# Patient Record
Sex: FEMALE | Race: WHITE | NOT HISPANIC OR LATINO | URBAN - METROPOLITAN AREA
[De-identification: names, ages, dates, MRNs, and addresses within clinical notes are randomized per-mention and may not be internally consistent; named-entity substitution may affect disease eponyms.]

---

## 2017-06-14 ENCOUNTER — OUTPATIENT (OUTPATIENT)
Dept: OUTPATIENT SERVICES | Facility: HOSPITAL | Age: 62
LOS: 1 days | Discharge: HOME | End: 2017-06-14

## 2017-06-14 DIAGNOSIS — D64.9 ANEMIA, UNSPECIFIED: ICD-10-CM

## 2017-06-14 DIAGNOSIS — R19.5 OTHER FECAL ABNORMALITIES: ICD-10-CM

## 2017-06-14 DIAGNOSIS — N39.0 URINARY TRACT INFECTION, SITE NOT SPECIFIED: ICD-10-CM

## 2017-06-28 DIAGNOSIS — D64.9 ANEMIA, UNSPECIFIED: ICD-10-CM

## 2017-09-11 ENCOUNTER — OUTPATIENT (OUTPATIENT)
Dept: OUTPATIENT SERVICES | Facility: HOSPITAL | Age: 62
LOS: 1 days | Discharge: HOME | End: 2017-09-11

## 2017-09-11 DIAGNOSIS — R50.9 FEVER, UNSPECIFIED: ICD-10-CM

## 2017-09-11 DIAGNOSIS — R19.5 OTHER FECAL ABNORMALITIES: ICD-10-CM

## 2017-09-11 DIAGNOSIS — D64.9 ANEMIA, UNSPECIFIED: ICD-10-CM

## 2017-09-11 DIAGNOSIS — N39.0 URINARY TRACT INFECTION, SITE NOT SPECIFIED: ICD-10-CM

## 2017-09-12 ENCOUNTER — OUTPATIENT (OUTPATIENT)
Dept: OUTPATIENT SERVICES | Facility: HOSPITAL | Age: 62
LOS: 1 days | Discharge: HOME | End: 2017-09-12

## 2017-09-12 DIAGNOSIS — D64.9 ANEMIA, UNSPECIFIED: ICD-10-CM

## 2017-09-12 DIAGNOSIS — Z79.899 OTHER LONG TERM (CURRENT) DRUG THERAPY: ICD-10-CM

## 2017-09-12 DIAGNOSIS — N39.0 URINARY TRACT INFECTION, SITE NOT SPECIFIED: ICD-10-CM

## 2017-09-12 DIAGNOSIS — R19.5 OTHER FECAL ABNORMALITIES: ICD-10-CM

## 2017-09-12 DIAGNOSIS — R94.6 ABNORMAL RESULTS OF THYROID FUNCTION STUDIES: ICD-10-CM

## 2017-09-14 ENCOUNTER — OUTPATIENT (OUTPATIENT)
Dept: OUTPATIENT SERVICES | Facility: HOSPITAL | Age: 62
LOS: 1 days | Discharge: HOME | End: 2017-09-14

## 2017-09-14 DIAGNOSIS — R19.5 OTHER FECAL ABNORMALITIES: ICD-10-CM

## 2017-09-14 DIAGNOSIS — D64.9 ANEMIA, UNSPECIFIED: ICD-10-CM

## 2017-09-14 DIAGNOSIS — N39.0 URINARY TRACT INFECTION, SITE NOT SPECIFIED: ICD-10-CM

## 2017-09-14 DIAGNOSIS — R79.9 ABNORMAL FINDING OF BLOOD CHEMISTRY, UNSPECIFIED: ICD-10-CM

## 2017-09-18 ENCOUNTER — OUTPATIENT (OUTPATIENT)
Dept: OUTPATIENT SERVICES | Facility: HOSPITAL | Age: 62
LOS: 1 days | Discharge: HOME | End: 2017-09-18

## 2017-09-18 DIAGNOSIS — R19.5 OTHER FECAL ABNORMALITIES: ICD-10-CM

## 2017-09-18 DIAGNOSIS — D64.9 ANEMIA, UNSPECIFIED: ICD-10-CM

## 2017-09-18 DIAGNOSIS — N39.0 URINARY TRACT INFECTION, SITE NOT SPECIFIED: ICD-10-CM

## 2017-09-18 DIAGNOSIS — R79.9 ABNORMAL FINDING OF BLOOD CHEMISTRY, UNSPECIFIED: ICD-10-CM

## 2017-09-24 ENCOUNTER — INPATIENT (INPATIENT)
Facility: HOSPITAL | Age: 62
LOS: 11 days | Discharge: NURSING, REHAB OR LONG-TERM CARE | End: 2017-10-06
Attending: HOSPITALIST

## 2017-09-24 DIAGNOSIS — R19.5 OTHER FECAL ABNORMALITIES: ICD-10-CM

## 2017-09-24 DIAGNOSIS — D64.9 ANEMIA, UNSPECIFIED: ICD-10-CM

## 2017-09-24 DIAGNOSIS — N39.0 URINARY TRACT INFECTION, SITE NOT SPECIFIED: ICD-10-CM

## 2017-10-06 PROBLEM — Z00.00 ENCOUNTER FOR PREVENTIVE HEALTH EXAMINATION: Status: ACTIVE | Noted: 2017-10-06

## 2017-10-07 ENCOUNTER — OUTPATIENT (OUTPATIENT)
Dept: OUTPATIENT SERVICES | Facility: HOSPITAL | Age: 62
LOS: 1 days | Discharge: HOME | End: 2017-10-07

## 2017-10-07 DIAGNOSIS — D64.9 ANEMIA, UNSPECIFIED: ICD-10-CM

## 2017-10-07 DIAGNOSIS — R19.5 OTHER FECAL ABNORMALITIES: ICD-10-CM

## 2017-10-07 DIAGNOSIS — N39.0 URINARY TRACT INFECTION, SITE NOT SPECIFIED: ICD-10-CM

## 2017-10-09 ENCOUNTER — OUTPATIENT (OUTPATIENT)
Dept: OUTPATIENT SERVICES | Facility: HOSPITAL | Age: 62
LOS: 1 days | Discharge: HOME | End: 2017-10-09

## 2017-10-09 DIAGNOSIS — E03.9 HYPOTHYROIDISM, UNSPECIFIED: ICD-10-CM

## 2017-10-09 DIAGNOSIS — D64.9 ANEMIA, UNSPECIFIED: ICD-10-CM

## 2017-10-09 DIAGNOSIS — N39.0 URINARY TRACT INFECTION, SITE NOT SPECIFIED: ICD-10-CM

## 2017-10-09 DIAGNOSIS — R19.5 OTHER FECAL ABNORMALITIES: ICD-10-CM

## 2017-10-09 DIAGNOSIS — R79.9 ABNORMAL FINDING OF BLOOD CHEMISTRY, UNSPECIFIED: ICD-10-CM

## 2017-10-12 ENCOUNTER — OUTPATIENT (OUTPATIENT)
Dept: OUTPATIENT SERVICES | Facility: HOSPITAL | Age: 62
LOS: 1 days | Discharge: HOME | End: 2017-10-12

## 2017-10-12 ENCOUNTER — INPATIENT (INPATIENT)
Facility: HOSPITAL | Age: 62
LOS: 3 days | Discharge: NURSING, REHAB OR LONG-TERM CARE | End: 2017-10-16
Attending: INTERNAL MEDICINE

## 2017-10-12 DIAGNOSIS — N39.0 URINARY TRACT INFECTION, SITE NOT SPECIFIED: ICD-10-CM

## 2017-10-12 DIAGNOSIS — R19.5 OTHER FECAL ABNORMALITIES: ICD-10-CM

## 2017-10-12 DIAGNOSIS — N17.9 ACUTE KIDNEY FAILURE, UNSPECIFIED: ICD-10-CM

## 2017-10-12 DIAGNOSIS — D64.9 ANEMIA, UNSPECIFIED: ICD-10-CM

## 2017-10-12 DIAGNOSIS — G40.909 EPILEPSY, UNSPECIFIED, NOT INTRACTABLE, WITHOUT STATUS EPILEPTICUS: ICD-10-CM

## 2017-10-12 DIAGNOSIS — F29 UNSPECIFIED PSYCHOSIS NOT DUE TO A SUBSTANCE OR KNOWN PHYSIOLOGICAL CONDITION: ICD-10-CM

## 2017-10-12 DIAGNOSIS — G35 MULTIPLE SCLEROSIS: ICD-10-CM

## 2017-10-12 DIAGNOSIS — G93.41 METABOLIC ENCEPHALOPATHY: ICD-10-CM

## 2017-10-12 DIAGNOSIS — E86.1 HYPOVOLEMIA: ICD-10-CM

## 2017-10-12 DIAGNOSIS — F31.9 BIPOLAR DISORDER, UNSPECIFIED: ICD-10-CM

## 2017-10-12 DIAGNOSIS — K59.00 CONSTIPATION, UNSPECIFIED: ICD-10-CM

## 2017-10-12 DIAGNOSIS — R33.8 OTHER RETENTION OF URINE: ICD-10-CM

## 2017-10-12 DIAGNOSIS — E87.2 ACIDOSIS: ICD-10-CM

## 2017-10-12 DIAGNOSIS — E44.1 MILD PROTEIN-CALORIE MALNUTRITION: ICD-10-CM

## 2017-10-12 DIAGNOSIS — E87.5 HYPERKALEMIA: ICD-10-CM

## 2017-10-12 DIAGNOSIS — E03.9 HYPOTHYROIDISM, UNSPECIFIED: ICD-10-CM

## 2017-10-12 DIAGNOSIS — E87.1 HYPO-OSMOLALITY AND HYPONATREMIA: ICD-10-CM

## 2017-10-12 DIAGNOSIS — E86.0 DEHYDRATION: ICD-10-CM

## 2017-10-12 DIAGNOSIS — B96.20 UNSPECIFIED ESCHERICHIA COLI [E. COLI] AS THE CAUSE OF DISEASES CLASSIFIED ELSEWHERE: ICD-10-CM

## 2017-10-12 DIAGNOSIS — R94.5 ABNORMAL RESULTS OF LIVER FUNCTION STUDIES: ICD-10-CM

## 2017-10-12 DIAGNOSIS — E83.52 HYPERCALCEMIA: ICD-10-CM

## 2017-10-12 DIAGNOSIS — M81.0 AGE-RELATED OSTEOPOROSIS WITHOUT CURRENT PATHOLOGICAL FRACTURE: ICD-10-CM

## 2017-10-12 DIAGNOSIS — R79.9 ABNORMAL FINDING OF BLOOD CHEMISTRY, UNSPECIFIED: ICD-10-CM

## 2017-10-12 DIAGNOSIS — D72.829 ELEVATED WHITE BLOOD CELL COUNT, UNSPECIFIED: ICD-10-CM

## 2017-10-12 DIAGNOSIS — E72.20 DISORDER OF UREA CYCLE METABOLISM, UNSPECIFIED: ICD-10-CM

## 2017-10-12 DIAGNOSIS — E53.8 DEFICIENCY OF OTHER SPECIFIED B GROUP VITAMINS: ICD-10-CM

## 2017-10-12 DIAGNOSIS — N26.1 ATROPHY OF KIDNEY (TERMINAL): ICD-10-CM

## 2017-10-12 DIAGNOSIS — N18.3 CHRONIC KIDNEY DISEASE, STAGE 3 (MODERATE): ICD-10-CM

## 2017-10-12 DIAGNOSIS — K72.90 HEPATIC FAILURE, UNSPECIFIED WITHOUT COMA: ICD-10-CM

## 2017-10-12 DIAGNOSIS — R74.0 NONSPECIFIC ELEVATION OF LEVELS OF TRANSAMINASE AND LACTIC ACID DEHYDROGENASE [LDH]: ICD-10-CM

## 2017-10-13 DIAGNOSIS — Z02.9 ENCOUNTER FOR ADMINISTRATIVE EXAMINATIONS, UNSPECIFIED: ICD-10-CM

## 2017-10-19 DIAGNOSIS — E03.9 HYPOTHYROIDISM, UNSPECIFIED: ICD-10-CM

## 2017-10-19 DIAGNOSIS — D64.9 ANEMIA, UNSPECIFIED: ICD-10-CM

## 2017-10-19 DIAGNOSIS — R56.9 UNSPECIFIED CONVULSIONS: ICD-10-CM

## 2017-10-19 DIAGNOSIS — N18.9 CHRONIC KIDNEY DISEASE, UNSPECIFIED: ICD-10-CM

## 2017-10-19 DIAGNOSIS — Z96.0 PRESENCE OF UROGENITAL IMPLANTS: ICD-10-CM

## 2017-10-19 DIAGNOSIS — K76.89 OTHER SPECIFIED DISEASES OF LIVER: ICD-10-CM

## 2017-10-19 DIAGNOSIS — M81.0 AGE-RELATED OSTEOPOROSIS WITHOUT CURRENT PATHOLOGICAL FRACTURE: ICD-10-CM

## 2017-10-19 DIAGNOSIS — F31.9 BIPOLAR DISORDER, UNSPECIFIED: ICD-10-CM

## 2017-10-19 DIAGNOSIS — G35 MULTIPLE SCLEROSIS: ICD-10-CM

## 2017-10-19 DIAGNOSIS — E87.1 HYPO-OSMOLALITY AND HYPONATREMIA: ICD-10-CM

## 2017-10-19 DIAGNOSIS — N39.0 URINARY TRACT INFECTION, SITE NOT SPECIFIED: ICD-10-CM

## 2017-10-19 DIAGNOSIS — E11.22 TYPE 2 DIABETES MELLITUS WITH DIABETIC CHRONIC KIDNEY DISEASE: ICD-10-CM

## 2017-10-24 DIAGNOSIS — L89.151 PRESSURE ULCER OF SACRAL REGION, STAGE 1: ICD-10-CM

## 2017-11-06 ENCOUNTER — OUTPATIENT (OUTPATIENT)
Dept: OUTPATIENT SERVICES | Facility: HOSPITAL | Age: 62
LOS: 1 days | Discharge: HOME | End: 2017-11-06

## 2017-11-06 DIAGNOSIS — D64.9 ANEMIA, UNSPECIFIED: ICD-10-CM

## 2017-11-06 DIAGNOSIS — N39.0 URINARY TRACT INFECTION, SITE NOT SPECIFIED: ICD-10-CM

## 2017-11-06 DIAGNOSIS — R19.5 OTHER FECAL ABNORMALITIES: ICD-10-CM

## 2017-11-06 DIAGNOSIS — R79.9 ABNORMAL FINDING OF BLOOD CHEMISTRY, UNSPECIFIED: ICD-10-CM

## 2017-11-14 ENCOUNTER — OUTPATIENT (OUTPATIENT)
Dept: OUTPATIENT SERVICES | Facility: HOSPITAL | Age: 62
LOS: 1 days | Discharge: HOME | End: 2017-11-14

## 2017-11-14 DIAGNOSIS — N39.0 URINARY TRACT INFECTION, SITE NOT SPECIFIED: ICD-10-CM

## 2017-11-14 DIAGNOSIS — R19.5 OTHER FECAL ABNORMALITIES: ICD-10-CM

## 2017-11-14 DIAGNOSIS — D64.9 ANEMIA, UNSPECIFIED: ICD-10-CM

## 2017-11-14 DIAGNOSIS — R79.9 ABNORMAL FINDING OF BLOOD CHEMISTRY, UNSPECIFIED: ICD-10-CM

## 2017-11-17 ENCOUNTER — OUTPATIENT (OUTPATIENT)
Dept: OUTPATIENT SERVICES | Facility: HOSPITAL | Age: 62
LOS: 1 days | Discharge: HOME | End: 2017-11-17

## 2017-11-17 DIAGNOSIS — D64.9 ANEMIA, UNSPECIFIED: ICD-10-CM

## 2017-11-17 DIAGNOSIS — N39.0 URINARY TRACT INFECTION, SITE NOT SPECIFIED: ICD-10-CM

## 2017-11-17 DIAGNOSIS — R19.5 OTHER FECAL ABNORMALITIES: ICD-10-CM

## 2017-11-22 ENCOUNTER — OUTPATIENT (OUTPATIENT)
Dept: OUTPATIENT SERVICES | Facility: HOSPITAL | Age: 62
LOS: 1 days | Discharge: HOME | End: 2017-11-22

## 2017-11-22 DIAGNOSIS — N39.0 URINARY TRACT INFECTION, SITE NOT SPECIFIED: ICD-10-CM

## 2017-11-22 DIAGNOSIS — D64.9 ANEMIA, UNSPECIFIED: ICD-10-CM

## 2017-11-22 DIAGNOSIS — R19.5 OTHER FECAL ABNORMALITIES: ICD-10-CM

## 2017-11-27 ENCOUNTER — OUTPATIENT (OUTPATIENT)
Dept: OUTPATIENT SERVICES | Facility: HOSPITAL | Age: 62
LOS: 1 days | Discharge: HOME | End: 2017-11-27

## 2017-11-27 DIAGNOSIS — N39.0 URINARY TRACT INFECTION, SITE NOT SPECIFIED: ICD-10-CM

## 2017-11-27 DIAGNOSIS — D64.9 ANEMIA, UNSPECIFIED: ICD-10-CM

## 2017-11-27 DIAGNOSIS — R19.5 OTHER FECAL ABNORMALITIES: ICD-10-CM

## 2017-11-27 DIAGNOSIS — R71.8 OTHER ABNORMALITY OF RED BLOOD CELLS: ICD-10-CM

## 2017-12-14 ENCOUNTER — OUTPATIENT (OUTPATIENT)
Dept: OUTPATIENT SERVICES | Facility: HOSPITAL | Age: 62
LOS: 1 days | Discharge: HOME | End: 2017-12-14

## 2017-12-14 DIAGNOSIS — N39.0 URINARY TRACT INFECTION, SITE NOT SPECIFIED: ICD-10-CM

## 2017-12-14 DIAGNOSIS — R79.9 ABNORMAL FINDING OF BLOOD CHEMISTRY, UNSPECIFIED: ICD-10-CM

## 2017-12-14 DIAGNOSIS — E87.4 MIXED DISORDER OF ACID-BASE BALANCE: ICD-10-CM

## 2017-12-14 DIAGNOSIS — D64.9 ANEMIA, UNSPECIFIED: ICD-10-CM

## 2017-12-14 DIAGNOSIS — R94.6 ABNORMAL RESULTS OF THYROID FUNCTION STUDIES: ICD-10-CM

## 2017-12-14 DIAGNOSIS — R19.5 OTHER FECAL ABNORMALITIES: ICD-10-CM

## 2017-12-14 DIAGNOSIS — Z51.81 ENCOUNTER FOR THERAPEUTIC DRUG LEVEL MONITORING: ICD-10-CM

## 2018-01-11 ENCOUNTER — INPATIENT (INPATIENT)
Facility: HOSPITAL | Age: 63
LOS: 5 days | Discharge: NURSING, REHAB OR LONG-TERM CARE | End: 2018-01-17
Attending: HOSPITALIST

## 2018-01-11 ENCOUNTER — OUTPATIENT (OUTPATIENT)
Dept: OUTPATIENT SERVICES | Facility: HOSPITAL | Age: 63
LOS: 1 days | Discharge: HOME | End: 2018-01-11

## 2018-01-11 DIAGNOSIS — N39.0 URINARY TRACT INFECTION, SITE NOT SPECIFIED: ICD-10-CM

## 2018-01-11 DIAGNOSIS — R19.5 OTHER FECAL ABNORMALITIES: ICD-10-CM

## 2018-01-11 DIAGNOSIS — D64.9 ANEMIA, UNSPECIFIED: ICD-10-CM

## 2018-01-11 DIAGNOSIS — R79.9 ABNORMAL FINDING OF BLOOD CHEMISTRY, UNSPECIFIED: ICD-10-CM

## 2018-01-12 DIAGNOSIS — Z02.9 ENCOUNTER FOR ADMINISTRATIVE EXAMINATIONS, UNSPECIFIED: ICD-10-CM

## 2018-01-18 ENCOUNTER — OUTPATIENT (OUTPATIENT)
Dept: OUTPATIENT SERVICES | Facility: HOSPITAL | Age: 63
LOS: 1 days | Discharge: HOME | End: 2018-01-18

## 2018-01-18 DIAGNOSIS — D64.9 ANEMIA, UNSPECIFIED: ICD-10-CM

## 2018-01-18 DIAGNOSIS — R79.9 ABNORMAL FINDING OF BLOOD CHEMISTRY, UNSPECIFIED: ICD-10-CM

## 2018-01-18 DIAGNOSIS — R19.5 OTHER FECAL ABNORMALITIES: ICD-10-CM

## 2018-01-18 DIAGNOSIS — N39.0 URINARY TRACT INFECTION, SITE NOT SPECIFIED: ICD-10-CM

## 2018-01-22 DIAGNOSIS — G93.41 METABOLIC ENCEPHALOPATHY: ICD-10-CM

## 2018-01-22 DIAGNOSIS — N13.6 PYONEPHROSIS: ICD-10-CM

## 2018-01-22 DIAGNOSIS — F31.9 BIPOLAR DISORDER, UNSPECIFIED: ICD-10-CM

## 2018-01-22 DIAGNOSIS — N18.4 CHRONIC KIDNEY DISEASE, STAGE 4 (SEVERE): ICD-10-CM

## 2018-01-22 DIAGNOSIS — G40.909 EPILEPSY, UNSPECIFIED, NOT INTRACTABLE, WITHOUT STATUS EPILEPTICUS: ICD-10-CM

## 2018-01-22 DIAGNOSIS — R41.82 ALTERED MENTAL STATUS, UNSPECIFIED: ICD-10-CM

## 2018-01-22 DIAGNOSIS — N17.9 ACUTE KIDNEY FAILURE, UNSPECIFIED: ICD-10-CM

## 2018-01-22 DIAGNOSIS — E11.22 TYPE 2 DIABETES MELLITUS WITH DIABETIC CHRONIC KIDNEY DISEASE: ICD-10-CM

## 2018-01-22 DIAGNOSIS — R33.9 RETENTION OF URINE, UNSPECIFIED: ICD-10-CM

## 2018-01-22 DIAGNOSIS — G35 MULTIPLE SCLEROSIS: ICD-10-CM

## 2018-01-22 DIAGNOSIS — E87.1 HYPO-OSMOLALITY AND HYPONATREMIA: ICD-10-CM

## 2018-01-22 DIAGNOSIS — M81.0 AGE-RELATED OSTEOPOROSIS WITHOUT CURRENT PATHOLOGICAL FRACTURE: ICD-10-CM

## 2018-01-22 DIAGNOSIS — L89.153 PRESSURE ULCER OF SACRAL REGION, STAGE 3: ICD-10-CM

## 2018-01-22 DIAGNOSIS — R65.20 SEVERE SEPSIS WITHOUT SEPTIC SHOCK: ICD-10-CM

## 2018-01-22 DIAGNOSIS — A41.50 GRAM-NEGATIVE SEPSIS, UNSPECIFIED: ICD-10-CM

## 2018-01-22 DIAGNOSIS — B96.20 UNSPECIFIED ESCHERICHIA COLI [E. COLI] AS THE CAUSE OF DISEASES CLASSIFIED ELSEWHERE: ICD-10-CM

## 2018-01-22 DIAGNOSIS — E87.2 ACIDOSIS: ICD-10-CM

## 2018-01-23 ENCOUNTER — OUTPATIENT (OUTPATIENT)
Dept: OUTPATIENT SERVICES | Facility: HOSPITAL | Age: 63
LOS: 1 days | Discharge: HOME | End: 2018-01-23

## 2018-01-23 DIAGNOSIS — R79.9 ABNORMAL FINDING OF BLOOD CHEMISTRY, UNSPECIFIED: ICD-10-CM

## 2018-01-23 DIAGNOSIS — D64.9 ANEMIA, UNSPECIFIED: ICD-10-CM

## 2018-01-29 ENCOUNTER — OUTPATIENT (OUTPATIENT)
Dept: OUTPATIENT SERVICES | Facility: HOSPITAL | Age: 63
LOS: 1 days | Discharge: HOME | End: 2018-01-29

## 2018-01-29 DIAGNOSIS — R79.9 ABNORMAL FINDING OF BLOOD CHEMISTRY, UNSPECIFIED: ICD-10-CM

## 2018-02-01 ENCOUNTER — APPOINTMENT (OUTPATIENT)
Dept: SURGERY | Facility: CLINIC | Age: 63
End: 2018-02-01

## 2018-02-01 ENCOUNTER — OUTPATIENT (OUTPATIENT)
Dept: OUTPATIENT SERVICES | Facility: HOSPITAL | Age: 63
LOS: 1 days | Discharge: HOME | End: 2018-02-01

## 2018-02-01 ENCOUNTER — INPATIENT (INPATIENT)
Facility: HOSPITAL | Age: 63
LOS: 7 days | Discharge: HOME HEALTH | End: 2018-02-09
Attending: HOSPITALIST

## 2018-02-01 DIAGNOSIS — R94.6 ABNORMAL RESULTS OF THYROID FUNCTION STUDIES: ICD-10-CM

## 2018-02-01 DIAGNOSIS — R79.9 ABNORMAL FINDING OF BLOOD CHEMISTRY, UNSPECIFIED: ICD-10-CM

## 2018-02-01 DIAGNOSIS — D64.9 ANEMIA, UNSPECIFIED: ICD-10-CM

## 2018-02-02 DIAGNOSIS — Z02.9 ENCOUNTER FOR ADMINISTRATIVE EXAMINATIONS, UNSPECIFIED: ICD-10-CM

## 2018-02-03 VITALS
HEART RATE: 102 BPM | DIASTOLIC BLOOD PRESSURE: 53 MMHG | SYSTOLIC BLOOD PRESSURE: 115 MMHG | TEMPERATURE: 98 F | RESPIRATION RATE: 16 BRPM

## 2018-02-03 LAB
CK SERPL-CCNC: 34 U/L — SIGNIFICANT CHANGE UP (ref 0–225)
VALPROATE SERPL-MCNC: <10 UG/ML — LOW (ref 50–100)

## 2018-02-03 RX ORDER — LACTOBACILLUS ACIDOPHILUS 100MM CELL
0 CAPSULE ORAL
Qty: 0 | Refills: 0 | COMMUNITY

## 2018-02-03 RX ORDER — HEPARIN SODIUM 5000 [USP'U]/ML
5000 INJECTION INTRAVENOUS; SUBCUTANEOUS EVERY 8 HOURS
Qty: 0 | Refills: 0 | Status: DISCONTINUED | OUTPATIENT
Start: 2018-02-03 | End: 2018-02-09

## 2018-02-03 RX ORDER — LEVOTHYROXINE SODIUM 125 MCG
1 TABLET ORAL
Qty: 0 | Refills: 0 | COMMUNITY

## 2018-02-03 RX ORDER — LEVOTHYROXINE SODIUM 125 MCG
50 TABLET ORAL DAILY
Qty: 0 | Refills: 0 | Status: DISCONTINUED | OUTPATIENT
Start: 2018-02-03 | End: 2018-02-09

## 2018-02-03 RX ORDER — CARBAMAZEPINE 200 MG
400 TABLET ORAL DAILY
Qty: 0 | Refills: 0 | Status: DISCONTINUED | OUTPATIENT
Start: 2018-02-03 | End: 2018-02-06

## 2018-02-03 RX ORDER — ZINC SULFATE TAB 220 MG (50 MG ZINC EQUIVALENT) 220 (50 ZN) MG
220 TAB ORAL DAILY
Qty: 0 | Refills: 0 | Status: DISCONTINUED | OUTPATIENT
Start: 2018-02-03 | End: 2018-02-09

## 2018-02-03 RX ORDER — DIVALPROEX SODIUM 500 MG/1
500 TABLET, DELAYED RELEASE ORAL AT BEDTIME
Qty: 0 | Refills: 0 | Status: DISCONTINUED | OUTPATIENT
Start: 2018-02-03 | End: 2018-02-06

## 2018-02-03 RX ORDER — THIAMINE MONONITRATE (VIT B1) 100 MG
1 TABLET ORAL
Qty: 0 | Refills: 0 | COMMUNITY

## 2018-02-03 RX ORDER — DIVALPROEX SODIUM 500 MG/1
250 TABLET, DELAYED RELEASE ORAL EVERY 12 HOURS
Qty: 0 | Refills: 0 | Status: DISCONTINUED | OUTPATIENT
Start: 2018-02-03 | End: 2018-02-03

## 2018-02-03 RX ORDER — FOLIC ACID 0.8 MG
1 TABLET ORAL DAILY
Qty: 0 | Refills: 0 | Status: DISCONTINUED | OUTPATIENT
Start: 2018-02-03 | End: 2018-02-09

## 2018-02-03 RX ORDER — DIVALPROEX SODIUM 500 MG/1
1 TABLET, DELAYED RELEASE ORAL
Qty: 0 | Refills: 0 | COMMUNITY

## 2018-02-03 RX ORDER — LACTULOSE 10 G/15ML
30 SOLUTION ORAL
Qty: 0 | Refills: 0 | COMMUNITY

## 2018-02-03 RX ORDER — SIMETHICONE 80 MG/1
1 TABLET, CHEWABLE ORAL
Qty: 0 | Refills: 0 | COMMUNITY

## 2018-02-03 RX ORDER — CHOLECALCIFEROL (VITAMIN D3) 125 MCG
1 CAPSULE ORAL
Qty: 0 | Refills: 0 | COMMUNITY

## 2018-02-03 RX ORDER — HEPARIN SODIUM 5000 [USP'U]/ML
5000 INJECTION INTRAVENOUS; SUBCUTANEOUS EVERY 8 HOURS
Qty: 0 | Refills: 0 | Status: DISCONTINUED | OUTPATIENT
Start: 2018-02-03 | End: 2018-02-03

## 2018-02-03 RX ORDER — THIAMINE MONONITRATE (VIT B1) 100 MG
100 TABLET ORAL DAILY
Qty: 0 | Refills: 0 | Status: DISCONTINUED | OUTPATIENT
Start: 2018-02-03 | End: 2018-02-09

## 2018-02-03 RX ORDER — CARBAMAZEPINE 200 MG
2 TABLET ORAL
Qty: 0 | Refills: 0 | COMMUNITY

## 2018-02-03 RX ORDER — DIVALPROEX SODIUM 500 MG/1
2 TABLET, DELAYED RELEASE ORAL
Qty: 0 | Refills: 0 | COMMUNITY

## 2018-02-03 RX ORDER — ZINC SULFATE TAB 220 MG (50 MG ZINC EQUIVALENT) 220 (50 ZN) MG
1 TAB ORAL
Qty: 0 | Refills: 0 | COMMUNITY

## 2018-02-03 RX ORDER — CARBAMAZEPINE 200 MG
1 TABLET ORAL
Qty: 0 | Refills: 0 | COMMUNITY

## 2018-02-03 RX ORDER — MEROPENEM 1 G/30ML
500 INJECTION INTRAVENOUS EVERY 8 HOURS
Qty: 0 | Refills: 0 | Status: DISCONTINUED | OUTPATIENT
Start: 2018-02-03 | End: 2018-02-03

## 2018-02-03 RX ORDER — DIVALPROEX SODIUM 500 MG/1
250 TABLET, DELAYED RELEASE ORAL
Qty: 0 | Refills: 0 | Status: DISCONTINUED | OUTPATIENT
Start: 2018-02-03 | End: 2018-02-06

## 2018-02-03 RX ORDER — LACTULOSE 10 G/15ML
10 SOLUTION ORAL EVERY 8 HOURS
Qty: 0 | Refills: 0 | Status: DISCONTINUED | OUTPATIENT
Start: 2018-02-03 | End: 2018-02-09

## 2018-02-03 RX ORDER — FOLIC ACID 0.8 MG
1 TABLET ORAL
Qty: 0 | Refills: 0 | COMMUNITY

## 2018-02-03 RX ORDER — CARBAMAZEPINE 200 MG
450 TABLET ORAL AT BEDTIME
Qty: 0 | Refills: 0 | Status: DISCONTINUED | OUTPATIENT
Start: 2018-02-03 | End: 2018-02-09

## 2018-02-03 RX ORDER — LACTOBACILLUS ACIDOPHILUS 100MM CELL
1 CAPSULE ORAL EVERY 8 HOURS
Qty: 0 | Refills: 0 | Status: DISCONTINUED | OUTPATIENT
Start: 2018-02-03 | End: 2018-02-03

## 2018-02-03 RX ORDER — MEROPENEM 1 G/30ML
500 INJECTION INTRAVENOUS EVERY 12 HOURS
Qty: 0 | Refills: 0 | Status: DISCONTINUED | OUTPATIENT
Start: 2018-02-03 | End: 2018-02-08

## 2018-02-03 RX ORDER — LACTOBACILLUS ACIDOPHILUS 100MM CELL
1 CAPSULE ORAL EVERY 8 HOURS
Qty: 0 | Refills: 0 | Status: DISCONTINUED | OUTPATIENT
Start: 2018-02-03 | End: 2018-02-09

## 2018-02-03 RX ADMIN — HEPARIN SODIUM 5000 UNIT(S): 5000 INJECTION INTRAVENOUS; SUBCUTANEOUS at 22:11

## 2018-02-03 RX ADMIN — Medication 1 TABLET(S): at 22:05

## 2018-02-03 RX ADMIN — Medication 450 MILLIGRAM(S): at 22:07

## 2018-02-03 RX ADMIN — DIVALPROEX SODIUM 500 MILLIGRAM(S): 500 TABLET, DELAYED RELEASE ORAL at 22:09

## 2018-02-03 RX ADMIN — LACTULOSE 10 GRAM(S): 10 SOLUTION ORAL at 22:05

## 2018-02-04 DIAGNOSIS — N39.0 URINARY TRACT INFECTION, SITE NOT SPECIFIED: ICD-10-CM

## 2018-02-04 DIAGNOSIS — R19.5 OTHER FECAL ABNORMALITIES: ICD-10-CM

## 2018-02-04 DIAGNOSIS — E87.2 ACIDOSIS: ICD-10-CM

## 2018-02-04 DIAGNOSIS — R56.9 UNSPECIFIED CONVULSIONS: ICD-10-CM

## 2018-02-04 DIAGNOSIS — E11.22 TYPE 2 DIABETES MELLITUS WITH DIABETIC CHRONIC KIDNEY DISEASE: ICD-10-CM

## 2018-02-04 DIAGNOSIS — N17.9 ACUTE KIDNEY FAILURE, UNSPECIFIED: ICD-10-CM

## 2018-02-04 DIAGNOSIS — D64.9 ANEMIA, UNSPECIFIED: ICD-10-CM

## 2018-02-04 DIAGNOSIS — L89.303 PRESSURE ULCER OF UNSPECIFIED BUTTOCK, STAGE 3: ICD-10-CM

## 2018-02-04 DIAGNOSIS — E03.9 HYPOTHYROIDISM, UNSPECIFIED: ICD-10-CM

## 2018-02-04 DIAGNOSIS — N18.4 CHRONIC KIDNEY DISEASE, STAGE 4 (SEVERE): ICD-10-CM

## 2018-02-04 DIAGNOSIS — R33.9 RETENTION OF URINE, UNSPECIFIED: ICD-10-CM

## 2018-02-04 DIAGNOSIS — D63.8 ANEMIA IN OTHER CHRONIC DISEASES CLASSIFIED ELSEWHERE: ICD-10-CM

## 2018-02-04 DIAGNOSIS — K74.60 UNSPECIFIED CIRRHOSIS OF LIVER: ICD-10-CM

## 2018-02-04 LAB
ALBUMIN SERPL ELPH-MCNC: 1.8 G/DL — LOW (ref 3–5.5)
ALP SERPL-CCNC: 98 U/L — SIGNIFICANT CHANGE UP (ref 30–115)
ALT FLD-CCNC: 8 U/L — SIGNIFICANT CHANGE UP (ref 0–41)
ANION GAP SERPL CALC-SCNC: 16 MMOL/L — HIGH (ref 7–14)
ANION GAP SERPL CALC-SCNC: 18 MMOL/L — HIGH (ref 7–14)
AST SERPL-CCNC: 16 U/L — SIGNIFICANT CHANGE UP (ref 0–41)
BILIRUB SERPL-MCNC: 0.5 MG/DL — SIGNIFICANT CHANGE UP (ref 0.2–1.2)
BUN SERPL-MCNC: 45 MG/DL — HIGH (ref 10–20)
BUN SERPL-MCNC: 46 MG/DL — HIGH (ref 10–20)
CALCIUM SERPL-MCNC: 8.7 MG/DL — SIGNIFICANT CHANGE UP (ref 8.5–10.1)
CALCIUM SERPL-MCNC: 8.8 MG/DL — SIGNIFICANT CHANGE UP (ref 8.5–10.1)
CHLORIDE SERPL-SCNC: 107 MMOL/L — SIGNIFICANT CHANGE UP (ref 98–110)
CHLORIDE SERPL-SCNC: 108 MMOL/L — SIGNIFICANT CHANGE UP (ref 98–110)
CO2 SERPL-SCNC: 15 MMOL/L — LOW (ref 17–32)
CO2 SERPL-SCNC: 17 MMOL/L — SIGNIFICANT CHANGE UP (ref 17–32)
CREAT SERPL-MCNC: 2.3 MG/DL — HIGH (ref 0.7–1.5)
CREAT SERPL-MCNC: 2.3 MG/DL — HIGH (ref 0.7–1.5)
GLUCOSE SERPL-MCNC: 81 MG/DL — SIGNIFICANT CHANGE UP (ref 70–110)
GLUCOSE SERPL-MCNC: 98 MG/DL — SIGNIFICANT CHANGE UP (ref 70–110)
HCT VFR BLD CALC: 24.6 % — LOW (ref 37–47)
HGB BLD-MCNC: 8.2 G/DL — LOW (ref 14–18)
MCHC RBC-ENTMCNC: 29 PG — SIGNIFICANT CHANGE UP (ref 27–31)
MCHC RBC-ENTMCNC: 33.3 G/DL — SIGNIFICANT CHANGE UP (ref 32–37)
MCV RBC AUTO: 86.9 FL — SIGNIFICANT CHANGE UP (ref 81–91)
NRBC # BLD: 0 /100 WBCS — SIGNIFICANT CHANGE UP (ref 0–0)
OSMOLALITY UR: 262 MOS/KG — SIGNIFICANT CHANGE UP (ref 50–1400)
PLATELET # BLD AUTO: 416 K/UL — HIGH (ref 130–400)
POTASSIUM SERPL-MCNC: 3.3 MMOL/L — LOW (ref 3.5–5)
POTASSIUM SERPL-MCNC: 3.9 MMOL/L — SIGNIFICANT CHANGE UP (ref 3.5–5)
POTASSIUM SERPL-SCNC: 3.3 MMOL/L — LOW (ref 3.5–5)
POTASSIUM SERPL-SCNC: 3.9 MMOL/L — SIGNIFICANT CHANGE UP (ref 3.5–5)
PROT SERPL-MCNC: 6.1 G/DL — SIGNIFICANT CHANGE UP (ref 6–8)
RBC # BLD: 2.83 M/UL — LOW (ref 4.2–5.4)
RBC # FLD: 14.9 % — HIGH (ref 11.5–14.5)
SODIUM SERPL-SCNC: 140 MMOL/L — SIGNIFICANT CHANGE UP (ref 135–146)
SODIUM SERPL-SCNC: 141 MMOL/L — SIGNIFICANT CHANGE UP (ref 135–146)
VALPROATE SERPL-MCNC: 15.8 UG/ML — LOW (ref 50–100)
WBC # BLD: 8.11 K/UL — SIGNIFICANT CHANGE UP (ref 4.8–10.8)
WBC # FLD AUTO: 8.11 K/UL — SIGNIFICANT CHANGE UP (ref 4.8–10.8)

## 2018-02-04 RX ORDER — VALPROIC ACID (AS SODIUM SALT) 250 MG/5ML
1200 SOLUTION, ORAL ORAL ONCE
Qty: 0 | Refills: 0 | Status: COMPLETED | OUTPATIENT
Start: 2018-02-04 | End: 2018-02-04

## 2018-02-04 RX ORDER — POTASSIUM CHLORIDE 20 MEQ
40 PACKET (EA) ORAL ONCE
Qty: 0 | Refills: 0 | Status: COMPLETED | OUTPATIENT
Start: 2018-02-04 | End: 2018-02-04

## 2018-02-04 RX ORDER — SODIUM CHLORIDE 9 MG/ML
1000 INJECTION, SOLUTION INTRAVENOUS
Qty: 0 | Refills: 0 | Status: DISCONTINUED | OUTPATIENT
Start: 2018-02-04 | End: 2018-02-06

## 2018-02-04 RX ORDER — SODIUM CHLORIDE 9 MG/ML
1000 INJECTION, SOLUTION INTRAVENOUS
Qty: 0 | Refills: 0 | Status: DISCONTINUED | OUTPATIENT
Start: 2018-02-04 | End: 2018-02-04

## 2018-02-04 RX ADMIN — Medication 450 MILLIGRAM(S): at 21:25

## 2018-02-04 RX ADMIN — Medication 1 APPLICATION(S): at 06:12

## 2018-02-04 RX ADMIN — LACTULOSE 10 GRAM(S): 10 SOLUTION ORAL at 14:58

## 2018-02-04 RX ADMIN — Medication 1 TABLET(S): at 21:31

## 2018-02-04 RX ADMIN — DIVALPROEX SODIUM 500 MILLIGRAM(S): 500 TABLET, DELAYED RELEASE ORAL at 21:29

## 2018-02-04 RX ADMIN — Medication 1 APPLICATION(S): at 17:32

## 2018-02-04 RX ADMIN — DIVALPROEX SODIUM 250 MILLIGRAM(S): 500 TABLET, DELAYED RELEASE ORAL at 18:05

## 2018-02-04 RX ADMIN — HEPARIN SODIUM 5000 UNIT(S): 5000 INJECTION INTRAVENOUS; SUBCUTANEOUS at 21:30

## 2018-02-04 RX ADMIN — MEROPENEM 100 MILLIGRAM(S): 1 INJECTION INTRAVENOUS at 06:39

## 2018-02-04 RX ADMIN — SODIUM CHLORIDE 100 MILLILITER(S): 9 INJECTION, SOLUTION INTRAVENOUS at 11:47

## 2018-02-04 RX ADMIN — Medication 1 TABLET(S): at 14:58

## 2018-02-04 RX ADMIN — LACTULOSE 10 GRAM(S): 10 SOLUTION ORAL at 21:31

## 2018-02-04 RX ADMIN — MEROPENEM 100 MILLIGRAM(S): 1 INJECTION INTRAVENOUS at 17:31

## 2018-02-04 RX ADMIN — HEPARIN SODIUM 5000 UNIT(S): 5000 INJECTION INTRAVENOUS; SUBCUTANEOUS at 14:57

## 2018-02-04 RX ADMIN — Medication 1 APPLICATION(S): at 09:46

## 2018-02-04 RX ADMIN — Medication 56 MILLIGRAM(S): at 18:30

## 2018-02-04 RX ADMIN — Medication 40 MILLIEQUIVALENT(S): at 15:50

## 2018-02-04 RX ADMIN — HEPARIN SODIUM 5000 UNIT(S): 5000 INJECTION INTRAVENOUS; SUBCUTANEOUS at 06:09

## 2018-02-04 NOTE — PROGRESS NOTE ADULT - SUBJECTIVE AND OBJECTIVE BOX
Nephrology progress note    Patient is a 62y Female with DM, Bipolar d/o, CKD stage III/IV, indwelling Tyson, recurrent UTIs, seizures, admitted with change in MS.  Pt also with history of liver cirrhosis on Lactulose at NH.  Pt recently diagnosed with Colon Mass/Cancer (1/25/18) as per surgery - Dr. Centeno note.  Pt seen for SALOME on CKD - baseline creat 2.2 (1/17/18) and metabolic acidosis    Allergies:  No Known Allergies    Hospital Medications:   MEDICATIONS  (STANDING):  carBAMazepine 450 milliGRAM(s) Oral at bedtime  carBAMazepine 400 milliGRAM(s) Oral daily  diVALproex Sprinkle 500 milliGRAM(s) Oral at bedtime  diVALproex Sprinkle 250 milliGRAM(s) Oral two times a day  folic acid 1 milliGRAM(s) Oral daily  heparin  Injectable 5000 Unit(s) SubCutaneous every 8 hours  lactobacillus acidophilus 1 Tablet(s) Oral every 8 hours  lactulose Syrup 10 Gram(s) Oral every 8 hours  levothyroxine 50 MICROGram(s) Oral daily  meropenem  IVPB 500 milliGRAM(s) IV Intermittent every 12 hours  multivitamin 1 Tablet(s) Oral daily  silver sulfADIAZINE 1% Cream 1 Application(s) Topical every 12 hours  thiamine 100 milliGRAM(s) Oral daily  zinc sulfate 220 milliGRAM(s) Oral daily    REVIEW OF SYSTEMS: Poor po intake, refuses meds most of the time. IVF given off and on in the hospital. As per nursing, no diarrhea, but pt is on Lactulose for high ammonia.  CONSTITUTIONAL: No fevers or chills  NECK: No pain or stiffness  RESPIRATORY: No cough, wheezing, hemoptysis; No shortness of breath  CARDIOVASCULAR: No chest pain or palpitations.  GASTROINTESTINAL: No abdominal or epigastric pain. No diarrhea  GENITOURINARY: Tyson in place  NEUROLOGICAL: Awake, alert, not answering questions  SKIN: No itching, burning, rashes, or lesions   VASCULAR: No bilateral lower extremity edema.   All other review of systems is negative unless indicated above.    VITALS:  T(F): 97.4 (02-04-18 @ 00:49), Max: 97.4 (02-04-18 @ 00:49)  HR: 99 (02-04-18 @ 00:49)  BP: 132/60 (02-04-18 @ 00:49)  RR: 18 (02-04-18 @ 00:49)  SpO2: --  Wt(kg): --    02-03 @ 07:01  -  02-04 @ 07:00  --------------------------------------------------------  IN: 0 mL / OUT: 400 mL / NET: -400 mL      Height (cm): 157.48 (02-03 @ 12:20)  Weight (kg): 76.9 (02-03 @ 12:20)  BMI (kg/m2): 31 (02-03 @ 12:20)  BSA (m2): 1.78 (02-03 @ 12:20)  PHYSICAL EXAM:  Constitutional: NAD  Neck: No JVD  Respiratory: CTAB, no wheezes, rales or rhonchi  Cardiovascular: S1, S2, RRR  Gastrointestinal: BS+, soft, NT/ND  Extremities: No cyanosis or clubbing. No peripheral edema  Neurological: Awake, alert, not answering questions.  Psychiatric: Flat affect  : No CVA tenderness. Tyson with yellow cloudy urine.   Skin: No rashes  Vascular Access:    LABS:  2/3/18 Creat 2.5, bicarb 12, K 3.6, AG 15+ albumin 1.8  CEA 8  Hb 7.9  WBC 6.9          Urine Studies:  UA - LE pos, WBC many    RADIOLOGY & ADDITIONAL STUDIES: CT ABDOMEN (2/1/18)  KIDNEYS: Interval near resolution of right hydroureteronephrosis, without radiopaque obstructing renal calculus. There is diffuse wall thickening of the right proximal collecting system. There is new foci of air in the lower pole of the right kidney anteriorly there is cortical atrophy of the right kidney, which is new, likely infectious in nature. No left hydronephrosis. There is a left extrarenal pelvis.   IMPRESSION:     1. Since January 11, 2018: Interval near resolution of right hydroureteronephrosis, without radiopaque obstructing renal calculus.     2. Wall thickening of the right proximal collecting system, which may be related to infection. Neoplasm is not excluded.     3. New air in the lower pole of the right kidney, which is likely infectious in nature.     4. Stable 2.2 cm indeterminate right adrenal nodule.     5. Stable bladder wall diffuse thickening and trabeculations.

## 2018-02-04 NOTE — PROGRESS NOTE ADULT - SUBJECTIVE AND OBJECTIVE BOX
Neurology Follow up note    Name  JUAN JOSE RAMACHANDRAN      Interval History -  Patient more alert today and saying "doctory".  She is following simple commands. No obvious seizure like events overnight.          Vital Signs Last 24 Hrs  T(C): 36.1 (04 Feb 2018 07:14), Max: 36.3 (04 Feb 2018 00:49)  T(F): 97 (04 Feb 2018 07:14), Max: 97.4 (04 Feb 2018 00:49)  HR: 103 (04 Feb 2018 07:14) (99 - 103)  BP: 127/58 (04 Feb 2018 07:14) (127/58 - 132/60)  BP(mean): --  RR: 16 (04 Feb 2018 07:14) (16 - 18)  SpO2: --          Neurological Exam:   Awake, follows simple commands in [place]. Moving extremities better then yesterday but still has increased tone     Medications  carBAMazepine 450 milliGRAM(s) Oral at bedtime  carBAMazepine 400 milliGRAM(s) Oral daily  dextrose 5% 1000 milliLiter(s) IV Continuous <Continuous>  diVALproex Sprinkle 250 milliGRAM(s) Oral two times a day  diVALproex Sprinkle 500 milliGRAM(s) Oral at bedtime  folic acid 1 milliGRAM(s) Oral daily  heparin  Injectable 5000 Unit(s) SubCutaneous every 8 hours  lactobacillus acidophilus 1 Tablet(s) Oral every 8 hours  lactulose Syrup 10 Gram(s) Oral every 8 hours  levothyroxine 50 MICROGram(s) Oral daily  LORazepam     Tablet 1 milliGRAM(s) Oral every 12 hours PRN  meropenem  IVPB 500 milliGRAM(s) IV Intermittent every 12 hours  multivitamin 1 Tablet(s) Oral daily  potassium chloride    Tablet ER 40 milliEquivalent(s) Oral once  silver sulfADIAZINE 1% Cream 1 Application(s) Topical every 12 hours  thiamine 100 milliGRAM(s) Oral daily  valproate sodium IVPB 1200 milliGRAM(s) IV Intermittent once  zinc sulfate 220 milliGRAM(s) Oral daily      Lab                        8.2    8.11  )-----------( 416      ( 04 Feb 2018 07:20 )             24.6       02-04    140  |  107  |  45<H>  ----------------------------<  81  3.3<L>   |  15<L>  |  2.3<H>    Ca    8.7      04 Feb 2018 07:20  Mg     2.6     02-04    TPro  6.1  /  Alb  1.8<L>  /  TBili  0.5  /  DBili  x   /  AST  16  /  ALT  8   /  AlkPhos  98  02-04

## 2018-02-04 NOTE — PROGRESS NOTE ADULT - ASSESSMENT
Patient with sepsis (improving) and h/o seizures.  Will adjust depakote level as level <10  1. VEEG (currently running)  2. Depakote 1200mg IVPB x1  3. Continue depakote and carbamazepine  4.check routine blood work daily,ammonia and magnesium  5. Call with questions and for changes

## 2018-02-04 NOTE — PROGRESS NOTE ADULT - ASSESSMENT
62/F with CKD stage IV, DM, bipolar, liver cirrhosis, new Colon mass, p/w with change in MS .  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

## 2018-02-04 NOTE — PROGRESS NOTE ADULT - SUBJECTIVE AND OBJECTIVE BOX
JUAN JOSE RAMACHANDRAN  62y  Female      Patient is a 62y old  Female who presents with a chief complaint of AMS and lethargy.    INTERVAL HPI/OVERNIGHT EVENTS:        No Known Allergies        REVIEW OF SYSTEMS:  CONSTITUTIONAL: No fever, weight loss, or fatigue  EYES: No eye pain, visual disturbances, or discharge  ENMT:  No difficulty hearing, tinnitus, vertigo; No sinus or throat pain  NECK: No pain or stiffness  BREASTS: No pain, masses, or nipple discharge  RESPIRATORY: No cough, wheezing, chills or hemoptysis; No shortness of breath  CARDIOVASCULAR: No chest pain, palpitations, dizziness, or leg swelling  GASTROINTESTINAL: No abdominal or epigastric pain. No nausea, vomiting, or hematemesis; No diarrhea or constipation. No melena or hematochezia.  GENITOURINARY: No dysuria, frequency, hematuria, or incontinence  NEUROLOGICAL: No headaches, memory loss, loss of strength, numbness, or tremors  SKIN: No itching, burning, rashes, or lesions   LYMPH NODES: No enlarged glands  ENDOCRINE: No heat or cold intolerance; No hair loss  MUSCULOSKELETAL: No joint pain or swelling; No muscle, back, or extremity pain  PSYCHIATRIC: No depression, anxiety, mood swings, or difficulty sleeping  HEME/LYMPH: No easy bruising, or bleeding gums  ALLERY AND IMMUNOLOGIC: No hives or eczema  FAMILY HISTORY:    T(C): 36.1 (02-04-18 @ 07:14), Max: 36.3 (02-04-18 @ 00:49)  HR: 103 (02-04-18 @ 07:14) (99 - 103)  BP: 127/58 (02-04-18 @ 07:14) (98/55 - 132/60)  RR: 16 (02-04-18 @ 07:14) (16 - 18)  SpO2: --  Wt(kg): --Vital Signs Last 24 Hrs  T(C): 36.1 (04 Feb 2018 07:14), Max: 36.3 (04 Feb 2018 00:49)  T(F): 97 (04 Feb 2018 07:14), Max: 97.4 (04 Feb 2018 00:49)  HR: 103 (04 Feb 2018 07:14) (99 - 103)  BP: 127/58 (04 Feb 2018 07:14) (98/55 - 132/60)  BP(mean): --  RR: 16 (04 Feb 2018 07:14) (16 - 18)  SpO2: --    PHYSICAL EXAM:  GENERAL: NAD, well-groomed, well-developed  HEAD:  Atraumatic, Normocephalic  EYES: EOMI, PERRLA, conjunctiva and sclera clear  ENMT: No tonsillar erythema, exudates, or enlargement; Moist mucous membranes, Good dentition, No lesions  NECK: Supple, No JVD, Normal thyroid  NERVOUS SYSTEM:  Alert & Oriented X3, Good concentration; Motor Strength 5/5 B/L upper and lower extremities; DTRs 2+ intact and symmetric  CHEST/LUNG: Clear to percussion bilaterally; No rales, rhonchi, wheezing, or rubs  HEART: Regular rate and rhythm; No murmurs, rubs, or gallops  ABDOMEN: Soft, Nontender, Nondistended; Bowel sounds present, Tyson present  EXTREMITIES:  2+ Peripheral Pulses, No clubbing, cyanosis, or edema  LYMPH: No lymphadenopathy noted  SKIN: No rashes or lesions    Consultant(s) Notes Reviewed:  [x ] YES  [ ] NO  Care Discussed with Consultants/Other Providers [ x] YES  [ ] NO    LABS:  CBC Full  -  ( 04 Feb 2018 07:20 )  WBC Count : 8.11 K/uL  Hemoglobin : 8.2 g/dL  Hematocrit : 24.6 %  Platelet Count - Automated : 416 K/uL  Mean Cell Volume : 86.9 fL  Mean Cell Hemoglobin : 29.0 pg  Mean Cell Hemoglobin Concentration : 33.3 g/dL  Auto Neutrophil # : x  Auto Lymphocyte # : x  Auto Monocyte # : x  Auto Eosinophil # : x  Auto Basophil # : x  Auto Neutrophil % : x  Auto Lymphocyte % : x  Auto Monocyte % : x  Auto Eosinophil % : x  Auto Basophil % : x             RADIOLOGY & ADDITIONAL TESTS:    Imaging Personally Reviewed:  [ ] YES  [ ] NO    HEALTH ISSUES - PROBLEM Dx:  Acute renal failure, unspecified acute renal failure type: Acute renal failure, unspecified acute renal failure type  Anemia, chronic disease: Anemia, chronic disease  Acidosis, metabolic: Acidosis, metabolic  Complicated UTI (urinary tract infection): Complicated UTI (urinary tract infection)  Type 2 diabetes mellitus with diabetic chronic kidney disease, unspecified CKD stage, unspecified long term insulin use status: Type 2 diabetes mellitus with diabetic chronic kidney disease, unspecified CKD stage, unspecified long term insulin use status  Chronic kidney disease, stage 4 (severe): Chronic kidney disease, stage 4 (severe)

## 2018-02-05 LAB
ALBUMIN SERPL ELPH-MCNC: 1.8 G/DL — LOW (ref 3–5.5)
ALBUMIN SERPL ELPH-MCNC: 1.9 G/DL — LOW (ref 3–5.5)
ALP SERPL-CCNC: 103 U/L — SIGNIFICANT CHANGE UP (ref 30–115)
ALP SERPL-CCNC: 104 U/L — SIGNIFICANT CHANGE UP (ref 30–115)
ALT FLD-CCNC: 11 U/L — SIGNIFICANT CHANGE UP (ref 0–41)
ALT FLD-CCNC: 9 U/L — SIGNIFICANT CHANGE UP (ref 0–41)
AMMONIA BLD-MCNC: 58 MMOL/L — CRITICAL HIGH (ref 11–35)
ANION GAP SERPL CALC-SCNC: 16 MMOL/L — HIGH (ref 7–14)
ANION GAP SERPL CALC-SCNC: 19 MMOL/L — HIGH (ref 7–14)
AST SERPL-CCNC: 17 U/L — SIGNIFICANT CHANGE UP (ref 0–41)
AST SERPL-CCNC: 23 U/L — SIGNIFICANT CHANGE UP (ref 0–41)
BILIRUB SERPL-MCNC: 0.5 MG/DL — SIGNIFICANT CHANGE UP (ref 0.2–1.2)
BILIRUB SERPL-MCNC: 1 MG/DL — SIGNIFICANT CHANGE UP (ref 0.2–1.2)
BUN SERPL-MCNC: 38 MG/DL — HIGH (ref 10–20)
BUN SERPL-MCNC: 42 MG/DL — HIGH (ref 10–20)
CALCIUM SERPL-MCNC: 8.5 MG/DL — SIGNIFICANT CHANGE UP (ref 8.5–10.1)
CALCIUM SERPL-MCNC: 8.6 MG/DL — SIGNIFICANT CHANGE UP (ref 8.5–10.1)
CARBAMAZEPINE SERPL-MCNC: 4.1 UG/ML — SIGNIFICANT CHANGE UP (ref 4–12)
CHLORIDE SERPL-SCNC: 104 MMOL/L — SIGNIFICANT CHANGE UP (ref 98–110)
CHLORIDE SERPL-SCNC: 106 MMOL/L — SIGNIFICANT CHANGE UP (ref 98–110)
CO2 SERPL-SCNC: 19 MMOL/L — SIGNIFICANT CHANGE UP (ref 17–32)
CO2 SERPL-SCNC: 19 MMOL/L — SIGNIFICANT CHANGE UP (ref 17–32)
CREAT SERPL-MCNC: 2.2 MG/DL — HIGH (ref 0.7–1.5)
CREAT SERPL-MCNC: 2.3 MG/DL — HIGH (ref 0.7–1.5)
GLUCOSE SERPL-MCNC: 105 MG/DL — SIGNIFICANT CHANGE UP (ref 70–110)
GLUCOSE SERPL-MCNC: 77 MG/DL — SIGNIFICANT CHANGE UP (ref 70–110)
HCT VFR BLD CALC: 24.8 % — LOW (ref 37–47)
HCT VFR BLD CALC: 26.4 % — LOW (ref 37–47)
HGB BLD-MCNC: 7.9 G/DL — LOW (ref 14–18)
HGB BLD-MCNC: 8.5 G/DL — LOW (ref 14–18)
MAGNESIUM SERPL-MCNC: 2 MG/DL — SIGNIFICANT CHANGE UP (ref 1.8–2.4)
MAGNESIUM SERPL-MCNC: 2.2 MG/DL — SIGNIFICANT CHANGE UP (ref 1.8–2.4)
MCHC RBC-ENTMCNC: 27.9 PG — SIGNIFICANT CHANGE UP (ref 27–31)
MCHC RBC-ENTMCNC: 28.1 PG — SIGNIFICANT CHANGE UP (ref 27–31)
MCHC RBC-ENTMCNC: 31.9 G/DL — LOW (ref 32–37)
MCHC RBC-ENTMCNC: 32.2 G/DL — SIGNIFICANT CHANGE UP (ref 32–37)
MCV RBC AUTO: 87.1 FL — SIGNIFICANT CHANGE UP (ref 81–91)
MCV RBC AUTO: 87.6 FL — SIGNIFICANT CHANGE UP (ref 81–91)
NRBC # BLD: 0 /100 WBCS — SIGNIFICANT CHANGE UP (ref 0–0)
NRBC # BLD: 0 /100 WBCS — SIGNIFICANT CHANGE UP (ref 0–0)
PLATELET # BLD AUTO: 409 K/UL — HIGH (ref 130–400)
PLATELET # BLD AUTO: 412 K/UL — HIGH (ref 130–400)
POTASSIUM SERPL-MCNC: 3.5 MMOL/L — SIGNIFICANT CHANGE UP (ref 3.5–5)
POTASSIUM SERPL-MCNC: 4 MMOL/L — SIGNIFICANT CHANGE UP (ref 3.5–5)
POTASSIUM SERPL-SCNC: 3.5 MMOL/L — SIGNIFICANT CHANGE UP (ref 3.5–5)
POTASSIUM SERPL-SCNC: 4 MMOL/L — SIGNIFICANT CHANGE UP (ref 3.5–5)
PROT SERPL-MCNC: 6 G/DL — SIGNIFICANT CHANGE UP (ref 6–8)
PROT SERPL-MCNC: 6.2 G/DL — SIGNIFICANT CHANGE UP (ref 6–8)
RBC # BLD: 2.83 M/UL — LOW (ref 4.2–5.4)
RBC # BLD: 3.03 M/UL — LOW (ref 4.2–5.4)
RBC # FLD: 15 % — HIGH (ref 11.5–14.5)
RBC # FLD: 15.1 % — HIGH (ref 11.5–14.5)
SODIUM SERPL-SCNC: 141 MMOL/L — SIGNIFICANT CHANGE UP (ref 135–146)
SODIUM SERPL-SCNC: 142 MMOL/L — SIGNIFICANT CHANGE UP (ref 135–146)
VALPROATE SERPL-MCNC: <10 UG/ML — LOW (ref 50–100)
WBC # BLD: 10.79 K/UL — SIGNIFICANT CHANGE UP (ref 4.8–10.8)
WBC # BLD: 8.5 K/UL — SIGNIFICANT CHANGE UP (ref 4.8–10.8)
WBC # FLD AUTO: 10.79 K/UL — SIGNIFICANT CHANGE UP (ref 4.8–10.8)
WBC # FLD AUTO: 8.5 K/UL — SIGNIFICANT CHANGE UP (ref 4.8–10.8)

## 2018-02-05 RX ADMIN — HEPARIN SODIUM 5000 UNIT(S): 5000 INJECTION INTRAVENOUS; SUBCUTANEOUS at 07:15

## 2018-02-05 RX ADMIN — Medication 1 APPLICATION(S): at 20:29

## 2018-02-05 RX ADMIN — MEROPENEM 100 MILLIGRAM(S): 1 INJECTION INTRAVENOUS at 20:28

## 2018-02-05 RX ADMIN — SODIUM CHLORIDE 100 MILLILITER(S): 9 INJECTION, SOLUTION INTRAVENOUS at 12:42

## 2018-02-05 RX ADMIN — LACTULOSE 10 GRAM(S): 10 SOLUTION ORAL at 22:32

## 2018-02-05 RX ADMIN — HEPARIN SODIUM 5000 UNIT(S): 5000 INJECTION INTRAVENOUS; SUBCUTANEOUS at 20:28

## 2018-02-05 RX ADMIN — Medication 450 MILLIGRAM(S): at 22:33

## 2018-02-05 RX ADMIN — MEROPENEM 100 MILLIGRAM(S): 1 INJECTION INTRAVENOUS at 07:16

## 2018-02-05 RX ADMIN — DIVALPROEX SODIUM 500 MILLIGRAM(S): 500 TABLET, DELAYED RELEASE ORAL at 22:34

## 2018-02-05 RX ADMIN — SODIUM CHLORIDE 100 MILLILITER(S): 9 INJECTION, SOLUTION INTRAVENOUS at 07:13

## 2018-02-05 RX ADMIN — Medication 1 APPLICATION(S): at 07:14

## 2018-02-05 RX ADMIN — Medication 1 TABLET(S): at 22:34

## 2018-02-05 NOTE — PROGRESS NOTE ADULT - SUBJECTIVE AND OBJECTIVE BOX
Nephrology progress note    Patient is a 62y Female with SALOME on CKD, recurrent UTI, Rt kidney with air on CT, possible PN.  Metab. acidosis - on IV bicarb since yesterday.    PMH: liver cirrhosis, bipolar, seizure    Allergies:  No Known Allergies    Hospital Medications:   MEDICATIONS  (STANDING):  carBAMazepine 450 milliGRAM(s) Oral at bedtime  carBAMazepine 400 milliGRAM(s) Oral daily  dextrose 5% 1000 milliLiter(s) (100 mL/Hr) IV Continuous <Continuous>  diVALproex Sprinkle 250 milliGRAM(s) Oral two times a day  diVALproex Sprinkle 500 milliGRAM(s) Oral at bedtime  folic acid 1 milliGRAM(s) Oral daily  heparin  Injectable 5000 Unit(s) SubCutaneous every 8 hours  lactobacillus acidophilus 1 Tablet(s) Oral every 8 hours  lactulose Syrup 10 Gram(s) Oral every 8 hours  levothyroxine 50 MICROGram(s) Oral daily  meropenem  IVPB 500 milliGRAM(s) IV Intermittent every 12 hours  multivitamin 1 Tablet(s) Oral daily  silver sulfADIAZINE 1% Cream 1 Application(s) Topical every 12 hours  thiamine 100 milliGRAM(s) Oral daily  zinc sulfate 220 milliGRAM(s) Oral daily    REVIEW OF SYSTEMS:  CONSTITUTIONAL: No weakness, fevers or chills  EYES/ENT: No visual changes;  No vertigo or throat pain   NECK: No pain or stiffness  RESPIRATORY: No cough, wheezing, hemoptysis; No shortness of breath  CARDIOVASCULAR: No chest pain or palpitations.  GASTROINTESTINAL: No abdominal or epigastric pain. No nausea, vomiting, or hematemesis; No diarrhea or constipation. No melena or hematochezia.  GENITOURINARY: Tyson in place  SKIN: No lesions   VASCULAR: No bilateral lower extremity edema.   All other review of systems is negative unless indicated above.    VITALS:  T(F): 98.2 (02-05-18 @ 07:41), Max: 98.7 (02-04-18 @ 23:10)  HR: 114 (02-05-18 @ 07:41)  BP: 114/49 (02-05-18 @ 07:41)  RR: 18 (02-05-18 @ 07:41)  SpO2: --  Wt(kg): --    02-03 @ 07:01  -  02-04 @ 07:00  --------------------------------------------------------  IN: 0 mL / OUT: 400 mL / NET: -400 mL    02-04 @ 07:01  -  02-05 @ 07:00  --------------------------------------------------------  IN: 1150 mL / OUT: 1650 mL / NET: -500 mL        PHYSICAL EXAM:  Constitutional: NAD  HEENT: anicteric sclera, oropharynx clear, MMM  Neck: No JVD  Respiratory: CTAB, no wheezes, rales or rhonchi  Cardiovascular: S1, S2, RRR  Gastrointestinal: BS+, soft, NT/ND  Extremities: No cyanosis or clubbing. No peripheral edema  Neurological: Awake, alert  : No CVA tenderness. Tyson in place  Skin: No rashes  Vascular Access:    LABS:  02-04    141  |  108  |  46<H>  ----------------------------<  98  3.9   |  17  |  2.3<H>    Ca    8.8      04 Feb 2018 19:40  Mg     2.2     02-05    TPro  6.1  /  Alb  1.8<L>  /  TBili  0.5  /  DBili      /  AST  16  /  ALT  8   /  AlkPhos  98  02-04                          7.9    8.50  )-----------( 409      ( 05 Feb 2018 06:52 )             24.8       Urine Studies:    Osmolality, Random Urine: 262 mos/kg (02-03 @ 18:37)    RADIOLOGY & ADDITIONAL STUDIES:

## 2018-02-05 NOTE — PROGRESS NOTE ADULT - SUBJECTIVE AND OBJECTIVE BOX
Patient is a 62y old  Female who presents with a chief complaint of     INTERVAL HPI/OVERNIGHT EVENTS:    MEDICATIONS  (STANDING):  carBAMazepine 450 milliGRAM(s) Oral at bedtime  carBAMazepine 400 milliGRAM(s) Oral daily  dextrose 5% 1000 milliLiter(s) (100 mL/Hr) IV Continuous <Continuous>  diVALproex Sprinkle 250 milliGRAM(s) Oral two times a day  diVALproex Sprinkle 500 milliGRAM(s) Oral at bedtime  folic acid 1 milliGRAM(s) Oral daily  heparin  Injectable 5000 Unit(s) SubCutaneous every 8 hours  lactobacillus acidophilus 1 Tablet(s) Oral every 8 hours  lactulose Syrup 10 Gram(s) Oral every 8 hours  levothyroxine 50 MICROGram(s) Oral daily  meropenem  IVPB 500 milliGRAM(s) IV Intermittent every 12 hours  multivitamin 1 Tablet(s) Oral daily  silver sulfADIAZINE 1% Cream 1 Application(s) Topical every 12 hours  thiamine 100 milliGRAM(s) Oral daily  zinc sulfate 220 milliGRAM(s) Oral daily    MEDICATIONS  (PRN):  LORazepam     Tablet 1 milliGRAM(s) Oral every 12 hours PRN Anxiety      Allergies    No Known Allergies    Intolerances        REVIEW OF SYSTEMS:  CONSTITUTIONAL: No fever, weight loss, or fatigue  EYES: No eye pain, visual disturbances, or discharge  ENMT:  No difficulty hearing, tinnitus, vertigo; No sinus or throat pain  NECK: No pain or stiffness  BREASTS: No pain, masses, or nipple discharge  RESPIRATORY: No cough, wheezing, chills or hemoptysis; No shortness of breath  CARDIOVASCULAR: No chest pain, palpitations, dizziness, or leg swelling  GASTROINTESTINAL: No abdominal or epigastric pain. No nausea, vomiting, or hematemesis; No diarrhea or constipation. No melena or hematochezia.  GENITOURINARY: No dysuria, frequency, hematuria, or incontinence  NEUROLOGICAL: No headaches, memory loss, loss of strength, numbness, or tremors  SKIN: No itching, burning, rashes, or lesions   LYMPH NODES: No enlarged glands  ENDOCRINE: No heat or cold intolerance; No hair loss  MUSCULOSKELETAL: No joint pain or swelling; No muscle, back, or extremity pain  PSYCHIATRIC: No depression, anxiety, mood swings, or difficulty sleeping  HEME/LYMPH: No easy bruising, or bleeding gums  ALLERGY AND IMMUNOLOGIC: No hives or eczema    Vital Signs Last 24 Hrs  T(C): 36.6 (05 Feb 2018 15:00), Max: 36.8 (05 Feb 2018 07:41)  T(F): 97.9 (05 Feb 2018 15:00), Max: 98.2 (05 Feb 2018 07:41)  HR: 105 (05 Feb 2018 15:00) (105 - 114)  BP: 93/68 (05 Feb 2018 15:00) (93/68 - 114/49)  BP(mean): --  RR: 18 (05 Feb 2018 15:00) (18 - 18)  SpO2: --    PHYSICAL EXAM:  GENERAL: NAD, well-groomed, well-developed  HEAD:  Atraumatic, Normocephalic  EYES: EOMI, PERRLA, conjunctiva and sclera clear  ENMT: No tonsillar erythema, exudates, or enlargement; Moist mucous membranes, Good dentition, No lesions  NECK: Supple, No JVD, Normal thyroid  NERVOUS SYSTEM:  Alert & Oriented X3, Good concentration; Motor Strength 5/5 B/L upper and lower extremities; DTRs 2+ intact and symmetric  CHEST/LUNG: Clear to percussion bilaterally; No rales, rhonchi, wheezing, or rubs  HEART: Regular rate and rhythm; No murmurs, rubs, or gallops  ABDOMEN: Soft, Nontender, Nondistended; Bowel sounds present  EXTREMITIES:  2+ Peripheral Pulses, No clubbing, cyanosis, or edema  LYMPH: No lymphadenopathy noted  SKIN: No rashes or lesions    LABS:                        8.5    10.79 )-----------( 412      ( 05 Feb 2018 19:40 )             26.4     02-05    142  |  104  |  38<H>  ----------------------------<  77  4.0   |  19  |  2.2<H>    Ca    8.5      05 Feb 2018 19:40  Mg     2.0     02-05    TPro  6.2  /  Alb  1.9<L>  /  TBili  1.0  /  DBili  x   /  AST  23  /  ALT  11  /  AlkPhos  104  02-05        CAPILLARY BLOOD GLUCOSE          RADIOLOGY & ADDITIONAL TESTS:    Imaging Personally Reviewed:  [ ] YES  [ ] NO    Consultant(s) Notes Reviewed:  [ ] YES  [ ] NO    Care Discussed with Consultants/Other Providers [ ] YES  [ ] NO Patient is seen and examined at the bed side, is afebrile. She is moaning but said  has no pain. The vEEG in progress.         REVIEW OF SYSTEMS: All other review systems are negative        Vital Signs Last 24 Hrs  T(C): 36.6 (05 Feb 2018 15:00), Max: 36.8 (05 Feb 2018 07:41)  T(F): 97.9 (05 Feb 2018 15:00), Max: 98.2 (05 Feb 2018 07:41)  HR: 105 (05 Feb 2018 15:00) (105 - 114)  BP: 93/68 (05 Feb 2018 15:00) (93/68 - 114/49)  BP(mean): --  RR: 18 (05 Feb 2018 15:00) (18 - 18)  SpO2: --        PHYSICAL EXAM:  GENERAL: Moaning but not sure why, vEEG in progress  CVS: s1 and s2 present   RESP: Air entry B/L  GI: Abdomen soft and nontender  : Tyson catheter in placed  EXT: No pedal edema  CNS: Awake and alert        Allergies    No Known Allergies            LABS:                        8.5    10.79 )-----------( 412      ( 05 Feb 2018 19:40 )             26.4     02-05    142  |  104  |  38<H>  ----------------------------<  77  4.0   |  19  |  2.2<H>    Ca    8.5      05 Feb 2018 19:40  Mg     2.0     02-05    TPro  6.2  /  Alb  1.9<L>  /  TBili  1.0  /  DBili  x   /  AST  23  /  ALT  11  /  AlkPhos  104  02-05        MEDICATIONS  (STANDING):  carBAMazepine 450 milliGRAM(s) Oral at bedtime  carBAMazepine 400 milliGRAM(s) Oral daily  dextrose 5% 1000 milliLiter(s) (100 mL/Hr) IV Continuous <Continuous>  diVALproex Sprinkle 250 milliGRAM(s) Oral two times a day  diVALproex Sprinkle 500 milliGRAM(s) Oral at bedtime  folic acid 1 milliGRAM(s) Oral daily  heparin  Injectable 5000 Unit(s) SubCutaneous every 8 hours  lactobacillus acidophilus 1 Tablet(s) Oral every 8 hours  lactulose Syrup 10 Gram(s) Oral every 8 hours  levothyroxine 50 MICROGram(s) Oral daily  meropenem  IVPB 500 milliGRAM(s) IV Intermittent every 12 hours  multivitamin 1 Tablet(s) Oral daily  silver sulfADIAZINE 1% Cream 1 Application(s) Topical every 12 hours  thiamine 100 milliGRAM(s) Oral daily  zinc sulfate 220 milliGRAM(s) Oral daily    MEDICATIONS  (PRN):  LORazepam     Tablet 1 milliGRAM(s) Oral every 12 hours PRN Anxiety            RADIOLOGY & ADDITIONAL TESTS:    none      MICROBIOLOGY DATA:    Culture - Urine (02.03.18 @ 18:35)    Specimen Source: .Urine Clean Catch (Midstream)    Culture Results:   50,000 - 99,000 CFU/mL Enterobacter cloacae/asburiae  <10,000 CFU/ml Normal Urogenital clark present

## 2018-02-05 NOTE — PROGRESS NOTE ADULT - ASSESSMENT
62/F with CKD stage IV, DM, bipolar, liver cirrhosis, new Colon mass, p/w with change in MS .  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

## 2018-02-05 NOTE — PROGRESS NOTE ADULT - SUBJECTIVE AND OBJECTIVE BOX
Neurology Follow up note    Name  JUAN JOSE RAMACHANDRAN    HPI: 62-year-old female with history of MS, epilepsy,, mood disorder admitted last week for change in mental status. Pt is treated with Depakote and Tegretol. On admission VPA level <10, Tegretol level <2.5. Diagnosed with UTI and ARF.      Interval History:  No clinical seizures reported since admission.    VEEG findings: EEG is abnormal due to presence of :  1. moderate generalized slowing  2. moderate left more than right focal slowing  3. independent left more than right posterior quadrant, at times diffusely expressed sharps. In the early portions of the EEG occasionally appeared as a periodic pattern    Vital Signs Last 24 Hrs  T(C): 36.8 (05 Feb 2018 07:41), Max: 37.1 (04 Feb 2018 23:10)  T(F): 98.2 (05 Feb 2018 07:41), Max: 98.7 (04 Feb 2018 23:10)  HR: 114 (05 Feb 2018 07:41) (109 - 117)  BP: 114/49 (05 Feb 2018 07:41) (96/66 - 114/49)  RR: 18 (05 Feb 2018 07:41) (18 - 20)      Mental status: Awake, alert, answers some simple questions  Moves UE symmetrically    Medications  carBAMazepine 450 milliGRAM(s) Oral at bedtime  carBAMazepine 400 milliGRAM(s) Oral daily  dextrose 5% 1000 milliLiter(s) IV Continuous <Continuous>  diVALproex Sprinkle 250 milliGRAM(s) Oral two times a day  diVALproex Sprinkle 500 milliGRAM(s) Oral at bedtime  folic acid 1 milliGRAM(s) Oral daily  heparin  Injectable 5000 Unit(s) SubCutaneous every 8 hours  lactobacillus acidophilus 1 Tablet(s) Oral every 8 hours  lactulose Syrup 10 Gram(s) Oral every 8 hours  levothyroxine 50 MICROGram(s) Oral daily  LORazepam     Tablet 1 milliGRAM(s) Oral every 12 hours PRN  meropenem  IVPB 500 milliGRAM(s) IV Intermittent every 12 hours  multivitamin 1 Tablet(s) Oral daily  silver sulfADIAZINE 1% Cream 1 Application(s) Topical every 12 hours  thiamine 100 milliGRAM(s) Oral daily  zinc sulfate 220 milliGRAM(s) Oral daily      Lab  02-05    141  |  106  |  42<H>  ----------------------------<  105  3.5   |  19  |  2.3<H>    Ca    8.6      05 Feb 2018 06:52  Mg     2.2     02-05    TPro  6.0  /  Alb  1.8<L>  /  TBili  0.5  /  DBili  x   /  AST  17  /  ALT  9   /  AlkPhos  103  02-05                          7.9    8.50  )-----------( 409      ( 05 Feb 2018 06:52 )             24.8     LIVER FUNCTIONS - ( 05 Feb 2018 06:52 )  Alb: 1.8 g/dL / Pro: 6.0 g/dL / ALK PHOS: 103 U/L / ALT: 9 U/L / AST: 17 U/L / GGT: x             Mg 2.2    Ammonia 58  --  VPA 15.8      Assessment: 62-year-old female with history of MS, seizure disorder, mood disorder,  ARF, acute UTI, and change in mental status.    Plan:  Continue VEEG monitoring.  Check VPA and Tegretol levels trough, Mg level  Keep Mg above 2.0  Will try to obtained additional history from family and SNF

## 2018-02-05 NOTE — PROGRESS NOTE ADULT - ASSESSMENT
A 62 year old female presented with metabolic encephalopathy Most likely secondary to UTI.    # Encephalopathy  # UTI- enterobacter cloacae  # R/O seizure    would recommend:  1. Follow up Final urine culture, and adjust antibiotic accordingly,  growing Enterobacter   2. Please change Meropenem to Cefepime, since it can lower the seizure threshold in the setting of suspected seizure  3. Follow up the vEEG result  4. Tyson catheter need to be changed  5. Blood cultures x2 for completeness    d/w patient and Nursing staff    -will continue to follow the patient with you

## 2018-02-05 NOTE — PROGRESS NOTE ADULT - SUBJECTIVE AND OBJECTIVE BOX
JUAN JOSE RAMACHANDRAN  62y  Female      Patient is a 62y old  Female who presents with a chief complaint of AMS and lethargy.    INTERVAL HPI/OVERNIGHT EVENTS:  none    No Known Allergies      REVIEW OF SYSTEMS:  CONSTITUTIONAL: No fever, weight loss, or fatigue  EYES: No eye pain, visual disturbances, or discharge  ENMT:  No difficulty hearing, tinnitus, vertigo; No sinus or throat pain  NECK: No pain or stiffness  BREASTS: No pain, masses, or nipple discharge  RESPIRATORY: No cough, wheezing, chills or hemoptysis; No shortness of breath  CARDIOVASCULAR: No chest pain, palpitations, dizziness, or leg swelling  GASTROINTESTINAL: No abdominal or epigastric pain. No nausea, vomiting, or hematemesis; No diarrhea or constipation. No melena or hematochezia.  GENITOURINARY: No dysuria, frequency, hematuria, or incontinence  NEUROLOGICAL: No headaches, memory loss, loss of strength, numbness, or tremors  SKIN: No itching, burning, rashes, or lesions   LYMPH NODES: No enlarged glands  ENDOCRINE: No heat or cold intolerance; No hair loss  MUSCULOSKELETAL: No joint pain or swelling; No muscle, back, or extremity pain  PSYCHIATRIC: No depression, anxiety, mood swings, or difficulty sleeping  HEME/LYMPH: No easy bruising, or bleeding gums  ALLERY AND IMMUNOLOGIC: No hives or eczema  FAMILY HISTORY: n/a    ICU Vital Signs Last 24 Hrs  T(C): 36.8 (05 Feb 2018 07:41), Max: 37.1 (04 Feb 2018 23:10)  T(F): 98.2 (05 Feb 2018 07:41), Max: 98.7 (04 Feb 2018 23:10)  HR: 114 (05 Feb 2018 07:41) (113 - 117)  BP: 114/49 (05 Feb 2018 07:41) (110/65 - 114/49)  BP(mean): --  ABP: --  ABP(mean): --  RR: 18 (05 Feb 2018 07:41) (18 - 18)  SpO2: --    PHYSICAL EXAM:  GENERAL: NAD, well-groomed, well-developed  HEAD:  Atraumatic, Normocephalic  EYES: EOMI, PERRLA, conjunctiva and sclera clear  ENMT: No tonsillar erythema, exudates, or enlargement; Moist mucous membranes, Good dentition, No lesions. On VEEG  NECK: Supple, No JVD, Normal thyroid  NERVOUS SYSTEM:  Lethargic. Responds to noxious stimuli  CHEST/LUNG: Clear to percussion bilaterally; No rales, rhonchi, wheezing, or rubs  HEART: Regular rate and rhythm; No murmurs, rubs, or gallops  ABDOMEN: Soft, Nontender, Nondistended; Bowel sounds present, Tyson present  EXTREMITIES:  2+ Peripheral Pulses, No clubbing, cyanosis, or edema  LYMPH: No lymphadenopathy noted  SKIN: No rashes or lesions    Consultant(s) Notes Reviewed:  [x ] YES  [ ] NO  Care Discussed with Consultants/Other Providers [ x] YES  [ ] NO    LABS:                        7.9    8.50  )-----------( 409      ( 05 Feb 2018 06:52 )             24.8   02-05    141  |  106  |  42<H>  ----------------------------<  105  3.5   |  19  |  2.3<H>    Ca    8.6      05 Feb 2018 06:52  Mg     2.2     02-05    TPro  6.0  /  Alb  1.8<L>  /  TBili  0.5  /  DBili  x   /  AST  17  /  ALT  9   /  AlkPhos  103  02-05      RADIOLOGY & ADDITIONAL TESTS:    Imaging Personally Reviewed:  [ ] YES  [ ] NO    HEALTH ISSUES - PROBLEM Dx:  Acute renal failure, unspecified acute renal failure type: Acute renal failure, unspecified acute renal failure type  Anemia, chronic disease: Anemia, chronic disease  Acidosis, metabolic: Acidosis, metabolic  Complicated UTI (urinary tract infection): Complicated UTI (urinary tract infection)  Type 2 diabetes mellitus with diabetic chronic kidney disease, unspecified CKD stage, unspecified long term insulin use status: Type 2 diabetes mellitus with diabetic chronic kidney disease, unspecified CKD stage, unspecified long term insulin use status  Chronic kidney disease, stage 4 (severe): Chronic kidney disease, stage 4 (severe)

## 2018-02-06 DIAGNOSIS — M46.28 OSTEOMYELITIS OF VERTEBRA, SACRAL AND SACROCOCCYGEAL REGION: ICD-10-CM

## 2018-02-06 DIAGNOSIS — D49.0 NEOPLASM OF UNSPECIFIED BEHAVIOR OF DIGESTIVE SYSTEM: ICD-10-CM

## 2018-02-06 LAB
ALBUMIN SERPL ELPH-MCNC: 1.8 G/DL — LOW (ref 3–5.5)
ALP SERPL-CCNC: 100 U/L — SIGNIFICANT CHANGE UP (ref 30–115)
ALT FLD-CCNC: 9 U/L — SIGNIFICANT CHANGE UP (ref 0–41)
ANION GAP SERPL CALC-SCNC: 16 MMOL/L — HIGH (ref 7–14)
AST SERPL-CCNC: 20 U/L — SIGNIFICANT CHANGE UP (ref 0–41)
BILIRUB SERPL-MCNC: 0.5 MG/DL — SIGNIFICANT CHANGE UP (ref 0.2–1.2)
BUN SERPL-MCNC: 37 MG/DL — HIGH (ref 10–20)
CALCIUM SERPL-MCNC: 8.2 MG/DL — LOW (ref 8.5–10.1)
CHLORIDE SERPL-SCNC: 102 MMOL/L — SIGNIFICANT CHANGE UP (ref 98–110)
CO2 SERPL-SCNC: 23 MMOL/L — SIGNIFICANT CHANGE UP (ref 17–32)
CREAT SERPL-MCNC: 2 MG/DL — HIGH (ref 0.7–1.5)
ERYTHROCYTE [SEDIMENTATION RATE] IN BLOOD: 130 MM/HR — HIGH (ref 0–20)
GLUCOSE SERPL-MCNC: 98 MG/DL — SIGNIFICANT CHANGE UP (ref 70–110)
HBA1C BLD-MCNC: 5.4 % — SIGNIFICANT CHANGE UP (ref 4–5.6)
HCT VFR BLD CALC: 25.6 % — LOW (ref 37–47)
HGB BLD-MCNC: 8.3 G/DL — LOW (ref 14–18)
MCHC RBC-ENTMCNC: 27.6 PG — SIGNIFICANT CHANGE UP (ref 27–31)
MCHC RBC-ENTMCNC: 32.4 G/DL — SIGNIFICANT CHANGE UP (ref 32–37)
MCV RBC AUTO: 85 FL — SIGNIFICANT CHANGE UP (ref 81–91)
NRBC # BLD: 0 /100 WBCS — SIGNIFICANT CHANGE UP (ref 0–0)
PLATELET # BLD AUTO: 416 K/UL — HIGH (ref 130–400)
POTASSIUM SERPL-MCNC: 3.5 MMOL/L — SIGNIFICANT CHANGE UP (ref 3.5–5)
POTASSIUM SERPL-SCNC: 3.5 MMOL/L — SIGNIFICANT CHANGE UP (ref 3.5–5)
PROT SERPL-MCNC: 6 G/DL — SIGNIFICANT CHANGE UP (ref 6–8)
RBC # BLD: 3.01 M/UL — LOW (ref 4.2–5.4)
RBC # FLD: 15.2 % — HIGH (ref 11.5–14.5)
SODIUM SERPL-SCNC: 141 MMOL/L — SIGNIFICANT CHANGE UP (ref 135–146)
WBC # BLD: 8.3 K/UL — SIGNIFICANT CHANGE UP (ref 4.8–10.8)
WBC # FLD AUTO: 8.3 K/UL — SIGNIFICANT CHANGE UP (ref 4.8–10.8)

## 2018-02-06 RX ORDER — ACETAMINOPHEN 500 MG
650 TABLET ORAL ONCE
Qty: 0 | Refills: 0 | Status: COMPLETED | OUTPATIENT
Start: 2018-02-06 | End: 2018-02-06

## 2018-02-06 RX ORDER — CARBAMAZEPINE 200 MG
400 TABLET ORAL EVERY 24 HOURS
Qty: 0 | Refills: 0 | Status: DISCONTINUED | OUTPATIENT
Start: 2018-02-07 | End: 2018-02-09

## 2018-02-06 RX ORDER — SODIUM CHLORIDE 9 MG/ML
1000 INJECTION, SOLUTION INTRAVENOUS
Qty: 0 | Refills: 0 | Status: DISCONTINUED | OUTPATIENT
Start: 2018-02-06 | End: 2018-02-09

## 2018-02-06 RX ORDER — VALPROIC ACID (AS SODIUM SALT) 250 MG/5ML
500 SOLUTION, ORAL ORAL EVERY 12 HOURS
Qty: 0 | Refills: 0 | Status: DISCONTINUED | OUTPATIENT
Start: 2018-02-06 | End: 2018-02-09

## 2018-02-06 RX ORDER — VALPROIC ACID (AS SODIUM SALT) 250 MG/5ML
1000 SOLUTION, ORAL ORAL ONCE
Qty: 0 | Refills: 0 | Status: COMPLETED | OUTPATIENT
Start: 2018-02-06 | End: 2018-02-06

## 2018-02-06 RX ADMIN — Medication 400 MILLIGRAM(S): at 12:13

## 2018-02-06 RX ADMIN — Medication 1 APPLICATION(S): at 06:30

## 2018-02-06 RX ADMIN — Medication 110 MILLIGRAM(S): at 17:13

## 2018-02-06 RX ADMIN — Medication 50 MICROGRAM(S): at 06:29

## 2018-02-06 RX ADMIN — MEROPENEM 100 MILLIGRAM(S): 1 INJECTION INTRAVENOUS at 17:12

## 2018-02-06 RX ADMIN — Medication 60 MILLIGRAM(S): at 14:22

## 2018-02-06 RX ADMIN — Medication 1 TABLET(S): at 14:22

## 2018-02-06 RX ADMIN — Medication 1 APPLICATION(S): at 17:12

## 2018-02-06 RX ADMIN — Medication 1 MILLIGRAM(S): at 12:14

## 2018-02-06 RX ADMIN — Medication 1 TABLET(S): at 06:28

## 2018-02-06 RX ADMIN — HEPARIN SODIUM 5000 UNIT(S): 5000 INJECTION INTRAVENOUS; SUBCUTANEOUS at 06:28

## 2018-02-06 RX ADMIN — HEPARIN SODIUM 5000 UNIT(S): 5000 INJECTION INTRAVENOUS; SUBCUTANEOUS at 23:06

## 2018-02-06 RX ADMIN — Medication 1 TABLET(S): at 23:07

## 2018-02-06 RX ADMIN — LACTULOSE 10 GRAM(S): 10 SOLUTION ORAL at 23:06

## 2018-02-06 RX ADMIN — DIVALPROEX SODIUM 250 MILLIGRAM(S): 500 TABLET, DELAYED RELEASE ORAL at 06:26

## 2018-02-06 RX ADMIN — ZINC SULFATE TAB 220 MG (50 MG ZINC EQUIVALENT) 220 MILLIGRAM(S): 220 (50 ZN) TAB at 12:14

## 2018-02-06 RX ADMIN — Medication 450 MILLIGRAM(S): at 23:06

## 2018-02-06 RX ADMIN — LACTULOSE 10 GRAM(S): 10 SOLUTION ORAL at 14:21

## 2018-02-06 RX ADMIN — SODIUM CHLORIDE 100 MILLILITER(S): 9 INJECTION, SOLUTION INTRAVENOUS at 01:12

## 2018-02-06 RX ADMIN — Medication 1 TABLET(S): at 12:14

## 2018-02-06 RX ADMIN — LACTULOSE 10 GRAM(S): 10 SOLUTION ORAL at 06:27

## 2018-02-06 RX ADMIN — SODIUM CHLORIDE 75 MILLILITER(S): 9 INJECTION, SOLUTION INTRAVENOUS at 14:54

## 2018-02-06 RX ADMIN — Medication 100 MILLIGRAM(S): at 12:14

## 2018-02-06 RX ADMIN — MEROPENEM 100 MILLIGRAM(S): 1 INJECTION INTRAVENOUS at 06:34

## 2018-02-06 RX ADMIN — HEPARIN SODIUM 5000 UNIT(S): 5000 INJECTION INTRAVENOUS; SUBCUTANEOUS at 14:18

## 2018-02-06 NOTE — PROGRESS NOTE ADULT - ASSESSMENT
62 year old female presented with metabolic encephalopathy secondary to UTI and sacral decubitus ulcer

## 2018-02-06 NOTE — PROGRESS NOTE ADULT - ASSESSMENT
1-Chronic kidney disease, stage 4 (severe).  likely due to DM and recurrent UTIs/possible PN  CT abdomen revealed near complete resolution of Rt hydro, although Rt collecting system neoplasm is not excluded. No right hydronephrosis.  Myeloma w/u was neg in Oct 2017  ELAINA, ANCA were neg as well.      2-Type 2 diabetes mellitus with diabetic chronic kidney disease, unspecified CKD stage, unspecified long term insulin use status.   Urine protein/creat ratio    Proteinuria was 2.1 g/g creat in Oct 2017.   3- Complicated UTI (urinary tract infection).  Plan: Continue Merrem (renally adjusted)  Air noted on the Right kidney  ?Emphysematous PN?  Follow ID Rx.       4-Acidosis, metabolic.  Continue IVF with bicarb  1/2 NS with 75 mE of Na bicarb at 100 cc/hr  Obtain urine lytes for Urine AG.      5-Anemia, chronic disease.  Plan: obtain iron studies, stool guiac  SPEP/UPEP/SIF/UIF were neg in OCtober 2017  Monitor H/H - transfuse if Hb<7 g/dl. 1-Chronic kidney disease, stage 4 (severe).  likely due to DM and recurrent UTIs/possible PN, creatinine at baseline , last creat on chart was 2.2 from 1/2017  CT abdomen revealed near complete resolution of Rt hydro, although Rt collecting system neoplasm is not excluded. No right hydronephrosis.  Myeloma w/u was neg in Oct 2017. Will hold on ordering CT scan with contrast for now . Can switch IVF to D51/2 NS at 75 cc per hour for now   ELAINA, ANCA were neg as well.      2-Type 2 diabetes mellitus with diabetic chronic kidney disease, unspecified CKD stage, unspecified long term insulin use status.   repeat Urine protein/creat ratioProteinuria was 2.1 g/g creat in Oct 2017.     3- Complicated UTI (urinary tract infection). Continue Merrem (renally adjusted)  Air noted on the Right kidney  ?Emphysematous PN?  Follow ID Rx.       4-Acidosis, metabolic.  Continue IVF as above   Obtain urine lytes for Urine AG.      5-Anemia, chronic disease.   obtain iron studies, stool guiac  SPEP/UPEP/SIF/UIF were neg in OCtober 2017  Monitor H/H - transfuse if Hb<7 g/dl.     6- Confusion related to UTI / Neuro on case patient has history of seizure

## 2018-02-06 NOTE — PROGRESS NOTE ADULT - SUBJECTIVE AND OBJECTIVE BOX
JUAN JOSE RAMACHANDRAN  62y  Female      Patient is a 62y old  Female who presents with a chief complaint of AMS and lethargy.    INTERVAL HPI/OVERNIGHT EVENTS:  NGT placed    No Known Allergies      REVIEW OF SYSTEMS:  CONSTITUTIONAL: No fever, weight loss, or fatigue  EYES: No eye pain, visual disturbances, or discharge  ENMT:  No difficulty hearing, tinnitus, vertigo; No sinus or throat pain  NECK: No pain or stiffness  BREASTS: No pain, masses, or nipple discharge  RESPIRATORY: No cough, wheezing, chills or hemoptysis; No shortness of breath  CARDIOVASCULAR: No chest pain, palpitations, dizziness, or leg swelling  GASTROINTESTINAL: No abdominal or epigastric pain. No nausea, vomiting, or hematemesis; No diarrhea or constipation. No melena or hematochezia.  GENITOURINARY: No dysuria, frequency, hematuria, or incontinence  NEUROLOGICAL: No headaches, memory loss, loss of strength, numbness, or tremors  SKIN: No itching, burning, rashes, or lesions   LYMPH NODES: No enlarged glands  ENDOCRINE: No heat or cold intolerance; No hair loss  MUSCULOSKELETAL: No joint pain or swelling; No muscle, back, or extremity pain  PSYCHIATRIC: No depression, anxiety, mood swings, or difficulty sleeping  HEME/LYMPH: No easy bruising, or bleeding gums  ALLERY AND IMMUNOLOGIC: No hives or eczema  FAMILY HISTORY: n/a    ICU Vital Signs Last 24 Hrs  T(C): 37 (05 Feb 2018 23:13), Max: 37 (05 Feb 2018 23:13)  T(F): 98.6 (05 Feb 2018 23:13), Max: 98.6 (05 Feb 2018 23:13)  HR: 99 (06 Feb 2018 07:58) (99 - 121)  BP: 116/57 (06 Feb 2018 07:58) (93/68 - 124/64)  BP(mean): --  ABP: --  ABP(mean): --  RR: 16 (06 Feb 2018 07:58) (16 - 18)  SpO2: --      PHYSICAL EXAM:  GENERAL: NAD, well-groomed, well-developed  HEAD:  Atraumatic, Normocephalic  EYES: EOMI, PERRLA, conjunctiva and sclera clear  ENMT: No tonsillar erythema, exudates, or enlargement; Moist mucous membranes, Good dentition, No lesions. On VEEG  NECK: Supple, No JVD, Normal thyroid  NERVOUS SYSTEM:  more awake today. Responds to questions  CHEST/LUNG: Clear to percussion bilaterally; No rales, rhonchi, wheezing, or rubs  HEART: Regular rate and rhythm; No murmurs, rubs, or gallops  ABDOMEN: Soft, Nontender, Nondistended; Bowel sounds present, Tyson present  EXTREMITIES:  2+ Peripheral Pulses, No clubbing, cyanosis, or edema.  Sacral stage III decubitus ulcer  LYMPH: No lymphadenopathy noted  SKIN: Sacral stage III decubitus ulder    Consultant(s) Notes Reviewed:  [x ] YES  [ ] NO  Care Discussed with Consultants/Other Providers [ x] YES  [ ] NO    LABS:                        7.9    8.50  )-----------( 409      ( 05 Feb 2018 06:52 )             24.8   02-05    141  |  106  |  42<H>  ----------------------------<  105  3.5   |  19  |  2.3<H>    Ca    8.6      05 Feb 2018 06:52  Mg     2.2     02-05    TPro  6.0  /  Alb  1.8<L>  /  TBili  0.5  /  DBili  x   /  AST  17  /  ALT  9   /  AlkPhos  103  02-05      RADIOLOGY & ADDITIONAL TESTS:    Imaging Personally Reviewed:  [ ] YES  [ ] NO    HEALTH ISSUES - PROBLEM Dx:  Acute renal failure, unspecified acute renal failure type: Acute renal failure, unspecified acute renal failure type  Anemia, chronic disease: Anemia, chronic disease  Acidosis, metabolic: Acidosis, metabolic  Complicated UTI (urinary tract infection): Complicated UTI (urinary tract infection)  Chronic kidney disease, stage 4 (severe): Chronic kidney disease, stage 4 (severe)

## 2018-02-06 NOTE — PROGRESS NOTE ADULT - SUBJECTIVE AND OBJECTIVE BOX
Surgery recalled for wound reevaluation.   Patient poor historian secondary to psychiatric hx    Vital Signs Last 24 Hrs  T(C): 37 (05 Feb 2018 23:13), Max: 37 (05 Feb 2018 23:13)  T(F): 98.6 (05 Feb 2018 23:13), Max: 98.6 (05 Feb 2018 23:13)  HR: 99 (06 Feb 2018 07:58) (99 - 121)  BP: 116/57 (06 Feb 2018 07:58) (116/57 - 124/64)  BP(mean): --  RR: 16 (06 Feb 2018 07:58) (16 - 18)  SpO2: --    PHYSICAL EXAM:      Constitutional: ALERT    Eyes: PERRLA, EOM intact    Neck: no tenderness    Back: no spinal tenderness    Respiratory: cta b/l    Cardiovascular: s1 s2 rrr    Gastrointestinal: soft nt  nd + bs no rebound or guarding    Genitourinary: no cva tenderenss    Extremities: no edema, calf tederness    Vascular: no cyanosis     Neurological: no focal deficits    Skin: STAGE 3 SACRAL ULCER HAS MINIMAL ERYTHEMA SURROUNDING WOUND, WOUND BASE IS CLEAN NO DISCHARGE    Lymph Nodes: no lymphadenopathy                          8.3    8.30  )-----------( 416      ( 06 Feb 2018 06:31 )             25.6       02-06    141  |  102  |  37<H>  ----------------------------<  98  3.5   |  23  |  2.0<H>    Ca    8.2<L>      06 Feb 2018 06:31  Mg     2.0     02-05    TPro  6.0  /  Alb  1.8<L>  /  TBili  0.5  /  DBili  x   /  AST  20  /  ALT  9   /  AlkPhos  100  02-06  [id number]

## 2018-02-06 NOTE — PROGRESS NOTE ADULT - ASSESSMENT
61yo F with resolving sepsis suspected secondary to UTI. Stage 3 sacral ulcer is clean and does not require debridement presently. Patient was korin and examined with surgical resident. Will discuss case with attending.

## 2018-02-06 NOTE — PROGRESS NOTE ADULT - SUBJECTIVE AND OBJECTIVE BOX
Nephrology progress note    Patient is seen and examined, events over the last 24 h noted .    Allergies:  No Known Allergies    Hospital Medications:   MEDICATIONS  (STANDING):  carBAMazepine 450 milliGRAM(s) Oral at bedtime  carBAMazepine 400 milliGRAM(s) Oral daily  dextrose 5% 1000 milliLiter(s) (100 mL/Hr) IV Continuous <Continuous>  diVALproex Sprinkle 250 milliGRAM(s) Oral two times a day  diVALproex Sprinkle 500 milliGRAM(s) Oral at bedtime  folic acid 1 milliGRAM(s) Oral daily  heparin  Injectable 5000 Unit(s) SubCutaneous every 8 hours  lactobacillus acidophilus 1 Tablet(s) Oral every 8 hours  lactulose Syrup 10 Gram(s) Oral every 8 hours  levothyroxine 50 MICROGram(s) Oral daily  meropenem  IVPB 500 milliGRAM(s) IV Intermittent every 12 hours  multivitamin 1 Tablet(s) Oral daily  silver sulfADIAZINE 1% Cream 1 Application(s) Topical every 12 hours  thiamine 100 milliGRAM(s) Oral daily  zinc sulfate 220 milliGRAM(s) Oral daily        VITALS:  T(F): 98.6 (02-05-18 @ 23:13), Max: 98.6 (02-05-18 @ 23:13)  HR: 99 (02-06-18 @ 07:58)  BP: 116/57 (02-06-18 @ 07:58)  RR: 16 (02-06-18 @ 07:58)  SpO2: --  Wt(kg): --    02-04 @ 07:01  -  02-05 @ 07:00  --------------------------------------------------------  IN: 1150 mL / OUT: 1650 mL / NET: -500 mL    02-05 @ 07:01  -  02-06 @ 07:00  --------------------------------------------------------  IN: 2090 mL / OUT: 1175 mL / NET: 915 mL          PHYSICAL EXAM:  Constitutional: NAD  HEENT: anicteric sclera, oropharynx clear, MMM  Neck: No JVD  Respiratory: CTAB, no wheezes, rales or rhonchi  Cardiovascular: S1, S2, RRR  Gastrointestinal: BS+, soft, NT/ND  Extremities: No cyanosis or clubbing. No peripheral edema  Neurological: A/O x 3, no focal deficits  : No CVA tenderness. No antony.   Skin: No rashes  Vascular Access:    LABS:  02-05    142  |  104  |  38<H>  ----------------------------<  77  4.0   |  19  |  2.2<H>    Ca    8.5      05 Feb 2018 19:40  Mg     2.0     02-05    Creatinine Trend: 2.2<--, 2.3<--, 2.3<--, 2.3<--    TPro  6.2  /  Alb  1.9<L>  /  TBili  1.0  /  DBili      /  AST  23  /  ALT  11  /  AlkPhos  104  02-05                          8.5    10.79 )-----------( 412      ( 05 Feb 2018 19:40 )             26.4       Urine Studies:    Osmolality, Random Urine: 262 mos/kg (02-03 @ 18:37) Nephrology progress note    Patient is seen and examined, events over the last 24 h noted .  Confused asking for water , no major events     Allergies:  No Known Allergies    Hospital Medications:   MEDICATIONS  (STANDING):  carBAMazepine 450 milliGRAM(s) Oral at bedtime  carBAMazepine 400 milliGRAM(s) Oral daily  dextrose 5% 1000 milliLiter(s) (100 mL/Hr) IV Continuous <Continuous>  diVALproex Sprinkle 250 milliGRAM(s) Oral two times a day  diVALproex Sprinkle 500 milliGRAM(s) Oral at bedtime  folic acid 1 milliGRAM(s) Oral daily  heparin  Injectable 5000 Unit(s) SubCutaneous every 8 hours  lactobacillus acidophilus 1 Tablet(s) Oral every 8 hours  lactulose Syrup 10 Gram(s) Oral every 8 hours  levothyroxine 50 MICROGram(s) Oral daily  meropenem  IVPB 500 milliGRAM(s) IV Intermittent every 12 hours  multivitamin 1 Tablet(s) Oral daily  silver sulfADIAZINE 1% Cream 1 Application(s) Topical every 12 hours  thiamine 100 milliGRAM(s) Oral daily  zinc sulfate 220 milliGRAM(s) Oral daily        VITALS:  T(F): 98.6 (02-05-18 @ 23:13), Max: 98.6 (02-05-18 @ 23:13)  HR: 99 (02-06-18 @ 07:58)  BP: 116/57 (02-06-18 @ 07:58)  RR: 16 (02-06-18 @ 07:58)      02-04 @ 07:01  -  02-05 @ 07:00  --------------------------------------------------------  IN: 1150 mL / OUT: 1650 mL / NET: -500 mL    02-05 @ 07:01  -  02-06 @ 07:00  --------------------------------------------------------  IN: 2090 mL / OUT: 1175 mL / NET: 915 mL          PHYSICAL EXAM:  Constitutional: NAD  HEENT: anicteric sclera, oropharynx clear, MMM  Neck: No JVD  Respiratory: CTAB, no wheezes, rales or rhonchi  Cardiovascular: S1, S2, RRR  Gastrointestinal: BS+, soft, NT/ND  Extremities: No cyanosis or clubbing. No peripheral edema  Neurological: confused asking for water , no focal motor sensory deficit   : No CVA tenderness. No antony.   Skin: No rashes      LABS:  02-05    142  |  104  |  38<H>  ----------------------------<  77  4.0   |  19  |  2.2<H>    Ca    8.5      05 Feb 2018 19:40  Mg     2.0     02-05    Creatinine Trend: 2.2<--, 2.3<--, 2.3<--, 2.3<--    TPro  6.2  /  Alb  1.9<L>  /  TBili  1.0  /  DBili      /  AST  23  /  ALT  11  /  AlkPhos  104  02-05                          8.5    10.79 )-----------( 412      ( 05 Feb 2018 19:40 )             26.4       Urine Studies:    Osmolality, Random Urine: 262 mos/kg (02-03 @ 18:37)

## 2018-02-06 NOTE — CHART NOTE - NSCHARTNOTEFT_GEN_A_CORE
Registered Dietitian Follow-Up     Patient Profile Reviewed                           Yes [X]   No []     Nutrition History Previously Obtained        Yes [X]  No []       Pertinent Subjective Information: At time of RD visit, pt's daughter was feeding pt pudding. Pt had NGT in place for medications. Pt's daughter gave preferences to RD and noted pt's last BM was today, 2/6. (Of note, pt speaks Malay as primary language.)     Pertinent Medical Interventions: Per RD's phone call with MD, pt is to begin NGT feedings today. 2/6- Abnormal V-EEG noted.      Diet order: NPO with Jevity 1.2 240mL q8= total volume 720mL. This provides 855 kcals, 39.6g protein, and 583mL free water.      Anthropometrics:  - Ht. 157.48cm  - Wt. 76.9kg  - %wt change none noted  - BMI 31.0  - IBW 155lbs     Pertinent Lab Data: 2/6 RBC 3.01L, H/H 8.3/25.6 L, BUN 37H, Cr 2.0H, Alb 1.8L     Pertinent Meds: Heparin, Folic Acid, Thiamine, MVI, Zinc Sulfate, Lactulose, IVF     Physical Findings:  - Appearance: Pt alert and oriented at time of visit; spoke very little  - GI function: BM+ 2/6  - Tubes: NGT  - Oral/Mouth cavity: N/A  - Skin: decubitus ulcer of buttocks- stage 3     Nutrition Requirements  Weight Used: 76.9kg     Estimated Energy Needs    Continue []  Adjust [X]  Adjusted Energy Recommendations:  5652-7224 kcal/day (MSJ x1.2-1.3)        Estimated Protein Needs    Continue [X]  Adjust []  Adjusted Protein Recommendations:    gm/day (1.2-1.4g/kg)      Estimated Fluid Needs        Continue []  Adjust [X]  Adjusted Fluid Recommendations:   5981-7143 mL/day (1mL: 1 kcal)     Nutrient Intake: When on a PO diet, intakes varied between 0-25%        [X] Previous Nutrition Diagnosis: increased protein needs (continues as pt has stage 3 ulcer); inadequate protein-energy intake (continues as pt's TF has not yet started; current order will meet 56% of kcal needs and 43% of protein needs at goal)            [] Ongoing          [] Resolved    [] No active nutrition diagnosis identified at this time     Nutrition Diagnostic #1  Problem:  Etiology:  Statement:     Nutrition Diagnostic #2  Problem:  Etiology:  Statement:     Nutrition Intervention: Recommend slowly increasing pt's TF to Jevity 1.2 q4 as tolerated over the next 2-3 days (hold 2 AM feeds) to provide 1200mL total volume, 1425 kcals, 66g prot, and 972 mL free water. Water flushes- per physician. Also, recommend 1 packet Beneprotein with each feed to provide an additional 125 kcals and 30 g prot. At goal, TF + Beneprotein would provide 1550 kcals (101%) and 96g protein (104%).      Goal/Expected Outcome: Pt able to meet >75% of estimated needs through EN within 3 days.     Indicator/Monitoring: RD to monitor tube feeding initiation and tolerance, wt trends, wound healing, and nutrition-focused physical findings.

## 2018-02-06 NOTE — PROGRESS NOTE ADULT - SUBJECTIVE AND OBJECTIVE BOX
infectious diseases progress note:  JUAN JOSE RAMACHANDRAN is a 62yFemale patient    ALTERED MENTAL STATUS    Chronic retention of urine  Cirrhosis of liver without ascites, unspecified hepatic cirrhosis type  Hypothyroidism, unspecified type  Decubitus ulcer of buttock, stage 3, unspecified laterality  Seizures  Acute renal failure, unspecified acute renal failure type  Anemia, chronic disease  Acidosis, metabolic  Complicated UTI (urinary tract infection)  Type 2 diabetes mellitus with diabetic chronic kidney disease, unspecified CKD stage, unspecified long term insulin use status  Chronic kidney disease, stage 4 (severe)      ROS:  unable to obtain    Allergies    No Known Allergies    Intolerances        ANTIBIOTICS/RELEVANT:  antimicrobials  meropenem  IVPB 500 milliGRAM(s) IV Intermittent every 12 hours    immunologic:    OTHER:  carBAMazepine 450 milliGRAM(s) Oral at bedtime  carBAMazepine 400 milliGRAM(s) Oral daily  dextrose 5% 1000 milliLiter(s) IV Continuous <Continuous>  diVALproex Sprinkle 250 milliGRAM(s) Oral two times a day  diVALproex Sprinkle 500 milliGRAM(s) Oral at bedtime  folic acid 1 milliGRAM(s) Oral daily  heparin  Injectable 5000 Unit(s) SubCutaneous every 8 hours  lactobacillus acidophilus 1 Tablet(s) Oral every 8 hours  lactulose Syrup 10 Gram(s) Oral every 8 hours  levothyroxine 50 MICROGram(s) Oral daily  LORazepam     Tablet 1 milliGRAM(s) Oral every 12 hours PRN  multivitamin 1 Tablet(s) Oral daily  silver sulfADIAZINE 1% Cream 1 Application(s) Topical every 12 hours  thiamine 100 milliGRAM(s) Oral daily  zinc sulfate 220 milliGRAM(s) Oral daily      Objective:  T(F): 98.6 (02-05-18 @ 23:13), Max: 98.6 (02-05-18 @ 23:13)  HR: 99 (02-06-18 @ 07:58) (99 - 121)  BP: 116/57 (02-06-18 @ 07:58) (93/68 - 124/64)  RR: 16 (02-06-18 @ 07:58) (16 - 18)  SpO2: --    PHYSICAL EXAM:  Constitutional:Well-developed, well nourished  Eyes:NGUYỄN, EOMI  Ear/Nose/Throat: no oral lesion, no sinus tenderness on percussion	  Neck:no JVD, no lymphadenopathy, supple  Respiratory: CTA radha  Cardiovascular: S1S2 RRR, no murmurs  Gastrointestinal:soft, (+) BS, no HSM  Extremities:no phlebitis.   sacral ulcer - stage 4         LABS:                        8.5    10.79 )-----------( 412      ( 05 Feb 2018 19:40 )             26.4     02-05    142  |  104  |  38<H>  ----------------------------<  77  4.0   |  19  |  2.2<H>    Ca    8.5      05 Feb 2018 19:40  Mg     2.0     02-05    TPro  6.2  /  Alb  1.9<L>  /  TBili  1.0  /  DBili  x   /  AST  23  /  ALT  11  /  AlkPhos  104  02-05      ESR elevated (prior labs )      MICROBIOLOGY:        RADIOLOGY & ADDITIONAL STUDIES:

## 2018-02-06 NOTE — PROGRESS NOTE ADULT - SUBJECTIVE AND OBJECTIVE BOX
Epilepsy attending   V-EEG is continuing to be abnormal showing :    1-moderate generalized slowing    2- moderate left  more than right focal slowing    3- independent Left more than right sharps at times in psuedoperiodic pattern.    Patient has not been taking depakote regulary the levels are  : VPA <10 and CBZ  4.1              8.3    8.30  )-----------( 416      ( 06 Feb 2018 06:31 )             25.6   02-05    142  |  104  |  38<H>  ----------------------------<  77  4.0   |  19  |  2.2<H>    Ca    8.5      05 Feb 2018 19:40  Mg     2.0     02-05    TPro  6.2  /  Alb  1.9<L>  /  TBili  1.0  /  DBili  x   /  AST  23  /  ALT  11  /  AlkPhos  104  02-05    Suggest:   continue the monitoring   switch the depakote to IV  Do trouph levels

## 2018-02-07 LAB
ALBUMIN SERPL ELPH-MCNC: 1.7 G/DL — LOW (ref 3–5.5)
ALP SERPL-CCNC: 96 U/L — SIGNIFICANT CHANGE UP (ref 30–115)
ALT FLD-CCNC: 9 U/L — SIGNIFICANT CHANGE UP (ref 0–41)
AMMONIA BLD-MCNC: 31 MMOL/L — SIGNIFICANT CHANGE UP (ref 11–35)
AMMONIA BLD-MCNC: 44 MMOL/L — CRITICAL HIGH (ref 11–35)
ANION GAP SERPL CALC-SCNC: 15 MMOL/L — HIGH (ref 7–14)
AST SERPL-CCNC: 24 U/L — SIGNIFICANT CHANGE UP (ref 0–41)
BILIRUB SERPL-MCNC: 0.5 MG/DL — SIGNIFICANT CHANGE UP (ref 0.2–1.2)
BUN SERPL-MCNC: 33 MG/DL — HIGH (ref 10–20)
CALCIUM SERPL-MCNC: 8.1 MG/DL — LOW (ref 8.5–10.1)
CARBAMAZEPINE SERPL-MCNC: 7.7 UG/ML — SIGNIFICANT CHANGE UP (ref 4–12)
CHLORIDE SERPL-SCNC: 98 MMOL/L — SIGNIFICANT CHANGE UP (ref 98–110)
CO2 SERPL-SCNC: 25 MMOL/L — SIGNIFICANT CHANGE UP (ref 17–32)
CREAT SERPL-MCNC: 2 MG/DL — HIGH (ref 0.7–1.5)
GLUCOSE SERPL-MCNC: 107 MG/DL — SIGNIFICANT CHANGE UP (ref 70–110)
HCT VFR BLD CALC: 25.2 % — LOW (ref 37–47)
HGB BLD-MCNC: 8 G/DL — LOW (ref 14–18)
MAGNESIUM SERPL-MCNC: 1.8 MG/DL — SIGNIFICANT CHANGE UP (ref 1.8–2.4)
MCHC RBC-ENTMCNC: 28 PG — SIGNIFICANT CHANGE UP (ref 27–31)
MCHC RBC-ENTMCNC: 31.7 G/DL — LOW (ref 32–37)
MCV RBC AUTO: 88.1 FL — SIGNIFICANT CHANGE UP (ref 81–91)
NRBC # BLD: 0 /100 WBCS — SIGNIFICANT CHANGE UP (ref 0–0)
PLATELET # BLD AUTO: 373 K/UL — SIGNIFICANT CHANGE UP (ref 130–400)
POTASSIUM SERPL-MCNC: 3.4 MMOL/L — LOW (ref 3.5–5)
POTASSIUM SERPL-SCNC: 3.4 MMOL/L — LOW (ref 3.5–5)
PROT SERPL-MCNC: 5.8 G/DL — LOW (ref 6–8)
RBC # BLD: 2.86 M/UL — LOW (ref 4.2–5.4)
RBC # FLD: 15.1 % — HIGH (ref 11.5–14.5)
SODIUM SERPL-SCNC: 138 MMOL/L — SIGNIFICANT CHANGE UP (ref 135–146)
VALPROATE SERPL-MCNC: 22 UG/ML — LOW (ref 50–100)
WBC # BLD: 8.22 K/UL — SIGNIFICANT CHANGE UP (ref 4.8–10.8)
WBC # FLD AUTO: 8.22 K/UL — SIGNIFICANT CHANGE UP (ref 4.8–10.8)

## 2018-02-07 RX ORDER — VALPROIC ACID (AS SODIUM SALT) 250 MG/5ML
500 SOLUTION, ORAL ORAL ONCE
Qty: 0 | Refills: 0 | Status: COMPLETED | OUTPATIENT
Start: 2018-02-07 | End: 2018-02-07

## 2018-02-07 RX ADMIN — Medication 1 TABLET(S): at 05:52

## 2018-02-07 RX ADMIN — SODIUM CHLORIDE 75 MILLILITER(S): 9 INJECTION, SOLUTION INTRAVENOUS at 05:54

## 2018-02-07 RX ADMIN — Medication 100 MILLIGRAM(S): at 12:20

## 2018-02-07 RX ADMIN — Medication 55 MILLIGRAM(S): at 13:52

## 2018-02-07 RX ADMIN — Medication 50 MICROGRAM(S): at 05:52

## 2018-02-07 RX ADMIN — ZINC SULFATE TAB 220 MG (50 MG ZINC EQUIVALENT) 220 MILLIGRAM(S): 220 (50 ZN) TAB at 12:20

## 2018-02-07 RX ADMIN — HEPARIN SODIUM 5000 UNIT(S): 5000 INJECTION INTRAVENOUS; SUBCUTANEOUS at 21:34

## 2018-02-07 RX ADMIN — Medication 110 MILLIGRAM(S): at 05:51

## 2018-02-07 RX ADMIN — Medication 650 MILLIGRAM(S): at 00:21

## 2018-02-07 RX ADMIN — Medication 400 MILLIGRAM(S): at 09:32

## 2018-02-07 RX ADMIN — HEPARIN SODIUM 5000 UNIT(S): 5000 INJECTION INTRAVENOUS; SUBCUTANEOUS at 05:51

## 2018-02-07 RX ADMIN — Medication 1 APPLICATION(S): at 17:16

## 2018-02-07 RX ADMIN — MEROPENEM 100 MILLIGRAM(S): 1 INJECTION INTRAVENOUS at 05:51

## 2018-02-07 RX ADMIN — Medication 450 MILLIGRAM(S): at 21:33

## 2018-02-07 RX ADMIN — LACTULOSE 10 GRAM(S): 10 SOLUTION ORAL at 13:57

## 2018-02-07 RX ADMIN — Medication 1 TABLET(S): at 21:33

## 2018-02-07 RX ADMIN — SODIUM CHLORIDE 75 MILLILITER(S): 9 INJECTION, SOLUTION INTRAVENOUS at 21:34

## 2018-02-07 RX ADMIN — Medication 1 APPLICATION(S): at 05:53

## 2018-02-07 RX ADMIN — LACTULOSE 10 GRAM(S): 10 SOLUTION ORAL at 05:53

## 2018-02-07 RX ADMIN — MEROPENEM 100 MILLIGRAM(S): 1 INJECTION INTRAVENOUS at 17:15

## 2018-02-07 RX ADMIN — LACTULOSE 10 GRAM(S): 10 SOLUTION ORAL at 21:33

## 2018-02-07 RX ADMIN — Medication 1 MILLIGRAM(S): at 12:20

## 2018-02-07 RX ADMIN — HEPARIN SODIUM 5000 UNIT(S): 5000 INJECTION INTRAVENOUS; SUBCUTANEOUS at 13:56

## 2018-02-07 RX ADMIN — Medication 1 TABLET(S): at 12:20

## 2018-02-07 RX ADMIN — Medication 1 TABLET(S): at 13:56

## 2018-02-07 RX ADMIN — Medication 110 MILLIGRAM(S): at 17:19

## 2018-02-07 RX ADMIN — Medication 650 MILLIGRAM(S): at 00:00

## 2018-02-07 NOTE — CHART NOTE - NSCHARTNOTEFT_GEN_A_CORE
Called to bedside by RN.  Patient NGT ordered discontinued by attending.  Informed patient of procedure and NGT removed without incident.  Patient tolerated procedure well.

## 2018-02-07 NOTE — PROGRESS NOTE ADULT - SUBJECTIVE AND OBJECTIVE BOX
JUAN JOSE RAMACHANDRAN  62y  Female      Patient is a 62y old Female who presents with a chief complaint of AMS and lethargy.    INTERVAL HPI/OVERNIGHT EVENTS:  Patient started NGT feeding.  More awake.  Await speech and swallow follow up.      No Known Allergies      REVIEW OF SYSTEMS:  CONSTITUTIONAL: No fever, weight loss, or fatigue  EYES: No eye pain, visual disturbances, or discharge  ENMT:  No difficulty hearing, tinnitus, vertigo; No sinus or throat pain  NECK: No pain or stiffness  BREASTS: No pain, masses, or nipple discharge  RESPIRATORY: No cough, wheezing, chills or hemoptysis; No shortness of breath  CARDIOVASCULAR: No chest pain, palpitations, dizziness, or leg swelling  GASTROINTESTINAL: No abdominal or epigastric pain. No nausea, vomiting, or hematemesis; No diarrhea or constipation. No melena or hematochezia.  GENITOURINARY: No dysuria, frequency, hematuria, or incontinence  NEUROLOGICAL: No headaches, memory loss, loss of strength, numbness, or tremors  SKIN: No itching, burning, rashes, or lesions   LYMPH NODES: No enlarged glands  ENDOCRINE: No heat or cold intolerance; No hair loss  MUSCULOSKELETAL: No joint pain or swelling; No muscle, back, or extremity pain  PSYCHIATRIC: No depression, anxiety, mood swings, or difficulty sleeping  HEME/LYMPH: No easy bruising, or bleeding gums  ALLERY AND IMMUNOLOGIC: No hives or eczema  FAMILY HISTORY: n/a    ICU Vital Signs Last 24 Hrs  T(C): 31.7 (07 Feb 2018 08:02), Max: 36 (06 Feb 2018 16:45)  T(F): 89.1 (07 Feb 2018 08:02), Max: 96.8 (06 Feb 2018 16:45)  HR: 100 (07 Feb 2018 08:02) (97 - 100)  BP: 130/69 (07 Feb 2018 08:02) (114/52 - 130/69)  BP(mean): --  ABP: --  ABP(mean): --  RR: 18 (07 Feb 2018 08:02) (16 - 18)  SpO2: --      PHYSICAL EXAM:  GENERAL: NAD, well-groomed, well-developed  HEAD:  Atraumatic, Normocephalic  EYES: EOMI, PERRLA, conjunctiva and sclera clear  ENMT: No tonsillar erythema, exudates, or enlargement; Moist mucous membranes, Good dentition, No lesions.   NECK: Supple, No JVD, Normal thyroid  NERVOUS SYSTEM:  more awake today. Responds to questions  CHEST/LUNG: Clear to percussion bilaterally; No rales, rhonchi, wheezing, or rubs  HEART: Regular rate and rhythm; No murmurs, rubs, or gallops  ABDOMEN: Soft, Nontender, Nondistended; Bowel sounds present, Tyson present, NGT present  EXTREMITIES:  2+ Peripheral Pulses, No clubbing, cyanosis, or edema.  Sacral stage III decubitus ulcer  LYMPH: No lymphadenopathy noted  SKIN: Sacral stage III decubitus ulcer    Consultant(s) Notes Reviewed:  [x ] YES  [ ] NO  Care Discussed with Consultants/Other Providers [ x] YES  [ ] NO    LABS:                                   8.0    8.22  )-----------( 373      ( 07 Feb 2018 08:32 )             25.2   02-07    138  |  98  |  33<H>  ----------------------------<  107  3.4<L>   |  25  |  2.0<H>    Ca    8.1<L>      07 Feb 2018 08:32  Mg     1.8     02-07    TPro  5.8<L>  /  Alb  1.7<L>  /  TBili  0.5  /  DBili  x   /  AST  24  /  ALT  9   /  AlkPhos  96  02-07         RADIOLOGY & ADDITIONAL TESTS:    Imaging Personally Reviewed:  [ ] YES  [ ] NO    HEALTH ISSUES - PROBLEM Dx:  Acute renal failure, unspecified acute renal failure type: Acute renal failure, unspecified acute renal failure type  Anemia, chronic disease: Anemia, chronic disease  Acidosis, metabolic: Acidosis, metabolic  Complicated UTI (urinary tract infection): Complicated UTI (urinary tract infection)  Chronic kidney disease, stage 4 (severe): Chronic kidney disease, stage 4 (severe)

## 2018-02-07 NOTE — CONSULT NOTE ADULT - SUBJECTIVE AND OBJECTIVE BOX
PTN  KNOWN  TO  ME  FROM  [facility]  PTN  IS  WHEEL  CHAIR  BOUND  NON  AMBULATORY  FOR  YEARS AT  THIS  TIME  NO  NEED  FOR  ACUTE  PT  EVAL AND TX    CANCEL  REHAB  C/S

## 2018-02-07 NOTE — PROGRESS NOTE ADULT - ASSESSMENT
62/F with CKD stage IV, DM, bipolar, liver cirrhosis, new Colon mass, p/w with change in MS .  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

## 2018-02-07 NOTE — PROGRESS NOTE ADULT - SUBJECTIVE AND OBJECTIVE BOX
Patient is seen and examined at the bed side, is afebrile. She is moaning but said  has no pain. The vEEG in progress.         REVIEW OF SYSTEMS: All other review systems are negative        Vital Signs Last 24 Hrs  T(C): 36.5 (07 Feb 2018 16:17), Max: 36.5 (07 Feb 2018 16:17)  T(F): 97.7 (07 Feb 2018 16:17), Max: 97.7 (07 Feb 2018 16:17)  HR: 99 (07 Feb 2018 16:17) (99 - 100)  BP: 119/63 (07 Feb 2018 16:17) (119/63 - 130/69)  BP(mean): --  RR: 16 (07 Feb 2018 16:17) (16 - 18)  SpO2: --        PHYSICAL EXAM:  GENERAL: Moaning but not sure why, vEEG in progress  CVS: s1 and s2 present   RESP: Air entry B/L  GI: Abdomen soft and nontender  : Tyson catheter in placed  EXT: No pedal edema  CNS: Awake and alert        Allergies    No Known Allergies            LABS:                        8.0    8.22  )-----------( 373      ( 07 Feb 2018 08:32 )             25.2                           8.5    10.79 )-----------( 412      ( 05 Feb 2018 19:40 )             26.4         02-07    138  |  98  |  33<H>  ----------------------------<  107  3.4<L>   |  25  |  2.0<H>    Ca    8.1<L>      07 Feb 2018 08:32  Mg     1.8     02-07    TPro  5.8<L>  /  Alb  1.7<L>  /  TBili  0.5  /  DBili  x   /  AST  24  /  ALT  9   /  AlkPhos  96  02-07    02-05    142  |  104  |  38<H>  ----------------------------<  77  4.0   |  19  |  2.2<H>    Ca    8.5      05 Feb 2018 19:40  Mg     2.0     02-05    TPro  6.2  /  Alb  1.9<L>  /  TBili  1.0  /  DBili  x   /  AST  23  /  ALT  11  /  AlkPhos  104  02-05          MEDICATIONS  (STANDING):  carBAMazepine 400 milliGRAM(s) Oral every 24 hours  carBAMazepine 450 milliGRAM(s) Oral at bedtime  dextrose 5% + sodium chloride 0.45%. 1000 milliLiter(s) (75 mL/Hr) IV Continuous <Continuous>  folic acid 1 milliGRAM(s) Oral daily  heparin  Injectable 5000 Unit(s) SubCutaneous every 8 hours  lactobacillus acidophilus 1 Tablet(s) Oral every 8 hours  lactulose Syrup 10 Gram(s) Oral every 8 hours  levothyroxine 50 MICROGram(s) Oral daily  meropenem  IVPB 500 milliGRAM(s) IV Intermittent every 12 hours  multivitamin 1 Tablet(s) Oral daily  silver sulfADIAZINE 1% Cream 1 Application(s) Topical every 12 hours  thiamine 100 milliGRAM(s) Oral daily  valproate sodium IVPB 500 milliGRAM(s) IV Intermittent every 12 hours  zinc sulfate 220 milliGRAM(s) Oral daily    MEDICATIONS  (PRN):  LORazepam     Tablet 1 milliGRAM(s) Oral every 12 hours PRN Anxiety          RADIOLOGY & ADDITIONAL TESTS:    none      MICROBIOLOGY DATA:    Culture - Urine (02.03.18 @ 18:35)    Specimen Source: .Urine Clean Catch (Midstream)    Culture Results:   50,000 - 99,000 CFU/mL Enterobacter cloacae/asburiae  <10,000 CFU/ml Normal Urogenital clark present Patient is seen and examined at the bed side, is afebrile. She is doing better today and have no complaints. The blood cultures have no growth to date.        REVIEW OF SYSTEMS: All other review systems are negative        Vital Signs Last 24 Hrs  T(C): 36.5 (07 Feb 2018 16:17), Max: 36.5 (07 Feb 2018 16:17)  T(F): 97.7 (07 Feb 2018 16:17), Max: 97.7 (07 Feb 2018 16:17)  HR: 99 (07 Feb 2018 16:17) (99 - 100)  BP: 119/63 (07 Feb 2018 16:17) (119/63 - 130/69)  BP(mean): --  RR: 16 (07 Feb 2018 16:17) (16 - 18)  SpO2: --          PHYSICAL EXAM:  GENERAL: Not in distress  CVS: s1 and s2 present   RESP: Air entry B/L  GI: Abdomen soft and nontender  : Tyson catheter in placed  EXT: No pedal edema  CNS: Awake and alert        Allergies    No Known Allergies            LABS:                        8.0    8.22  )-----------( 373      ( 07 Feb 2018 08:32 )             25.2                           8.5    10.79 )-----------( 412      ( 05 Feb 2018 19:40 )             26.4         02-07    138  |  98  |  33<H>  ----------------------------<  107  3.4<L>   |  25  |  2.0<H>    Ca    8.1<L>      07 Feb 2018 08:32  Mg     1.8     02-07    TPro  5.8<L>  /  Alb  1.7<L>  /  TBili  0.5  /  DBili  x   /  AST  24  /  ALT  9   /  AlkPhos  96  02-07    02-05    142  |  104  |  38<H>  ----------------------------<  77  4.0   |  19  |  2.2<H>    Ca    8.5      05 Feb 2018 19:40  Mg     2.0     02-05    TPro  6.2  /  Alb  1.9<L>  /  TBili  1.0  /  DBili  x   /  AST  23  /  ALT  11  /  AlkPhos  104  02-05          MEDICATIONS  (STANDING):  carBAMazepine 400 milliGRAM(s) Oral every 24 hours  carBAMazepine 450 milliGRAM(s) Oral at bedtime  dextrose 5% + sodium chloride 0.45%. 1000 milliLiter(s) (75 mL/Hr) IV Continuous <Continuous>  folic acid 1 milliGRAM(s) Oral daily  heparin  Injectable 5000 Unit(s) SubCutaneous every 8 hours  lactobacillus acidophilus 1 Tablet(s) Oral every 8 hours  lactulose Syrup 10 Gram(s) Oral every 8 hours  levothyroxine 50 MICROGram(s) Oral daily  meropenem  IVPB 500 milliGRAM(s) IV Intermittent every 12 hours  multivitamin 1 Tablet(s) Oral daily  silver sulfADIAZINE 1% Cream 1 Application(s) Topical every 12 hours  thiamine 100 milliGRAM(s) Oral daily  valproate sodium IVPB 500 milliGRAM(s) IV Intermittent every 12 hours  zinc sulfate 220 milliGRAM(s) Oral daily    MEDICATIONS  (PRN):  LORazepam     Tablet 1 milliGRAM(s) Oral every 12 hours PRN Anxiety          RADIOLOGY & ADDITIONAL TESTS:    none      MICROBIOLOGY DATA:    Culture - Blood in AM (02.06.18 @ 06:31)    Specimen Source: .Blood None    Culture Results:   No growth to date.      Culture - Urine (02.03.18 @ 18:35)    Specimen Source: .Urine Clean Catch (Midstream)    Culture Results:   50,000 - 99,000 CFU/mL Enterobacter cloacae/asburiae  <10,000 CFU/ml Normal Urogenital clark present

## 2018-02-07 NOTE — PROGRESS NOTE ADULT - ASSESSMENT
A 62 year old female presented with metabolic encephalopathy Most likely secondary to UTI.    # Encephalopathy  # UTI- enterobacter cloacae  # R/O seizure    would recommend:  1. Follow up Final urine culture, and adjust antibiotic accordingly,  growing Enterobacter   2. Please change Meropenem to Cefepime, since it can lower the seizure threshold in the setting of suspected seizure  3. Follow up the vEEG result  4. Tyson catheter need to be changed  5. Blood cultures x2 for completeness    d/w patient and Nursing staff    -will continue to follow the patient with you A 62 year old female presented with metabolic encephalopathy Most likely secondary to UTI.    # Encephalopathy  # UTI- enterobacter cloacae  # R/O seizure  # Blood cx- NG to date    would recommend:  1. Please change Meropenem to Cefepime, since it can lower the seizure threshold in the setting of suspected seizure  2. Surgery follow up for DU  3. Frequent repositioning    d/w patient and Nursing staff    -will continue to follow the patient with you

## 2018-02-07 NOTE — PROGRESS NOTE ADULT - SUBJECTIVE AND OBJECTIVE BOX
Epilepsy attending,    V-EEG for the last 24 hours:    shows significant improvement. The background is more organized and the periodic discharges are less frequent.    The  blood  test results is pending.    will DC the monitoring.       Suggest continuing the Depakote as IV .    maintain a level around 60.    check levels and Mg++

## 2018-02-07 NOTE — PROGRESS NOTE ADULT - SUBJECTIVE AND OBJECTIVE BOX
Epilepsy NP note:    Morning Lab results reviewed  VPA level 22  CBZ level 7.7  Ammonia 44                   8.0    8.22  )-----------( 373      ( 07 Feb 2018 08:32 )             25.2   02-07    138  |  98  |  33<H>  ----------------------------<  107  3.4<L>   |  25  |  2.0<H>    Ca    8.1<L>      07 Feb 2018 08:32  Mg     1.8     02-07    TPro  5.8<L>  /  Alb  1.7<L>  /  TBili  0.5  /  DBili  x   /  AST  24  /  ALT  9   /  AlkPhos  96  02-07    Suggest: (ordered by me already)  give additional bolus  mg IV,  supplement Mg  repeat trough levels, CMP, Mg in AM

## 2018-02-08 LAB
AMMONIA BLD-MCNC: 43 MMOL/L — CRITICAL HIGH (ref 11–35)
HCT VFR BLD CALC: 24.6 % — LOW (ref 37–47)
HGB BLD-MCNC: 7.7 G/DL — LOW (ref 14–18)
MCHC RBC-ENTMCNC: 28 PG — SIGNIFICANT CHANGE UP (ref 27–31)
MCHC RBC-ENTMCNC: 31.3 G/DL — LOW (ref 32–37)
MCV RBC AUTO: 89.5 FL — SIGNIFICANT CHANGE UP (ref 81–91)
NRBC # BLD: 0 /100 WBCS — SIGNIFICANT CHANGE UP (ref 0–0)
PLATELET # BLD AUTO: 344 K/UL — SIGNIFICANT CHANGE UP (ref 130–400)
RBC # BLD: 2.75 M/UL — LOW (ref 4.2–5.4)
RBC # FLD: 15.4 % — HIGH (ref 11.5–14.5)
WBC # BLD: 7.91 K/UL — SIGNIFICANT CHANGE UP (ref 4.8–10.8)
WBC # FLD AUTO: 7.91 K/UL — SIGNIFICANT CHANGE UP (ref 4.8–10.8)

## 2018-02-08 RX ORDER — MEROPENEM 1 G/30ML
500 INJECTION INTRAVENOUS EVERY 12 HOURS
Qty: 0 | Refills: 0 | Status: DISCONTINUED | OUTPATIENT
Start: 2018-02-08 | End: 2018-02-09

## 2018-02-08 RX ORDER — VANCOMYCIN HCL 1 G
500 VIAL (EA) INTRAVENOUS EVERY 24 HOURS
Qty: 0 | Refills: 0 | Status: DISCONTINUED | OUTPATIENT
Start: 2018-02-09 | End: 2018-02-09

## 2018-02-08 RX ORDER — CEFEPIME 1 G/1
1000 INJECTION, POWDER, FOR SOLUTION INTRAMUSCULAR; INTRAVENOUS EVERY 24 HOURS
Qty: 0 | Refills: 0 | Status: DISCONTINUED | OUTPATIENT
Start: 2018-02-08 | End: 2018-02-08

## 2018-02-08 RX ORDER — VANCOMYCIN HCL 1 G
500 VIAL (EA) INTRAVENOUS ONCE
Qty: 0 | Refills: 0 | Status: COMPLETED | OUTPATIENT
Start: 2018-02-08 | End: 2018-02-08

## 2018-02-08 RX ORDER — VANCOMYCIN HCL 1 G
VIAL (EA) INTRAVENOUS
Qty: 0 | Refills: 0 | Status: DISCONTINUED | OUTPATIENT
Start: 2018-02-08 | End: 2018-02-09

## 2018-02-08 RX ADMIN — LACTULOSE 10 GRAM(S): 10 SOLUTION ORAL at 06:48

## 2018-02-08 RX ADMIN — Medication 1 APPLICATION(S): at 06:49

## 2018-02-08 RX ADMIN — HEPARIN SODIUM 5000 UNIT(S): 5000 INJECTION INTRAVENOUS; SUBCUTANEOUS at 14:37

## 2018-02-08 RX ADMIN — Medication 1 TABLET(S): at 14:37

## 2018-02-08 RX ADMIN — SODIUM CHLORIDE 75 MILLILITER(S): 9 INJECTION, SOLUTION INTRAVENOUS at 14:36

## 2018-02-08 RX ADMIN — ZINC SULFATE TAB 220 MG (50 MG ZINC EQUIVALENT) 220 MILLIGRAM(S): 220 (50 ZN) TAB at 11:29

## 2018-02-08 RX ADMIN — Medication 400 MILLIGRAM(S): at 09:27

## 2018-02-08 RX ADMIN — CEFEPIME 100 MILLIGRAM(S): 1 INJECTION, POWDER, FOR SOLUTION INTRAMUSCULAR; INTRAVENOUS at 07:35

## 2018-02-08 RX ADMIN — Medication 1 TABLET(S): at 06:46

## 2018-02-08 RX ADMIN — MEROPENEM 100 MILLIGRAM(S): 1 INJECTION INTRAVENOUS at 17:20

## 2018-02-08 RX ADMIN — Medication 50 MICROGRAM(S): at 06:47

## 2018-02-08 RX ADMIN — Medication 110 MILLIGRAM(S): at 06:48

## 2018-02-08 RX ADMIN — Medication 110 MILLIGRAM(S): at 17:21

## 2018-02-08 RX ADMIN — Medication 1 TABLET(S): at 22:08

## 2018-02-08 RX ADMIN — Medication 100 MILLIGRAM(S): at 09:27

## 2018-02-08 RX ADMIN — Medication 1 MILLIGRAM(S): at 11:29

## 2018-02-08 RX ADMIN — Medication 450 MILLIGRAM(S): at 22:09

## 2018-02-08 RX ADMIN — LACTULOSE 10 GRAM(S): 10 SOLUTION ORAL at 14:37

## 2018-02-08 RX ADMIN — HEPARIN SODIUM 5000 UNIT(S): 5000 INJECTION INTRAVENOUS; SUBCUTANEOUS at 06:48

## 2018-02-08 RX ADMIN — Medication 1 TABLET(S): at 11:29

## 2018-02-08 RX ADMIN — Medication 100 MILLIGRAM(S): at 11:29

## 2018-02-08 RX ADMIN — HEPARIN SODIUM 5000 UNIT(S): 5000 INJECTION INTRAVENOUS; SUBCUTANEOUS at 22:08

## 2018-02-08 RX ADMIN — LACTULOSE 10 GRAM(S): 10 SOLUTION ORAL at 22:10

## 2018-02-08 RX ADMIN — Medication 1 APPLICATION(S): at 17:21

## 2018-02-08 NOTE — PROGRESS NOTE ADULT - PROBLEM SELECTOR PLAN 10
Chronic antony renewed.

## 2018-02-08 NOTE — PROGRESS NOTE ADULT - PROBLEM SELECTOR PLAN 5
Patient is not a diabetic.  Hba1c is normal.
Not on meds
Not on meds
Patient is not a diabetic.  Hba1c is normal.
Patient is not a diabetic.  Hba1c is normal.
obtain iron studies, stool guiac  SPEP/UPEP/SIF/UIF were neg in OCtober 2017
obtain iron studies, stool guiac  SPEP/UPEP/SIF/UIF were neg in OCtober 2017  Monitor H/H - transfuse if Hb<7 g/dl

## 2018-02-08 NOTE — PROGRESS NOTE ADULT - PROBLEM SELECTOR PROBLEM 7
Decubitus ulcer of buttock, stage 3, unspecified laterality

## 2018-02-08 NOTE — PROGRESS NOTE ADULT - SUBJECTIVE AND OBJECTIVE BOX
infectious diseases progress note:  JUAN JOSE RAMACHANDRAN is a 62yFemale patient    ALTERED MENTAL STATUS    Colon neoplasm  Osteomyelitis of sacrum  Chronic retention of urine  Cirrhosis of liver without ascites, unspecified hepatic cirrhosis type  Hypothyroidism, unspecified type  Decubitus ulcer of buttock, stage 3, unspecified laterality  Seizures  Acute renal failure, unspecified acute renal failure type  Anemia, chronic disease  Acidosis, metabolic  Complicated UTI (urinary tract infection)  Type 2 diabetes mellitus with diabetic chronic kidney disease, unspecified CKD stage, unspecified long term insulin use status  Chronic kidney disease, stage 4 (severe)      ROS:  Unable to obtain      Allergies    No Known Allergies          ANTIBIOTICS/RELEVANT:  antimicrobials  cefepime  IVPB 1000 milliGRAM(s) IV Intermittent every 24 hours    immunologic:    OTHER:  carBAMazepine 400 milliGRAM(s) Oral every 24 hours  carBAMazepine 450 milliGRAM(s) Oral at bedtime  dextrose 5% + sodium chloride 0.45%. 1000 milliLiter(s) IV Continuous <Continuous>  folic acid 1 milliGRAM(s) Oral daily  heparin  Injectable 5000 Unit(s) SubCutaneous every 8 hours  lactobacillus acidophilus 1 Tablet(s) Oral every 8 hours  lactulose Syrup 10 Gram(s) Oral every 8 hours  levothyroxine 50 MICROGram(s) Oral daily  LORazepam     Tablet 1 milliGRAM(s) Oral every 12 hours PRN  multivitamin 1 Tablet(s) Oral daily  silver sulfADIAZINE 1% Cream 1 Application(s) Topical every 12 hours  thiamine 100 milliGRAM(s) Oral daily  valproate sodium IVPB 500 milliGRAM(s) IV Intermittent every 12 hours  zinc sulfate 220 milliGRAM(s) Oral daily      Objective:  T(F): 98.8 (02-08-18 @ 07:40), Max: 98.8 (02-08-18 @ 07:40)  HR: 107 (02-08-18 @ 07:40) (99 - 107)  BP: 146/69 (02-08-18 @ 07:40) (119/63 - 146/69)  RR: 16 (02-08-18 @ 07:40) (16 - 16)  SpO2: --    PHYSICAL EXAM:  Constitutional:Well-developed, well nourished  Eyes:NGUYỄN, EOMI  Ear/Nose/Throat: no oral lesion, no sinus tenderness on percussion	  Neck:no JVD, no lymphadenopathy, supple  Respiratory: CTA radha  Cardiovascular: S1S2 RRR, no murmurs  Gastrointestinal:soft, (+) BS, no HSM  Extremities:no e/e/c        LABS:                        7.7    7.91  )-----------( 344      ( 08 Feb 2018 06:27 )             24.6     02-07    138  |  98  |  33<H>  ----------------------------<  107  3.4<L>   |  25  |  2.0<H>    Ca    8.1<L>      07 Feb 2018 08:32  Mg     1.8     02-07    TPro  5.8<L>  /  Alb  1.7<L>  /  TBili  0.5  /  DBili  x   /  AST  24  /  ALT  9   /  AlkPhos  96  02-07            MICROBIOLOGY:        RADIOLOGY & ADDITIONAL STUDIES:

## 2018-02-08 NOTE — PROGRESS NOTE ADULT - PROBLEM SELECTOR PROBLEM 2
Complicated UTI (urinary tract infection)
Anemia, chronic disease
Osteomyelitis of sacrum
Type 2 diabetes mellitus with diabetic chronic kidney disease, unspecified CKD stage, unspecified long term insulin use status
Anemia, chronic disease

## 2018-02-08 NOTE — PROGRESS NOTE ADULT - PROBLEM SELECTOR PROBLEM 5
Anemia, chronic disease
Type 2 diabetes mellitus with diabetic chronic kidney disease, unspecified CKD stage, unspecified long term insulin use status

## 2018-02-08 NOTE — PROGRESS NOTE ADULT - PROBLEM SELECTOR PLAN 6
Neuro consult appreciated.  Completed VEEG. Continue current AED's monitor levels
2 to dehydtarion   Continue IVF with bicarb
2 to dehydtarion   Will start IVF with bicarb
Neuro consult appreciated.  Completed VEEG. Continue current AED's monitor levels
Neuro consult appreciated.  Pt needs Video EEG. Continue current AED's
Neuro consult appreciated.  Pt needs Video EEG. Continue current AED's
Neuro consult appreciated.  Pt on VEEG. Continue current AED's.

## 2018-02-08 NOTE — PROGRESS NOTE ADULT - PROBLEM SELECTOR PROBLEM 10
Chronic retention of urine

## 2018-02-08 NOTE — PROGRESS NOTE ADULT - PROBLEM SELECTOR PLAN 7
Surgery following.  Local wound care.  S/p bedside debridement. ID recommending PICC and 6 weeks of IV Vanco 500mg q24 and Meropenem 500mg q12
Surgery following.  Local wound care.  S/p bedside debridement
Surgery following.  Local wound care.  S/p bedside debridement
Surgery following.  Local wound care.  S/p bedside debridement. Follow up requested.
Surgery following.  Local wound care.  S/p bedside debridement. Follow up requested.

## 2018-02-08 NOTE — PROGRESS NOTE ADULT - PROBLEM SELECTOR PLAN 4
Continue IVF with bicarb  1/2 NS with 75 mE of Na bicarb at 100 cc/hr  Obtain urine lytes for Urine AG
Monitor BMP daily.
Monitor BMP daily.
Monitor BMP daily. Creatinine at baseline
Monitor BMP daily. Creatinine at baseline
Start IVF with isotonic bicarb  1/2 NS with 75 mE of Na bicarb at 100 cc/hr  Obtain urine lytes for Urine AG
Monitor BMP daily. Creatinine at baseline

## 2018-02-08 NOTE — PROGRESS NOTE ADULT - PROBLEM SELECTOR PLAN 2
-c/w iv abx  -ID following
Monitor. Transfuse and needed
Monitor. Transfuse and needed
Monitor. Transfuse as needed
Monitor. Transfuse as needed
Obtain Urine protein/creat ratio  Proteinuria was 2.1 g/g creat in Oct 2017
Obtain Urine protein/creat ratio  Proteinuria was 2.1 g/g creat in Oct 2017
Monitor. Transfuse as needed

## 2018-02-08 NOTE — PROGRESS NOTE ADULT - PROBLEM SELECTOR PROBLEM 1
Decubitus ulcer of buttock, stage 3, unspecified laterality
Osteomyelitis of sacrum
Acidosis, metabolic
Chronic kidney disease, stage 4 (severe)
Chronic kidney disease, stage 4 (severe)
Decubitus ulcer of buttock, stage 3, unspecified laterality
Chronic kidney disease, stage 4 (severe)
Acidosis, metabolic

## 2018-02-08 NOTE — PROGRESS NOTE ADULT - PROBLEM SELECTOR PROBLEM 9
Cirrhosis of liver without ascites, unspecified hepatic cirrhosis type

## 2018-02-08 NOTE — PROGRESS NOTE ADULT - PROBLEM SELECTOR PLAN 1
Repeat ESR  surgical eval - decub  may need PICC + 6 weeks IV abx
needs PICC + 6 weeks of abx - IV Meropenem + IV VAnco   No cx to guide rx - no I& D per surgery   ESR elevated    Recall if needed
-c/w LWC, nursing care, nutrition, recall prn
CKD stage 4, likely due to DM and recurrent UTIs/possible PN  CT abdomen revealed near complete resolution of Rt hydro, although Rt collecting system neoplasm is not excluded. No right hydronephrosis.  Myeloma w/u was neg in Oct 2017  ELAINA, ANCA were neg as well
CKD stage 4, likely due to DM and recurrent UTIs/possible PN  CT abdomen revealed near complete resolution of Rt hydro, although Rt collecting system neoplasm is not excluded. No right hydronephrosis.  Myeloma w/u was neg in Oct 2017  ELAINA, ANCA were neg as well
Changed fluids to d51/2ns as per renal.  Monitor BMP daily.  Renal following
On fluids with Bicarb. Monitor BMP daily.  Renal following
On fluids with Bicarb. Monitor BMP daily.  Renal following
Resolved. Changed fluids to d51/2ns as per renal.  Monitor BMP daily.  Renal following
Resolved. Changed fluids to d51/2ns as per renal.  Monitor BMP daily.  Renal following

## 2018-02-08 NOTE — PROGRESS NOTE ADULT - PROBLEM SELECTOR PROBLEM 6
Seizures
Acute renal failure, unspecified acute renal failure type
Seizures

## 2018-02-08 NOTE — PROGRESS NOTE ADULT - PROBLEM SELECTOR PROBLEM 3
Colon neoplasm
Complicated UTI (urinary tract infection)

## 2018-02-08 NOTE — PROGRESS NOTE ADULT - SUBJECTIVE AND OBJECTIVE BOX
JUAN JOSE RAMACHANDRAN  62y  Female      Patient is a 62y old Female who presents with a chief complaint of AMS and lethargy.    INTERVAL HPI/OVERNIGHT EVENTS:  More awake.  Tolerating mechanical soft ground diet      No Known Allergies      REVIEW OF SYSTEMS:  Unable to asses.  Patient is non verbal.    FAMILY HISTORY: n/a    Vital Signs Last 24 Hrs  T(C): 37.1 (08 Feb 2018 07:40), Max: 37.1 (08 Feb 2018 07:40)  T(F): 98.8 (08 Feb 2018 07:40), Max: 98.8 (08 Feb 2018 07:40)  HR: 107 (08 Feb 2018 07:40) (99 - 107)  BP: 146/69 (08 Feb 2018 07:40) (119/63 - 146/69)  BP(mean): --  RR: 16 (08 Feb 2018 07:40) (16 - 16)  SpO2: --      PHYSICAL EXAM:  GENERAL: NAD, well-groomed, well-developed  HEAD:  Atraumatic, Normocephalic  EYES: EOMI, PERRLA, conjunctiva and sclera clear  ENMT: No tonsillar erythema, exudates, or enlargement; Moist mucous membranes, Good dentition, No lesions.   NECK: Supple, No JVD, Normal thyroid  NERVOUS SYSTEM:  more awake today. Responds to questions appropriately  CHEST/LUNG: Clear to percussion bilaterally; No rales, rhonchi, wheezing, or rubs  HEART: Regular rate and rhythm; No murmurs, rubs, or gallops  ABDOMEN: Soft, Nontender, Nondistended; Bowel sounds present, Tyson present.  EXTREMITIES:  2+ Peripheral Pulses, No clubbing, cyanosis, or edema.  Sacral stage III decubitus ulcer  LYMPH: No lymphadenopathy noted  SKIN: Sacral stage III decubitus ulcer    Consultant(s) Notes Reviewed:  [x ] YES  [ ] NO  Care Discussed with Consultants/Other Providers [ x] YES  [ ] NO    LABS:                        7.7    7.91  )-----------( 344      ( 08 Feb 2018 06:27 )             24.6   02-07    138  |  98  |  33<H>  ----------------------------<  107  3.4<L>   |  25  |  2.0<H>    Ca    8.1<L>      07 Feb 2018 08:32  Mg     1.8     02-07    TPro  5.8<L>  /  Alb  1.7<L>  /  TBili  0.5  /  DBili  x   /  AST  24  /  ALT  9   /  AlkPhos  96  02-07    CMP pending for today.         RADIOLOGY & ADDITIONAL TESTS:    Imaging Personally Reviewed:  [ ] YES  [ ] NO    HEALTH ISSUES - PROBLEM Dx:  Acute renal failure, unspecified acute renal failure type: Acute renal failure, unspecified acute renal failure type  Anemia, chronic disease: Anemia, chronic disease  Acidosis, metabolic: Acidosis, metabolic  Complicated UTI (urinary tract infection): Complicated UTI (urinary tract infection)  Chronic kidney disease, stage 4 (severe): Chronic kidney disease, stage 4 (severe)

## 2018-02-08 NOTE — PROGRESS NOTE ADULT - PROVIDER SPECIALTY LIST ADULT
Infectious Disease
Internal Medicine
Nephrology
Neurology
Surgery
Internal Medicine

## 2018-02-08 NOTE — PROGRESS NOTE ADULT - PROBLEM SELECTOR PLAN 3
-will need colectomy when medically stable
Continue Merrem (renally adjusted)  Air noted on the Right kidney  ?Emphysematous PN?  Follow ID Rx
Continue Merrem (renally adjusted)  Air noted on the Right kidney  ?Emphysematous PN?  Follow ID Rx
Continue meropenem
Continue meropenem.  Altered mental status, but more awake today.  NGT placed and patient is tolerating feeds. Speech and swallow will follow up today.
Continue meropenem.  Altered mental status, but more awake today.  NGT placed.  Will try to feed by mouth, if unsuccessful, will start NGT feeds
Continue meropenem.  Altered mental status.  Patient not eating or taking meds. Needs NGT.  Will call PA to place
Continue meropenem.  More awake today.

## 2018-02-08 NOTE — PROGRESS NOTE ADULT - PROBLEM SELECTOR PROBLEM 4
Acidosis, metabolic
Chronic kidney disease, stage 4 (severe)

## 2018-02-08 NOTE — PROGRESS NOTE ADULT - NSHPATTENDINGPLANDISCUSS_GEN_ALL_CORE
medical staff and patient's daughter, Enma.  All questions answered
patients daughter and medical staff
medical staff
medical staff and patient's daughter at length.  All questions answered

## 2018-02-09 ENCOUNTER — TRANSCRIPTION ENCOUNTER (OUTPATIENT)
Age: 63
End: 2018-02-09

## 2018-02-09 VITALS
DIASTOLIC BLOOD PRESSURE: 53 MMHG | HEART RATE: 99 BPM | TEMPERATURE: 98 F | SYSTOLIC BLOOD PRESSURE: 96 MMHG | RESPIRATION RATE: 16 BRPM

## 2018-02-09 LAB
ALBUMIN SERPL ELPH-MCNC: 1.5 G/DL — LOW (ref 3–5.5)
ALLERGY+IMMUNOLOGY DIAG STUDY NOTE: SIGNIFICANT CHANGE UP
ALP SERPL-CCNC: 102 U/L — SIGNIFICANT CHANGE UP (ref 30–115)
ALT FLD-CCNC: 9 U/L — SIGNIFICANT CHANGE UP (ref 0–41)
ANION GAP SERPL CALC-SCNC: 12 MMOL/L — SIGNIFICANT CHANGE UP (ref 7–14)
AST SERPL-CCNC: 23 U/L — SIGNIFICANT CHANGE UP (ref 0–41)
BILIRUB SERPL-MCNC: 0.3 MG/DL — SIGNIFICANT CHANGE UP (ref 0.2–1.2)
BUN SERPL-MCNC: 25 MG/DL — HIGH (ref 10–20)
CALCIUM SERPL-MCNC: 8.2 MG/DL — LOW (ref 8.5–10.1)
CHLORIDE SERPL-SCNC: 102 MMOL/L — SIGNIFICANT CHANGE UP (ref 98–110)
CO2 SERPL-SCNC: 23 MMOL/L — SIGNIFICANT CHANGE UP (ref 17–32)
CREAT SERPL-MCNC: 2 MG/DL — HIGH (ref 0.7–1.5)
GLUCOSE SERPL-MCNC: 84 MG/DL — SIGNIFICANT CHANGE UP (ref 70–110)
HCT VFR BLD CALC: 23.2 % — LOW (ref 37–47)
HGB BLD-MCNC: 7.2 G/DL — CRITICAL LOW (ref 14–18)
MCHC RBC-ENTMCNC: 27.9 PG — SIGNIFICANT CHANGE UP (ref 27–31)
MCHC RBC-ENTMCNC: 31 G/DL — LOW (ref 32–37)
MCV RBC AUTO: 89.9 FL — SIGNIFICANT CHANGE UP (ref 81–91)
NRBC # BLD: 0 /100 WBCS — SIGNIFICANT CHANGE UP (ref 0–0)
PLATELET # BLD AUTO: 283 K/UL — SIGNIFICANT CHANGE UP (ref 130–400)
POTASSIUM SERPL-MCNC: 3.5 MMOL/L — SIGNIFICANT CHANGE UP (ref 3.5–5)
POTASSIUM SERPL-SCNC: 3.5 MMOL/L — SIGNIFICANT CHANGE UP (ref 3.5–5)
PROT SERPL-MCNC: 5.2 G/DL — LOW (ref 6–8)
RBC # BLD: 2.58 M/UL — LOW (ref 4.2–5.4)
RBC # FLD: 15 % — HIGH (ref 11.5–14.5)
SODIUM SERPL-SCNC: 137 MMOL/L — SIGNIFICANT CHANGE UP (ref 135–146)
TYPE + AB SCN PNL BLD: SIGNIFICANT CHANGE UP
WBC # BLD: 7.94 K/UL — SIGNIFICANT CHANGE UP (ref 4.8–10.8)
WBC # FLD AUTO: 7.94 K/UL — SIGNIFICANT CHANGE UP (ref 4.8–10.8)

## 2018-02-09 RX ORDER — DIVALPROEX SODIUM 500 MG/1
750 TABLET, DELAYED RELEASE ORAL
Qty: 0 | Refills: 0 | Status: DISCONTINUED | OUTPATIENT
Start: 2018-02-09 | End: 2018-02-09

## 2018-02-09 RX ORDER — FERROUS SULFATE 325(65) MG
325 TABLET ORAL
Qty: 0 | Refills: 0 | COMMUNITY
Start: 2018-02-09

## 2018-02-09 RX ORDER — CARBAMAZEPINE 200 MG
400 TABLET ORAL EVERY 24 HOURS
Qty: 0 | Refills: 0 | Status: DISCONTINUED | OUTPATIENT
Start: 2018-02-09 | End: 2018-02-09

## 2018-02-09 RX ORDER — ACETAMINOPHEN 500 MG
1 TABLET ORAL
Qty: 0 | Refills: 0 | COMMUNITY

## 2018-02-09 RX ORDER — DIVALPROEX SODIUM 500 MG/1
250 TABLET, DELAYED RELEASE ORAL
Qty: 0 | Refills: 0 | Status: DISCONTINUED | OUTPATIENT
Start: 2018-02-09 | End: 2018-02-09

## 2018-02-09 RX ORDER — FERROUS SULFATE 325(65) MG
325 TABLET ORAL EVERY 12 HOURS
Qty: 0 | Refills: 0 | Status: DISCONTINUED | OUTPATIENT
Start: 2018-02-09 | End: 2018-02-09

## 2018-02-09 RX ORDER — MEROPENEM 1 G/30ML
500 INJECTION INTRAVENOUS
Qty: 0 | Refills: 0 | COMMUNITY
Start: 2018-02-09

## 2018-02-09 RX ORDER — VANCOMYCIN HCL 1 G
500 VIAL (EA) INTRAVENOUS
Qty: 0 | Refills: 0 | COMMUNITY
Start: 2018-02-09

## 2018-02-09 RX ORDER — MULTIVIT-MIN/FERROUS GLUCONATE 9 MG/15 ML
0 LIQUID (ML) ORAL
Qty: 0 | Refills: 0 | COMMUNITY

## 2018-02-09 RX ORDER — CARBAMAZEPINE 200 MG
4.5 TABLET ORAL
Qty: 0 | Refills: 0 | COMMUNITY

## 2018-02-09 RX ADMIN — Medication 1 TABLET(S): at 12:31

## 2018-02-09 RX ADMIN — Medication 450 MILLIGRAM(S): at 21:44

## 2018-02-09 RX ADMIN — MEROPENEM 100 MILLIGRAM(S): 1 INJECTION INTRAVENOUS at 05:14

## 2018-02-09 RX ADMIN — SODIUM CHLORIDE 75 MILLILITER(S): 9 INJECTION, SOLUTION INTRAVENOUS at 05:51

## 2018-02-09 RX ADMIN — LACTULOSE 10 GRAM(S): 10 SOLUTION ORAL at 15:49

## 2018-02-09 RX ADMIN — Medication 1 TABLET(S): at 21:44

## 2018-02-09 RX ADMIN — LACTULOSE 10 GRAM(S): 10 SOLUTION ORAL at 21:44

## 2018-02-09 RX ADMIN — ZINC SULFATE TAB 220 MG (50 MG ZINC EQUIVALENT) 220 MILLIGRAM(S): 220 (50 ZN) TAB at 12:31

## 2018-02-09 RX ADMIN — Medication 400 MILLIGRAM(S): at 12:33

## 2018-02-09 RX ADMIN — Medication 1 APPLICATION(S): at 05:14

## 2018-02-09 RX ADMIN — Medication 1 TABLET(S): at 05:12

## 2018-02-09 RX ADMIN — HEPARIN SODIUM 5000 UNIT(S): 5000 INJECTION INTRAVENOUS; SUBCUTANEOUS at 05:12

## 2018-02-09 RX ADMIN — Medication 1 TABLET(S): at 15:50

## 2018-02-09 RX ADMIN — Medication 100 MILLIGRAM(S): at 12:33

## 2018-02-09 RX ADMIN — Medication 1 APPLICATION(S): at 18:07

## 2018-02-09 RX ADMIN — LACTULOSE 10 GRAM(S): 10 SOLUTION ORAL at 05:12

## 2018-02-09 RX ADMIN — DIVALPROEX SODIUM 250 MILLIGRAM(S): 500 TABLET, DELAYED RELEASE ORAL at 18:06

## 2018-02-09 RX ADMIN — Medication 100 MILLIGRAM(S): at 12:29

## 2018-02-09 RX ADMIN — Medication 50 MICROGRAM(S): at 05:12

## 2018-02-09 RX ADMIN — Medication 110 MILLIGRAM(S): at 05:14

## 2018-02-09 RX ADMIN — Medication 325 MILLIGRAM(S): at 18:07

## 2018-02-09 RX ADMIN — MEROPENEM 100 MILLIGRAM(S): 1 INJECTION INTRAVENOUS at 18:06

## 2018-02-09 RX ADMIN — Medication 1 MILLIGRAM(S): at 12:32

## 2018-02-09 NOTE — DISCHARGE NOTE ADULT - SECONDARY DIAGNOSIS.
Osteomyelitis of sacrum Seizures Acute renal failure, unspecified acute renal failure type Colon neoplasm Complicated UTI (urinary tract infection) Hypothyroidism, unspecified type

## 2018-02-09 NOTE — DISCHARGE NOTE ADULT - CARE PLAN
Principal Discharge DX:	Acidosis, metabolic  Goal:	resolved  Assessment and plan of treatment:	resolved. Check bmp in one week  Secondary Diagnosis:	Colon neoplasm  Goal:	follow up with surgery  Assessment and plan of treatment:	follow up with surgery and GI  Secondary Diagnosis:	Complicated UTI (urinary tract infection)  Goal:	resolved  Assessment and plan of treatment:	prevent recurrence  Secondary Diagnosis:	Hypothyroidism, unspecified type  Goal:	take meds as prescribed  Assessment and plan of treatment:	take meds as prescribed  Secondary Diagnosis:	Osteomyelitis of sacrum  Goal:	complete six weeks of abx  Assessment and plan of treatment:	s/p picc. complete six weeks of abx  Secondary Diagnosis:	Seizures  Goal:	continue current aed's  Assessment and plan of treatment:	continue current AED's  Secondary Diagnosis:	Acute renal failure, unspecified acute renal failure type  Goal:	resolved  Assessment and plan of treatment:	check bmp in one week. Baseline creatinine is 2

## 2018-02-09 NOTE — DISCHARGE NOTE ADULT - PATIENT PORTAL LINK FT
You can access the "[employer] Patient Portal, offered by [facility], by registering with the following website: [email or web address]

## 2018-02-09 NOTE — DISCHARGE NOTE ADULT - PLAN OF CARE
take meds as prescribed complete six weeks of abx s/p picc. complete six weeks of abx continue current aed's continue current AED's resolved check bmp in one week. Baseline creatinine is 2 resolved. Check bmp in one week follow up with surgery follow up with surgery and GI prevent recurrence

## 2018-02-09 NOTE — DISCHARGE NOTE ADULT - HOSPITAL COURSE
Patient seen an examined by me this morning.  She is in NAD, s/p PICC this am.  Presented for AMS and found to have sacral OM and metabolic acidosis.  Patient was seen by renal and s/p bicarb drip.  She was seen by surgery for a bedside debridement and recommended to have a picc placed and continue 6 weeks of IV Huey and Vanco.  Spent over 2.5 hours on discharge planning.

## 2018-02-09 NOTE — CHART NOTE - NSCHARTNOTEFT_GEN_A_CORE
Registered Dietitian Follow-Up     Patient Profile Reviewed                           Yes [x]   No []     Nutrition History Previously Obtained        Yes [x]  No []       Pertinent Subjective Information: pt was away for PICC line; no family present in pt's room at time of visit.     Pertinent Medical Interventions: stage 3 decubitus ulcer to sacral spine; s/p debridement; pt is gone for PICC line placement. colon neoplasm- will need colectomy.      Diet order: mechanical soft (unspecified consistency)     Anthropometrics:  - Ht.   - Wt. question accuracy of weight fluctuations.   - %wt change  - BMI  - IBW     Pertinent Lab Data: 2/8: H/H 7.7/24.6, 2/7: K+ 3.4, BUN 33, creatinine 2.0, albumin 1.7, GFR 26     Pertinent Meds: abx, acidophilus, lactulose, levothyroxine, MVI, thiamine, Zn sulfate (ordered as 220 mg OC to be d/c'd in 29 days)     Physical Findings:  - Appearance: noted to be confused and disoriented. not available at time of visit   - GI function: +BM 2/9  - Tubes: NGT removed   - Oral/Mouth cavity: communicated with SLP who recommended mechanical soft/chopped with thin liquids.   - Skin: stage 3 decubitus ulcer to sacral spine     Nutrition Requirements  Weight Used: 50 kg (IBW)     Estimated Energy Needs    Continue []  Adjust [x]  Adjusted Energy Recommendations:  6825-6653 kcal/day (30-35 kcal/kg ABW) for wound        Estimated Protein Needs    Continue [x]  Adjust []  Adjusted Protein Recommendations:   gm/day (1.2-1.4 g/kg ABW)        Estimated Fluid Needs        Continue [x]  Adjust []  Adjusted Fluid Recommendations:   1:1 ml/kcal     Nutrient Intake: needs are likely not being met. PCA reporting very poor intake ~ 0-25% of trays.        [] Previous Nutrition Diagnosis: increased protein needs             [x] Ongoing          [] Resolved    [] No active nutrition diagnosis identified at this time     Nutrition Diagnostic #1  Problem:  Etiology:  Statement:     Nutrition Diagnostic #2  Problem:  Etiology:  Statement:     Nutrition Intervention: meals and snacks, medical food supplements, vitamins and minerals     Goal/Expected Outcome: PO intake >25% of meals within 4 days      Indicator/Monitoring: diet order, energy intake, weight trends

## 2018-02-09 NOTE — DISCHARGE NOTE ADULT - MEDICATION SUMMARY - MEDICATIONS TO TAKE
I will START or STAY ON the medications listed below when I get home from the hospital:    carBAMazepine 200 mg oral tablet  -- 2 tab(s) by mouth once a day  -- Indication: For Seizures    Depakote 500 mg oral delayed release tablet  -- 1 tab(s) by mouth once a day (at bedtime)  -- Indication: For Seizures    divalproex sodium 125 mg oral delayed release capsule  -- 2 cap(s) by mouth 2 times a day  -- Indication: For Seizures    LORazepam 1 mg oral tablet  -- 1 tab(s) by mouth 2 times a day  -- Indication: For Seizures    meropenem 500 mg intravenous injection  -- 500 milligram(s) intravenous every 12 hours for 6 weeks  -- Indication: For Osteomyelitis of sacrum    silver sulfADIAZINE 1% topical cream  -- 1 application on skin every 12 hours  -- Indication: For Osteomyelitis of sacrum    vancomycin  -- 500 milligram(s) intravenously once a day  for 6 weeks  -- Indication: For Osteomyelitis of sacrum    Cranberry oral capsule  -- Indication: For Home med    FeroSul 325 mg (65 mg elemental iron) oral tablet  -- 325 milligram(s) by mouth 2 times a day  -- Indication: For Anemia, chronic disease    Bisco-Lax 10 mg rectal suppository  -- 1 suppository(ies) rectally once a day  -- Indication: For Constipation    lactulose  -- 30 gram(s) by mouth 3 times a day  -- Indication: For Constipation    zinc sulfate 220 mg oral capsule  -- 1 cap(s) by mouth once a day  -- Indication: For vitamin    simethicone 80 mg oral tablet  -- 1 tab(s) by mouth once a day  -- Indication: For gas    Acidophilus oral tablet  -- tab(s) by mouth 2 times a day  -- Indication: For for abx    Synthroid 50 mcg (0.05 mg) oral tablet  -- 1 tab(s) by mouth once a day (in the morning)  -- Indication: For Hypothyroidism, unspecified type    Multiple Vitamins oral tablet  -- 1 tab(s) by mouth once a day  -- Indication: For vitamin    thiamine 100 mg oral tablet  -- 1 tab(s) by mouth once a day  -- Indication: For vitamin    Vitamin D3 50,000 intl units oral capsule  -- 1 cap(s) by mouth once a month  -- Indication: For vitamin     folic acid 1 mg oral tablet  -- 1 tab(s) by mouth once a day  -- Indication: For vitamin

## 2018-02-10 ENCOUNTER — OUTPATIENT (OUTPATIENT)
Dept: OUTPATIENT SERVICES | Facility: HOSPITAL | Age: 63
LOS: 1 days | Discharge: HOME | End: 2018-02-10

## 2018-02-10 DIAGNOSIS — Z51.81 ENCOUNTER FOR THERAPEUTIC DRUG LEVEL MONITORING: ICD-10-CM

## 2018-02-11 LAB
CULTURE RESULTS: SIGNIFICANT CHANGE UP
SPECIMEN SOURCE: SIGNIFICANT CHANGE UP

## 2018-02-12 ENCOUNTER — OUTPATIENT (OUTPATIENT)
Dept: OUTPATIENT SERVICES | Facility: HOSPITAL | Age: 63
LOS: 1 days | Discharge: HOME | End: 2018-02-12

## 2018-02-12 DIAGNOSIS — I10 ESSENTIAL (PRIMARY) HYPERTENSION: ICD-10-CM

## 2018-02-12 DIAGNOSIS — D89.89 OTHER SPECIFIED DISORDERS INVOLVING THE IMMUNE MECHANISM, NOT ELSEWHERE CLASSIFIED: ICD-10-CM

## 2018-02-13 ENCOUNTER — OUTPATIENT (OUTPATIENT)
Dept: OUTPATIENT SERVICES | Facility: HOSPITAL | Age: 63
LOS: 1 days | Discharge: HOME | End: 2018-02-13

## 2018-02-13 DIAGNOSIS — R79.9 ABNORMAL FINDING OF BLOOD CHEMISTRY, UNSPECIFIED: ICD-10-CM

## 2018-02-13 DIAGNOSIS — G35 MULTIPLE SCLEROSIS: ICD-10-CM

## 2018-02-13 DIAGNOSIS — F31.9 BIPOLAR DISORDER, UNSPECIFIED: ICD-10-CM

## 2018-02-13 DIAGNOSIS — D64.9 ANEMIA, UNSPECIFIED: ICD-10-CM

## 2018-02-13 DIAGNOSIS — R41.82 ALTERED MENTAL STATUS, UNSPECIFIED: ICD-10-CM

## 2018-02-13 DIAGNOSIS — N39.0 URINARY TRACT INFECTION, SITE NOT SPECIFIED: ICD-10-CM

## 2018-02-13 DIAGNOSIS — E11.22 TYPE 2 DIABETES MELLITUS WITH DIABETIC CHRONIC KIDNEY DISEASE: ICD-10-CM

## 2018-02-13 DIAGNOSIS — G93.41 METABOLIC ENCEPHALOPATHY: ICD-10-CM

## 2018-02-13 DIAGNOSIS — K74.60 UNSPECIFIED CIRRHOSIS OF LIVER: ICD-10-CM

## 2018-02-13 DIAGNOSIS — G40.909 EPILEPSY, UNSPECIFIED, NOT INTRACTABLE, WITHOUT STATUS EPILEPTICUS: ICD-10-CM

## 2018-02-14 DIAGNOSIS — E03.9 HYPOTHYROIDISM, UNSPECIFIED: ICD-10-CM

## 2018-02-14 DIAGNOSIS — D63.1 ANEMIA IN CHRONIC KIDNEY DISEASE: ICD-10-CM

## 2018-02-14 DIAGNOSIS — A41.9 SEPSIS, UNSPECIFIED ORGANISM: ICD-10-CM

## 2018-02-14 DIAGNOSIS — M86.9 OSTEOMYELITIS, UNSPECIFIED: ICD-10-CM

## 2018-02-14 DIAGNOSIS — R33.8 OTHER RETENTION OF URINE: ICD-10-CM

## 2018-02-14 DIAGNOSIS — K63.9 DISEASE OF INTESTINE, UNSPECIFIED: ICD-10-CM

## 2018-02-14 DIAGNOSIS — E87.2 ACIDOSIS: ICD-10-CM

## 2018-02-14 DIAGNOSIS — L89.303 PRESSURE ULCER OF UNSPECIFIED BUTTOCK, STAGE 3: ICD-10-CM

## 2018-02-14 DIAGNOSIS — L89.153 PRESSURE ULCER OF SACRAL REGION, STAGE 3: ICD-10-CM

## 2018-02-14 DIAGNOSIS — E11.69 TYPE 2 DIABETES MELLITUS WITH OTHER SPECIFIED COMPLICATION: ICD-10-CM

## 2018-02-14 DIAGNOSIS — E11.21 TYPE 2 DIABETES MELLITUS WITH DIABETIC NEPHROPATHY: ICD-10-CM

## 2018-02-14 DIAGNOSIS — B96.89 OTHER SPECIFIED BACTERIAL AGENTS AS THE CAUSE OF DISEASES CLASSIFIED ELSEWHERE: ICD-10-CM

## 2018-02-14 DIAGNOSIS — N17.9 ACUTE KIDNEY FAILURE, UNSPECIFIED: ICD-10-CM

## 2018-02-14 DIAGNOSIS — E72.20 DISORDER OF UREA CYCLE METABOLISM, UNSPECIFIED: ICD-10-CM

## 2018-02-14 DIAGNOSIS — N18.4 CHRONIC KIDNEY DISEASE, STAGE 4 (SEVERE): ICD-10-CM

## 2018-02-14 DIAGNOSIS — E86.0 DEHYDRATION: ICD-10-CM

## 2018-02-14 DIAGNOSIS — C18.8 MALIGNANT NEOPLASM OF OVERLAPPING SITES OF COLON: ICD-10-CM

## 2018-02-19 ENCOUNTER — OUTPATIENT (OUTPATIENT)
Dept: OUTPATIENT SERVICES | Facility: HOSPITAL | Age: 63
LOS: 1 days | Discharge: HOME | End: 2018-02-19

## 2018-02-19 DIAGNOSIS — Z51.81 ENCOUNTER FOR THERAPEUTIC DRUG LEVEL MONITORING: ICD-10-CM

## 2018-02-20 ENCOUNTER — OUTPATIENT (OUTPATIENT)
Dept: OUTPATIENT SERVICES | Facility: HOSPITAL | Age: 63
LOS: 1 days | Discharge: HOME | End: 2018-02-20

## 2018-02-20 DIAGNOSIS — Z51.81 ENCOUNTER FOR THERAPEUTIC DRUG LEVEL MONITORING: ICD-10-CM

## 2018-02-22 DIAGNOSIS — Z66 DO NOT RESUSCITATE: ICD-10-CM

## 2018-02-23 ENCOUNTER — OUTPATIENT (OUTPATIENT)
Dept: OUTPATIENT SERVICES | Facility: HOSPITAL | Age: 63
LOS: 1 days | Discharge: HOME | End: 2018-02-23

## 2018-02-23 DIAGNOSIS — R70.0 ELEVATED ERYTHROCYTE SEDIMENTATION RATE: ICD-10-CM

## 2018-02-23 DIAGNOSIS — R79.9 ABNORMAL FINDING OF BLOOD CHEMISTRY, UNSPECIFIED: ICD-10-CM

## 2018-02-23 DIAGNOSIS — D64.9 ANEMIA, UNSPECIFIED: ICD-10-CM

## 2018-02-24 ENCOUNTER — INPATIENT (INPATIENT)
Facility: HOSPITAL | Age: 63
LOS: 25 days | Discharge: NURSING, REHAB OR LONG-TERM CARE | End: 2018-03-22
Attending: HOSPITALIST | Admitting: INTERNAL MEDICINE

## 2018-02-24 VITALS
DIASTOLIC BLOOD PRESSURE: 75 MMHG | RESPIRATION RATE: 20 BRPM | SYSTOLIC BLOOD PRESSURE: 140 MMHG | HEART RATE: 121 BPM | OXYGEN SATURATION: 100 % | TEMPERATURE: 99 F

## 2018-02-24 DIAGNOSIS — Z98.890 OTHER SPECIFIED POSTPROCEDURAL STATES: Chronic | ICD-10-CM

## 2018-02-24 DIAGNOSIS — T83.511A INFECTION AND INFLAMMATORY REACTION DUE TO INDWELLING URETHRAL CATHETER, INITIAL ENCOUNTER: ICD-10-CM

## 2018-02-24 LAB
ALBUMIN SERPL ELPH-MCNC: 2 G/DL — LOW (ref 3–5.5)
ALP SERPL-CCNC: 121 U/L — HIGH (ref 30–115)
ALT FLD-CCNC: 11 U/L — SIGNIFICANT CHANGE UP (ref 0–41)
ANION GAP SERPL CALC-SCNC: 17 MMOL/L — HIGH (ref 7–14)
APPEARANCE UR: (no result)
APTT BLD: 24.9 SEC — LOW (ref 27–39.2)
AST SERPL-CCNC: 17 U/L — SIGNIFICANT CHANGE UP (ref 0–41)
BACTERIA # UR AUTO: (no result) /HPF
BASOPHILS # BLD AUTO: 0.14 K/UL — SIGNIFICANT CHANGE UP (ref 0–0.2)
BASOPHILS NFR BLD AUTO: 1.4 % — HIGH (ref 0–1)
BILIRUB SERPL-MCNC: 0.9 MG/DL — SIGNIFICANT CHANGE UP (ref 0.2–1.2)
BILIRUB UR-MCNC: NEGATIVE — SIGNIFICANT CHANGE UP
BUN SERPL-MCNC: 46 MG/DL — HIGH (ref 10–20)
CALCIUM SERPL-MCNC: 9.4 MG/DL — SIGNIFICANT CHANGE UP (ref 8.5–10.1)
CHLORIDE SERPL-SCNC: 120 MMOL/L — HIGH (ref 98–110)
CK MB CFR SERPL CALC: 2.5 NG/ML — SIGNIFICANT CHANGE UP (ref 0.6–6.3)
CO2 SERPL-SCNC: 10 MMOL/L — LOW (ref 17–32)
COLOR SPEC: YELLOW — SIGNIFICANT CHANGE UP
CREAT SERPL-MCNC: 2.1 MG/DL — HIGH (ref 0.7–1.5)
DIFF PNL FLD: (no result)
EOSINOPHIL # BLD AUTO: 0.33 K/UL — SIGNIFICANT CHANGE UP (ref 0–0.7)
EOSINOPHIL NFR BLD AUTO: 3.4 % — SIGNIFICANT CHANGE UP (ref 0–8)
EPI CELLS # UR: (no result) /HPF
GLUCOSE SERPL-MCNC: 86 MG/DL — SIGNIFICANT CHANGE UP (ref 70–110)
GLUCOSE UR QL: NEGATIVE MG/DL — SIGNIFICANT CHANGE UP
HCT VFR BLD CALC: 30.9 % — LOW (ref 37–47)
HGB BLD-MCNC: 9.3 G/DL — LOW (ref 14–18)
IMM GRANULOCYTES NFR BLD AUTO: 0.4 % — HIGH (ref 0.1–0.3)
INR BLD: 1.08 RATIO — SIGNIFICANT CHANGE UP (ref 0.65–1.3)
KETONES UR-MCNC: (no result)
LACTATE SERPL-SCNC: 0.8 MMOL/L — SIGNIFICANT CHANGE UP (ref 0.5–2.2)
LEUKOCYTE ESTERASE UR-ACNC: (no result)
LIDOCAIN IGE QN: 34 U/L — SIGNIFICANT CHANGE UP (ref 7–60)
LYMPHOCYTES # BLD AUTO: 2.59 K/UL — SIGNIFICANT CHANGE UP (ref 1.2–3.4)
LYMPHOCYTES # BLD AUTO: 26.3 % — SIGNIFICANT CHANGE UP (ref 20.5–51.1)
MAGNESIUM SERPL-MCNC: 1.8 MG/DL — SIGNIFICANT CHANGE UP (ref 1.8–2.4)
MCHC RBC-ENTMCNC: 28.2 PG — SIGNIFICANT CHANGE UP (ref 27–31)
MCHC RBC-ENTMCNC: 30.1 G/DL — LOW (ref 32–37)
MCV RBC AUTO: 93.6 FL — HIGH (ref 81–91)
MONOCYTES # BLD AUTO: 0.88 K/UL — HIGH (ref 0.1–0.6)
MONOCYTES NFR BLD AUTO: 8.9 % — SIGNIFICANT CHANGE UP (ref 1.7–9.3)
NEUTROPHILS # BLD AUTO: 5.86 K/UL — SIGNIFICANT CHANGE UP (ref 1.4–6.5)
NEUTROPHILS NFR BLD AUTO: 59.6 % — SIGNIFICANT CHANGE UP (ref 42.2–75.2)
NITRITE UR-MCNC: NEGATIVE — SIGNIFICANT CHANGE UP
PH UR: 6.5 — SIGNIFICANT CHANGE UP (ref 5–8)
PLATELET # BLD AUTO: 407 K/UL — HIGH (ref 130–400)
POTASSIUM SERPL-MCNC: 4.1 MMOL/L — SIGNIFICANT CHANGE UP (ref 3.5–5)
POTASSIUM SERPL-SCNC: 4.1 MMOL/L — SIGNIFICANT CHANGE UP (ref 3.5–5)
PROT SERPL-MCNC: 6.9 G/DL — SIGNIFICANT CHANGE UP (ref 6–8)
PROT UR-MCNC: 100 MG/DL
PROTHROM AB SERPL-ACNC: 11.7 SEC — SIGNIFICANT CHANGE UP (ref 9.95–12.87)
RBC # BLD: 3.3 M/UL — LOW (ref 4.2–5.4)
RBC # FLD: 17.3 % — HIGH (ref 11.5–14.5)
RBC CASTS # UR COMP ASSIST: (no result) /HPF
SODIUM SERPL-SCNC: 147 MMOL/L — HIGH (ref 135–146)
SP GR SPEC: 1.01 — SIGNIFICANT CHANGE UP (ref 1.01–1.03)
TROPONIN I SERPL-MCNC: 0.02 NG/ML — SIGNIFICANT CHANGE UP (ref 0–0.05)
UROBILINOGEN FLD QL: 0.2 MG/DL — SIGNIFICANT CHANGE UP (ref 0.2–0.2)
WBC # BLD: 9.84 K/UL — SIGNIFICANT CHANGE UP (ref 4.8–10.8)
WBC # FLD AUTO: 9.84 K/UL — SIGNIFICANT CHANGE UP (ref 4.8–10.8)
WBC UR QL: >50 /HPF

## 2018-02-24 RX ORDER — DIVALPROEX SODIUM 500 MG/1
125 TABLET, DELAYED RELEASE ORAL
Qty: 0 | Refills: 0 | Status: DISCONTINUED | OUTPATIENT
Start: 2018-02-24 | End: 2018-02-27

## 2018-02-24 RX ORDER — VANCOMYCIN HCL 1 G
500 VIAL (EA) INTRAVENOUS EVERY 24 HOURS
Qty: 0 | Refills: 0 | Status: DISCONTINUED | OUTPATIENT
Start: 2018-02-24 | End: 2018-02-27

## 2018-02-24 RX ORDER — SIMETHICONE 80 MG/1
80 TABLET, CHEWABLE ORAL DAILY
Qty: 0 | Refills: 0 | Status: DISCONTINUED | OUTPATIENT
Start: 2018-02-24 | End: 2018-03-19

## 2018-02-24 RX ORDER — SODIUM CHLORIDE 9 MG/ML
1000 INJECTION INTRAMUSCULAR; INTRAVENOUS; SUBCUTANEOUS ONCE
Qty: 0 | Refills: 0 | Status: COMPLETED | OUTPATIENT
Start: 2018-02-24 | End: 2018-02-24

## 2018-02-24 RX ORDER — HALOPERIDOL DECANOATE 100 MG/ML
1 INJECTION INTRAMUSCULAR ONCE
Qty: 0 | Refills: 0 | Status: COMPLETED | OUTPATIENT
Start: 2018-02-24 | End: 2018-02-24

## 2018-02-24 RX ORDER — CEFEPIME 1 G/1
1000 INJECTION, POWDER, FOR SOLUTION INTRAMUSCULAR; INTRAVENOUS EVERY 12 HOURS
Qty: 0 | Refills: 0 | Status: DISCONTINUED | OUTPATIENT
Start: 2018-02-24 | End: 2018-02-24

## 2018-02-24 RX ORDER — LEVOTHYROXINE SODIUM 125 MCG
50 TABLET ORAL DAILY
Qty: 0 | Refills: 0 | Status: DISCONTINUED | OUTPATIENT
Start: 2018-02-24 | End: 2018-03-19

## 2018-02-24 RX ORDER — FOLIC ACID 0.8 MG
1 TABLET ORAL DAILY
Qty: 0 | Refills: 0 | Status: DISCONTINUED | OUTPATIENT
Start: 2018-02-24 | End: 2018-03-19

## 2018-02-24 RX ORDER — LACTOBACILLUS ACIDOPHILUS 100MM CELL
1 CAPSULE ORAL
Qty: 0 | Refills: 0 | Status: DISCONTINUED | OUTPATIENT
Start: 2018-02-24 | End: 2018-03-19

## 2018-02-24 RX ORDER — DIVALPROEX SODIUM 500 MG/1
500 TABLET, DELAYED RELEASE ORAL AT BEDTIME
Qty: 0 | Refills: 0 | Status: DISCONTINUED | OUTPATIENT
Start: 2018-02-24 | End: 2018-02-26

## 2018-02-24 RX ORDER — ZINC SULFATE TAB 220 MG (50 MG ZINC EQUIVALENT) 220 (50 ZN) MG
220 TAB ORAL DAILY
Qty: 0 | Refills: 0 | Status: DISCONTINUED | OUTPATIENT
Start: 2018-02-24 | End: 2018-03-15

## 2018-02-24 RX ORDER — LACTULOSE 10 G/15ML
30 SOLUTION ORAL THREE TIMES A DAY
Qty: 0 | Refills: 0 | Status: DISCONTINUED | OUTPATIENT
Start: 2018-02-24 | End: 2018-02-28

## 2018-02-24 RX ORDER — CARBAMAZEPINE 200 MG
200 TABLET ORAL DAILY
Qty: 0 | Refills: 0 | Status: DISCONTINUED | OUTPATIENT
Start: 2018-02-24 | End: 2018-02-27

## 2018-02-24 RX ORDER — DOCUSATE SODIUM 100 MG
100 CAPSULE ORAL THREE TIMES A DAY
Qty: 0 | Refills: 0 | Status: DISCONTINUED | OUTPATIENT
Start: 2018-02-24 | End: 2018-03-16

## 2018-02-24 RX ORDER — CEFEPIME 1 G/1
1000 INJECTION, POWDER, FOR SOLUTION INTRAMUSCULAR; INTRAVENOUS ONCE
Qty: 0 | Refills: 0 | Status: DISCONTINUED | OUTPATIENT
Start: 2018-02-24 | End: 2018-02-24

## 2018-02-24 RX ORDER — THIAMINE MONONITRATE (VIT B1) 100 MG
100 TABLET ORAL DAILY
Qty: 0 | Refills: 0 | Status: DISCONTINUED | OUTPATIENT
Start: 2018-02-24 | End: 2018-03-19

## 2018-02-24 RX ORDER — PIPERACILLIN AND TAZOBACTAM 4; .5 G/20ML; G/20ML
3.38 INJECTION, POWDER, LYOPHILIZED, FOR SOLUTION INTRAVENOUS ONCE
Qty: 0 | Refills: 0 | Status: COMPLETED | OUTPATIENT
Start: 2018-02-24 | End: 2018-02-24

## 2018-02-24 RX ORDER — VANCOMYCIN HCL 1 G
1000 VIAL (EA) INTRAVENOUS ONCE
Qty: 0 | Refills: 0 | Status: COMPLETED | OUTPATIENT
Start: 2018-02-24 | End: 2018-02-24

## 2018-02-24 RX ORDER — SODIUM CHLORIDE 9 MG/ML
1000 INJECTION INTRAMUSCULAR; INTRAVENOUS; SUBCUTANEOUS
Qty: 0 | Refills: 0 | Status: DISCONTINUED | OUTPATIENT
Start: 2018-02-24 | End: 2018-02-25

## 2018-02-24 RX ORDER — PIPERACILLIN AND TAZOBACTAM 4; .5 G/20ML; G/20ML
3.38 INJECTION, POWDER, LYOPHILIZED, FOR SOLUTION INTRAVENOUS EVERY 12 HOURS
Qty: 0 | Refills: 0 | Status: DISCONTINUED | OUTPATIENT
Start: 2018-02-24 | End: 2018-03-05

## 2018-02-24 RX ORDER — SENNA PLUS 8.6 MG/1
2 TABLET ORAL AT BEDTIME
Qty: 0 | Refills: 0 | Status: DISCONTINUED | OUTPATIENT
Start: 2018-02-24 | End: 2018-03-19

## 2018-02-24 RX ADMIN — PIPERACILLIN AND TAZOBACTAM 200 GRAM(S): 4; .5 INJECTION, POWDER, LYOPHILIZED, FOR SOLUTION INTRAVENOUS at 12:54

## 2018-02-24 RX ADMIN — Medication 250 MILLIGRAM(S): at 12:54

## 2018-02-24 RX ADMIN — SODIUM CHLORIDE 2000 MILLILITER(S): 9 INJECTION INTRAMUSCULAR; INTRAVENOUS; SUBCUTANEOUS at 18:32

## 2018-02-24 RX ADMIN — SODIUM CHLORIDE 100 MILLILITER(S): 9 INJECTION INTRAMUSCULAR; INTRAVENOUS; SUBCUTANEOUS at 20:54

## 2018-02-24 RX ADMIN — HALOPERIDOL DECANOATE 1 MILLIGRAM(S): 100 INJECTION INTRAMUSCULAR at 22:31

## 2018-02-24 RX ADMIN — Medication 100 MILLIGRAM(S): at 22:31

## 2018-02-24 RX ADMIN — SODIUM CHLORIDE 1000 MILLILITER(S): 9 INJECTION INTRAMUSCULAR; INTRAVENOUS; SUBCUTANEOUS at 12:20

## 2018-02-24 NOTE — H&P ADULT - ASSESSMENT
This is a 62F who is bedridden because of MS who is sent in by [facility] because of altered mental status    # altered mental status  - likely secondary to sepsis, likely secondary to sacral ulcer vs urinary tract infection (unlikely, patient has positive leukocytes because of chronic antony)  - also in the differential is uremia, BUN is twice her normal, likely secondary to dehydration +/- progressive kidney condition  - will start cefepime, and continue vancomycin. stop meropenem (failed therapy?)  - start NS @ 100 This is a 62F who is bedridden because of MS who is sent in by [facility] because of altered mental status    # altered mental status  - possibly sepsis, likely secondary to sacral ulcer vs urinary tract infection (unlikely, patient has positive leukocytes because of chronic antony)  - also in the differential is uremia, BUN is twice her normal, likely secondary to dehydration +/- progressive kidney condition  - hypernatremia could also be causing the altered mental status, but it isnt terribly high and is likely secondary to dehydration so should improve reasonably easily  - likely multifactorial in etiology  - will start cefepime, and continue vancomycin. stop meropenem (failed therapy?)  - start NS @ 100    # tachycardia  - likely secondary to sepsis, dehydration  - doubt PE, but is a possibility, dorina if the patient becomes hypoxic    # seizure disorder  - continue with carbamazepine and depakote    # hypothyroid  - synthroid    # hypernatremia  - likely hypovolemic, hypernatremia  - start NS @ 100    # sacral ulcer  - will get ID eval for antibiotics management given a possibility of antibiotics failure  - does not need debridement    # CKD IV  - stable    # DVT ppx  - SQH    # dispo  - pending mental status improves, can go back to [facility]  - is bedridden    DNR This is a 62F who is bedridden because of MS who is sent in by [facility] because of altered mental status    # altered mental status  - possibly sepsis, likely secondary to sacral ulcer vs urinary tract infection (unlikely, patient has positive leukocytes because of chronic antony)  - also in the differential is uremia, BUN is twice her normal, likely secondary to dehydration +/- progressive kidney condition  - hypernatremia could also be causing the altered mental status, but it isnt terribly high and is likely secondary to dehydration so should improve reasonably easily  - likely multifactorial in etiology  - will start zosyn, and continue vancomycin. stop meropenem (failed therapy?)  - start NS @ 100    # tachycardia  - likely secondary to sepsis, dehydration  - doubt PE, but is a possibility, dorina if the patient becomes hypoxic    # seizure disorder  - continue with carbamazepine and depakote    # hypothyroid  - synthroid    # hypernatremia  - likely hypovolemic, hypernatremia  - start NS @ 100    # sacral ulcer  - will get ID eval for antibiotics management given a possibility of antibiotics failure  - does not need debridement    # CKD IV  - stable    # DVT ppx  - SQH    # dispo  - pending mental status improves, can go back to [facility]  - is bedridden    DNR This is a 62F who is bedridden because of MS who is sent in by [facility] because of altered mental status    # altered mental status  - possibly sepsis, likely secondary to sacral ulcer vs urinary tract infection (unlikely, patient has positive leukocytes because of chronic antony)  - also in the differential is uremia, BUN is twice her normal, likely secondary to dehydration +/- progressive kidney condition  - hypernatremia could also be causing the altered mental status, but it isnt terribly high and is likely secondary to dehydration so should improve reasonably easily  - likely multifactorial in etiology  - L arm PICC is clean  - will start zosyn, and continue vancomycin. stop meropenem (failed therapy?)  - start NS @ 100    # tachycardia  - likely secondary to sepsis, dehydration  - doubt PE, but is a possibility, dorina if the patient becomes hypoxic    # seizure disorder  - continue with carbamazepine and depakote    # hypothyroid  - synthroid    # hypernatremia  - likely hypovolemic, hypernatremia  - start NS @ 100    # sacral ulcer  - will get ID eval for antibiotics management given a possibility of antibiotics failure  - does not need debridement    # CKD IV  - stable    # DVT ppx  - SQH    # dispo  - pending mental status improves, can go back to [facility]  - is bedridden    DNR This is a 62F who is bedridden because of MS who is sent in by [facility] because of altered mental status    # altered mental status  - possibly sepsis, likely secondary to sacral ulcer vs urinary tract infection (unlikely, patient has positive leukocytes because of chronic antony)  - also in the differential is uremia, BUN is twice her normal, likely secondary to dehydration +/- progressive kidney condition  - hypernatremia could also be causing the altered mental status, but it isnt terribly high and is likely secondary to dehydration so should improve reasonably easily  - likely multifactorial in etiology  - L arm PICC is clean  - will start zosyn, and continue vancomycin. stop meropenem (failed therapy?)  - start NS @ 100    # tachycardia  - likely secondary to sepsis, dehydration  - doubt PE, but is a possibility, dorina if the patient becomes hypoxic    # seizure disorder  - continue with carbamazepine and depakote    # hypothyroid  - synthroid    # hypernatremia  - likely hypovolemic, hypernatremia  - start NS @ 100    # sacral ulcer  - will get ID eval for antibiotics management given a possibility of antibiotics failure  - does not need debridement    # CKD IV  - stable    # DVT ppx  - SQH    # dispo  - pending mental status improves, can go back to [facility]  - is bedridden    DNR  daughter's phone number: [phone]

## 2018-02-24 NOTE — ED PROVIDER NOTE - PHYSICAL EXAMINATION
CONSTITUTIONAL: Well-developed; elderly, frail  SKIN: warm, dry  HEAD: Normocephalic; atraumatic.  EYES: no conj injection  ENT: No nasal discharge; airway clear.  CARD: S1, S2 normal; no murmurs, gallops, or rubs. Regular rate and rhythm.   RESP: No wheezes, rales or rhonchi. CTAB  ABD: soft ND, no palpable masses  EXT: No LE swelling or erythema   NEURO: Unresponsive, does not follow commands, PERRL, Negative babinski, limited exam given lack of pt participation.   PSYCH: Cooperative, appropriate.

## 2018-02-24 NOTE — ED PROVIDER NOTE - CRITICAL CARE PROVIDED
direct patient care (not related to procedure)/documentation/additional history taking/consult w/ pt's family directly relating to pts condition

## 2018-02-24 NOTE — H&P ADULT - NSHPPHYSICALEXAM_GEN_ALL_CORE
Vitals:   T(C): 36.7 (02-24-18 @ 16:01), Max: 37.1 (02-24-18 @ 11:53)  HR: 125 (02-24-18 @ 16:01) (121 - 125)  BP: 166/72 (02-24-18 @ 16:01) (140/75 - 166/72)  RR: 18 (02-24-18 @ 16:01) (18 - 20)  SpO2: 100% (02-24-18 @ 16:01) (100% - 100%)    Physical Exam  GENERAL: NAD. Uncomfortable. Moaning.  HEAD:  Atraumatic, normocephalic  EYES: EOMI, PERRLA, conjunctiva and sclera clear  NECK: Supple, no JVD  CHEST/LUNG: Clear to auscultation bilaterally; no wheeze; no crackles; no accessory muscles used  HEART: Regular rate and rhythm, S1, S2, no murmurs  ABDOMEN: Non distended  EXTREMITIES:  no edema  PSYCH: not assessable  NEUROLOGY: not assessable  SKIN: No rashes or lesions  SACRUM: 3X5 cm sacral ulcer, purulent drainage per nursing notes Vitals:   T(C): 36.7 (02-24-18 @ 16:01), Max: 37.1 (02-24-18 @ 11:53)  HR: 125 (02-24-18 @ 16:01) (121 - 125)  BP: 166/72 (02-24-18 @ 16:01) (140/75 - 166/72)  RR: 18 (02-24-18 @ 16:01) (18 - 20)  SpO2: 100% (02-24-18 @ 16:01) (100% - 100%)    Physical Exam  GENERAL: NAD. Uncomfortable. Moaning.  HEAD:  Atraumatic, normocephalic  EYES: EOMI, PERRLA, conjunctiva and sclera clear  NECK: Supple, no JVD  CHEST/LUNG: Clear to auscultation bilaterally; no wheeze; no crackles; no accessory muscles used  HEART: Regular rate and rhythm, S1, S2, no murmurs  ABDOMEN: Non distended  EXTREMITIES:  no edema  PSYCH: not assessable. not oriented  NEUROLOGY: not assessable  SKIN: sacral ulcer  SACRUM: 3X5 cm sacral ulcer, purulent drainage per nursing notes but clean on my exam Vitals:   T(C): 36.7 (02-24-18 @ 16:01), Max: 37.1 (02-24-18 @ 11:53)  HR: 125 (02-24-18 @ 16:01) (121 - 125)  BP: 166/72 (02-24-18 @ 16:01) (140/75 - 166/72)  RR: 18 (02-24-18 @ 16:01) (18 - 20)  SpO2: 100% (02-24-18 @ 16:01) (100% - 100%)    Physical Exam  GENERAL: NAD. Uncomfortable. Moaning.  HEAD:  Atraumatic, normocephalic  EYES: EOMI, PERRLA, conjunctiva and sclera clear  NECK: Supple, no JVD  CHEST/LUNG: Clear to auscultation bilaterally; no wheeze; no crackles; no accessory muscles used  HEART: Regular rate and rhythm, S1, S2, no murmurs  ABDOMEN: Non distended  EXTREMITIES:  no edema. L arm PICC in place. Clean  PSYCH: not assessable. not oriented  NEUROLOGY: not assessable  SKIN: sacral ulcer  SACRUM: 3X5 cm sacral ulcer, purulent drainage per nursing notes but clean on my exam

## 2018-02-24 NOTE — H&P ADULT - PMH
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

## 2018-02-24 NOTE — H&P ADULT - NSHPRISKHEPCSCREEN_GEN_A_CORE
Not applicable (known HCV negative status in last year) Normal rate, regular rhythm.  Heart sounds S1, S2.  No murmurs, rubs or gallops.

## 2018-02-24 NOTE — H&P ADULT - NSHPLABSRESULTS_GEN_ALL_CORE
Labs    CBC                        9.3    9.84  )-----------( 407      ( 2018 17:19 )             30.9       CMP      147<H>  |  120<H>  |  46<H>  ----------------------------<  86  4.1   |  10<L>  |  2.1<H>    Ca    9.4      2018 12:06  Mg     1.8         TPro  6.9  /  Alb  2.0<L>  /  TBili  0.9  /  DBili  x   /  AST  17  /  ALT  11  /  AlkPhos  121<H>  24          Coagulation  PT/INR - ( 2018 12:06 )   PT: 11.70 sec;   INR: 1.08 ratio         PTT - ( 2018 12:06 )  PTT:24.9 sec    Urinalysis  Urinalysis Basic - ( 2018 12:28 )    Color: Yellow / Appearance: Turbid / S.015 / pH: x  Gluc: x / Ketone: Trace  / Bili: Negative / Urobili: 0.2 mg/dL   Blood: x / Protein: 100 mg/dL / Nitrite: Negative   Leuk Esterase: Large / RBC: 5-10 /HPF / WBC >50 /HPF   Sq Epi: x / Non Sq Epi: Many /HPF / Bacteria: Many /HPF        Cardiac Enzyme  CARDIAC MARKERS ( 2018 12:06 )  0.02 ng/mL / x     / x     / x     / 2.5 ng/mL        Lactic Acid  Lactate Trend   @ 12:06 Lactate:0.8

## 2018-02-24 NOTE — ED PROVIDER NOTE - ATTENDING CONTRIBUTION TO CARE
62F PMH OM sacrum PICC line, UTI antony on long term iv abx from NH, sent for AMS. pt has been moaning since this AM. pt is unable to provide hx. FS 87 w ems. no other hx provided by EMS. hx colon Ca per NH papers and pt is DNR. on exam, AFVSS, well noemi nad, ncat, eomi, perrla, mmm, lctab, rrr nl s1s2 no mrg, abd soft ntnd, antony w cloudy urine/pyuria, alert, moaning, moving all ext, not following commands, no facial droop or slurred speech, Picc line in place c/d/i;  a/p; AMS, likely 2/2 sepsis, recent UTI and OM, will do labs, urine, CTH, CT a/p, ivf, iv abx, needs admission.     I personally evaluated the patient. I reviewed the Resident’s or Physician Assistant’s note (as assigned above), and agree with the findings and plan except as documented in my note.

## 2018-02-24 NOTE — ED PROVIDER NOTE - OBJECTIVE STATEMENT
62 ho osteomyelitis in sacrum from decubitus ulcer on abx through picc line presents to ED with altered mental status. Daughter states last time she acted like this was due to UTI which she was seen in 62 ho osteomyelitis in sacrum from decubitus ulcer on abx through picc line presents to ED with altered mental status. Daughter states last time she acted like this was due to UTI which she was seen in ED for in past.

## 2018-02-24 NOTE — ED ADULT NURSE NOTE - OBJECTIVE STATEMENT
63 Y/O f BIBA  from [facility] for AMS, lethargic. Pt BIBA VSS placed on cardiac monitor and pulse. Pt noted with L arm PICC line from [facility] treated  for OM IV ABX for Stage IV pressure ulcer, pt noted with Tyson , urine cloudy and pus friom perianal area. Tyson changed in ER. Pt bedbound for MS.

## 2018-02-24 NOTE — H&P ADULT - HISTORY OF PRESENT ILLNESS
62F who is bedridden at [facility] because of MS, who is currently being treated for sacral OM with vancomycin and meropenem  Patient is sent in by [facility] because of altered mental status. The patient was recently admitted at [facility] for AMS also and was found to have a sacral ulcer that required debridement and IV antibiotics. The patient was discharged on IV meropenem and IV vancomycin on Feb 9. At the time of discharge, the patient was responding appropriately to questions.   Today, the patient is mostly non-verbal and only is moaning. She doesn't answer any questions.

## 2018-02-24 NOTE — ED PROVIDER NOTE - MEDICAL DECISION MAKING DETAILS
I personally evaluated the patient. I reviewed the Resident’s or Physician Assistant’s note (as assigned above), and agree with the findings and plan except as documented in my note.    pt admitted

## 2018-02-25 LAB
ANION GAP SERPL CALC-SCNC: 15 MMOL/L — HIGH (ref 7–14)
APPEARANCE UR: (no result)
BASE EXCESS BLDA CALC-SCNC: -19.2 MMOL/L — LOW (ref -2–2)
BILIRUB UR-MCNC: NEGATIVE — SIGNIFICANT CHANGE UP
BUN SERPL-MCNC: 39 MG/DL — HIGH (ref 10–20)
CALCIUM SERPL-MCNC: 8.5 MG/DL — SIGNIFICANT CHANGE UP (ref 8.5–10.1)
CALCIUM UR-MCNC: 3 MG/DL — SIGNIFICANT CHANGE UP
CHLORIDE SERPL-SCNC: 126 MMOL/L — HIGH (ref 98–110)
CHLORIDE UR-SCNC: 79 MMOL/L — SIGNIFICANT CHANGE UP
CO2 SERPL-SCNC: 9 MMOL/L — CRITICAL LOW (ref 17–32)
COLOR SPEC: YELLOW — SIGNIFICANT CHANGE UP
COMMENT - URINE: SIGNIFICANT CHANGE UP
COMMENT - URINE: SIGNIFICANT CHANGE UP
CREAT ?TM UR-MCNC: 21 MG/DL — SIGNIFICANT CHANGE UP
CREAT SERPL-MCNC: 2.1 MG/DL — HIGH (ref 0.7–1.5)
CULTURE RESULTS: SIGNIFICANT CHANGE UP
DIFF PNL FLD: (no result)
EPI CELLS # UR: (no result) /HPF
GLUCOSE SERPL-MCNC: 83 MG/DL — SIGNIFICANT CHANGE UP (ref 70–110)
GLUCOSE UR QL: NEGATIVE MG/DL — SIGNIFICANT CHANGE UP
HCO3 BLDA-SCNC: 8 MMOL/L — CRITICAL LOW (ref 23–27)
HCT VFR BLD CALC: 28.1 % — LOW (ref 37–47)
HGB BLD-MCNC: 8.4 G/DL — LOW (ref 14–18)
KETONES UR-MCNC: 15
LEUKOCYTE ESTERASE UR-ACNC: (no result)
MCHC RBC-ENTMCNC: 28.3 PG — SIGNIFICANT CHANGE UP (ref 27–31)
MCHC RBC-ENTMCNC: 29.9 G/DL — LOW (ref 32–37)
MCV RBC AUTO: 94.6 FL — HIGH (ref 81–91)
NITRITE UR-MCNC: NEGATIVE — SIGNIFICANT CHANGE UP
NRBC # BLD: 0 /100 WBCS — SIGNIFICANT CHANGE UP (ref 0–0)
OSMOLALITY SERPL: 279 MOS/KG — LOW (ref 289–308)
OSMOLALITY UR: 279 MOS/KG — SIGNIFICANT CHANGE UP (ref 50–1400)
PCO2 BLDA: 24 MMHG — LOW (ref 38–42)
PH BLDA: 7.14 — CRITICAL LOW (ref 7.38–7.42)
PH UR: 6.5 — SIGNIFICANT CHANGE UP (ref 5–8)
PLATELET # BLD AUTO: 347 K/UL — SIGNIFICANT CHANGE UP (ref 130–400)
PO2 BLDA: 117 MMHG — HIGH (ref 78–95)
POTASSIUM SERPL-MCNC: 4.1 MMOL/L — SIGNIFICANT CHANGE UP (ref 3.5–5)
POTASSIUM SERPL-SCNC: 4.1 MMOL/L — SIGNIFICANT CHANGE UP (ref 3.5–5)
POTASSIUM UR-SCNC: 9 MMOL/L — SIGNIFICANT CHANGE UP
PROT ?TM UR-MCNC: 62 MG/DL — SIGNIFICANT CHANGE UP
PROT UR-MCNC: 30 MG/DL
PROT/CREAT UR-RTO: 3 RATIO — HIGH (ref 0–0.2)
RBC # BLD: 2.97 M/UL — LOW (ref 4.2–5.4)
RBC # FLD: 17.8 % — HIGH (ref 11.5–14.5)
RBC CASTS # UR COMP ASSIST: (no result) /HPF
SAO2 % BLDA: 99 % — HIGH (ref 94–98)
SODIUM SERPL-SCNC: 150 MMOL/L — HIGH (ref 135–146)
SODIUM UR-SCNC: 89 MMOL/L — SIGNIFICANT CHANGE UP
SP GR SPEC: 1.01 — SIGNIFICANT CHANGE UP (ref 1.01–1.03)
SPECIMEN SOURCE: SIGNIFICANT CHANGE UP
TROPONIN I SERPL-MCNC: 0.02 NG/ML — SIGNIFICANT CHANGE UP (ref 0–0.05)
UROBILINOGEN FLD QL: 0.2 MG/DL — SIGNIFICANT CHANGE UP (ref 0.2–0.2)
WBC # BLD: 10.59 K/UL — SIGNIFICANT CHANGE UP (ref 4.8–10.8)
WBC # FLD AUTO: 10.59 K/UL — SIGNIFICANT CHANGE UP (ref 4.8–10.8)
WBC UR QL: (no result) /HPF

## 2018-02-25 RX ORDER — SODIUM BICARBONATE 1 MEQ/ML
0.15 SYRINGE (ML) INTRAVENOUS
Qty: 150 | Refills: 0 | Status: DISCONTINUED | OUTPATIENT
Start: 2018-02-25 | End: 2018-02-25

## 2018-02-25 RX ORDER — VALPROIC ACID (AS SODIUM SALT) 250 MG/5ML
112 SOLUTION, ORAL ORAL DAILY
Qty: 0 | Refills: 0 | Status: DISCONTINUED | OUTPATIENT
Start: 2018-02-26 | End: 2018-02-26

## 2018-02-25 RX ORDER — VALPROIC ACID (AS SODIUM SALT) 250 MG/5ML
517 SOLUTION, ORAL ORAL DAILY
Qty: 0 | Refills: 0 | Status: DISCONTINUED | OUTPATIENT
Start: 2018-02-25 | End: 2018-02-26

## 2018-02-25 RX ORDER — SODIUM BICARBONATE 1 MEQ/ML
0.15 SYRINGE (ML) INTRAVENOUS
Qty: 150 | Refills: 0 | Status: DISCONTINUED | OUTPATIENT
Start: 2018-02-25 | End: 2018-02-26

## 2018-02-25 RX ADMIN — Medication 1 APPLICATION(S): at 05:30

## 2018-02-25 RX ADMIN — Medication 1 APPLICATION(S): at 18:38

## 2018-02-25 RX ADMIN — PIPERACILLIN AND TAZOBACTAM 200 GRAM(S): 4; .5 INJECTION, POWDER, LYOPHILIZED, FOR SOLUTION INTRAVENOUS at 05:30

## 2018-02-25 RX ADMIN — PIPERACILLIN AND TAZOBACTAM 200 GRAM(S): 4; .5 INJECTION, POWDER, LYOPHILIZED, FOR SOLUTION INTRAVENOUS at 18:38

## 2018-02-25 RX ADMIN — Medication 100 MILLIGRAM(S): at 22:15

## 2018-02-25 NOTE — PROGRESS NOTE ADULT - SUBJECTIVE AND OBJECTIVE BOX
JUAN JOSE RAMACHANDRAN  62y  Female      Patient is a 62y old  Female who presents with a chief complaint of altered mental status (2018 18:44)      INTERVAL HPI/OVERNIGHT EVENTS:      ******************************* REVIEW OF SYSTEMS:**********************************************      Unable to obtain because of patient's mental status.    *********************** VITALS ******************************************    T(F): 99.2 (18 @ 06:37)  HR: 127 (18 12:06) (120 - 127)  BP: 168/77 (18 @ 12:06) (148/68 - 169/67)  RR: 18 (18 @ 06:37) (18 - 20)  SpO2: 100% (18 @ 12:06) (100% - 100%)    18 @ 07:01  -  18 @ 07:00  --------------------------------------------------------  IN: 0 mL / OUT: 1410 mL / NET: -1410 mL            18 @ 07:01  -  18 @ 07:00  --------------------------------------------------------  IN: 0 mL / OUT: 1410 mL / NET: -1410 mL        ******************************** PHYSICAL EXAM:**************************************************  GENERAL: NAD    PSYCH: no agitation, baseline mentation  HEENT:     NERVOUS SYSTEM:  Alert & Oriented X0-1, bedridden    PULMONARY: BRUCE, CTA    CARDIOVASCULAR: S1S2 RRR    GI: Soft, NT, ND; BS present.    EXTREMITIES:  2+ Peripheral Pulses,  LYMPH: No lymphadenopathy noted    SKIN: No rashes or lesions    ******************************************************************************************    Consultant(s) Notes Reviewed:  [x ] YES  [ ] NO    Discussed with Consultants/Other Providers [ x] YES     **************************** LABS *******************************************************                          8.4    10.59 )-----------( 347      ( 2018 09:04 )             28.1     02-    150<H>  |  126<H>  |  39<H>  ----------------------------<  83  4.1   |  9<LL>  |  2.1<H>    Ca    8.5      2018 09:04  Mg     1.8     -    TPro  6.9  /  Alb  2.0<L>  /  TBili  0.9  /  DBili  x   /  AST  17  /  ALT  11  /  AlkPhos  121<H>        Urinalysis Basic - ( 2018 12:28 )    Color: Yellow / Appearance: Turbid / S.015 / pH: x  Gluc: x / Ketone: Trace  / Bili: Negative / Urobili: 0.2 mg/dL   Blood: x / Protein: 100 mg/dL / Nitrite: Negative   Leuk Esterase: Large / RBC: 5-10 /HPF / WBC >50 /HPF   Sq Epi: x / Non Sq Epi: Many /HPF / Bacteria: Many /HPF      PT/INR - ( 2018 12:06 )   PT: 11.70 sec;   INR: 1.08 ratio         PTT - ( 2018 12:06 )  PTT:24.9 sec  Lactate Trend   @ 12:06 Lactate:0.8     CARDIAC MARKERS ( 2018 09:04 )  0.02 ng/mL / x     / x     / x     / x      CARDIAC MARKERS ( 2018 12:06 )  0.02 ng/mL / x     / x     / x     / 2.5 ng/mL      CAPILLARY BLOOD GLUCOSE              **************************Active Medications *******************************************  No Known Allergies      bisacodyl Suppository 10 milliGRAM(s) Rectal daily  carBAMazepine 200 milliGRAM(s) Oral daily  diVALproex  milliGRAM(s) Oral at bedtime  diVALproex Oral Sprinkle Capsule - Peds 125 milliGRAM(s) Oral two times a day  docusate sodium 100 milliGRAM(s) Oral three times a day  folic acid 1 milliGRAM(s) Oral daily  lactobacillus acidophilus 1 Tablet(s) Oral two times a day with meals  lactulose Syrup 30 Gram(s) Oral three times a day  levothyroxine 50 MICROGram(s) Oral daily  multivitamin 1 Tablet(s) Oral daily  piperacillin/tazobactam IVPB. 3.375 Gram(s) IV Intermittent every 12 hours  senna 2 Tablet(s) Oral at bedtime PRN  silver sulfADIAZINE 1% Cream 1 Application(s) Topical every 12 hours  simethicone 80 milliGRAM(s) Chew daily  sodium bicarbonate  Infusion 0.153 mEq/kG/Hr IV Continuous <Continuous>  thiamine 100 milliGRAM(s) Oral daily  vancomycin  IVPB 500 milliGRAM(s) IV Intermittent every 24 hours  zinc sulfate 220 milliGRAM(s) Oral daily      ***************************************************  RADIOLOGY & ADDITIONAL TESTS:    Imaging Personally Reviewed:  [ ] YES  [ ] NO    HEALTH ISSUES - PROBLEM Dx:

## 2018-02-25 NOTE — PROGRESS NOTE ADULT - ASSESSMENT
This is a 62F who is bedridden because of MS who is sent in by [facility] because of altered mental status    # altered mental status  - possibly sepsis, likely secondary to sacral ulcer vs urinary tract infection (unlikely, patient has positive leukocytes because of chronic antony)  - also in the differential is uremia, BUN is twice her normal, likely secondary to dehydration +/- progressive kidney condition  - hypernatremia could also be causing the altered mental status,  likely secondary to dehydration .  - likely multifactorial in etiology  - L arm PICC is clean  - on zosyn, and continue vancomycin. stop meropenem (failed therapy?)  - on NS @ 100    # Metabolic acidosis 2/2 SALOME on CKD 4.  - likely secondary to sepsis, dehydration  - doubt PE, still  Would check ABG, URINE Lytes.    # seizure disorder  - continue with carbamazepine and depakote    # hypothyroid  - synthroid    # hypernatremia  - likely hypovolemic, hypernatremia  - start NS @ 100    # sacral ulcer  - will get ID eval for antibiotics management given a possibility of antibiotics failure  - does not need debridement.    # DVT ppx  - SQH    # dispo  - pending mental status improves, can go back to [facility]  - is bedridden    DNR  daughter's phone number: [phone]

## 2018-02-25 NOTE — CONSULT NOTE ADULT - ASSESSMENT
IMPRESSION    Pt from [facility] who being treated for sacral OM with vancomycin and meropenem    R/O metabolic encephalopathy    CT abd/pelvix:Sacral decubitus ulcer with extension to the coccyx demonstrating bony   destruction of the coccyx consistent with osteomyelitis. Surrounding   phlegmon measures 6 cm transverse, stable, with no definite involvement   of the rectum or vagina.    Hypernatremia; Chronic kidney disease (CKD), stage IV (severe)      SUGGESTIONs  Continue  piperacillin/tazobactam IVPB. 3.375 Gram(s) IV Intermittent every 12 hours  vancomycin  IVPB 500 milliGRAM(s) IV Intermittent every 24 hours    Surgical consult re:Phlegmon    F/U Na

## 2018-02-25 NOTE — CHART NOTE - NSCHARTNOTEFT_GEN_A_CORE
Called by RN to evaluate patients tachycardia. Patient currently being treated for presumed sepsis on antibiotics. Patient with stable BP. HR ~ 120. No fevers. Patient seems bedbound. Have ordered vascular duplex to rule out dvt. EKG ordered appears to be sinus rhythm on telemetry monitoring.  Will not treat tachycardia at this point but will monitor. Will f/u EKG

## 2018-02-25 NOTE — CHART NOTE - NSCHARTNOTEFT_GEN_A_CORE
Informed by lab of bicarb 9  ABG ordered stat, f/u  Patient started on sodium bicarbonate in D5W at 75 mls/hr  Patient also hypernatremia at 150, Will continue fluids are current rat, UA and Uelectrolytes ordered. Serum osmolality ordered.  Repeat BMP at 2000 will endorse to on call night team.     Hospitalist on call informed.     Will continue to monitor.

## 2018-02-25 NOTE — CONSULT NOTE ADULT - SUBJECTIVE AND OBJECTIVE BOX
JUAN JOSE RAMACHANDRAN 62yFemalePatient is a 62y old  Female who presents with a chief complaint of altered mental status (2018 18:44)      Patient has history of:  No Known Allergies      Adult/Nursing Home residence    SEPSIS, UTI  ^SEPSIS, UTI  No h/o HF  Yes  Family history unknown  MEWS Score  ESBL E. coli carrier  Chronic kidney disease (CKD), stage IV (severe)  Psychosis  Bedridden  Tyson catheter in place on admission  Osteomyelitis of sacrum  Sacral ulcer  Osteoporosis  Diabetes mellitus  Seizure disorder  Bipolar affective disorder  Sepsis  Status post debridement  AMS  AMS/  14  UTI (urinary tract infection)        Patient treated with:  piperacillin/tazobactam IVPB. 3.375 Gram(s) IV Intermittent every 12 hours  vancomycin  IVPB 500 milliGRAM(s) IV Intermittent every 24 hours        PHYSICAL EXAM  T(F): 99.2 (18 @ 06:37), Max: 99.2 (18 @ 06:37)  HR: 127 (18 @ 12:06) (120 - 127)  BP: 168/77 (18 @ 12:06) (148/68 - 169/67)  RR: 18 (18 @ 06:37) (18 - 20)  SpO2: 100% (18 @ 12:06) (100% - 100%)  Daily Height in cm: 152.4 (2018 21:16)    Daily   HEENT: normal, no nuchal rigidity  Cor: RSR Nl S1 S2  Lungs: clear  Decreased breath sounds at bases    Abdomen: Nontender, Nl BS, Tyson; sacral ulcer    Ext: No clubbing,cyanosis or edema; PICC    LAB & RADIOLOGIC RESULTS:                        8.4    10.59 )-----------( 347      ( 2018 09:04 )             28.1             150<H>  |  126<H>  |  39<H>  ----------------------------<  83  4.1   |  9<LL>  |  2.1<H>    Mg     1.8     -    TPro  6.9  /  Alb  2.0<L>  /  TBili  0.9  /  DBili  x   /  AST  17  /  ALT  11  /  AlkPhos  121<H>  02-24      Sodium, Serum: 150 mmol/L (18 @ 09:04)            Hypernatremia             Creatinine, Serum: 2.1 mg/dL (18 @ 09:04)  eGFR if Non African American: 25 mL/min/1.73M2 (18 @ 09:04)  eGFR if : 29 mL/min/1.73M2 (18 @ 09:04)      Chronic kidney disease (CKD), stage IV (severe)        Urinalysis Basic - ( 2018 12:28 )    Color: Yellow / Appearance: Turbid / S.015 / pH: x  Gluc: x / Ketone: Trace  / Bili: Negative / Urobili: 0.2 mg/dL   Blood: x / Protein: 100 mg/dL / Nitrite: Negative   Leuk Esterase: Large / RBC: 5-10 /HPF / WBC >50 /HPF   Sq Epi: x / Non Sq Epi: Many /HPF / Bacteria: Many /HPF      PT/INR - ( 2018 12:06 )   PT: 11.70 sec;   INR: 1.08 ratio         PTT - ( 2018 12:06 )  PTT:24.9 sec        Cxray:

## 2018-02-26 LAB
ALBUMIN SERPL ELPH-MCNC: 1.8 G/DL — LOW (ref 3–5.5)
ANION GAP SERPL CALC-SCNC: 12 MMOL/L — SIGNIFICANT CHANGE UP (ref 7–14)
ANION GAP SERPL CALC-SCNC: 13 MMOL/L — SIGNIFICANT CHANGE UP (ref 7–14)
ANION GAP SERPL CALC-SCNC: 9 MMOL/L — SIGNIFICANT CHANGE UP (ref 7–14)
BASE EXCESS BLDA CALC-SCNC: -5.8 MMOL/L — LOW (ref -2–2)
BLD GP AB SCN SERPL QL: SIGNIFICANT CHANGE UP
BUN SERPL-MCNC: 30 MG/DL — HIGH (ref 10–20)
BUN SERPL-MCNC: 35 MG/DL — HIGH (ref 10–20)
BUN SERPL-MCNC: 38 MG/DL — HIGH (ref 10–20)
CALCIUM SERPL-MCNC: 8.1 MG/DL — LOW (ref 8.5–10.1)
CALCIUM SERPL-MCNC: 8.2 MG/DL — LOW (ref 8.5–10.1)
CALCIUM SERPL-MCNC: 8.6 MG/DL — SIGNIFICANT CHANGE UP (ref 8.5–10.1)
CHLORIDE SERPL-SCNC: 121 MMOL/L — HIGH (ref 98–110)
CHLORIDE SERPL-SCNC: 123 MMOL/L — HIGH (ref 98–110)
CHLORIDE SERPL-SCNC: 128 MMOL/L — HIGH (ref 98–110)
CO2 SERPL-SCNC: 14 MMOL/L — LOW (ref 17–32)
CO2 SERPL-SCNC: 14 MMOL/L — LOW (ref 17–32)
CO2 SERPL-SCNC: 16 MMOL/L — LOW (ref 17–32)
CREAT SERPL-MCNC: 1.8 MG/DL — HIGH (ref 0.7–1.5)
CREAT SERPL-MCNC: 1.9 MG/DL — HIGH (ref 0.7–1.5)
CREAT SERPL-MCNC: 2 MG/DL — HIGH (ref 0.7–1.5)
GLUCOSE SERPL-MCNC: 100 MG/DL — SIGNIFICANT CHANGE UP (ref 70–110)
GLUCOSE SERPL-MCNC: 109 MG/DL — SIGNIFICANT CHANGE UP (ref 70–110)
GLUCOSE SERPL-MCNC: 91 MG/DL — SIGNIFICANT CHANGE UP (ref 70–110)
HCO3 BLDA-SCNC: 18 MMOL/L — LOW (ref 23–27)
HCT VFR BLD CALC: 25 % — LOW (ref 37–47)
HCT VFR BLD CALC: 30.8 % — LOW (ref 37–47)
HGB BLD-MCNC: 7.8 G/DL — LOW (ref 14–18)
HGB BLD-MCNC: 9.7 G/DL — LOW (ref 14–18)
MCHC RBC-ENTMCNC: 28 PG — SIGNIFICANT CHANGE UP (ref 27–31)
MCHC RBC-ENTMCNC: 28.4 PG — SIGNIFICANT CHANGE UP (ref 27–31)
MCHC RBC-ENTMCNC: 31.2 G/DL — LOW (ref 32–37)
MCHC RBC-ENTMCNC: 31.5 G/DL — LOW (ref 32–37)
MCV RBC AUTO: 88.8 FL — SIGNIFICANT CHANGE UP (ref 81–91)
MCV RBC AUTO: 90.9 FL — SIGNIFICANT CHANGE UP (ref 81–91)
NRBC # BLD: 0 /100 WBCS — SIGNIFICANT CHANGE UP (ref 0–0)
NRBC # BLD: 0 /100 WBCS — SIGNIFICANT CHANGE UP (ref 0–0)
PCO2 BLDA: 28 MMHG — LOW (ref 38–42)
PH BLDA: 7.41 — SIGNIFICANT CHANGE UP (ref 7.38–7.42)
PLATELET # BLD AUTO: 313 K/UL — SIGNIFICANT CHANGE UP (ref 130–400)
PLATELET # BLD AUTO: 323 K/UL — SIGNIFICANT CHANGE UP (ref 130–400)
PO2 BLDA: 99 MMHG — HIGH (ref 78–95)
POTASSIUM SERPL-MCNC: 3.2 MMOL/L — LOW (ref 3.5–5)
POTASSIUM SERPL-MCNC: 3.6 MMOL/L — SIGNIFICANT CHANGE UP (ref 3.5–5)
POTASSIUM SERPL-MCNC: 3.7 MMOL/L — SIGNIFICANT CHANGE UP (ref 3.5–5)
POTASSIUM SERPL-SCNC: 3.2 MMOL/L — LOW (ref 3.5–5)
POTASSIUM SERPL-SCNC: 3.6 MMOL/L — SIGNIFICANT CHANGE UP (ref 3.5–5)
POTASSIUM SERPL-SCNC: 3.7 MMOL/L — SIGNIFICANT CHANGE UP (ref 3.5–5)
PROT SERPL-MCNC: 6.2 G/DL — SIGNIFICANT CHANGE UP (ref 6–8)
RBC # BLD: 2.75 M/UL — LOW (ref 4.2–5.4)
RBC # BLD: 3.47 M/UL — LOW (ref 4.2–5.4)
RBC # FLD: 17.5 % — HIGH (ref 11.5–14.5)
RBC # FLD: 17.8 % — HIGH (ref 11.5–14.5)
SAO2 % BLDA: 99 % — HIGH (ref 94–98)
SODIUM SERPL-SCNC: 147 MMOL/L — HIGH (ref 135–146)
SODIUM SERPL-SCNC: 151 MMOL/L — HIGH (ref 135–146)
SODIUM SERPL-SCNC: 152 MMOL/L — HIGH (ref 135–146)
TYPE + AB SCN PNL BLD: SIGNIFICANT CHANGE UP
WBC # BLD: 11.9 K/UL — HIGH (ref 4.8–10.8)
WBC # BLD: 8.26 K/UL — SIGNIFICANT CHANGE UP (ref 4.8–10.8)
WBC # FLD AUTO: 11.9 K/UL — HIGH (ref 4.8–10.8)
WBC # FLD AUTO: 8.26 K/UL — SIGNIFICANT CHANGE UP (ref 4.8–10.8)

## 2018-02-26 RX ORDER — DIVALPROEX SODIUM 500 MG/1
500 TABLET, DELAYED RELEASE ORAL AT BEDTIME
Qty: 0 | Refills: 0 | Status: DISCONTINUED | OUTPATIENT
Start: 2018-02-26 | End: 2018-02-27

## 2018-02-26 RX ORDER — POTASSIUM CHLORIDE 20 MEQ
20 PACKET (EA) ORAL
Qty: 0 | Refills: 0 | Status: COMPLETED | OUTPATIENT
Start: 2018-02-26 | End: 2018-02-26

## 2018-02-26 RX ORDER — SODIUM CHLORIDE 9 MG/ML
1000 INJECTION, SOLUTION INTRAVENOUS
Qty: 0 | Refills: 0 | Status: DISCONTINUED | OUTPATIENT
Start: 2018-02-26 | End: 2018-02-26

## 2018-02-26 RX ORDER — SODIUM CHLORIDE 9 MG/ML
1000 INJECTION, SOLUTION INTRAVENOUS
Qty: 0 | Refills: 0 | Status: DISCONTINUED | OUTPATIENT
Start: 2018-02-26 | End: 2018-02-27

## 2018-02-26 RX ORDER — HEPARIN SODIUM 5000 [USP'U]/ML
5000 INJECTION INTRAVENOUS; SUBCUTANEOUS EVERY 8 HOURS
Qty: 0 | Refills: 0 | Status: DISCONTINUED | OUTPATIENT
Start: 2018-02-26 | End: 2018-02-26

## 2018-02-26 RX ORDER — MORPHINE SULFATE 50 MG/1
2 CAPSULE, EXTENDED RELEASE ORAL EVERY 4 HOURS
Qty: 0 | Refills: 0 | Status: DISCONTINUED | OUTPATIENT
Start: 2018-02-26 | End: 2018-02-27

## 2018-02-26 RX ORDER — SODIUM CHLORIDE 9 MG/ML
1000 INJECTION INTRAMUSCULAR; INTRAVENOUS; SUBCUTANEOUS ONCE
Qty: 0 | Refills: 0 | Status: COMPLETED | OUTPATIENT
Start: 2018-02-26 | End: 2018-02-26

## 2018-02-26 RX ORDER — MORPHINE SULFATE 50 MG/1
2 CAPSULE, EXTENDED RELEASE ORAL ONCE
Qty: 0 | Refills: 0 | Status: DISCONTINUED | OUTPATIENT
Start: 2018-02-26 | End: 2018-02-26

## 2018-02-26 RX ORDER — PANTOPRAZOLE SODIUM 20 MG/1
8 TABLET, DELAYED RELEASE ORAL
Qty: 80 | Refills: 0 | Status: DISCONTINUED | OUTPATIENT
Start: 2018-02-26 | End: 2018-02-27

## 2018-02-26 RX ADMIN — Medication 50 MILLIEQUIVALENT(S): at 14:25

## 2018-02-26 RX ADMIN — PIPERACILLIN AND TAZOBACTAM 200 GRAM(S): 4; .5 INJECTION, POWDER, LYOPHILIZED, FOR SOLUTION INTRAVENOUS at 19:04

## 2018-02-26 RX ADMIN — SODIUM CHLORIDE 2000 MILLILITER(S): 9 INJECTION INTRAMUSCULAR; INTRAVENOUS; SUBCUTANEOUS at 17:23

## 2018-02-26 RX ADMIN — LACTULOSE 30 GRAM(S): 10 SOLUTION ORAL at 22:02

## 2018-02-26 RX ADMIN — MORPHINE SULFATE 2 MILLIGRAM(S): 50 CAPSULE, EXTENDED RELEASE ORAL at 16:39

## 2018-02-26 RX ADMIN — MORPHINE SULFATE 2 MILLIGRAM(S): 50 CAPSULE, EXTENDED RELEASE ORAL at 09:02

## 2018-02-26 RX ADMIN — MORPHINE SULFATE 2 MILLIGRAM(S): 50 CAPSULE, EXTENDED RELEASE ORAL at 12:01

## 2018-02-26 RX ADMIN — MORPHINE SULFATE 2 MILLIGRAM(S): 50 CAPSULE, EXTENDED RELEASE ORAL at 14:37

## 2018-02-26 RX ADMIN — Medication 100 MILLIGRAM(S): at 22:02

## 2018-02-26 RX ADMIN — MORPHINE SULFATE 2 MILLIGRAM(S): 50 CAPSULE, EXTENDED RELEASE ORAL at 22:06

## 2018-02-26 RX ADMIN — DIVALPROEX SODIUM 125 MILLIGRAM(S): 500 TABLET, DELAYED RELEASE ORAL at 19:04

## 2018-02-26 RX ADMIN — Medication 1 TABLET(S): at 19:04

## 2018-02-26 RX ADMIN — MORPHINE SULFATE 2 MILLIGRAM(S): 50 CAPSULE, EXTENDED RELEASE ORAL at 22:49

## 2018-02-26 RX ADMIN — SODIUM CHLORIDE 50 MILLILITER(S): 9 INJECTION, SOLUTION INTRAVENOUS at 02:10

## 2018-02-26 RX ADMIN — PANTOPRAZOLE SODIUM 10 MG/HR: 20 TABLET, DELAYED RELEASE ORAL at 15:41

## 2018-02-26 RX ADMIN — MORPHINE SULFATE 2 MILLIGRAM(S): 50 CAPSULE, EXTENDED RELEASE ORAL at 11:49

## 2018-02-26 RX ADMIN — SODIUM CHLORIDE 100 MILLILITER(S): 9 INJECTION, SOLUTION INTRAVENOUS at 20:31

## 2018-02-26 RX ADMIN — Medication 1 APPLICATION(S): at 19:05

## 2018-02-26 RX ADMIN — Medication 1 APPLICATION(S): at 06:04

## 2018-02-26 RX ADMIN — Medication 50 MILLIEQUIVALENT(S): at 16:37

## 2018-02-26 RX ADMIN — MORPHINE SULFATE 2 MILLIGRAM(S): 50 CAPSULE, EXTENDED RELEASE ORAL at 16:16

## 2018-02-26 RX ADMIN — Medication 10 MILLIGRAM(S): at 12:02

## 2018-02-26 RX ADMIN — PIPERACILLIN AND TAZOBACTAM 200 GRAM(S): 4; .5 INJECTION, POWDER, LYOPHILIZED, FOR SOLUTION INTRAVENOUS at 06:04

## 2018-02-26 NOTE — CONSULT NOTE ADULT - SUBJECTIVE AND OBJECTIVE BOX
GI CONSULTATION    62y year old Female with melena.    HPI:  62F recently diagnosed with CRC in the sigmoid [place] [facility] [place] as per family[place] bedridden from [facility] admitted for sepsis 2/2 to OM 2/2 a sacral decubitus sp debridement and a previous hospitalization.  GI is called for x1 large melenic BM.  Patient mental status is not appropriate attributed to sepsis.  Pt is on Fe PO.  Hx is not clear to whether the stool has changed in color or consistency.    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  Chronic kidney disease (CKD), stage IV (severe)  Psychosis  Bedridden  Tyson catheter in place on admission  Osteomyelitis of sacrum  Sacral ulcer  Osteoporosis  Diabetes mellitus  Seizure disorder  Bipolar affective disorder  Status post debridement: of sacral ulcer    Allergies: No Known Allergies    Medications:  bisacodyl Suppository 10 milliGRAM(s) Rectal daily  carBAMazepine 200 milliGRAM(s) Oral daily  dextrose 5%. 1000 milliLiter(s) IV Continuous <Continuous>  diVALproex  milliGRAM(s) Oral at bedtime  diVALproex Oral Sprinkle Capsule - Peds 125 milliGRAM(s) Oral two times a day  docusate sodium 100 milliGRAM(s) Oral three times a day  folic acid 1 milliGRAM(s) Oral daily  lactobacillus acidophilus 1 Tablet(s) Oral two times a day with meals  lactulose Syrup 30 Gram(s) Oral three times a day  levothyroxine 50 MICROGram(s) Oral daily  morphine  - Injectable 2 milliGRAM(s) IV Push every 4 hours PRN  multivitamin 1 Tablet(s) Oral daily  pantoprazole Infusion 8 mG/Hr IV Continuous <Continuous>  piperacillin/tazobactam IVPB. 3.375 Gram(s) IV Intermittent every 12 hours  potassium chloride  20 mEq/100 mL IVPB 20 milliEquivalent(s) IV Intermittent every 2 hours  senna 2 Tablet(s) Oral at bedtime PRN  silver sulfADIAZINE 1% Cream 1 Application(s) Topical every 12 hours  simethicone 80 milliGRAM(s) Chew daily  sodium bicarbonate  Infusion 0.153 mEq/kG/Hr IV Continuous <Continuous>  sodium chloride 0.9% Bolus 1000 milliLiter(s) IV Bolus once  thiamine 100 milliGRAM(s) Oral daily  vancomycin  IVPB 500 milliGRAM(s) IV Intermittent every 24 hours  zinc sulfate 220 milliGRAM(s) Oral daily    FAMILY HISTORY:  Family history unknown    Review of Systems noncontributory , other than as listed in  Admission H & P    Physical Examination:  Female in no acute distress.  T(C): 36.2 (18 @ 06:43), Max: 37.2 (18 @ 05:39)  HR: 108 (18 @ 06:43) (62 - 121)  BP: 152/66 (18 @ 06:43) (143/63 - 168/74)  RR: 18 (18 @ 06:43) (18 - 24)  SpO2: 100% (18 @ 16:47) (100% - 100%):    Data:                        7.8    8.  )-----------( 313      ( 2018 05:21 )             25.0     Mean Cell Volume: 90.9 fL (18 @ 05:21)  Red Cell Distrib Width: 17.8 % (18 @ 05:21)    7.802 @ 05:21  8.402 @ 09:04  9.302 @ 17:19        147<H>  |  121<H>  |  35<H>  ----------------------------<  91  3.2<L>   |  14<L>  |  1.9<H>    Ca    8.2<L>      2018 05:21          Urinalysis Basic - ( 2018 18:19 )    Color: Yellow / Appearance: Cloudy / S.015 / pH: x  Gluc: x / Ketone: 15  / Bili: Negative / Urobili: 0.2 mg/dL   Blood: x / Protein: 30 mg/dL / Nitrite: Negative   Leuk Esterase: Large / RBC: 5-10 /HPF / WBC 10-25 /HPF   Sq Epi: x / Non Sq Epi: Occasional /HPF / Bacteria: x        Culture - Blood (collected 18 @ 13:16)  Source: .Blood Blood  Preliminary Report (18 @ 01:01):    No growth to date.    Lipase, Serum: 34 U/L (18 @ 12:06)    Radiology:  < from: CT Head No Cont (18 @ 16:25) >  Study severely limited by motion artifact, however demonstrating no   definite evidence of acute intracranial pathology.      < end of copied text >      < from: CT Abdomen and Pelvis No Cont (18 @ 16:26) >  PELVIC ORGANS: Bladder decompressed with a Tyson catheter. Stable bladder   wall diffuse thickening and trabeculations.    PERITONEUM/MESENTERY/BOWEL: No evidence of bowel obstruction. No free   intraperitoneal air or ascites. Appendicolith seen without appendiceal   dilation or surrounding inflammatory change. Moderate stool load within   the rectosigmoid colon.     < from: CT Abdomen and Pelvis No Cont (18 @ 16:26) >  Sacral decubitus ulcer, with extension to the coccyx demonstrating bony   destruction consistent with osteomyelitis (series 602 image 43) with   surrounding phlegmon measuring approximately 6 cm transverse. This is  unchanged from the prior exam on 2018. No definite   involvement of the rectum or vagina.    < end of copied text >    < end of copied text >    Impression: 62y year old Female recently diagnosed with CRC now with dark stool. Canot rule out an UGIB (low suspicion for active GIB)    Recs:  check CBC q8 transfuse PRN  Keep NPO  PPI IVD  EGD on urgent basis if HD instability acute drop in Hb or evidence of active GIB      Recommendation:  Type and X Match  Ressucitate  Golytely Prep for Colonoscopy GI CONSULTATION    62y year old Female with melena.    HPI:  62F recently diagnosed with CRC in the sigmoid [place] [facility] [place] (awaiting surgery as per family), bedridden from [facility] admitted for sepsis 2/2 to OM 2/2 a sacral decubitus sp debridement and a previous hospitalization.  GI is called for x1 large melenic BM.  Patient mental status is not appropriate attributed to sepsis.  Pt is on Fe PO.  Hx is not clear to whether the stool has changed in color or consistency.    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  Chronic kidney disease (CKD), stage IV (severe)  Psychosis  Bedridden  Tyson catheter in place on admission  Osteomyelitis of sacrum  Sacral ulcer  Osteoporosis  Diabetes mellitus  Seizure disorder  Bipolar affective disorder  Status post debridement: of sacral ulcer    Allergies: No Known Allergies    Medications:  bisacodyl Suppository 10 milliGRAM(s) Rectal daily  carBAMazepine 200 milliGRAM(s) Oral daily  dextrose 5%. 1000 milliLiter(s) IV Continuous <Continuous>  diVALproex  milliGRAM(s) Oral at bedtime  diVALproex Oral Sprinkle Capsule - Peds 125 milliGRAM(s) Oral two times a day  docusate sodium 100 milliGRAM(s) Oral three times a day  folic acid 1 milliGRAM(s) Oral daily  lactobacillus acidophilus 1 Tablet(s) Oral two times a day with meals  lactulose Syrup 30 Gram(s) Oral three times a day  levothyroxine 50 MICROGram(s) Oral daily  morphine  - Injectable 2 milliGRAM(s) IV Push every 4 hours PRN  multivitamin 1 Tablet(s) Oral daily  pantoprazole Infusion 8 mG/Hr IV Continuous <Continuous>  piperacillin/tazobactam IVPB. 3.375 Gram(s) IV Intermittent every 12 hours  potassium chloride  20 mEq/100 mL IVPB 20 milliEquivalent(s) IV Intermittent every 2 hours  senna 2 Tablet(s) Oral at bedtime PRN  silver sulfADIAZINE 1% Cream 1 Application(s) Topical every 12 hours  simethicone 80 milliGRAM(s) Chew daily  sodium bicarbonate  Infusion 0.153 mEq/kG/Hr IV Continuous <Continuous>  sodium chloride 0.9% Bolus 1000 milliLiter(s) IV Bolus once  thiamine 100 milliGRAM(s) Oral daily  vancomycin  IVPB 500 milliGRAM(s) IV Intermittent every 24 hours  zinc sulfate 220 milliGRAM(s) Oral daily    FAMILY HISTORY:  Family history unknown    Review of Systems noncontributory , other than as listed in  Admission H & P    Physical Examination:  Female in no acute distress.  T(C): 36.2 (18 @ 06:43), Max: 37.2 (18 @ 05:39)  HR: 108 (18 @ 06:43) (62 - 121)  BP: 152/66 (18 @ 06:43) (143/63 - 168/74)  RR: 18 (18 @ 06:43) (18 - 24)  SpO2: 100% (18 @ 16:47) (100% - 100%):    Data:                        7.8    8.  )-----------( 313      ( 2018 05:21 )             25.0     Mean Cell Volume: 90.9 fL (18 @ 05:21)  Red Cell Distrib Width: 17.8 % (18 @ 05:21)    7.802 @ 05:21  8.402 @ 09:04  9.302 @ 17:19        147<H>  |  121<H>  |  35<H>  ----------------------------<  91  3.2<L>   |  14<L>  |  1.9<H>    Ca    8.2<L>      2018 05:21          Urinalysis Basic - ( 2018 18:19 )    Color: Yellow / Appearance: Cloudy / S.015 / pH: x  Gluc: x / Ketone: 15  / Bili: Negative / Urobili: 0.2 mg/dL   Blood: x / Protein: 30 mg/dL / Nitrite: Negative   Leuk Esterase: Large / RBC: 5-10 /HPF / WBC 10-25 /HPF   Sq Epi: x / Non Sq Epi: Occasional /HPF / Bacteria: x        Culture - Blood (collected 18 @ 13:16)  Source: .Blood Blood  Preliminary Report (18 @ 01:01):    No growth to date.    Lipase, Serum: 34 U/L (18 @ 12:06)    Radiology:  < from: CT Head No Cont (18 @ 16:25) >  Study severely limited by motion artifact, however demonstrating no   definite evidence of acute intracranial pathology.      < end of copied text >      < from: CT Abdomen and Pelvis No Cont (18 @ 16:26) >  PELVIC ORGANS: Bladder decompressed with a Tyson catheter. Stable bladder   wall diffuse thickening and trabeculations.    PERITONEUM/MESENTERY/BOWEL: No evidence of bowel obstruction. No free   intraperitoneal air or ascites. Appendicolith seen without appendiceal   dilation or surrounding inflammatory change. Moderate stool load within   the rectosigmoid colon.     < from: CT Abdomen and Pelvis No Cont (18 @ 16:26) >  Sacral decubitus ulcer, with extension to the coccyx demonstrating bony   destruction consistent with osteomyelitis (series 602 image 43) with   surrounding phlegmon measuring approximately 6 cm transverse. This is  unchanged from the prior exam on 2018. No definite   involvement of the rectum or vagina.    < end of copied text >    < end of copied text >    Impression: 62y year old Female recently diagnosed with CRC now with dark stool. Canot rule out an UGIB (low suspicion for active GIB)    Recs:  check CBC q8 transfuse PRN  Keep NPO  PPI IVD  EGD on urgent basis if HD instability acute drop in Hb or evidence of active GIB

## 2018-02-26 NOTE — PROGRESS NOTE ADULT - SUBJECTIVE AND OBJECTIVE BOX
Patient is a 62y old Female with PMH of MS and being bed-bound, CKD IV, chronic sacral osteo, recently diagnose colon CA at [facility], and seizure disorder who presents with a chief complaint of altered mental status (2018 18:44). Patient's daughter/healthcare proxy present at bedside. She reports patient's mental status has gradually declined since October after repeated hospitalizations for lung infections, UTIs, and the chronic sacral ulcer. Patient's hospital course complicated by metabolic acidosis and sepsis, which improved after IV hydration with bicarb. Patient is still non-verbal and altered. As per daughter, patient more verbal a week ago. She had one episode of melena today.    Last 24 hours:  - Morphine started for pain  - Bicarb drip discontinued  - NGT placed for dysphagia  - Patient had one episode of what seemed like melena and hemoglobin trending down, GI following      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  Chronic kidney disease (CKD), stage IV (severe)  Psychosis  Bedridden  Tyson catheter in place on admission  Osteomyelitis of sacrum  Sacral ulcer  Osteoporosis  Diabetes mellitus  Seizure disorder  Bipolar affective disorder  Status post debridement: of sacral ulcer      MEDICATIONS  (STANDING):  bisacodyl Suppository 10 milliGRAM(s) Rectal daily  carBAMazepine 200 milliGRAM(s) Oral daily  dextrose 5%. 1000 milliLiter(s) (100 mL/Hr) IV Continuous <Continuous>  diVALproex  milliGRAM(s) Oral at bedtime  diVALproex Oral Sprinkle Capsule - Peds 125 milliGRAM(s) Oral two times a day  docusate sodium 100 milliGRAM(s) Oral three times a day  folic acid 1 milliGRAM(s) Oral daily  lactobacillus acidophilus 1 Tablet(s) Oral two times a day with meals  lactulose Syrup 30 Gram(s) Oral three times a day  levothyroxine 50 MICROGram(s) Oral daily  multivitamin 1 Tablet(s) Oral daily  pantoprazole Infusion 8 mG/Hr (10 mL/Hr) IV Continuous <Continuous>  piperacillin/tazobactam IVPB. 3.375 Gram(s) IV Intermittent every 12 hours  silver sulfADIAZINE 1% Cream 1 Application(s) Topical every 12 hours  simethicone 80 milliGRAM(s) Chew daily  thiamine 100 milliGRAM(s) Oral daily  vancomycin  IVPB 500 milliGRAM(s) IV Intermittent every 24 hours  zinc sulfate 220 milliGRAM(s) Oral daily    MEDICATIONS  (PRN):  morphine  - Injectable 2 milliGRAM(s) IV Push every 4 hours PRN Moderate Pain (4 - 6)  senna 2 Tablet(s) Oral at bedtime PRN Constipation      Overnight events:    Vital Signs Last 24 Hrs  T(C): 36.7 (2018 17:00), Max: 37.2 (2018 05:39)  T(F): 98.1 (2018 17:00), Max: 98.9 (2018 05:39)  HR: 115 (2018 17:00) (62 - 121)  BP: 162/75 (2018 17:00) (143/63 - 168/74)  BP(mean): --  RR: 18 (2018 17:00) (18 - 24)  SpO2: --  CAPILLARY BLOOD GLUCOSE  110 (2018 15:06)  93 (2018 11:14)  103 (2018 09:36)        I&O's Summary    2018 07:  -  2018 07:00  --------------------------------------------------------  IN: 1375 mL / OUT: 2150 mL / NET: -775 mL    2018 07:01  -  2018 19:19  --------------------------------------------------------  IN: 2281 mL / OUT: 1101 mL / NET: 1180 mL        Physical Exam:    -     General : Bed-bound, looks uncomfortable, groaning in pain    -      HEENT: Normocephalic/Atraumatic. Lower lip looks slightly inflamed 2/2 patient biting on it. NGT in place    -      Cardiac: normal S1 S2 appreciated    -      Pulm: Lungs CTA B/L    -      GI: normal bowel sounds, absome soft, NT/ND    -      Musculoskeletal:    -      Neuro: Non-verbal        Labs:                        7.8    8.26  )-----------( 313      ( 2018 05:21 )             25.0             02-26    147<H>  |  121<H>  |  35<H>  ----------------------------<  91  3.2<L>   |  14<L>  |  1.9<H>    Ca    8.2<L>      2018 05:21                CARDIAC MARKERS ( 2018 09:04 )  0.02 ng/mL / x     / x     / x     / x          ABG - ( 2018 12:04 )  pH: 7.41  /  pCO2: 28    /  pO2: 99    / HCO3: 18    / Base Excess: -5.8  /  SaO2: 99                Urinalysis Basic - ( 2018 18:19 )    Color: Yellow / Appearance: Cloudy / S.015 / pH: x  Gluc: x / Ketone: 15  / Bili: Negative / Urobili: 0.2 mg/dL   Blood: x / Protein: 30 mg/dL / Nitrite: Negative   Leuk Esterase: Large / RBC: 5-10 /HPF / WBC 10-25 /HPF   Sq Epi: x / Non Sq Epi: Occasional /HPF / Bacteria: x        Culture - Blood (collected 2018 13:16)  Source: .Blood Blood  Preliminary Report (2018 01:01):    No growth to date.    Culture - Urine (collected 2018 12:28)  Source: .Urine Catheterized  Final Report (2018 23:24):    50,000 - 99,000 CFU/mL Presumptive Candida albicans        Imaging:    ECG:

## 2018-02-26 NOTE — DIETITIAN INITIAL EVALUATION ADULT. - NS AS NUTRI INTERV ENTERAL NUTRITION
Composition Composition/Glucerna 1.2 320 ml q 6hrs x4/day provides 1280ml, 1536 kcal, 77g protein, and 1030 free H20. 50ml water flush every feed.

## 2018-02-26 NOTE — DIETITIAN INITIAL EVALUATION ADULT. - DIET TYPE
dysphagia 2, mechanical soft, thin liquids/no concentrated phosphorus/no concentrated potassium/low sodium

## 2018-02-26 NOTE — DIETITIAN INITIAL EVALUATION ADULT. - FEEDING SKILL
RN reports pt has poor po intake, ~25%, vs EMR which shows 75% po intake of one meal./total assistance

## 2018-02-26 NOTE — PROGRESS NOTE ADULT - ASSESSMENT
Patient is a 62y old Female with PMH of MS and being bed-bound, CKD IV, chronic sacral osteo, recently diagnosed colon CA at [facility], and seizure disorder who presents with a chief complaint of altered mental status (24 Feb 2018 18:44). Patient's daughter/healthcare proxy present at bedside. She reports patient's mental status has gradually declined since October after repeated hospitalizations for lung infections, UTIs, and the chronic sacral ulcer. Patient's hospital course complicated by metabolic acidosis and sepsis, which improved after IV hydration with bicarb. Patient is still non-verbal and altered. As per daughter, patient more verbal a week ago. She had one episode of melena today.    1. Metabolic encephalopathy probably 2/2 sepsis/ sacral osteomyelitis/UTI  - CT Head No Cont (02.24.18 @ 16:25) no definite evidence of acute intracranial pathology.  - CT Abdomen and Pelvis No Cont (02.24.18 @ 16:26) Sacral decubitus ulcer with extension to the coccyx demonstrating bony   destruction of the coccyx consistent with osteomyelitis. Surrounding phlegmon measures 6 cm transverse,Trace persistent right hydroureteronephrosis with suggestion of bilateral urothelial thickening likely related to chronic infection.Stable 2.2 cm right adrenal adenoma.Stable diffuse bladder wall thickening and trabeculation.  - pt was evaluated by ID started on Vancomycin, zosyn.  - Surgery consult  for phlegmon. Burn not planning any intervention for now.  - Check Ammonia level.    2.Metabolic Acidosis, SALOME on CKDstage 4, hypernatremia.  - Metabolic acidosis resolved, d/c Bicarb drip. F/U renal consult.  - IV Fluids: D5 @ 100 cc/hr  - daily BMP    3. Dysphagia- failed Speech and Swallow.  - NGT feeds.  - Aspiration precautions.    4. Seizure Disorder/ Bipolar disorder  - tegretol. valproic acid level F/u LFT. Ammonia  - ct tegretol, dapakote.    5. Possible Melena in the context of recent EGD results and h/o colon CA:  - GI following  - Pt receiving 1 PRBC today  - EGD done at [facility] on 1/28/18 showed 4cm semi-circumferential mass in sigmoid colon, which was biopsied - waiting for daughter to bring in results.  - Of note, pt is on iron but will monitor CBC    5. DM type 2   - Monitor Fs.    6. Hypothyroidism  - ct synthroid.    7. Multiple Sclerosis with neurogenic bladder  - Chronic antony     8. Hypokalemia.  - K repleted     9. GI/DVT prophylaxis.    Prognosis guarded. Patient is a 62y old Female with PMH of MS and being bed-bound, CKD IV, chronic sacral osteo, recently diagnosed colon CA at [facility], and seizure disorder who presents with a chief complaint of altered mental status (24 Feb 2018 18:44). Patient's daughter/healthcare proxy present at bedside. She reports patient's mental status has gradually declined since October after repeated hospitalizations for lung infections, UTIs, and the chronic sacral ulcer. Patient's hospital course complicated by metabolic acidosis and sepsis, which improved after IV hydration with bicarb. Patient is still non-verbal and altered. As per daughter, patient more verbal a week ago. She had one episode of melena today.    1. Metabolic encephalopathy probably 2/2 sepsis/ sacral osteomyelitis/UTI  - CT Head No Cont (02.24.18 @ 16:25) no definite evidence of acute intracranial pathology.  - CT Abdomen and Pelvis No Cont (02.24.18 @ 16:26) Sacral decubitus ulcer with extension to the coccyx demonstrating bony   destruction of the coccyx consistent with osteomyelitis. Surrounding phlegmon measures 6 cm transverse,Trace persistent right hydroureteronephrosis with suggestion of bilateral urothelial thickening likely related to chronic infection.Stable 2.2 cm right adrenal adenoma.Stable diffuse bladder wall thickening and trabeculation.  - pt was evaluated by ID started on Vancomycin, zosyn.  - Surgery consult  for phlegmon. Burn not planning any intervention for now.  - Check Ammonia level.    2.Metabolic Acidosis, SALOME on CKDstage 4, hypernatremia.  - Metabolic acidosis resolved, d/c Bicarb drip. F/U renal consult.  - IV Fluids: D5 @ 100 cc/hr  - daily BMP    3. Dysphagia- failed Speech and Swallow.  - NGT feeds.  - Aspiration precautions.    4. Seizure Disorder/ Bipolar disorder  - tegretol. valproic acid level F/u LFT. Ammonia  - ct tegretol, dapakote.    5. Possible Melena in the context of recent EGD results and h/o colon CA:  - GI following  - placed on protonix drip  - Pt receiving 1 PRBC today  - EGD done at [facility] on 1/28/18 showed 4cm semi-circumferential mass in sigmoid colon, which was biopsied - waiting for daughter to bring in results.  - Of note, pt is on iron but will monitor CBC    5. DM type 2   - Monitor Fs.    6. Hypothyroidism  - ct synthroid.    7. Multiple Sclerosis with neurogenic bladder  - Chronic antony     8. Hypokalemia.  - K repleted     9. GI/DVT prophylaxis.    Prognosis guarded.

## 2018-02-26 NOTE — DIETITIAN INITIAL EVALUATION ADULT. - PERTINENT MEDS FT
sodium bicarbonate, bisacodyl, colace, folic acid, lactobacillus acidophilus, lactulose, synthroid, MVI, senna, thiamine, zinc sulfate

## 2018-02-26 NOTE — PROGRESS NOTE ADULT - ASSESSMENT
62F who is bedridden at [facility] because of MS, who is currently being treated for sacral OM with vancomycin and meropenem  Patient is sent in by [facility] because of altered mental status.     1. Metabolic encephalopathy probably sec to sepsis/ sacral osteomyelitis/UTI  -  CT Head No Cont (02.24.18 @ 16:25) no definite evidence of acute intracranial pathology.  - CT Abdomen and Pelvis No Cont (02.24.18 @ 16:26) Sacral decubitus ulcer with extension to the coccyx demonstrating bony   destruction of the coccyx consistent with osteomyelitis. Surrounding phlegmon measures 6 cm transverse,Trace persistent right hydroureteronephrosis with suggestion of bilateral urothelial thickening likely related to chronic infection.Stable 2.2 cm right adrenal adenoma.Stable diffuse bladder wall thickening and trabeculation.  - pt was evaluated by ID started on Vancomycin, zosyn.  - Surgery consult  for plegmon.  - Check Ammonia level.    2.Metabolic Acidosis, SALOME on CKDstage 4, hypermatremia.  - Metabolic acidosis resolving. ct Bicarb drip. renal consult.  - IV Fluids.  - daily BMP    3. Dysphagia- failed Spech and Swallow.  - NGT feeds.  - Aspiration precautions.    4. Seizure Disorder/ Bipolar disorder  - tegretol. valproic acid level F/u LFT. Ammonia  - ct tegretol, dapakote.    5. DM type 2   - Monitor Fs.      6. Hypothyroidism  - ct synthroid.    7. Multiple Sclerosis with neurogenic bladder  - antony .    8. Hypokalemia.  - rplace with IV kcl.    9. GI/DVT prophylaxis.    Prognosis guarded.

## 2018-02-26 NOTE — CONSULT NOTE ADULT - ASSESSMENT
ASSESSMENT:  Stage 2, 3/ poss 4 pressure ulcer sacrum and buttock   Not grossly infected    RECOMMENDATION:  Wound care - Santyl ointment and moist drsssing  to open yellow areas. Xeroform to pink surrounding wounds  Surgical debridement may be needed  Offloading/ positional changes  Discussed with pts daughter at bedside . Questions addressed  Will follow.

## 2018-02-26 NOTE — DIETITIAN INITIAL EVALUATION ADULT. - OTHER INFO
Pt who is bedridden b/c of MS sent in by [facility] d/t altered mental status. Metabolic acidosis 2/2 SALOME on CKD 4 likely d/t sepsis, dehydration. Failed speech and swallow, will need NGT feeds. Pt who is bedridden b/c of MS sent in by [facility] d/t altered mental status. Metabolic acidosis 2/2 SALOME on CKD 4 likely d/t sepsis, dehydration. Failed speech and swallow, will need NGT feeds. Awaiting confirmation of NGT placement. (Reason for RD Assessment: Stage IV pressure ulcer)

## 2018-02-26 NOTE — PROGRESS NOTE ADULT - SUBJECTIVE AND OBJECTIVE BOX
Patient is a 62y old  Female who presents with a chief complaint of altered mental status (2018 18:44)  Patient was seen and examined.    patient Awake, confused.    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 1000 milliLiter(s) (50 mL/Hr) IV Continuous <Continuous>  diVALproex  milliGRAM(s) Oral at bedtime  diVALproex Oral Sprinkle Capsule - Peds 125 milliGRAM(s) Oral two times a day  docusate sodium 100 milliGRAM(s) Oral three times a day  folic acid 1 milliGRAM(s) Oral daily  heparin  Injectable 5000 Unit(s) SubCutaneous every 8 hours  lactobacillus acidophilus 1 Tablet(s) Oral two times a day with meals  lactulose Syrup 30 Gram(s) Oral three times a day  levothyroxine 50 MICROGram(s) Oral daily  multivitamin 1 Tablet(s) Oral daily  piperacillin/tazobactam IVPB. 3.375 Gram(s) IV Intermittent every 12 hours  potassium chloride  20 mEq/100 mL IVPB 20 milliEquivalent(s) IV Intermittent every 2 hours  silver sulfADIAZINE 1% Cream 1 Application(s) Topical every 12 hours  simethicone 80 milliGRAM(s) Chew daily  sodium bicarbonate  Infusion 0.153 mEq/kG/Hr (75 mL/Hr) IV Continuous <Continuous>  thiamine 100 milliGRAM(s) Oral daily  vancomycin  IVPB 500 milliGRAM(s) IV Intermittent every 24 hours  zinc sulfate 220 milliGRAM(s) Oral daily    MEDICATIONS  (PRN):  morphine  - Injectable 2 milliGRAM(s) IV Push every 4 hours PRN Moderate Pain (4 - 6)  senna 2 Tablet(s) Oral at bedtime PRN Constipation    Vital Signs Last 24 Hrs  T(C): 36.2  T(F): 97.1  HR: 108  BP: 152/66  BP(mean): --  RR: 18  SpO2: 100%  O/E:  Awake, Confused.  HEENT: atraumatic.  Chest: clear.  CVS: SIS2 +, no murmur.  P/A: Soft, BS+  CNS: confused. Moves all ext.  Ext: no edema feet.  Skin: sacral ulcer stage 4  All systems reviewed positive findings as above.                          7.8<L>  8.26  )-----------( 313      ( 2018 05:21 )             25.0<L>                        8.4<L>  10.59 )-----------( 347      ( 2018 09:04 )             28.1<L>    02-26    147<H>  |  121<H>  |  35<H>  ----------------------------<  91  3.2<L>   |  14<L>  |  1.9<H>  02-25    151<H>  |  128<H>  |  38<H>  ----------------------------<  100  3.6   |  14<L>  |  2.0<H>    Ca    8.2<L>      2018 05:21  Ca    8.6      2018 23:29  Ca    8.5      2018 09:04      CARDIAC MARKERS ( 2018 09:04 )  0.02 ng/mL / x     / x     / x     / x        Urinalysis Basic - ( 2018 18:19 )    Color: Yellow / Appearance: Cloudy / S.015 / pH: x  Gluc: x / Ketone: 15  / Bili: Negative / Urobili: 0.2 mg/dL   Blood: x / Protein: 30 mg/dL / Nitrite: Negative   Leuk Esterase: Large / RBC: 5-10 /HPF / WBC 10-25 /HPF   Sq Epi: x / Non Sq Epi: Occasional /HPF / Bacteria: x        Culture - Blood (collected 2018 13:16)  Source: .Blood Blood  Preliminary Report (2018 01:01):    No growth to date.    Culture - Urine (collected 2018 12:28)  Source: .Urine Catheterized  Final Report (2018 23:24):    50,000 - 99,000 CFU/mL Presumptive Candida albicans

## 2018-02-26 NOTE — CONSULT NOTE ADULT - SUBJECTIVE AND OBJECTIVE BOX
62y  Female  HPI:  62F who is bedridden at [facility] because of MS, who is currently being treated for sacral OM with vancomycin and meropenem  Patient is sent in by [facility] because of altered mental status. The patient was recently admitted at [facility] for AMS also and was found to have a sacral ulcer that required debridement and IV antibiotics. The patient was discharged on IV meropenem and IV vancomycin on Feb 9. At the time of discharge, the patient was responding appropriately to questions.   Today, the patient is mostly non-verbal and only is moaning. She doesn't answer any questions. 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 62y  Female  HPI:  62F who is bedridden at [facility] because of MS, who is currently being treated for sacral OM with vancomycin and meropenem  Patient is sent in by [facility] because of altered mental status. The patient was recently admitted at [facility] for AMS also and was found to have a sacral ulcer that required debridement and IV antibiotics. The patient was discharged on IV meropenem and IV vancomycin on Feb 9. At the time of discharge, the patient was responding appropriately to questions.   Today, the patient is mostly non-verbal and only is moaning. She doesn't answer any questions. 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

## 2018-02-26 NOTE — DIETITIAN INITIAL EVALUATION ADULT. - ENERGY NEEDS
Estimated Calorie Needs: 2948-1069 kcal/day (MSJ x 1.2-1.5 AF)   Estimated Protein Needs: 63-73 gm/day (1.4-1.6 IBW)-- protein needs are increased d/t stage 4 pressure ulcer and IBW significantly lower than dosing wt   Estimated Fluid Needs: 5896-1884 ml/day (1ml/kcal)

## 2018-02-27 LAB
ALBUMIN SERPL ELPH-MCNC: 1.4 G/DL — LOW (ref 3–5.5)
ALP SERPL-CCNC: 102 U/L — SIGNIFICANT CHANGE UP (ref 30–115)
ALP SERPL-CCNC: 86 U/L — SIGNIFICANT CHANGE UP (ref 30–115)
ALT FLD-CCNC: 6 U/L — SIGNIFICANT CHANGE UP (ref 0–41)
ALT FLD-CCNC: 7 U/L — SIGNIFICANT CHANGE UP (ref 0–41)
AMMONIA BLD-MCNC: 135 MMOL/L — CRITICAL HIGH (ref 11–35)
ANION GAP SERPL CALC-SCNC: 10 MMOL/L — SIGNIFICANT CHANGE UP (ref 7–14)
ANION GAP SERPL CALC-SCNC: 10 MMOL/L — SIGNIFICANT CHANGE UP (ref 7–14)
AST SERPL-CCNC: 18 U/L — SIGNIFICANT CHANGE UP (ref 0–41)
AST SERPL-CCNC: 20 U/L — SIGNIFICANT CHANGE UP (ref 0–41)
BILIRUB SERPL-MCNC: 0.8 MG/DL — SIGNIFICANT CHANGE UP (ref 0.2–1.2)
BILIRUB SERPL-MCNC: 0.8 MG/DL — SIGNIFICANT CHANGE UP (ref 0.2–1.2)
BUN SERPL-MCNC: 28 MG/DL — HIGH (ref 10–20)
BUN SERPL-MCNC: 29 MG/DL — HIGH (ref 10–20)
CALCIUM SERPL-MCNC: 8 MG/DL — LOW (ref 8.5–10.1)
CALCIUM SERPL-MCNC: 8.2 MG/DL — LOW (ref 8.5–10.1)
CHLORIDE SERPL-SCNC: 122 MMOL/L — HIGH (ref 98–110)
CHLORIDE SERPL-SCNC: 126 MMOL/L — HIGH (ref 98–110)
CO2 SERPL-SCNC: 19 MMOL/L — SIGNIFICANT CHANGE UP (ref 17–32)
CO2 SERPL-SCNC: 19 MMOL/L — SIGNIFICANT CHANGE UP (ref 17–32)
CREAT SERPL-MCNC: 1.7 MG/DL — HIGH (ref 0.7–1.5)
CREAT SERPL-MCNC: 1.8 MG/DL — HIGH (ref 0.7–1.5)
GLUCOSE SERPL-MCNC: 100 MG/DL — SIGNIFICANT CHANGE UP (ref 70–110)
GLUCOSE SERPL-MCNC: 118 MG/DL — HIGH (ref 70–110)
HCT VFR BLD CALC: 29.5 % — LOW (ref 37–47)
HGB BLD-MCNC: 9.4 G/DL — LOW (ref 14–18)
MCHC RBC-ENTMCNC: 28.7 PG — SIGNIFICANT CHANGE UP (ref 27–31)
MCHC RBC-ENTMCNC: 31.9 G/DL — LOW (ref 32–37)
MCV RBC AUTO: 90.2 FL — SIGNIFICANT CHANGE UP (ref 81–91)
NRBC # BLD: 0 /100 WBCS — SIGNIFICANT CHANGE UP (ref 0–0)
PLATELET # BLD AUTO: 279 K/UL — SIGNIFICANT CHANGE UP (ref 130–400)
POTASSIUM SERPL-MCNC: 3.3 MMOL/L — LOW (ref 3.5–5)
POTASSIUM SERPL-MCNC: 3.6 MMOL/L — SIGNIFICANT CHANGE UP (ref 3.5–5)
POTASSIUM SERPL-SCNC: 3.3 MMOL/L — LOW (ref 3.5–5)
POTASSIUM SERPL-SCNC: 3.6 MMOL/L — SIGNIFICANT CHANGE UP (ref 3.5–5)
PROT SERPL-MCNC: 4.9 G/DL — LOW (ref 6–8)
RBC # BLD: 3.27 M/UL — LOW (ref 4.2–5.4)
RBC # FLD: 17.7 % — HIGH (ref 11.5–14.5)
SODIUM SERPL-SCNC: 151 MMOL/L — HIGH (ref 135–146)
SODIUM SERPL-SCNC: 155 MMOL/L — HIGH (ref 135–146)
SODIUM UR-SCNC: 67 MMOL/L — SIGNIFICANT CHANGE UP
VALPROATE SERPL-MCNC: <10 UG/ML — LOW (ref 50–100)
VANCOMYCIN TROUGH SERPL-MCNC: 31 UG/ML — HIGH (ref 5–10)
WBC # BLD: 9.63 K/UL — SIGNIFICANT CHANGE UP (ref 4.8–10.8)
WBC # FLD AUTO: 9.63 K/UL — SIGNIFICANT CHANGE UP (ref 4.8–10.8)

## 2018-02-27 RX ORDER — LACTULOSE 10 G/15ML
30 SOLUTION ORAL
Qty: 0 | Refills: 0 | Status: DISCONTINUED | OUTPATIENT
Start: 2018-02-27 | End: 2018-02-27

## 2018-02-27 RX ORDER — POTASSIUM CHLORIDE 20 MEQ
40 PACKET (EA) ORAL ONCE
Qty: 0 | Refills: 0 | Status: COMPLETED | OUTPATIENT
Start: 2018-02-27 | End: 2018-02-27

## 2018-02-27 RX ORDER — DIVALPROEX SODIUM 500 MG/1
500 TABLET, DELAYED RELEASE ORAL EVERY OTHER DAY
Qty: 0 | Refills: 0 | Status: DISCONTINUED | OUTPATIENT
Start: 2018-02-27 | End: 2018-02-27

## 2018-02-27 RX ORDER — MORPHINE SULFATE 50 MG/1
4 CAPSULE, EXTENDED RELEASE ORAL EVERY 4 HOURS
Qty: 0 | Refills: 0 | Status: DISCONTINUED | OUTPATIENT
Start: 2018-02-27 | End: 2018-02-28

## 2018-02-27 RX ORDER — MORPHINE SULFATE 50 MG/1
4 CAPSULE, EXTENDED RELEASE ORAL ONCE
Qty: 0 | Refills: 0 | Status: DISCONTINUED | OUTPATIENT
Start: 2018-02-27 | End: 2018-02-27

## 2018-02-27 RX ORDER — CARBAMAZEPINE 200 MG
400 TABLET ORAL DAILY
Qty: 0 | Refills: 0 | Status: DISCONTINUED | OUTPATIENT
Start: 2018-02-27 | End: 2018-03-02

## 2018-02-27 RX ORDER — VALPROIC ACID (AS SODIUM SALT) 250 MG/5ML
250 SOLUTION, ORAL ORAL
Qty: 0 | Refills: 0 | Status: DISCONTINUED | OUTPATIENT
Start: 2018-02-27 | End: 2018-03-02

## 2018-02-27 RX ORDER — FLUCONAZOLE 150 MG/1
TABLET ORAL
Qty: 0 | Refills: 0 | Status: DISCONTINUED | OUTPATIENT
Start: 2018-02-27 | End: 2018-02-28

## 2018-02-27 RX ORDER — FLUCONAZOLE 150 MG/1
100 TABLET ORAL EVERY 24 HOURS
Qty: 0 | Refills: 0 | Status: DISCONTINUED | OUTPATIENT
Start: 2018-02-28 | End: 2018-02-28

## 2018-02-27 RX ORDER — PANTOPRAZOLE SODIUM 20 MG/1
40 TABLET, DELAYED RELEASE ORAL
Qty: 0 | Refills: 0 | Status: DISCONTINUED | OUTPATIENT
Start: 2018-02-27 | End: 2018-03-19

## 2018-02-27 RX ORDER — FLUCONAZOLE 150 MG/1
100 TABLET ORAL ONCE
Qty: 0 | Refills: 0 | Status: COMPLETED | OUTPATIENT
Start: 2018-02-27 | End: 2018-02-27

## 2018-02-27 RX ORDER — VALPROIC ACID (AS SODIUM SALT) 250 MG/5ML
500 SOLUTION, ORAL ORAL AT BEDTIME
Qty: 0 | Refills: 0 | Status: DISCONTINUED | OUTPATIENT
Start: 2018-02-27 | End: 2018-03-19

## 2018-02-27 RX ORDER — SODIUM CHLORIDE 9 MG/ML
1000 INJECTION, SOLUTION INTRAVENOUS
Qty: 0 | Refills: 0 | Status: DISCONTINUED | OUTPATIENT
Start: 2018-02-27 | End: 2018-02-28

## 2018-02-27 RX ADMIN — PIPERACILLIN AND TAZOBACTAM 200 GRAM(S): 4; .5 INJECTION, POWDER, LYOPHILIZED, FOR SOLUTION INTRAVENOUS at 17:03

## 2018-02-27 RX ADMIN — PIPERACILLIN AND TAZOBACTAM 200 GRAM(S): 4; .5 INJECTION, POWDER, LYOPHILIZED, FOR SOLUTION INTRAVENOUS at 05:24

## 2018-02-27 RX ADMIN — MORPHINE SULFATE 2 MILLIGRAM(S): 50 CAPSULE, EXTENDED RELEASE ORAL at 03:45

## 2018-02-27 RX ADMIN — SIMETHICONE 80 MILLIGRAM(S): 80 TABLET, CHEWABLE ORAL at 12:05

## 2018-02-27 RX ADMIN — MORPHINE SULFATE 4 MILLIGRAM(S): 50 CAPSULE, EXTENDED RELEASE ORAL at 15:03

## 2018-02-27 RX ADMIN — LACTULOSE 30 GRAM(S): 10 SOLUTION ORAL at 21:22

## 2018-02-27 RX ADMIN — FLUCONAZOLE 50 MILLIGRAM(S): 150 TABLET ORAL at 13:00

## 2018-02-27 RX ADMIN — Medication 250 MILLIGRAM(S): at 17:09

## 2018-02-27 RX ADMIN — LACTULOSE 30 GRAM(S): 10 SOLUTION ORAL at 05:16

## 2018-02-27 RX ADMIN — Medication 100 MILLIGRAM(S): at 12:06

## 2018-02-27 RX ADMIN — ZINC SULFATE TAB 220 MG (50 MG ZINC EQUIVALENT) 220 MILLIGRAM(S): 220 (50 ZN) TAB at 12:06

## 2018-02-27 RX ADMIN — Medication 1 APPLICATION(S): at 17:07

## 2018-02-27 RX ADMIN — MORPHINE SULFATE 2 MILLIGRAM(S): 50 CAPSULE, EXTENDED RELEASE ORAL at 08:47

## 2018-02-27 RX ADMIN — Medication 1 TABLET(S): at 17:03

## 2018-02-27 RX ADMIN — PANTOPRAZOLE SODIUM 40 MILLIGRAM(S): 20 TABLET, DELAYED RELEASE ORAL at 17:03

## 2018-02-27 RX ADMIN — MORPHINE SULFATE 2 MILLIGRAM(S): 50 CAPSULE, EXTENDED RELEASE ORAL at 11:39

## 2018-02-27 RX ADMIN — Medication 40 MILLIEQUIVALENT(S): at 17:05

## 2018-02-27 RX ADMIN — LACTULOSE 30 GRAM(S): 10 SOLUTION ORAL at 15:03

## 2018-02-27 RX ADMIN — MORPHINE SULFATE 4 MILLIGRAM(S): 50 CAPSULE, EXTENDED RELEASE ORAL at 21:22

## 2018-02-27 RX ADMIN — MORPHINE SULFATE 4 MILLIGRAM(S): 50 CAPSULE, EXTENDED RELEASE ORAL at 11:23

## 2018-02-27 RX ADMIN — Medication 10 MILLIGRAM(S): at 12:02

## 2018-02-27 RX ADMIN — Medication 400 MILLIGRAM(S): at 10:31

## 2018-02-27 RX ADMIN — SODIUM CHLORIDE 125 MILLILITER(S): 9 INJECTION, SOLUTION INTRAVENOUS at 04:23

## 2018-02-27 RX ADMIN — Medication 1 APPLICATION(S): at 05:23

## 2018-02-27 RX ADMIN — DIVALPROEX SODIUM 125 MILLIGRAM(S): 500 TABLET, DELAYED RELEASE ORAL at 05:16

## 2018-02-27 RX ADMIN — Medication 50 MICROGRAM(S): at 05:16

## 2018-02-27 RX ADMIN — Medication 1 TABLET(S): at 12:04

## 2018-02-27 RX ADMIN — Medication 1 MILLIGRAM(S): at 12:03

## 2018-02-27 RX ADMIN — Medication 250 MILLIGRAM(S): at 10:29

## 2018-02-27 RX ADMIN — SODIUM CHLORIDE 150 MILLILITER(S): 9 INJECTION, SOLUTION INTRAVENOUS at 08:53

## 2018-02-27 RX ADMIN — MORPHINE SULFATE 2 MILLIGRAM(S): 50 CAPSULE, EXTENDED RELEASE ORAL at 04:46

## 2018-02-27 RX ADMIN — Medication 500 MILLIGRAM(S): at 21:22

## 2018-02-27 NOTE — PROGRESS NOTE ADULT - ASSESSMENT
Pt with Candiduria    On: piperacillin/tazobactam IVPB. 3.375 Gram(s) IV Intermittent every 12 hours  vancomycin  IVPB 500 milliGRAM(s) IV Intermittent every 24 hours    Hypernatremia    Would hold Vanco and give IV Diflucan 100mg 24h x 3 days    Evaluate need for Tyson

## 2018-02-27 NOTE — PROGRESS NOTE ADULT - SUBJECTIVE AND OBJECTIVE BOX
GI Followup Note:   Pt seen and examined at bedside.  62F recently diagnosed with CRC in the sigmoid following up at [facility] for treatment (awaiting surgery as per family), bedridden from [facility] admitted for sepsis 2/2 to OM 2/2 a sacral decubitus sp debridement and a previous hospitalization.  GI is called for x1 large melenic BM.  Patient mental status is not appropriate attributed to sepsis.  Pt is on Fe PO.  Hx is not clear to whether the stool has changed in color or consistency.    Subjective:  Had 1 BM this is AM smaller and more formed than yesterdays.  Remains NPO  Has an NG Tube now.    REVIEW OF SYSTEMS:  Constitutional: No fever, weight loss or fatigue  Cardiovascular: No chest pain, palpitations, dizziness or leg swelling  Gastrointestinal: No abdominal or epigastric pain. No nausea, vomiting or hematemesis; No diarrhea or constipation. No melena or hematochezia.  Skin: No itching, burning, rashes or lesions     Allergies: No Known Allergies      Medications:  bisacodyl Suppository 10 milliGRAM(s) Rectal daily  carBAMazepine Suspension 400 milliGRAM(s) Oral daily  dextrose 5%. 1000 milliLiter(s) IV Continuous <Continuous>  docusate sodium 100 milliGRAM(s) Oral three times a day  fluconAZOLE IVPB 100 milliGRAM(s) IV Intermittent once  fluconAZOLE IVPB      folic acid 1 milliGRAM(s) Oral daily  lactobacillus acidophilus 1 Tablet(s) Oral two times a day with meals  lactulose Syrup 30 Gram(s) Oral three times a day  levothyroxine 50 MICROGram(s) Oral daily  morphine  - Injectable 2 milliGRAM(s) IV Push every 4 hours PRN  multivitamin 1 Tablet(s) Oral daily  pantoprazole Infusion 8 mG/Hr IV Continuous <Continuous>  piperacillin/tazobactam IVPB. 3.375 Gram(s) IV Intermittent every 12 hours  senna 2 Tablet(s) Oral at bedtime PRN  silver sulfADIAZINE 1% Cream 1 Application(s) Topical every 12 hours  simethicone 80 milliGRAM(s) Chew daily  thiamine 100 milliGRAM(s) Oral daily  valproic  acid Syrup 250 milliGRAM(s) Oral two times a day  valproic  acid Syrup 500 milliGRAM(s) Oral at bedtime  zinc sulfate 220 milliGRAM(s) Oral daily      PHYSICAL EXAM:    Vital Signs Last 24 Hrs  T(F): 96 (2018 06:00), Max: 98.1 (2018 17:00)  HR: 107 (2018 06:00) (107 - 115)  BP: 167/67 (2018 06:00) (149/71 - 167/67)  RR: 18 (2018 06:00) (18 - 18)  SpO2: 96% (2018 21:23) (96% - 96%)     @ 07:01  -   @ 07:00  --------------------------------------------------------  IN: 3356 mL / OUT: 1751 mL / NET: 1605 mL        General: Well developed; well nourished; in no acute distress  HEENT: MMM, conjunctiva and sclera clear  Lungs: Clear, no Rhonchi  Gastrointestinal: Soft non-tender non-distended; Normal bowel sounds; No hepatosplenomegaly. No rebound or guarding  Skin: Warm and dry. No obvious rash    LABS:                        9.4    9.63  )-----------( 279      ( 2018 04:51 )             29.5     9.402--18 @ 04:51  9.702- @ 21:46  ------------------------1 Unit PRBC  7.802- @ 05:21  8.402--18 @ 09:04  9.302-24-18 @ 17:19    02-27    155<H>  |  126<H>  |  29<H>  ----------------------------<  100  3.6   |  19  |  1.8<H>    Ca    8.0<L>      2018 04:51  TPro  4.9<L>  /  Alb  1.4<L>  /  TBili  0.8  /  DBili  x   /  AST  18  /  ALT  6   /  AlkPhos  86        Urinalysis Basic - ( 2018 18:19 )    Color: Yellow / Appearance: Cloudy / S.015 / pH: x  Gluc: x / Ketone: 15  / Bili: Negative / Urobili: 0.2 mg/dL   Blood: x / Protein: 30 mg/dL / Nitrite: Negative   Leuk Esterase: Large / RBC: 5-10 /HPF / WBC 10-25 /HPF   Sq Epi: x / Non Sq Epi: Occasional /HPF / Bacteria: x    Culture - Blood (collected 2018 13:16)  Source: .Blood Blood  Preliminary Report (2018 01:01):    No growth to date.    Culture - Urine (collected 2018 12:28)  Source: .Urine Catheterized  Final Report (2018 23:24):    50,000 - 99,000 CFU/mL Presumptive Candida albicans            Impression; 62y  year old Female with :    Differential includes    Recommendations:

## 2018-02-27 NOTE — CONSULT NOTE ADULT - ASSESSMENT
Pt seen, examined (see above). NGT.  HCT - no acute changes.  EEG - generalized slowing w/ triphasic waves (reportedly) - consistent w/ metabolic encephalopathy  Na - 155; Ammonia - 135;   Depakote level less than 10 reportedly but Depakote was not given for a while    A/P. AMS. Hx MS/bedridden, hardly verbal at baseline (non verbal at present). Hx seizure disorder, bipolar disorder - on  mg/d, Depakote 250 mg bid and 500 mg at night        Current UTI/sepsis, Hyperammonemia, Hypernatremia.   - mental status worsening most likely toxic metabolic encephalopathy due to above.   - doubt nonconvulsive epileptic status - EEG did not show epileptiform or epileptic activity.  - resume Depakote in sprinkles via NGT; consider recheck level - if high - this could be at least partly be cause of high NH3  Please, call if any question

## 2018-02-27 NOTE — CONSULT NOTE ADULT - ASSESSMENT
61 yo F with sacral decubitus, possible infected ulcer, sacral osteomyelitis, perisacral/perirectal phlegmone since 09/17, bedridden fro MS    Plan  No need for surgical intervention  Phlegmone due to osteomyelitis and chronic local infection  Mngt per Burn team and Medicine 61 yo F with sacral decubitus, possible infected ulcer, sacral osteomyelitis, perisacral/perirectal phlegmone since 09/17, bedridden fro MS    Plan  No need for surgical intervention  Phlegmone due to osteomyelitis and chronic local infection  Mngt per Burn team and Medicine  Needs troy debridement and and bone bx for culture, appropriate antibiotic coverage  F/u on colonoscopy result from [facility] 4 cm semi-circumferential sigmoid mass. Needs official report. 63 yo F with sacral decubitus, possible infected ulcer, sacral osteomyelitis, perisacral/perirectal phlegmone since 09/17, bedridden fro MS    Plan  No need for surgical intervention  Phlegmone due to osteomyelitis and chronic local infection  Mngt per Burn team and Medicine  Needs troy debridement and and bone bx for culture, appropriate antibiotic coverage  F/u on colonoscopy result from [facility] 1/28/18 - 4 cm semi-circumferential sigmoid mass. Needs official report.

## 2018-02-27 NOTE — PROGRESS NOTE ADULT - ASSESSMENT
62F who is bedridden at [facility] because of MS, who is currently being treated for sacral OM with vancomycin and meropenem  Patient is sent in by [facility] because of altered mental status.     1. Metabolic encephalopathy probably sec to sepsis/ sacral osteomyelitis/UTI with Hyperammonemia  -  CT Head No Cont (02.24.18 @ 16:25) no definite evidence of acute intracranial pathology.  - CT Abdomen and Pelvis No Cont (02.24.18 @ 16:26) Sacral decubitus ulcer with extension to the coccyx demonstrating bony   destruction of the coccyx consistent with osteomyelitis. Surrounding phlegmon measures 6 cm transverse,Trace persistent right hydroureteronephrosis with suggestion of bilateral urothelial thickening likely related to chronic infection.Stable 2.2 cm right adrenal adenoma.Stable diffuse bladder wall thickening and trabeculation.  EEG - generalized slowing w/ triphasic waves (reportedly) - consistent w/ metabolic encephalopathy    - pt was evaluated by ID ct zosyn started on Diflucan. Vancomycin held sec to increase vanco trough.  - Surgery consult  for plegmon.  - High  Ammonia level- pt started on lactulose.  -evaluated by Neurology, most likely toxic metabolic encephalopathy     2.Metabolic Acidosis, JAMAR on CKDstage 4, hypernatremia.  - Metabolic acidosis resolved. discontinue  Bicarb drip. Jamar resolved  - daily BMP.  - pt was evaluated by renal-Hypernatremia  ·	Poor po intake on D5w continue at 150 cc/hour  ·	when able to have PO start free water 250 cc/4h  ·	 do not decrease serum sodium by more than 8 meq /24h  ·	CKD4 serum creatinine around baseline  ·	check IP and PTH no further renal work up  ·	UTI with cultures c/w candida continue diflucan    3. Possible GI bleed, H/o colon cancer.  - S/p 1 unit PRBC  - pt evaluated by GI recommend- Switch to PPI BID. EGD on urgent basis if HD instability acute drop in Hb or evidence of active GIB  - Monitor CBC    4. Dysphagia- failed Speech and Swallow.  - NGT feeds.  - Aspiration precautions.    5. Seizure Disorder/ Bipolar disorder  - ct tegretol, dapakote.    6. DM type 2   - Monitor Fs.    7. Hypokalemia.  - replace with kcl.      8. Hypothyroidism  - ct synthroid.    9. Multiple Sclerosis with neurogenic bladder  -  chr antony .      10. GI/DVT prophylaxis.    Prognosis guarded.

## 2018-02-27 NOTE — PROGRESS NOTE ADULT - SUBJECTIVE AND OBJECTIVE BOX
Patient is a 62y old  Female who presents with a chief complaint of altered mental status (24 Feb 2018 18:44)  Patient was seen and examined.  patient Awake, confused.  1 large melenic BM yesterday. Pt received 1 unit PRBC.    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  MEDICATIONS  (STANDING):  bisacodyl Suppository 10 milliGRAM(s) Rectal daily  carBAMazepine Suspension 400 milliGRAM(s) Oral daily  dextrose 5%. 1000 milliLiter(s) (150 mL/Hr) IV Continuous <Continuous>  docusate sodium 100 milliGRAM(s) Oral three times a day  fluconAZOLE IVPB 100 milliGRAM(s) IV Intermittent once  fluconAZOLE IVPB      folic acid 1 milliGRAM(s) Oral daily  lactobacillus acidophilus 1 Tablet(s) Oral two times a day with meals  lactulose Syrup 30 Gram(s) Oral three times a day  levothyroxine 50 MICROGram(s) Oral daily  multivitamin 1 Tablet(s) Oral daily  pantoprazole   Suspension 40 milliGRAM(s) Oral two times a day before meals  piperacillin/tazobactam IVPB. 3.375 Gram(s) IV Intermittent every 12 hours  silver sulfADIAZINE 1% Cream 1 Application(s) Topical every 12 hours  simethicone 80 milliGRAM(s) Chew daily  thiamine 100 milliGRAM(s) Oral daily  valproic  acid Syrup 250 milliGRAM(s) Oral two times a day  valproic  acid Syrup 500 milliGRAM(s) Oral at bedtime  zinc sulfate 220 milliGRAM(s) Oral daily    MEDICATIONS  (PRN):  morphine  - Injectable 4 milliGRAM(s) IV Push every 4 hours PRN Severe Pain (7 - 10)  senna 2 Tablet(s) Oral at bedtime PRN Constipation    T(C): 35.6 (02-27-18 @ 06:00), Max: 36.7 (02-26-18 @ 17:00)  HR: 107 (02-27-18 @ 06:00) (107 - 115)  BP: 167/67 (02-27-18 @ 06:00) (149/71 - 167/67)  RR: 18 (02-27-18 @ 06:00) (18 - 18)  SpO2: 96% (02-26-18 @ 21:23) (96% - 96%)    O/E:  Awake, Confused.  HEENT: atraumatic.  Chest: clear.  CVS: SIS2 +, no murmur.tachycardic+  P/A: Soft, BS+  CNS: confused. Moves all ext.  Ext: no edema feet.  Skin: sacral ulcer stage 4  All systems reviewed positive findings as above.                            9.4<L>  9.63  )-----------( 279      ( 27 Feb 2018 04:51 )             29.5<L>                        9.7<L>  11.90<H> )-----------( 323      ( 26 Feb 2018 21:46 )             30.8<L>  02-27    151<H>  |  122<H>  |  28<H>  ----------------------------<  118<H>  3.3<L>   |  19  |  1.7<H>  02-27    155<H>  |  126<H>  |  29<H>  ----------------------------<  100  3.6   |  19  |  1.8<H>    Ca    8.2<L>      27 Feb 2018 11:23  Ca    8.0<L>      27 Feb 2018 04:51  Ca    8.1<L>      26 Feb 2018 21:46  Ca    8.2<L>      26 Feb 2018 05:21  Ca    8.6      25 Feb 2018 23:29    TPro  4.9<L>  /  Alb  1.4<L>  /  TBili  0.8  /  DBili  x   /  AST  18  /  ALT  6   /  AlkPhos  86  02-27  TPro  6.2  /  Alb  1.8<L>  /  TBili  0.8  /  DBili  x   /  AST  20  /  ALT  7   /  AlkPhos  102  02-26    Ammonia, Serum (02.26.18 @ 21:46)    Ammonia, Serum: 135: TYPE:(C=Critical, N=Notification, A=Abnormal) C

## 2018-02-27 NOTE — PROGRESS NOTE ADULT - SUBJECTIVE AND OBJECTIVE BOX
infectious diseases progress note:  JUAN JOSE RAMACHANDRAN is a 62yFemale patient    SEPSIS, UTI        ROS:  CONSTITUTIONAL:   EYES:  Negative  blurry vision or double vision  CARDIOVASCULAR:  Negative for chest pain or palpitations  RESPIRATORY:  Negative for cough, wheezing, or SOB   GASTROINTESTINAL:  Negative for nausea, vomiting, diarrhea, constipation, or abdominal pain  GENITOURINARY:  Negative frequency, urgency or dysuria  NEUROLOGIC:  No headache, confusion, dizziness, lightheadedness    Allergies    No Known Allergies    Intolerances        ANTIBIOTICS/RELEVANT:  antimicrobials  piperacillin/tazobactam IVPB. 3.375 Gram(s) IV Intermittent every 12 hours  vancomycin  IVPB 500 milliGRAM(s) IV Intermittent every 24 hours    immunologic:    OTHER:  bisacodyl Suppository 10 milliGRAM(s) Rectal daily  carBAMazepine 200 milliGRAM(s) Oral daily  dextrose 5%. 1000 milliLiter(s) IV Continuous <Continuous>  diVALproex Oral Sprinkle Capsule - Peds 125 milliGRAM(s) Oral two times a day  diVALproex Sprinkle 500 milliGRAM(s) Oral every other day  docusate sodium 100 milliGRAM(s) Oral three times a day  folic acid 1 milliGRAM(s) Oral daily  lactobacillus acidophilus 1 Tablet(s) Oral two times a day with meals  lactulose Syrup 30 Gram(s) Oral three times a day  levothyroxine 50 MICROGram(s) Oral daily  morphine  - Injectable 2 milliGRAM(s) IV Push every 4 hours PRN  multivitamin 1 Tablet(s) Oral daily  pantoprazole Infusion 8 mG/Hr IV Continuous <Continuous>  senna 2 Tablet(s) Oral at bedtime PRN  silver sulfADIAZINE 1% Cream 1 Application(s) Topical every 12 hours  simethicone 80 milliGRAM(s) Chew daily  thiamine 100 milliGRAM(s) Oral daily  zinc sulfate 220 milliGRAM(s) Oral daily      Objective:  T(F): 96 (18 @ 06:00), Max: 98.1 (18 @ 17:00)  HR: 107 (18 @ 06:00) (107 - 115)  BP: 167/67 (18 @ 06:00) (149/71 - 167/67)  RR: 18 (18 @ 06:00) (18 - 18)  SpO2: 96% (18 @ 21:23) (96% - 96%)    PHYSICAL EXAM  Constitutional: NG tube  Eyes:NGUYỄN, EOMI  Ear/Nose/Throat: no oral lesion, no sinus tenderness on percussion	  Neck:no JVD, no lymphadenopathy, supple  Respiratory: CTA radha  Cardiovascular: S1S2 RRR, no murmurs  Gastrointestinal:soft, (+) BS, no HSM; antony  Extremities:no e/e/c        155<H>  |  126<H>  |  29<H>  ----------------------------<  100  3.6   |  19  |  1.8<H>      TPro  4.9<L>  /  Alb  1.4<L>  /  TBili  0.8  /  DBili  x   /  AST  18  /  ALT  6   /  AlkPhos  86      Vancomycin Level, Trough: 31.0 ug/mL (18 @ 21:46)  <--<9>>7.41  <--<9>>7.14                          9.4    9.63  )-----------( 279      ( 2018 04:51 )             29.5     Urinalysis Basic - ( 2018 18:19 )    Color: Yellow / Appearance: Cloudy / S.015 / pH: x  Gluc: x / Ketone: 15  / Bili: Negative / Urobili: 0.2 mg/dL   Blood: x / Protein: 30 mg/dL / Nitrite: Negative   Leuk Esterase: Large / RBC: 5-10 /HPF / WBC 10-25 /HPF   Sq Epi: x / Non Sq Epi: Occasional /HPF / Bacteria: x          Culture - Blood (collected 2018 13:16)  Source: .Blood Blood  Preliminary Report (2018 01:01):    No growth to date.    Culture - Urine (collected 2018 12:28)  Source: .Urine Catheterized  Final Report (2018 23:24):    50,000 - 99,000 CFU/mL Presumptive Candida albicans

## 2018-02-27 NOTE — CONSULT NOTE ADULT - ATTENDING COMMENTS
Assessment and plan above were modified and discussed with residents, physician assistants, and nurses.

## 2018-02-27 NOTE — CONSULT NOTE ADULT - SUBJECTIVE AND OBJECTIVE BOX
61 yo F bedridden due to MS, sacral decubitus ulcers, sacral osteomyelitis, known sigmoid colon semi-circular adenocarcinoma 01/28/18 colonoscopy, currently on antibiotics vanc/zosyn for OM. Has known perirectal/perisacral fluid collection, possible phlegmone since 09/17, same collection 01/18. Surgery consulted.    Initially admitted Feb 24 with AMS, had UTI sepsis, infected sacral ulcer. On antibiotics, had 1 RBC transfusion 1 day ago for Hgb 7, now Hg 9.4, stable. Had episode of dark stool, GI saw pt - EGD if drop in Hgb.   Per note - colonoscopy 01/28/18 - 4 cm semi-circumferential sigmoid mass    PMH: bedridden due to MS, sacral decubitus ulcers, sacral osteomyelitis, known sigmoid colon semi-circular adenocarcinoma 01/28/18 colonoscopy, currently on antibiotics vanc/zosyn for OM  PSH: leg ulcers debridement  Meds: vanc/zosyn  Code status: DNR    Physical exam:  Gen: nonverbal  Lungs: clear  Abd: soft, NT, ND, rectal - no blood, no mass, no melena, brown stool                          9.4    9.63  )-----------( 279      ( 27 Feb 2018 04:51 )             29.5   02-27    151<H>  |  122<H>  |  28<H>  ----------------------------<  118<H>  3.3<L>   |  19  |  1.7<H>    Ca    8.2<L>      27 Feb 2018 11:23    TPro  4.9<L>  /  Alb  1.4<L>  /  TBili  0.8  /  DBili  x   /  AST  18  /  ALT  6   /  AlkPhos  86  02-27  Lact 0.8  < from: CT Abdomen and Pelvis No Cont (02.24.18 @ 16:26) >  IMPRESSION:        Since February 1, 2018,    Overall unchanged examination:    **Sacral decubitus ulcer with extension to the coccyx demonstrating bony   destruction of the coccyx consistent with osteomyelitis. Surrounding   phlegmon measures 6 cm transverse, stable, with no definite involvement   of the rectum or vagina.    Trace persistent right hydroureteronephrosis with suggestion of bilateral   urothelial thickening likely related to chronic infection.    Stable 2.2 cm right adrenal adenoma.    Stable diffuse bladder wall thickening and trabeculation.    < end of copied text >

## 2018-02-27 NOTE — CONSULT NOTE ADULT - ASSESSMENT
61 yo woman with PMH of CKD 4, UTI recurrent , sacral decubitus presenting with UTI now with hypernatremia   ·	Hypernatremia  ·	Poor po intake on D5w continue at 150 cc/hour  ·	when able to have PO start free water 250 cc/4h  ·	follow up BMP closely, do not decrease serum sodium by more than 8 meq /24h  ·	CKD4 serum creatinine around baseline  ·	check IP and PTH no further renal work up  ·	UTI with cultures c/w candida continue diflucan    will follow

## 2018-02-27 NOTE — PROGRESS NOTE ADULT - SUBJECTIVE AND OBJECTIVE BOX
Patient is a 62y old Female with PMH of MS and being bed-bound, CKD IV, chronic sacral osteo, recently diagnose colon CA at [facility], and seizure disorder who presents with a chief complaint of altered mental status (2018 18:44). Patient's daughter/healthcare proxy present at bedside. She reports patient's mental status has gradually declined since October after repeated hospitalizations for lung infections, UTIs, and the chronic sacral ulcer. Patient's hospital course complicated by metabolic acidosis and sepsis, which improved after IV hydration with bicarb. Patient is still non-verbal and altered. As per daughter, patient more verbal a week ago.     Last 24 hours:  - No more episode of melena, hemoglobin stable  - D5 increased to 150 cc/hr, NF feeds started, free water started as per nephro  - Lactulose frequency increased for elevated ammonia      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  Chronic kidney disease (CKD), stage IV (severe)  Psychosis  Bedridden  Tyson catheter in place on admission  Osteomyelitis of sacrum  Sacral ulcer  Osteoporosis  Diabetes mellitus  Seizure disorder  Bipolar affective disorder  Status post debridement: of sacral ulcer      MEDICATIONS  (STANDING):  bisacodyl Suppository 10 milliGRAM(s) Rectal daily  carBAMazepine Suspension 400 milliGRAM(s) Oral daily  dextrose 5%. 1000 milliLiter(s) (150 mL/Hr) IV Continuous <Continuous>  docusate sodium 100 milliGRAM(s) Oral three times a day  fluconAZOLE IVPB      folic acid 1 milliGRAM(s) Oral daily  lactobacillus acidophilus 1 Tablet(s) Oral two times a day with meals  lactulose Syrup 30 Gram(s) Oral three times a day  levothyroxine 50 MICROGram(s) Oral daily  multivitamin 1 Tablet(s) Oral daily  pantoprazole   Suspension 40 milliGRAM(s) Oral two times a day before meals  piperacillin/tazobactam IVPB. 3.375 Gram(s) IV Intermittent every 12 hours  potassium chloride   Powder 40 milliEquivalent(s) Oral once  silver sulfADIAZINE 1% Cream 1 Application(s) Topical every 12 hours  simethicone 80 milliGRAM(s) Chew daily  thiamine 100 milliGRAM(s) Oral daily  valproic  acid Syrup 250 milliGRAM(s) Oral two times a day  valproic  acid Syrup 500 milliGRAM(s) Oral at bedtime  zinc sulfate 220 milliGRAM(s) Oral daily    MEDICATIONS  (PRN):  morphine  - Injectable 4 milliGRAM(s) IV Push every 4 hours PRN Severe Pain (7 - 10)  senna 2 Tablet(s) Oral at bedtime PRN Constipation      Overnight events:    Vital Signs Last 24 Hrs  T(C): 36.1 (2018 14:11), Max: 36.7 (2018 17:00)  T(F): 97 (2018 14:11), Max: 98.1 (2018 17:00)  HR: 105 (2018 14:11) (105 - 115)  BP: 129/66 (2018 14:11) (129/66 - 167/67)  BP(mean): --  RR: 18 (2018 14:11) (18 - 18)  SpO2: 96% (2018 21:23) (96% - 96%)  CAPILLARY BLOOD GLUCOSE  102 (2018 11:47)  101 (2018 08:00)  107 (2018 21:23)  110 (2018 15:06)        I&O's Summary    2018 07:01  -  2018 07:00  --------------------------------------------------------  IN: 3356 mL / OUT: 1751 mL / NET: 1605 mL    2018 07:01  -  2018 14:44  --------------------------------------------------------  IN: 620 mL / OUT: 0 mL / NET: 620 mL        Physical Exam:    -     General :     -      HEENT:    -      Cardiac:    -      Pulm:    -      GI:    -      Musculoskeletal:    -      Neuro:        Labs:                        9.4    9.63  )-----------( 279      ( 2018 04:51 )             29.5             02-27    151<H>  |  122<H>  |  28<H>  ----------------------------<  118<H>  3.3<L>   |  19  |  1.7<H>    Ca    8.2<L>      2018 11:23    TPro  4.9<L>  /  Alb  1.4<L>  /  TBili  0.8  /  DBili  x   /  AST  18  /  ALT  6   /  AlkPhos  86  02-27    LIVER FUNCTIONS - ( 2018 04:51 )  Alb: 1.4 g/dL / Pro: 4.9 g/dL / ALK PHOS: 86 U/L / ALT: 6 U/L / AST: 18 U/L / GGT: x                       ABG - ( 2018 12:04 )  pH: 7.41  /  pCO2: 28    /  pO2: 99    / HCO3: 18    / Base Excess: -5.8  /  SaO2: 99                Urinalysis Basic - ( 2018 18:19 )    Color: Yellow / Appearance: Cloudy / S.015 / pH: x  Gluc: x / Ketone: 15  / Bili: Negative / Urobili: 0.2 mg/dL   Blood: x / Protein: 30 mg/dL / Nitrite: Negative   Leuk Esterase: Large / RBC: 5-10 /HPF / WBC 10-25 /HPF   Sq Epi: x / Non Sq Epi: Occasional /HPF / Bacteria: x          Imaging:    ECG: Patient is a 62y old Female with PMH of MS and being bed-bound, CKD IV, chronic sacral osteo, recently diagnose colon CA at [facility], and seizure disorder who presents with a chief complaint of altered mental status (2018 18:44). Patient's daughter/healthcare proxy present at bedside. She reports patient's mental status has gradually declined since October after repeated hospitalizations for lung infections, UTIs, and the chronic sacral ulcer. Patient's hospital course complicated by metabolic acidosis and sepsis, which improved after IV hydration with bicarb. Patient is still non-verbal and altered. As per daughter, patient more verbal a week ago.     Last 24 hours:  - No more episode of melena, hemoglobin stable  - D5 increased to 150 cc/hr, NF feeds started, free water started as per nephro  - Lactulose frequency increased for elevated ammonia  - Neuro and Nephro consults complete    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  Chronic kidney disease (CKD), stage IV (severe)  Psychosis  Bedridden  Tyson catheter in place on admission  Osteomyelitis of sacrum  Sacral ulcer  Osteoporosis  Diabetes mellitus  Seizure disorder  Bipolar affective disorder  Status post debridement: of sacral ulcer      MEDICATIONS  (STANDING):  bisacodyl Suppository 10 milliGRAM(s) Rectal daily  carBAMazepine Suspension 400 milliGRAM(s) Oral daily  dextrose 5%. 1000 milliLiter(s) (150 mL/Hr) IV Continuous <Continuous>  docusate sodium 100 milliGRAM(s) Oral three times a day  fluconAZOLE IVPB      folic acid 1 milliGRAM(s) Oral daily  lactobacillus acidophilus 1 Tablet(s) Oral two times a day with meals  lactulose Syrup 30 Gram(s) Oral three times a day  levothyroxine 50 MICROGram(s) Oral daily  multivitamin 1 Tablet(s) Oral daily  pantoprazole   Suspension 40 milliGRAM(s) Oral two times a day before meals  piperacillin/tazobactam IVPB. 3.375 Gram(s) IV Intermittent every 12 hours  potassium chloride   Powder 40 milliEquivalent(s) Oral once  silver sulfADIAZINE 1% Cream 1 Application(s) Topical every 12 hours  simethicone 80 milliGRAM(s) Chew daily  thiamine 100 milliGRAM(s) Oral daily  valproic  acid Syrup 250 milliGRAM(s) Oral two times a day  valproic  acid Syrup 500 milliGRAM(s) Oral at bedtime  zinc sulfate 220 milliGRAM(s) Oral daily    MEDICATIONS  (PRN):  morphine  - Injectable 4 milliGRAM(s) IV Push every 4 hours PRN Severe Pain (7 - 10)  senna 2 Tablet(s) Oral at bedtime PRN Constipation      Overnight events:    Vital Signs Last 24 Hrs  T(C): 36.1 (2018 14:11), Max: 36.7 (2018 17:00)  T(F): 97 (2018 14:11), Max: 98.1 (2018 17:00)  HR: 105 (2018 14:11) (105 - 115)  BP: 129/66 (2018 14:11) (129/66 - 167/67)  BP(mean): --  RR: 18 (2018 14:11) (18 - 18)  SpO2: 96% (2018 21:23) (96% - 96%)  CAPILLARY BLOOD GLUCOSE  102 (2018 11:47)  101 (2018 08:00)  107 (2018 21:23)  110 (2018 15:06)        I&O's Summary    2018 07:  -  2018 07:00  --------------------------------------------------------  IN: 3356 mL / OUT: 1751 mL / NET: 1605 mL    2018 07:01  -  2018 14:44  --------------------------------------------------------  IN: 620 mL / OUT: 0 mL / NET: 620 mL        Physical Exam:    -     General : laying in bed groaning, looks uncomfortable and in pain    -      HEENT: NC/AT    -      Cardiac: normal S1 S2 appreciated    -      Pulm: Lungs CTA B/L    -      GI: abdomen soft, positive bowel sounds    -      Musculoskeletal: no LE edema    -      Neuro: non-verbal        Labs:                        9.4    9.63  )-----------( 279      ( 2018 04:51 )             29.5                 151<H>  |  122<H>  |  28<H>  ----------------------------<  118<H>  3.3<L>   |  19  |  1.7<H>    Ca    8.2<L>      2018 11:23    TPro  4.9<L>  /  Alb  1.4<L>  /  TBili  0.8  /  DBili  x   /  AST  18  /  ALT  6   /  AlkPhos  86      LIVER FUNCTIONS - ( 2018 04:51 )  Alb: 1.4 g/dL / Pro: 4.9 g/dL / ALK PHOS: 86 U/L / ALT: 6 U/L / AST: 18 U/L / GGT: x                       ABG - ( 2018 12:04 )  pH: 7.41  /  pCO2: 28    /  pO2: 99    / HCO3: 18    / Base Excess: -5.8  /  SaO2: 99          Urinalysis Basic - ( 2018 18:19 )    Color: Yellow / Appearance: Cloudy / S.015 / pH: x  Gluc: x / Ketone: 15  / Bili: Negative / Urobili: 0.2 mg/dL   Blood: x / Protein: 30 mg/dL / Nitrite: Negative   Leuk Esterase: Large / RBC: 5-10 /HPF / WBC 10-25 /HPF   Sq Epi: x / Non Sq Epi: Occasional /HPF / Bacteria: x          Imaging:    ECG:

## 2018-02-27 NOTE — PROGRESS NOTE ADULT - ASSESSMENT
Impression: 62y year old Female recently diagnosed with CRC now with dark stool. No evidence of active UGIB. hb up adequately with 1 PRBC (no drop in hb). Dark color likely 2/2 Fe supplement or lower GIB.    Recs:  check CBC q8 transfuse PRN  Switch to PPI BID  EGD on urgent basis if HD instability acute drop in Hb or evidence of active GIB

## 2018-02-27 NOTE — CONSULT NOTE ADULT - SUBJECTIVE AND OBJECTIVE BOX
Neurology consult    JUAN JOSE [id number]    HPI:  [id number] who is bedridden at [facility] because of MS, who is currently being treated for sacral OM with vancomycin and meropenem  Patient is sent in by [facility] because of altered mental status. The patient was recently admitted at [facility] for AMS also and was found to have a sacral ulcer that required debridement and IV antibiotics. The patient was discharged on IV meropenem and IV vancomycin on . At the time of discharge, the patient was responding appropriately to questions.   Today, the patient is mostly non-verbal and only is moaning. She doesn't answer any questions. (2018 18:44)          MEDICATIONS    bisacodyl Suppository 10 milliGRAM(s) Rectal daily  carBAMazepine 200 milliGRAM(s) Oral daily  dextrose 5%. 1000 milliLiter(s) IV Continuous <Continuous>  diVALproex Oral Sprinkle Capsule - Peds 125 milliGRAM(s) Oral two times a day  diVALproex Sprinkle 500 milliGRAM(s) Oral every other day  docusate sodium 100 milliGRAM(s) Oral three times a day  fluconAZOLE IVPB 100 milliGRAM(s) IV Intermittent once  fluconAZOLE IVPB      folic acid 1 milliGRAM(s) Oral daily  lactobacillus acidophilus 1 Tablet(s) Oral two times a day with meals  lactulose Syrup 30 Gram(s) Oral three times a day  levothyroxine 50 MICROGram(s) Oral daily  morphine  - Injectable 2 milliGRAM(s) IV Push every 4 hours PRN  multivitamin 1 Tablet(s) Oral daily  pantoprazole Infusion 8 mG/Hr IV Continuous <Continuous>  piperacillin/tazobactam IVPB. 3.375 Gram(s) IV Intermittent every 12 hours  senna 2 Tablet(s) Oral at bedtime PRN  silver sulfADIAZINE 1% Cream 1 Application(s) Topical every 12 hours  simethicone 80 milliGRAM(s) Chew daily  thiamine 100 milliGRAM(s) Oral daily  zinc sulfate 220 milliGRAM(s) Oral daily      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  Chronic kidney disease (CKD), stage IV (severe)  Psychosis  Bedridden  Tyson catheter in place on admission  Osteomyelitis of sacrum  Sacral ulcer  Osteoporosis  Diabetes mellitus  Seizure disorder  Bipolar affective disorder  Status post debridement: of sacral ulcer       Family history: No history of dementia, strokes, or seizures   FAMILY HISTORY:  Family history unknown    SOCIAL HISTORY --     Allergies    No Known Allergies    Intolerances        Height (cm): 152.4 ( @ 15:06)    Vital Signs Last 24 Hrs  T(C): 35.6 (2018 06:00), Max: 36.7 (2018 17:00)  T(F): 96 (2018 06:00), Max: 98.1 (2018 17:00)  HR: 107 (2018 06:00) (107 - 115)  BP: 167/67 (2018 06:00) (149/71 - 167/67)  BP(mean): --  RR: 18 (2018 06:00) (18 - 18)  SpO2: 96% (2018 21:23) (96% - 96%)    SEPSIS, UTI  ^AMS  No h/o HF  Yes  Family history unknown  Handoff  MEWS Score  ESBL E. coli carrier  Chronic kidney disease (CKD), stage IV (severe)  Psychosis  Bedridden  Tyson catheter in place on admission  Osteomyelitis of sacrum  Sacral ulcer  Osteoporosis  Diabetes mellitus  Seizure disorder  Bipolar affective disorder  Sepsis  Status post debridement  AMS  AMS/  14  UTI (urinary tract infection)      REVIEW OF SYSTEMS:    Constitutional: No fever, chills, fatigue, weakness  Eyes: no eye pain, visual disturbances, or discharge  ENT:  No difficulty hearing, tinnitus, vertigo; No sinus or throat pain  Neck: No pain or stiffness  Respiratory: No cough, dyspnea, wheezing   Cardiovascular: No chest pain, palpitations,   Gastrointestinal: No abdominal or epigastric pain. No nausea, vomiting  No diarrhea or constipation.   Genitourinary: No dysuria, frequency, hematuria or incontinence  Neurological: No headaches, lightheadedness, vertigo, numbness or tremors  Psychiatric: No depression, anxiety, mood swings or difficulty sleeping  Musculoskeletal: No joint pain or swelling; No muscle, back or extremity pain  Skin: No itching, burning, rashes or lesions   Lymph Nodes: No enlarged glands  Endocrine: No heat or cold intolerance; No hair loss, No h/o diabetes or thyroid dysfunction  Allergy and Immunologic: No hives or eczema      PHYSICAL EXAMINATION:  General: Well-developed, well nourished, in no acute distress.  Eyes: Conjunctiva and sclera clear.  Cardiovascular: Regular rate and rhythm; S1 and S2 Normal; No murmurs, gallops or rubs.  Neurologic:  - Mental Status:  opens eyes on loud verbal stimuli, moaning, non verbal, does not follow commands.   Spastic quadriparesis. Non ambulatory.              LABS:  CBC Full  -  ( 2018 04:51 )  WBC Count : 9.63 K/uL  Hemoglobin : 9.4 g/dL  Hematocrit : 29.5 %  Platelet Count - Automated : 279 K/uL  Mean Cell Volume : 90.2 fL  Mean Cell Hemoglobin : 28.7 pg  Mean Cell Hemoglobin Concentration : 31.9 g/dL  Auto Neutrophil # : x  Auto Lymphocyte # : x  Auto Monocyte # : x  Auto Eosinophil # : x  Auto Basophil # : x  Auto Neutrophil % : x  Auto Lymphocyte % : x  Auto Monocyte % : x  Auto Eosinophil % : x  Auto Basophil % : x    Urinalysis Basic - ( 2018 18:19 )    Color: Yellow / Appearance: Cloudy / S.015 / pH: x  Gluc: x / Ketone: 15  / Bili: Negative / Urobili: 0.2 mg/dL   Blood: x / Protein: 30 mg/dL / Nitrite: Negative   Leuk Esterase: Large / RBC: 5-10 /HPF / WBC 10-25 /HPF   Sq Epi: x / Non Sq Epi: Occasional /HPF / Bacteria: x          155<H>  |  126<H>  |  29<H>  ----------------------------<  100  3.6   |  19  |  1.8<H>    Ca    8.0<L>      2018 04:51    TPro  4.9<L>  /  Alb  1.4<L>  /  TBili  0.8  /  DBili  x   /  AST  18  /  ALT  6   /  AlkPhos  86      Hemoglobin A1C:     LIVER FUNCTIONS - ( 2018 04:51 )  Alb: 1.4 g/dL / Pro: 4.9 g/dL / ALK PHOS: 86 U/L / ALT: 6 U/L / AST: 18 U/L / GGT: x           Vitamin B12         RADIOLOGY    EKG

## 2018-02-27 NOTE — CONSULT NOTE ADULT - SUBJECTIVE AND OBJECTIVE BOX
NEPHROLOGY CONSULTATION NOTE    Patient is a 62y Female whom presented to the hospital with altered mental status found to have a UTI and hypernatremia and acidosis. Patient had on episode of melena along with anemia acute sp transfusion of one unit of PRBC . Seen today not cooperative not talkative .  To note also that patient is status multiple admissions for UTI and sacral decubitus was on vanco and trough was high.   Renal consult called for hypernatremia     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  Chronic kidney disease (CKD), stage IV (severe)  Psychosis  Bedridden  Antony catheter in place on admission  Osteomyelitis of sacrum  Sacral ulcer  Osteoporosis  Diabetes mellitus  Seizure disorder  Bipolar affective disorder  Status post debridement: of sacral ulcer    Allergies:  No Known Allergies    Home Medications Reviewed  Hospital Medications:   MEDICATIONS  (STANDING):  bisacodyl Suppository 10 milliGRAM(s) Rectal daily  carBAMazepine Suspension 400 milliGRAM(s) Oral daily  dextrose 5%. 1000 milliLiter(s) (150 mL/Hr) IV Continuous <Continuous>  docusate sodium 100 milliGRAM(s) Oral three times a day  fluconAZOLE IVPB 100 milliGRAM(s) IV Intermittent once     folic acid 1 milliGRAM(s) Oral daily  lactobacillus acidophilus 1 Tablet(s) Oral two times a day with meals  lactulose Syrup 30 Gram(s) Oral three times a day  levothyroxine 50 MICROGram(s) Oral daily  multivitamin 1 Tablet(s) Oral daily  pantoprazole   Suspension 40 milliGRAM(s) Oral two times a day before meals  piperacillin/tazobactam IVPB. 3.375 Gram(s) IV Intermittent every 12 hours  silver sulfADIAZINE 1% Cream 1 Application(s) Topical every 12 hours  simethicone 80 milliGRAM(s) Chew daily  thiamine 100 milliGRAM(s) Oral daily  valproic  acid Syrup 250 milliGRAM(s) Oral two times a day  valproic  acid Syrup 500 milliGRAM(s) Oral at bedtime  zinc sulfate 220 milliGRAM(s) Oral daily      SOCIAL HISTORY:  Denies ETOH,Smoking,   FAMILY HISTORY:  Family history unknown        REVIEW OF SYSTEMS:  CONSTITUTIONAL: No weakness, fevers or chills  EYES/ENT: No visual changes;  No vertigo or throat pain   NECK: No pain or stiffness  RESPIRATORY: No cough, wheezing, hemoptysis; No shortness of breath  CARDIOVASCULAR: No chest pain or palpitations.  GASTROINTESTINAL: No abdominal or epigastric pain. No nausea, vomiting, or hematemesis; No diarrhea or constipation. No melena or hematochezia.  GENITOURINARY: No dysuria, frequency, foamy urine, urinary urgency, incontinence or hematuria  NEUROLOGICAL: No numbness or weakness  SKIN: No itching, burning, rashes, or lesions   VASCULAR: No bilateral lower extremity edema.   All other review of systems is negative unless indicated above.    VITALS:  T(F): 96 (18 @ 06:00), Max: 98.1 (18 @ 17:00)  HR: 107 (18 @ 06:00)  BP: 167/67 (18 @ 06:00)  RR: 18 (18 @ 06:00)  SpO2: 96% (18 @ 21:23)     @ 07:01  -   @ 07:00  --------------------------------------------------------  IN: 3356 mL / OUT: 1751 mL / NET: 1605 mL      Height (cm): 152.4 ( @ 15:06)    18 @ 07:01  -  18 @ 07:00  --------------------------------------------------------  IN: 0 mL / OUT: 1750 mL / NET: -1750 mL      I&O's Detail    2018 07:01  -  2018 07:00  --------------------------------------------------------  IN:    dextrose 5%.: 500 mL    dextrose 5%.: 875 mL    IV PiggyBack: 100 mL    Packed Red Blood Cells: 1 mL    pantoprazole Infusion: 130 mL    sodium bicarbonate  Infusion: 750 mL    Sodium Chloride 0.9% IV Bolus: 1000 mL  Total IN: 3356 mL    OUT:    Indwelling Catheter - Urethral: 1750 mL    Stool: 1 mL  Total OUT: 1751 mL    Total NET: 1605 mL            PHYSICAL EXAM:  Constitutional: NAD  HEENT: anicteric sclera, oropharynx clear, MMM  Neck: No JVD  Respiratory: CTAB, no wheezes, rales or rhonchi  Cardiovascular: S1, S2, RRR  Gastrointestinal: BS+, soft, NT/ND  Extremities: No cyanosis or clubbing. No peripheral edema  Neurological: A/O x 3, no focal deficits  Psychiatric: Normal mood, normal affect  : No CVA tenderness. No antony.   Skin: No rashes  Vascular Access:    LABS:      155<H>  |  126<H>  |  29<H>  ----------------------------<  100  3.6   |  19  |  1.8<H>    SODIUM TREND:  Sodium 155 [ @ 04:51]  Sodium 152 [ @ 21:46]  Sodium 147 [ @ 05:21]  Sodium 151 [ @ 23:29]  Sodium 150 [ @ 09:04]  Sodium 147 [ @ 12:06]  Sodium 137 [ @ 07:40]  Sodium 138 [ @ 08:32]  Sodium 141 [ @ 06:31]  Sodium 142 [ @ 19:40]    Creatinine Trend: 1.8<--, 1.8<--, 1.9<--, 2.0<--, 2.1<--, 2.1<--  Ca    8.0<L>      2018 04:51    TPro  4.9<L>  /  Alb  1.4<L>  /  TBili  0.8  /  DBili      /  AST  18  /  ALT  6   /  AlkPhos  86      Creatinine Trend: 1.8 <--, 1.8 <--, 1.9 <--, 2.0 <--, 2.1 <--, 2.1 <--, 2.0 <--, 2.0 <--, 2.0 <--, 2.2 <--, 2.3 <--, 2.3 <--, 2.3 <--                        9.4    9.63  )-----------( 279      ( 2018 04:51 )             29.5     Urine Studies:  Urinalysis Basic - ( 2018 18:19 )    Color: Yellow / Appearance: Cloudy / S.015 / pH:   Gluc:  / Ketone: 15  / Bili: Negative / Urobili: 0.2 mg/dL   Blood:  / Protein: 30 mg/dL / Nitrite: Negative   Leuk Esterase: Large / RBC: 5-10 /HPF / WBC 10-25 /HPF   Sq Epi:  / Non Sq Epi: Occasional /HPF / Bacteria:       Osmolality, Random Urine: 279 mos/kg ( @ 18:19)  Creatinine, Random Urine: 21 mg/dL ( @ 18:19)  Protein/Creatinine Ratio Calculation: 3.0 Ratio ( @ 18:19)  Chloride, Random Urine: 79 mmol/L ( @ 18:19)  Sodium, Random Urine: 89 mmol/L ( @ 18:19)  Potassium, Random Urine: 9 mmol/L ( @ 18:19)  Calcium, Random Urine: 3 mg/dL ( @ 18:19)            RADIOLOGY & ADDITIONAL STUDIES: NEPHROLOGY CONSULTATION NOTE    Patient is a 62y Female whom presented to the hospital with altered mental status found to have a UTI and hypernatremia and acidosis. Patient had on episode of melena along with anemia acute sp transfusion of one unit of PRBC . Seen today not cooperative not talkative .  To note also that patient is status multiple admissions for UTI and sacral decubitus was on vancomycin and trough was high.   Renal consult called for hypernatremia   Patient is NPO because of work up of UGIB , has NGT will start feeding soon     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  Chronic kidney disease (CKD), stage IV (severe)  Psychosis  Bedridden  Tyson catheter in place on admission  Osteomyelitis of sacrum  Sacral ulcer  Osteoporosis  Diabetes mellitus  Seizure disorder  Bipolar affective disorder  Status post debridement: of sacral ulcer    Allergies:  No Known Allergies    Home Medications Reviewed  Hospital Medications:   MEDICATIONS  (STANDING):  bisacodyl Suppository 10 milliGRAM(s) Rectal daily  carBAMazepine Suspension 400 milliGRAM(s) Oral daily  dextrose 5%. 1000 milliLiter(s) (150 mL/Hr) IV Continuous <Continuous>  docusate sodium 100 milliGRAM(s) Oral three times a day  fluconAZOLE IVPB 100 milliGRAM(s) IV Intermittent once     folic acid 1 milliGRAM(s) Oral daily  lactobacillus acidophilus 1 Tablet(s) Oral two times a day with meals  lactulose Syrup 30 Gram(s) Oral three times a day  levothyroxine 50 MICROGram(s) Oral daily  multivitamin 1 Tablet(s) Oral daily  pantoprazole   Suspension 40 milliGRAM(s) Oral two times a day before meals  piperacillin/tazobactam IVPB. 3.375 Gram(s) IV Intermittent every 12 hours  silver sulfADIAZINE 1% Cream 1 Application(s) Topical every 12 hours  simethicone 80 milliGRAM(s) Chew daily  thiamine 100 milliGRAM(s) Oral daily  valproic  acid Syrup 250 milliGRAM(s) Oral two times a day  valproic  acid Syrup 500 milliGRAM(s) Oral at bedtime  zinc sulfate 220 milliGRAM(s) Oral daily      SOCIAL HISTORY:  Denies ETOH,Smoking,   FAMILY HISTORY:  Family history unknown        REVIEW OF SYSTEMS:  CONSTITUTIONAL: lethargic, weak   NECK: No pain or stiffness  RESPIRATORY: No cough, wheezing, hemoptysis; No shortness of breath  CARDIOVASCULAR: unable to assess .  GASTROINTESTINAL: NPO, upper GI bleeding .  GENITOURINARY: recurrent UTI  NEUROLOGICAL: confused , poorly responsive   SKIN: No itching, burning, rashes, or lesions   VASCULAR: No bilateral lower extremity edema.   All other review of systems is negative unless indicated above.    VITALS:  T(F): 96 (18 @ 06:00), Max: 98.1 (18 @ 17:00)  HR: 107 (18 @ 06:00)  BP: 167/67 (18 @ 06:00)  RR: 18 (18 @ 06:00)  SpO2: 96% (18 @ 21:23)     @ 07:01  -   @ 07:00  --------------------------------------------------------  IN: 3356 mL / OUT: 1751 mL / NET: 1605 mL      Height (cm): 152.4 ( @ 15:06)    18 @ 07:01  -  18 @ 07:00  --------------------------------------------------------  IN: 0 mL / OUT: 1750 mL / NET: -1750 mL      I&O's Detail    2018 07:  -  2018 07:00  --------------------------------------------------------  IN:    dextrose 5%.: 500 mL    dextrose 5%.: 875 mL    IV PiggyBack: 100 mL    Packed Red Blood Cells: 1 mL    pantoprazole Infusion: 130 mL    sodium bicarbonate  Infusion: 750 mL    Sodium Chloride 0.9% IV Bolus: 1000 mL  Total IN: 3356 mL    OUT:    Indwelling Catheter - Urethral: 1750 mL    Stool: 1 mL  Total OUT: 1751 mL    Total NET: 1605 mL            PHYSICAL EXAM:  Constitutional: lethargic weak and confused   HEENT: anicteric sclera, oropharynx clear, MMM  Neck: No JVD  Respiratory: CTAB, no wheezes, rales or rhonchi  Cardiovascular: S1, S2, RRR  Gastrointestinal: BS+, soft, NT/ND  Extremities: No cyanosis or clubbing. No peripheral edema  Neurological: confused poorly responsive   Skin: No rashes      LABS:      155<H>  |  126<H>  |  29<H>  ----------------------------<  100  3.6   |  19  |  1.8<H>    SODIUM TREND:  Sodium 155 [ @ 04:51]  Sodium 152 [ @ 21:46]  Sodium 147 [ @ 05:21]  Sodium 151 [ @ 23:29]  Sodium 150 [ @ 09:04]  Sodium 147 [ @ 12:06]  Sodium 137 [ @ 07:40]  Sodium 138 [ @ 08:32]  Sodium 141 [ @ 06:31]  Sodium 142 [ @ 19:40]    Creatinine Trend: 1.8<--, 1.8<--, 1.9<--, 2.0<--, 2.1<--, 2.1<--    Ca    8.0<L>      2018 04:51    TPro  4.9<L>  /  Alb  1.4<L>  /  TBili  0.8   /  AST  18  /  ALT  6   /  AlkPhos  86      Creatinine Trend: 1.8 <--, 1.8 <--, 1.9 <--, 2.0 <--, 2.1 <--, 2.1 <--, 2.0 <--, 2.0 <--, 2.0 <--, 2.2 <--, 2.3 <--, 2.3 <--, 2.3 <--                        9.4    9.63  )-----------( 279      ( 2018 04:51 )             29.5     Urine Studies:  Urinalysis Basic - ( 2018 18:19 )    Color: Yellow / Appearance: Cloudy / S.015 / pH:   Gluc:  / Ketone: 15  / Bili: Negative / Urobili: 0.2 mg/dL   Blood:  / Protein: 30 mg/dL / Nitrite: Negative   Leuk Esterase: Large / RBC: 5-10 /HPF / WBC 10-25 /HPF   Sq Epi:  / Non Sq Epi: Occasional /HPF / Bacteria:       Osmolality, Random Urine: 279 mos/kg ( @ 18:19)  Creatinine, Random Urine: 21 mg/dL ( @ 18:19)  Protein/Creatinine Ratio Calculation: 3.0 Ratio ( @ 18:19)  Chloride, Random Urine: 79 mmol/L ( @ 18:19)  Sodium, Random Urine: 89 mmol/L ( @ 18:19)  Potassium, Random Urine: 9 mmol/L ( @ 18:19)  Calcium, Random Urine: 3 mg/dL ( @ 18:19)            RADIOLOGY & ADDITIONAL STUDIES:

## 2018-02-27 NOTE — PROGRESS NOTE ADULT - ASSESSMENT
Patient is a 62y old Female with PMH of MS and being bed-bound, CKD IV, chronic sacral osteo, recently diagnosed colon CA at [facility], and seizure disorder who presents with a chief complaint of altered mental status (24 Feb 2018 18:44). Patient's daughter/healthcare proxy present at bedside. She reports patient's mental status has gradually declined since October after repeated hospitalizations for lung infections, UTIs, and the chronic sacral ulcer. Patient's hospital course complicated by metabolic acidosis and sepsis, which improved after IV hydration with bicarb. Patient is still non-verbal and altered. As per daughter, patient more verbal a week ago. She had one episode of melena today.    1. Metabolic encephalopathy probably 2/2 sepsis/ sacral osteomyelitis/UTI  - CT Head No Cont (02.24.18 @ 16:25) no definite evidence of acute intracranial pathology.  - CT Abdomen and Pelvis No Cont (02.24.18 @ 16:26) Sacral decubitus ulcer with extension to the coccyx demonstrating bony   destruction of the coccyx consistent with osteomyelitis. Surrounding phlegmon measures 6 cm transverse,Trace persistent right hydroureteronephrosis with suggestion of bilateral urothelial thickening likely related to chronic infection.Stable 2.2 cm right adrenal adenoma.Stable diffuse bladder wall thickening and trabeculation.  - pt was evaluated by ID started on Vancomycin, zosyn.  - Surgery consult  for phlegmon. Burn not planning any intervention for now.  - Ammonia elevated - increased lactulose, titrate to 3-4 BMs per day    2.Metabolic Acidosis, SALOME on CKDstage 4, hypernatremia.  - Metabolic acidosis resolved, d/c Bicarb drip. F/U renal consult.  - IV Fluids: D5 @ 100 cc/hr  - daily BMP    3. Dysphagia- failed Speech and Swallow.  - NGT feeds.  - Aspiration precautions.    4. Seizure Disorder/ Bipolar disorder  - tegretol. valproic acid level F/u LFT. Ammonia  - ct tegretol, dapakote.    5. Possible Melena in the context of recent EGD results and h/o colon CA:  - GI following  - placed on protonix drip  - Pt receiving 1 PRBC today  - EGD done at [facility] on 1/28/18 showed 4cm semi-circumferential mass in sigmoid colon, which was biopsied - waiting for daughter to bring in results.  - Of note, pt is on iron but will monitor CBC    5. DM type 2   - Monitor Fs.    6. Hypothyroidism  - ct synthroid.    7. Multiple Sclerosis with neurogenic bladder  - Chronic antony     8. Hypokalemia.  - K repleted     9. GI/DVT prophylaxis.    Prognosis guarded. Patient is a 62y old Female with PMH of MS and being bed-bound, CKD IV, chronic sacral osteo, recently diagnosed colon CA at [facility], and seizure disorder who presents with a chief complaint of altered mental status (24 Feb 2018 18:44). Patient's daughter/healthcare proxy present at bedside. She reports patient's mental status has gradually declined since October after repeated hospitalizations for lung infections, UTIs, and the chronic sacral ulcer. Patient's hospital course complicated by metabolic acidosis and sepsis, which improved after IV hydration with bicarb. Patient is still non-verbal and altered. As per daughter, patient more verbal a week ago. She had one episode of melena today.    1. Metabolic encephalopathy probably 2/2 sepsis/ sacral osteomyelitis/UTI  - CT Head No Cont (02.24.18 @ 16:25): negative  - CT Abdomen and Pelvis No Cont (02.24.18 @ 16:26) Sacral decubitus ulcer with extension to the coccyx demonstrating bony destruction of the coccyx consistent with osteomyelitis. Surrounding phlegmon measures 6 cm transverse. Trace persistent right hydroureteronephrosis with suggestion of bilateral urothelial thickening likely related to chronic infection.Stable 2.2 cm right adrenal adenoma.Stable diffuse bladder wall thickening and trabeculation.  - pt was evaluated by ID: Vanco d/c and Diflucan started.  - Surgery consult  for phlegmon. Burn not planning any intervention for now.  - Ammonia elevated - increased lactulose, titrate to 3-4 BMs per day  - Morphine increased to 4mg q4h for pain control    2. Hypernatremia  - Nephr following  - IV Fluids: D5 @ 150 cc/hr, free water 250 cc/hr  - daily BMP    3. AMS, can't take PO  - NGT feeds.  - Aspiration precautions.    4. Seizure Disorder/ Bipolar disorder  - Valproic acid < 10  - c/w Tegretol and Depakote oral suspensions via NGT    5. Possible Melena in the context of recent EGD results and h/o colon CA:  - GI following  - Hemoglobin stable after transfusion with appropriate rise  - on IV protonix  - EGD done at [facility] on 1/28/18 showed 4cm semi-circumferential mass in sigmoid colon, which was biopsied - reports show moderately differentiated adenoCA  - Of note, pt is on iron but will monitor CBC    5. DM type 2   - Monitor Fs.    6. Hypothyroidism  - ct synthroid.    7. Multiple Sclerosis with neurogenic bladder  - Chronic antony     8. GI/DVT prophylaxis.    Prognosis guarded.

## 2018-02-28 LAB
ALBUMIN SERPL ELPH-MCNC: 1.6 G/DL — LOW (ref 3–5.5)
ALP SERPL-CCNC: 269 U/L — HIGH (ref 30–115)
ALT FLD-CCNC: 13 U/L — SIGNIFICANT CHANGE UP (ref 0–41)
AMMONIA BLD-MCNC: 141 MMOL/L — CRITICAL HIGH (ref 11–35)
ANION GAP SERPL CALC-SCNC: 11 MMOL/L — SIGNIFICANT CHANGE UP (ref 7–14)
ANION GAP SERPL CALC-SCNC: 11 MMOL/L — SIGNIFICANT CHANGE UP (ref 7–14)
AST SERPL-CCNC: 47 U/L — HIGH (ref 0–41)
BASOPHILS # BLD AUTO: 0.07 K/UL — SIGNIFICANT CHANGE UP (ref 0–0.2)
BASOPHILS NFR BLD AUTO: 0.6 % — SIGNIFICANT CHANGE UP (ref 0–1)
BILIRUB SERPL-MCNC: 0.7 MG/DL — SIGNIFICANT CHANGE UP (ref 0.2–1.2)
BUN SERPL-MCNC: 25 MG/DL — HIGH (ref 10–20)
BUN SERPL-MCNC: 25 MG/DL — HIGH (ref 10–20)
CALCIUM SERPL-MCNC: 8.2 MG/DL — LOW (ref 8.5–10.1)
CALCIUM SERPL-MCNC: 8.3 MG/DL — LOW (ref 8.5–10.1)
CHLORIDE SERPL-SCNC: 113 MMOL/L — HIGH (ref 98–110)
CHLORIDE SERPL-SCNC: 118 MMOL/L — HIGH (ref 98–110)
CO2 SERPL-SCNC: 19 MMOL/L — SIGNIFICANT CHANGE UP (ref 17–32)
CO2 SERPL-SCNC: 19 MMOL/L — SIGNIFICANT CHANGE UP (ref 17–32)
CREAT SERPL-MCNC: 1.6 MG/DL — HIGH (ref 0.7–1.5)
CREAT SERPL-MCNC: 1.7 MG/DL — HIGH (ref 0.7–1.5)
EOSINOPHIL # BLD AUTO: 0.57 K/UL — SIGNIFICANT CHANGE UP (ref 0–0.7)
EOSINOPHIL NFR BLD AUTO: 4.6 % — SIGNIFICANT CHANGE UP (ref 0–8)
GLUCOSE SERPL-MCNC: 104 MG/DL — SIGNIFICANT CHANGE UP (ref 70–110)
GLUCOSE SERPL-MCNC: 122 MG/DL — HIGH (ref 70–110)
HCT VFR BLD CALC: 31.6 % — LOW (ref 37–47)
HGB BLD-MCNC: 9.9 G/DL — LOW (ref 14–18)
IMM GRANULOCYTES NFR BLD AUTO: 0.6 % — HIGH (ref 0.1–0.3)
LYMPHOCYTES # BLD AUTO: 1.98 K/UL — SIGNIFICANT CHANGE UP (ref 1.2–3.4)
LYMPHOCYTES # BLD AUTO: 16.1 % — LOW (ref 20.5–51.1)
MAGNESIUM SERPL-MCNC: 1.5 MG/DL — LOW (ref 1.8–2.4)
MCHC RBC-ENTMCNC: 28.4 PG — SIGNIFICANT CHANGE UP (ref 27–31)
MCHC RBC-ENTMCNC: 31.3 G/DL — LOW (ref 32–37)
MCV RBC AUTO: 90.5 FL — SIGNIFICANT CHANGE UP (ref 81–91)
MONOCYTES # BLD AUTO: 0.57 K/UL — SIGNIFICANT CHANGE UP (ref 0.1–0.6)
MONOCYTES NFR BLD AUTO: 4.6 % — SIGNIFICANT CHANGE UP (ref 1.7–9.3)
NEUTROPHILS # BLD AUTO: 9.07 K/UL — HIGH (ref 1.4–6.5)
NEUTROPHILS NFR BLD AUTO: 73.5 % — SIGNIFICANT CHANGE UP (ref 42.2–75.2)
NRBC # BLD: 0 /100 WBCS — SIGNIFICANT CHANGE UP (ref 0–0)
OB PNL STL: NEGATIVE — SIGNIFICANT CHANGE UP
PLATELET # BLD AUTO: 276 K/UL — SIGNIFICANT CHANGE UP (ref 130–400)
POTASSIUM SERPL-MCNC: 4 MMOL/L — SIGNIFICANT CHANGE UP (ref 3.5–5)
POTASSIUM SERPL-MCNC: 4.2 MMOL/L — SIGNIFICANT CHANGE UP (ref 3.5–5)
POTASSIUM SERPL-SCNC: 4 MMOL/L — SIGNIFICANT CHANGE UP (ref 3.5–5)
POTASSIUM SERPL-SCNC: 4.2 MMOL/L — SIGNIFICANT CHANGE UP (ref 3.5–5)
PROT SERPL-MCNC: 5.9 G/DL — LOW (ref 6–8)
RBC # BLD: 3.49 M/UL — LOW (ref 4.2–5.4)
RBC # FLD: 18.2 % — HIGH (ref 11.5–14.5)
SODIUM SERPL-SCNC: 143 MMOL/L — SIGNIFICANT CHANGE UP (ref 135–146)
SODIUM SERPL-SCNC: 148 MMOL/L — HIGH (ref 135–146)
WBC # BLD: 12.33 K/UL — HIGH (ref 4.8–10.8)
WBC # FLD AUTO: 12.33 K/UL — HIGH (ref 4.8–10.8)

## 2018-02-28 RX ORDER — FLUCONAZOLE 150 MG/1
400 TABLET ORAL ONCE
Qty: 0 | Refills: 0 | Status: COMPLETED | OUTPATIENT
Start: 2018-02-28 | End: 2018-02-28

## 2018-02-28 RX ORDER — FLUCONAZOLE 150 MG/1
400 TABLET ORAL EVERY 24 HOURS
Qty: 0 | Refills: 0 | Status: DISCONTINUED | OUTPATIENT
Start: 2018-02-28 | End: 2018-03-01

## 2018-02-28 RX ORDER — ACETAMINOPHEN 500 MG
650 TABLET ORAL
Qty: 0 | Refills: 0 | Status: DISCONTINUED | OUTPATIENT
Start: 2018-02-28 | End: 2018-03-19

## 2018-02-28 RX ORDER — MAGNESIUM SULFATE 500 MG/ML
2 VIAL (ML) INJECTION ONCE
Qty: 0 | Refills: 0 | Status: COMPLETED | OUTPATIENT
Start: 2018-02-28 | End: 2018-02-28

## 2018-02-28 RX ORDER — HEPARIN SODIUM 5000 [USP'U]/ML
5000 INJECTION INTRAVENOUS; SUBCUTANEOUS EVERY 8 HOURS
Qty: 0 | Refills: 0 | Status: DISCONTINUED | OUTPATIENT
Start: 2018-02-28 | End: 2018-03-19

## 2018-02-28 RX ORDER — VANCOMYCIN HCL 1 G
500 VIAL (EA) INTRAVENOUS ONCE
Qty: 0 | Refills: 0 | Status: COMPLETED | OUTPATIENT
Start: 2018-02-28 | End: 2018-02-28

## 2018-02-28 RX ORDER — FLUCONAZOLE 150 MG/1
TABLET ORAL
Qty: 0 | Refills: 0 | Status: DISCONTINUED | OUTPATIENT
Start: 2018-02-28 | End: 2018-02-28

## 2018-02-28 RX ORDER — VANCOMYCIN HCL 1 G
VIAL (EA) INTRAVENOUS
Qty: 0 | Refills: 0 | Status: DISCONTINUED | OUTPATIENT
Start: 2018-02-28 | End: 2018-03-02

## 2018-02-28 RX ORDER — LACTULOSE 10 G/15ML
30 SOLUTION ORAL
Qty: 0 | Refills: 0 | Status: DISCONTINUED | OUTPATIENT
Start: 2018-02-28 | End: 2018-03-02

## 2018-02-28 RX ORDER — FLUCONAZOLE 150 MG/1
TABLET ORAL
Qty: 0 | Refills: 0 | Status: DISCONTINUED | OUTPATIENT
Start: 2018-02-28 | End: 2018-03-01

## 2018-02-28 RX ORDER — MORPHINE SULFATE 50 MG/1
2 CAPSULE, EXTENDED RELEASE ORAL EVERY 4 HOURS
Qty: 0 | Refills: 0 | Status: DISCONTINUED | OUTPATIENT
Start: 2018-02-28 | End: 2018-03-02

## 2018-02-28 RX ORDER — VANCOMYCIN HCL 1 G
500 VIAL (EA) INTRAVENOUS EVERY 24 HOURS
Qty: 0 | Refills: 0 | Status: DISCONTINUED | OUTPATIENT
Start: 2018-03-01 | End: 2018-03-02

## 2018-02-28 RX ADMIN — ZINC SULFATE TAB 220 MG (50 MG ZINC EQUIVALENT) 220 MILLIGRAM(S): 220 (50 ZN) TAB at 12:01

## 2018-02-28 RX ADMIN — Medication 1 TABLET(S): at 16:58

## 2018-02-28 RX ADMIN — MORPHINE SULFATE 4 MILLIGRAM(S): 50 CAPSULE, EXTENDED RELEASE ORAL at 12:34

## 2018-02-28 RX ADMIN — Medication 250 MILLIGRAM(S): at 05:21

## 2018-02-28 RX ADMIN — Medication 400 MILLIGRAM(S): at 11:59

## 2018-02-28 RX ADMIN — Medication 100 MILLIGRAM(S): at 05:22

## 2018-02-28 RX ADMIN — Medication 1 APPLICATION(S): at 17:25

## 2018-02-28 RX ADMIN — Medication 50 MICROGRAM(S): at 05:20

## 2018-02-28 RX ADMIN — PANTOPRAZOLE SODIUM 40 MILLIGRAM(S): 20 TABLET, DELAYED RELEASE ORAL at 08:43

## 2018-02-28 RX ADMIN — Medication 500 MILLIGRAM(S): at 21:30

## 2018-02-28 RX ADMIN — Medication 1 TABLET(S): at 08:43

## 2018-02-28 RX ADMIN — LACTULOSE 30 GRAM(S): 10 SOLUTION ORAL at 05:22

## 2018-02-28 RX ADMIN — Medication 100 MILLIGRAM(S): at 11:57

## 2018-02-28 RX ADMIN — Medication 1 TABLET(S): at 12:01

## 2018-02-28 RX ADMIN — MORPHINE SULFATE 4 MILLIGRAM(S): 50 CAPSULE, EXTENDED RELEASE ORAL at 15:27

## 2018-02-28 RX ADMIN — LACTULOSE 30 GRAM(S): 10 SOLUTION ORAL at 23:40

## 2018-02-28 RX ADMIN — Medication 100 MILLIGRAM(S): at 12:01

## 2018-02-28 RX ADMIN — Medication 250 MILLIGRAM(S): at 17:25

## 2018-02-28 RX ADMIN — PIPERACILLIN AND TAZOBACTAM 200 GRAM(S): 4; .5 INJECTION, POWDER, LYOPHILIZED, FOR SOLUTION INTRAVENOUS at 17:25

## 2018-02-28 RX ADMIN — Medication 50 GRAM(S): at 12:48

## 2018-02-28 RX ADMIN — PANTOPRAZOLE SODIUM 40 MILLIGRAM(S): 20 TABLET, DELAYED RELEASE ORAL at 15:42

## 2018-02-28 RX ADMIN — PIPERACILLIN AND TAZOBACTAM 200 GRAM(S): 4; .5 INJECTION, POWDER, LYOPHILIZED, FOR SOLUTION INTRAVENOUS at 05:20

## 2018-02-28 RX ADMIN — Medication 1 APPLICATION(S): at 05:21

## 2018-02-28 RX ADMIN — LACTULOSE 30 GRAM(S): 10 SOLUTION ORAL at 17:23

## 2018-02-28 RX ADMIN — LACTULOSE 30 GRAM(S): 10 SOLUTION ORAL at 12:00

## 2018-02-28 RX ADMIN — Medication 10 MILLIGRAM(S): at 11:57

## 2018-02-28 RX ADMIN — Medication 100 MILLIGRAM(S): at 21:30

## 2018-02-28 RX ADMIN — Medication 650 MILLIGRAM(S): at 10:07

## 2018-02-28 RX ADMIN — Medication 1 MILLIGRAM(S): at 12:00

## 2018-02-28 RX ADMIN — FLUCONAZOLE 100 MILLIGRAM(S): 150 TABLET ORAL at 12:06

## 2018-02-28 RX ADMIN — HEPARIN SODIUM 5000 UNIT(S): 5000 INJECTION INTRAVENOUS; SUBCUTANEOUS at 13:01

## 2018-02-28 RX ADMIN — HEPARIN SODIUM 5000 UNIT(S): 5000 INJECTION INTRAVENOUS; SUBCUTANEOUS at 21:30

## 2018-02-28 NOTE — PROGRESS NOTE ADULT - ASSESSMENT
Phlegmon likely related to pressure ulcer, no plan for surgical intervention at this time continue management as per burn and medical team

## 2018-02-28 NOTE — PROGRESS NOTE ADULT - SUBJECTIVE AND OBJECTIVE BOX
patient is seen and examined  no major events over last 24 hours  s/p ng tube  ON EXAMINATION    Vital Signs Last 24 Hrs  T(C): 38.4 (28 Feb 2018 07:59), Max: 38.4 (28 Feb 2018 07:59)  T(F): 101.2 (28 Feb 2018 07:59), Max: 101.2 (28 Feb 2018 07:59)  HR: 118 (28 Feb 2018 09:00) (101 - 135)  BP: 139/70 (28 Feb 2018 07:59) (129/66 - 177/96)  BP(mean): --  RR: 18 (28 Feb 2018 07:50) (18 - 18)  SpO2: 98% (28 Feb 2018 07:50) (98% - 98%)    CHEST BILATERAL BASAL CRACKLES   CVS S1 AND S2 NO ADDED SOUND  ABDOMEN SOFT LAX NO ORGANOMEGALY  MILD PEDAL EDEMA    02-28    143  |  113<H>  |  25<H>  ----------------------------<  122<H>  4.2   |  19  |  1.6<H>    Ca    8.3<L>      28 Feb 2018 06:31  Mg     1.5     02-28    TPro  5.9<L>  /  Alb  1.6<L>  /  TBili  0.7  /  DBili  x   /  AST  47<H>  /  ALT  13  /  AlkPhos  269<H>  02-28                        9.9    12.33 )-----------( 276      ( 28 Feb 2018 06:31 )             31.6   Sodium Trend:  Sodium, Serum: 143 mmol/L (02-28-18 @ 06:31)  Sodium, Serum: 151 mmol/L (02-27-18 @ 11:23)  Sodium, Serum: 155 mmol/L (02-27-18 @ 04:51)  Sodium, Serum: 152 mmol/L (02-26-18 @ 21:46)    MEDICATIONS  (STANDING):  bisacodyl Suppository 10 milliGRAM(s) Rectal daily  carBAMazepine Suspension 400 milliGRAM(s) Oral daily  docusate sodium 100 milliGRAM(s) Oral three times a day  folic acid 1 milliGRAM(s) Oral daily  heparin  Injectable 5000 Unit(s) SubCutaneous every 8 hours  lactobacillus acidophilus 1 Tablet(s) Oral two times a day with meals  lactulose Syrup 30 Gram(s) Oral four times a day  levothyroxine 50 MICROGram(s) Oral daily  magnesium sulfate  IVPB 2 Gram(s) IV Intermittent once  multivitamin 1 Tablet(s) Oral daily  pantoprazole   Suspension 40 milliGRAM(s) Oral two times a day before meals  piperacillin/tazobactam IVPB. 3.375 Gram(s) IV Intermittent every 12 hours  silver sulfADIAZINE 1% Cream 1 Application(s) Topical every 12 hours  simethicone 80 milliGRAM(s) Chew daily  thiamine 100 milliGRAM(s) Oral daily  valproic  acid Syrup 250 milliGRAM(s) Oral two times a day  valproic  acid Syrup 500 milliGRAM(s) Oral at bedtime  vancomycin  IVPB      zinc sulfate 220 milliGRAM(s) Oral daily    MEDICATIONS  (PRN):  acetaminophen  Suppository 650 milliGRAM(s) Rectal four times a day PRN For Temp greater than 38 C (100.4 F)  fluconAZOLE IVPB     PRN as per id dr knox  fluconAZOLE IVPB 400 milliGRAM(s) IV Intermittent every 24 hours PRN as per id dr knox  morphine  - Injectable 4 milliGRAM(s) IV Push every 4 hours PRN Severe Pain (7 - 10)  senna 2 Tablet(s) Oral at bedtime PRN Constipation

## 2018-02-28 NOTE — PROGRESS NOTE ADULT - SUBJECTIVE AND OBJECTIVE BOX
Patient is a 62y old  Female who presents with a chief complaint of altered mental status (24 Feb 2018 18:44)  Patient seen and examined at bedside. She is still altered and non-verbal, receiving NG feeds now.    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  Chronic kidney disease (CKD), stage IV (severe)  Psychosis  Bedridden  Tyson catheter in place on admission  Osteomyelitis of sacrum  Sacral ulcer  Osteoporosis  Diabetes mellitus  Seizure disorder  Bipolar affective disorder  Status post debridement: of sacral ulcer      MEDICATIONS  (STANDING):  bisacodyl Suppository 10 milliGRAM(s) Rectal daily  carBAMazepine Suspension 400 milliGRAM(s) Oral daily  docusate sodium 100 milliGRAM(s) Oral three times a day  folic acid 1 milliGRAM(s) Oral daily  heparin  Injectable 5000 Unit(s) SubCutaneous every 8 hours  lactobacillus acidophilus 1 Tablet(s) Oral two times a day with meals  lactulose Syrup 30 Gram(s) Oral four times a day  levothyroxine 50 MICROGram(s) Oral daily  multivitamin 1 Tablet(s) Oral daily  pantoprazole   Suspension 40 milliGRAM(s) Oral two times a day before meals  piperacillin/tazobactam IVPB. 3.375 Gram(s) IV Intermittent every 12 hours  silver sulfADIAZINE 1% Cream 1 Application(s) Topical every 12 hours  simethicone 80 milliGRAM(s) Chew daily  thiamine 100 milliGRAM(s) Oral daily  valproic  acid Syrup 250 milliGRAM(s) Oral two times a day  valproic  acid Syrup 500 milliGRAM(s) Oral at bedtime  vancomycin  IVPB      zinc sulfate 220 milliGRAM(s) Oral daily    MEDICATIONS  (PRN):  acetaminophen  Suppository 650 milliGRAM(s) Rectal four times a day PRN For Temp greater than 38 C (100.4 F)  fluconAZOLE IVPB     PRN as per id dr knox  fluconAZOLE IVPB 400 milliGRAM(s) IV Intermittent every 24 hours PRN as per id dr knox  morphine  - Injectable 4 milliGRAM(s) IV Push every 4 hours PRN Severe Pain (7 - 10)  senna 2 Tablet(s) Oral at bedtime PRN Constipation      Overnight events:    Vital Signs Last 24 Hrs  T(C): 37.7 (28 Feb 2018 13:20), Max: 38.4 (28 Feb 2018 07:59)  T(F): 99.8 (28 Feb 2018 13:20), Max: 101.2 (28 Feb 2018 07:59)  HR: 108 (28 Feb 2018 13:20) (101 - 135)  BP: 93/50 (28 Feb 2018 13:20) (93/50 - 177/96)  BP(mean): --  RR: 17 (28 Feb 2018 13:20) (17 - 18)  SpO2: 98% (28 Feb 2018 07:50) (98% - 98%)  CAPILLARY BLOOD GLUCOSE  91 (28 Feb 2018 11:10)  131 (28 Feb 2018 07:31)  112 (27 Feb 2018 21:27)  103 (27 Feb 2018 16:20)        I&O's Summary    27 Feb 2018 07:01  -  28 Feb 2018 07:00  --------------------------------------------------------  IN: 5855 mL / OUT: 3000 mL / NET: 2855 mL    28 Feb 2018 07:01  -  28 Feb 2018 13:35  --------------------------------------------------------  IN: 1320 mL / OUT: 0 mL / NET: 1320 mL        Physical Exam:    GENERAL APPEARANCE:  alert and cooperative, and appears to be in no acute distress.  HEENT: Head Normocephalic/Atraumatic  NECK: Neck supple, non-tender without lymphadenopathy, masses or thyromegaly.  CARDIAC: Normal S1 and S2.  LUNGS: Clear to auscultation without rales, rhonchi or wheezing.  ABDOMEN: Positive bowel sounds. Soft, nondistended, nontender. No guarding or rebound. No HSM.  MUSCULOSKELETAL: No joint erythema or tenderness. Normal gait.  EXTREMITIES: No edema. Peripheral pulses intact. No varicosities.  NEUROLOGICAL: CN II-XII intact. Strength and sensation symmetric and intact throughout.  SKIN: Skin normal color, texture and turgor with no lesions or eruptions.       Labs:                        9.9    12.33 )-----------( 276      ( 28 Feb 2018 06:31 )             31.6             02-28    143  |  113<H>  |  25<H>  ----------------------------<  122<H>  4.2   |  19  |  1.6<H>    Ca    8.3<L>      28 Feb 2018 06:31  Mg     1.5     02-28    TPro  5.9<L>  /  Alb  1.6<L>  /  TBili  0.7  /  DBili  x   /  AST  47<H>  /  ALT  13  /  AlkPhos  269<H>  02-28    LIVER FUNCTIONS - ( 28 Feb 2018 06:31 )  Alb: 1.6 g/dL / Pro: 5.9 g/dL / ALK PHOS: 269 U/L / ALT: 13 U/L / AST: 47 U/L / GGT: x                             Culture - Blood (collected 26 Feb 2018 11:46)  Source: .Blood None  Preliminary Report (28 Feb 2018 01:02):    No growth to date. Patient is a 62y old  Female who presents with a chief complaint of altered mental status (24 Feb 2018 18:44)  Patient seen and examined at bedside. She is still altered and non-verbal, receiving NG feeds now.    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  Chronic kidney disease (CKD), stage IV (severe)  Psychosis  Bedridden  Tyson catheter in place on admission  Osteomyelitis of sacrum  Sacral ulcer  Osteoporosis  Diabetes mellitus  Seizure disorder  Bipolar affective disorder  Status post debridement: of sacral ulcer      MEDICATIONS  (STANDING):  bisacodyl Suppository 10 milliGRAM(s) Rectal daily  carBAMazepine Suspension 400 milliGRAM(s) Oral daily  docusate sodium 100 milliGRAM(s) Oral three times a day  folic acid 1 milliGRAM(s) Oral daily  heparin  Injectable 5000 Unit(s) SubCutaneous every 8 hours  lactobacillus acidophilus 1 Tablet(s) Oral two times a day with meals  lactulose Syrup 30 Gram(s) Oral four times a day  levothyroxine 50 MICROGram(s) Oral daily  multivitamin 1 Tablet(s) Oral daily  pantoprazole   Suspension 40 milliGRAM(s) Oral two times a day before meals  piperacillin/tazobactam IVPB. 3.375 Gram(s) IV Intermittent every 12 hours  silver sulfADIAZINE 1% Cream 1 Application(s) Topical every 12 hours  simethicone 80 milliGRAM(s) Chew daily  thiamine 100 milliGRAM(s) Oral daily  valproic  acid Syrup 250 milliGRAM(s) Oral two times a day  valproic  acid Syrup 500 milliGRAM(s) Oral at bedtime  vancomycin  IVPB      zinc sulfate 220 milliGRAM(s) Oral daily    MEDICATIONS  (PRN):  acetaminophen  Suppository 650 milliGRAM(s) Rectal four times a day PRN For Temp greater than 38 C (100.4 F)  fluconAZOLE IVPB     PRN as per id dr knox  fluconAZOLE IVPB 400 milliGRAM(s) IV Intermittent every 24 hours PRN as per id dr knox  morphine  - Injectable 4 milliGRAM(s) IV Push every 4 hours PRN Severe Pain (7 - 10)  senna 2 Tablet(s) Oral at bedtime PRN Constipation      Overnight events:    Vital Signs Last 24 Hrs  T(C): 37.7 (28 Feb 2018 13:20), Max: 38.4 (28 Feb 2018 07:59)  T(F): 99.8 (28 Feb 2018 13:20), Max: 101.2 (28 Feb 2018 07:59)  HR: 108 (28 Feb 2018 13:20) (101 - 135)  BP: 93/50 (28 Feb 2018 13:20) (93/50 - 177/96)  BP(mean): --  RR: 17 (28 Feb 2018 13:20) (17 - 18)  SpO2: 98% (28 Feb 2018 07:50) (98% - 98%)  CAPILLARY BLOOD GLUCOSE  91 (28 Feb 2018 11:10)  131 (28 Feb 2018 07:31)  112 (27 Feb 2018 21:27)  103 (27 Feb 2018 16:20)        I&O's Summary    27 Feb 2018 07:01  -  28 Feb 2018 07:00  --------------------------------------------------------  IN: 5855 mL / OUT: 3000 mL / NET: 2855 mL    28 Feb 2018 07:01  -  28 Feb 2018 13:35  --------------------------------------------------------  IN: 1320 mL / OUT: 0 mL / NET: 1320 mL        Physical Exam:    GENERAL APPEARANCE:  laying in bed, appears uncomfortable at times  HEENT: Head Normocephalic/Atraumatic; PERRL.  NECK: Neck supple, non-tender without lymphadenopathy, masses or thyromegaly.  CARDIAC: RRR, Normal S1 and S2.  LUNGS: Clear to auscultation without rales, rhonchi or wheezing.  ABDOMEN: Positive bowel sounds. Soft, nondistended, nontender. No guarding or rebound. No HSM.  MUSCULOSKELETAL: No joint erythema or tenderness.  EXTREMITIES: No edema. Peripheral pulses intact. No varicosities.  NEUROLOGICAL: Non-verbal  SKIN: Skin normal color, texture and turgor with no lesions or eruptions.      Labs:                        9.9    12.33 )-----------( 276      ( 28 Feb 2018 06:31 )             31.6             02-28    143  |  113<H>  |  25<H>  ----------------------------<  122<H>  4.2   |  19  |  1.6<H>    Ca    8.3<L>      28 Feb 2018 06:31  Mg     1.5     02-28    TPro  5.9<L>  /  Alb  1.6<L>  /  TBili  0.7  /  DBili  x   /  AST  47<H>  /  ALT  13  /  AlkPhos  269<H>  02-28    LIVER FUNCTIONS - ( 28 Feb 2018 06:31 )  Alb: 1.6 g/dL / Pro: 5.9 g/dL / ALK PHOS: 269 U/L / ALT: 13 U/L / AST: 47 U/L / GGT: x                             Culture - Blood (collected 26 Feb 2018 11:46)  Source: .Blood None  Preliminary Report (28 Feb 2018 01:02):    No growth to date.

## 2018-02-28 NOTE — PROGRESS NOTE ADULT - SUBJECTIVE AND OBJECTIVE BOX
Patient is a 62y old  Female who presents with a chief complaint of altered mental status (24 Feb 2018 18:44)  Patient was seen and examined.  patient Awake, confused.  Pt febroile and tachycardic overnight.    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  MEDICATIONS  (STANDING):  bisacodyl Suppository 10 milliGRAM(s) Rectal daily  carBAMazepine Suspension 400 milliGRAM(s) Oral daily  docusate sodium 100 milliGRAM(s) Oral three times a day  folic acid 1 milliGRAM(s) Oral daily  heparin  Injectable 5000 Unit(s) SubCutaneous every 8 hours  lactobacillus acidophilus 1 Tablet(s) Oral two times a day with meals  lactulose Syrup 30 Gram(s) Oral four times a day  levothyroxine 50 MICROGram(s) Oral daily  multivitamin 1 Tablet(s) Oral daily  pantoprazole   Suspension 40 milliGRAM(s) Oral two times a day before meals  piperacillin/tazobactam IVPB. 3.375 Gram(s) IV Intermittent every 12 hours  silver sulfADIAZINE 1% Cream 1 Application(s) Topical every 12 hours  simethicone 80 milliGRAM(s) Chew daily  thiamine 100 milliGRAM(s) Oral daily  valproic  acid Syrup 250 milliGRAM(s) Oral two times a day  valproic  acid Syrup 500 milliGRAM(s) Oral at bedtime  vancomycin  IVPB      zinc sulfate 220 milliGRAM(s) Oral daily    MEDICATIONS  (PRN):  acetaminophen  Suppository 650 milliGRAM(s) Rectal four times a day PRN For Temp greater than 38 C (100.4 F)  fluconAZOLE IVPB     PRN as per id dr knox  fluconAZOLE IVPB 400 milliGRAM(s) IV Intermittent every 24 hours PRN as per id dr knox  morphine  - Injectable 4 milliGRAM(s) IV Push every 4 hours PRN Severe Pain (7 - 10)  senna 2 Tablet(s) Oral at bedtime PRN Constipation.    T(C): 37.4 (02-28-18 @ 12:45), Max: 38.4 (02-28-18 @ 07:59)  HR: 118 (02-28-18 @ 09:00) (101 - 135)  BP: 139/70 (02-28-18 @ 07:59) (129/66 - 177/96)  RR: 18 (02-28-18 @ 07:50) (18 - 18)  SpO2: 98% (02-28-18 @ 07:50) (98% - 98%)    O/E:  Awake, Confused.  HEENT: atraumatic.  Chest: clear.  CVS: SIS2 +, no murmur.tachycardic+  P/A: Soft, BS+  CNS: confused. Moves all ext.  Ext: no edema feet.  Skin: sacral ulcer stage 4  All systems reviewed positive findings as above.                             9.9<L>  12.33<H> )-----------( 276      ( 28 Feb 2018 06:31 )             31.6<L>                        9.4<L>  9.63  )-----------( 279      ( 27 Feb 2018 04:51 )             29.5<L>  02-28    143  |  113<H>  |  25<H>  ----------------------------<  122<H>  4.2   |  19  |  1.6<H>  02-27    151<H>  |  122<H>  |  28<H>  ----------------------------<  118<H>  3.3<L>   |  19  |  1.7<H>    Ca    8.3<L>      28 Feb 2018 06:31  Ca    8.2<L>      27 Feb 2018 11:23  Ca    8.0<L>      27 Feb 2018 04:51  Ca    8.1<L>      26 Feb 2018 21:46  Mg     1.5     02-28    TPro  5.9<L>  /  Alb  1.6<L>  /  TBili  0.7  /  DBili  x   /  AST  47<H>  /  ALT  13  /  AlkPhos  269<H>  02-28  TPro  4.9<L>  /  Alb  1.4<L>  /  TBili  0.8  /  DBili  x   /  AST  18  /  ALT  6   /  AlkPhos  86  02-27  TPro  6.2  /  Alb  1.8<L>  /  TBili  0.8  /  DBili  x   /  AST  20  /  ALT  7   /  AlkPhos  102 02-26    TPro  4.9<L>  /  Alb  1.4<L>  /  TBili  0.8  /  DBili  x   /  AST  18  /  ALT  6   /  AlkPhos  86 02-27  TPro  6.2  /  Alb  1.8<L>  /  TBili  0.8  /  DBili  x   /  AST  20  /  ALT  7   /  AlkPhos  102 02-26    Ammonia, Serum: 141 mmol/L (02.28.18 @ 09:24)

## 2018-02-28 NOTE — PROGRESS NOTE ADULT - ASSESSMENT
Patient is a 62y old Female with PMH of MS and being bed-bound, CKD IV, chronic sacral osteo, recently diagnosed colon CA at [facility], and seizure disorder who presents with a chief complaint of altered mental status (24 Feb 2018 18:44). Patient's daughter/healthcare proxy present at bedside. She reports patient's mental status has gradually declined since October after repeated hospitalizations for lung infections, UTIs, and the chronic sacral ulcer. Patient's hospital course complicated by metabolic acidosis and sepsis, which improved after IV hydration with bicarb. Patient is still non-verbal and altered. As per daughter, patient more verbal a week ago. She had one episode of melena today.    1. Metabolic encephalopathy probably 2/2 sepsis/ sacral osteomyelitis/UTI 2/2 chronic indwelling antony  - CT Head No Cont (02.24.18 @ 16:25): negative  - CT Abdomen and Pelvis No Cont (02.24.18 @ 16:26) Sacral decubitus ulcer with extension to the coccyx demonstrating bony destruction of the coccyx consistent with osteomyelitis. Surrounding phlegmon measures 6 cm transverse. Trace persistent right hydroureteronephrosis with suggestion of bilateral urothelial thickening likely related to chronic infection.Stable 2.2 cm right adrenal adenoma.Stable diffuse bladder wall thickening and trabeculation.  - As per ID, restart Vanco with Zosyn and increas Diflucan dose.  - Surgery not planning any intervention for phlegmon. Burn not planning any intervention for now.  - Ammonia elevated - increased lactulose, titrate to 3-4 BMs per day  - Morphine increased to 4mg q4h for pain control    2. Hypernatremia, resolving  - Nephr following  - IV Fluids discontinued, continue free water  - daily BMP    3. AMS, can't take PO  - NGT feeds.  - Aspiration precautions.    4. Seizure Disorder/ Bipolar disorder  - Valproic acid < 10; recheck level tomorrow  - c/w Tegretol and Depakote oral suspensions via NGT    5. Possible Melena in the context of recent EGD results and h/o colon CA, resolved:  - GI following, no intervention  - Hemoglobin stable, episode likely not melena  - on protonix  - EGD done at [facility] on 1/28/18 showed 4cm semi-circumferential mass in sigmoid colon, which was biopsied - reports show moderately differentiated adenoCA  - Of note, pt is on iron but will monitor CBC    5. DM type 2   - Monitor Fs.    6. Hypothyroidism  - ct synthroid.    7. Multiple Sclerosis with neurogenic bladder  - Chronic antony     8. GI/DVT prophylaxis.    Prognosis guarded. Patient is a 62y old Female with PMH of MS and being bed-bound, CKD IV, chronic sacral osteo, recently diagnosed colon CA at [facility], and seizure disorder who presents with a chief complaint of altered mental status (24 Feb 2018 18:44). Patient's daughter/healthcare proxy present at bedside. She reports patient's mental status has gradually declined since October after repeated hospitalizations for lung infections, UTIs, and the chronic sacral ulcer. Patient's hospital course complicated by metabolic acidosis and sepsis, which improved after IV hydration with bicarb. Patient is still non-verbal and altered. As per daughter, patient more verbal a week ago. She had one episode of melena today.    1. Metabolic encephalopathy probably 2/2 sepsis/ sacral osteomyelitis/UTI 2/2 chronic indwelling antony  - CT Head No Cont (02.24.18 @ 16:25): negative  - CT Abdomen and Pelvis No Cont (02.24.18 @ 16:26) Sacral decubitus ulcer with extension to the coccyx demonstrating bony destruction of the coccyx consistent with osteomyelitis. Surrounding phlegmon measures 6 cm transverse. Trace persistent right hydroureteronephrosis with suggestion of bilateral urothelial thickening likely related to chronic infection.Stable 2.2 cm right adrenal adenoma.Stable diffuse bladder wall thickening and trabeculation.  - As per ID, restart Vanco with Zosyn and increas Diflucan dose.  - Surgery not planning any intervention for phlegmon. Burn not planning any intervention for now.  - Ammonia elevated - increased lactulose, titrate to 3-4 BMs per day  - Morphine increased to 4mg q4h for pain control    2. Hypernatremia, resolving  - Nephr following  - IV Fluids discontinued, continue free water  - daily BMP    3. AMS, can't take PO  - NGT feeds.  - Aspiration precautions.    4. Seizure Disorder/ Bipolar disorder  - Valproic acid < 10; recheck level tomorrow  - c/w Tegretol and Depakote oral suspensions via NGT    5. Possible Melena in the context of recent EGD results and h/o colon CA, resolved:  - GI following, no intervention  - Hemoglobin stable, episode likely not melena  - on protonix  - EGD done at [facility] on 1/28/18 showed 4cm semi-circumferential mass in sigmoid colon, which was biopsied - reports show moderately differentiated adenoCA  - Of note, pt is on iron but will monitor CBC    5. DM type 2   - Monitor Fs.    6. Hypothyroidism  - ct synthroid.    7. Multiple Sclerosis with neurogenic bladder  - Chronic antony     8. B/L upper extremity edema  - Venous duplex ordered    9. GI/DVT prophylaxis.    Prognosis guarded.

## 2018-02-28 NOTE — PROGRESS NOTE ADULT - ASSESSMENT
62F who is bedridden at [facility] because of MS, who is currently being treated for sacral OM with vancomycin and meropenem  Patient is sent in by [facility] because of altered mental status.     1. Metabolic encephalopathy probably sec to sepsis/ sacral osteomyelitis/UTI with Hyperammonemia  -  CT Head No Cont (02.24.18 @ 16:25) no definite evidence of acute intracranial pathology.  - CT Abdomen and Pelvis No Cont (02.24.18 @ 16:26) Sacral decubitus ulcer with extension to the coccyx demonstrating bony   destruction of the coccyx consistent with osteomyelitis. Surrounding phlegmon measures 6 cm transverse,Trace persistent right hydroureteronephrosis with suggestion of bilateral urothelial thickening likely related to chronic infection.Stable 2.2 cm right adrenal adenoma.Stable diffuse bladder wall thickening and trabeculation.  - EEG - generalized slowing w/ triphasic waves (reportedly) - consistent w/ metabolic encephalopathy    - pt was evaluated by ID ct zosyn, Diflucan,Vancomycin   - Surgery consult  for plegmon- likely related to pressure ulcer, no plan for surgical intervention at this time continue management as per burn  - High  Ammonia level- ct lactulose.start Xifaxan  -evaluated by Neurology, most likely toxic metabolic encephalopathy     2.Metabolic Acidosis, JAMAR on CKDstage 4, hypernatremia.  - Metabolic acidosis resolved.  Jamar resolved. Hypernatremia resolved  - daily BMP.  - pt was evaluated by renal.  - ct free water flushes.  - discontinue IV fluids    3. Possible GI bleed, H/o colon cancer.  - S/p 1 unit PRBC  - pt evaluated by GI recommend- Switch to PPI BID. EGD on urgent basis if HD instability acute drop in Hb or evidence of active GIB  - Monitor CBC    4. Dysphagia- failed Speech and Swallow.  - NGT feeds.  - Aspiration precautions.    5. Seizure Disorder/ Bipolar disorder  - ct tegretol, dapakote.    6. DM type 2   - Monitor Fs.    7. Hypokalemia.  - resolved.      8. Hypothyroidism  - ct synthroid.    9. Multiple Sclerosis with neurogenic bladder  -  chr antony .    10 Sinus tachycardia start metoprolol.      10. GI/DVT prophylaxis.    Prognosis guarded.

## 2018-02-28 NOTE — PROGRESS NOTE ADULT - SUBJECTIVE AND OBJECTIVE BOX
In addition to prior recommendations patient also requires debridement of bone and bone biopsy, and outpatient follow up with PMD for possible sigmoid mass mentioned in previous note

## 2018-02-28 NOTE — PROGRESS NOTE ADULT - SUBJECTIVE AND OBJECTIVE BOX
62y Female 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  Chronic kidney disease (CKD), stage IV (severe)  Psychosis  Bedridden  Tyson catheter in place on admission  Osteomyelitis of sacrum  Sacral ulcer  Osteoporosis  Diabetes mellitus  Seizure disorder  Bipolar affective disorder  Status post debridement: of sacral ulcer      Hospital Day: 4d    Events of the Last 24h: patient examined by surgical team for perirectal phlegmon, no plan for surgical intervention at this time     Vital Signs Last 24 Hrs  T(C): 38.1 (2018 16:20), Max: 38.1 (2018 16:20)  T(F): 100.5 (2018 16:20), Max: 100.5 (2018 16:20)  HR: 109 (2018 16:20) (105 - 109)  BP: 136/63 (2018 16:20) (129/66 - 167/67)  BP(mean): --  RR: 18 (2018 16:20) (18 - 18)  SpO2: --      I&O's Detail    2018 07:01  -  2018 07:00  --------------------------------------------------------  IN:    dextrose 5%.: 875 mL    dextrose 5%.: 500 mL    IV PiggyBack: 100 mL    Packed Red Blood Cells: 1 mL    pantoprazole Infusion: 130 mL    sodium bicarbonate  Infusion: 750 mL    Sodium Chloride 0.9% IV Bolus: 1000 mL  Total IN: 3356 mL    OUT:    Indwelling Catheter - Urethral: 1750 mL    Stool: 1 mL  Total OUT: 1751 mL    Total NET: 1605 mL      2018 07:  -  2018 05:40  --------------------------------------------------------  IN:    dextrose 5%.: 125 mL    dextrose 5%.: 2400 mL    Enteral Tube Flush: 150 mL    Free Water: 750 mL    Miscellaneous Tube Feedin mL  Total IN: 4385 mL    OUT:    Indwelling Catheter - Urethral: 800 mL  Total OUT: 800 mL    Total NET: 3585 mL          PHYSICAL EXAM:    GENERAL: NAD - unable to communicate or verbalize    HEENT: NCAT    CHEST/LUNGS: CTAB    HEART: RRR,  No murmurs, rubs, or gallops    ABDOMEN: SNTND +BS    EXTREMITIES:  FROM, No clubbing, cyanosis, or edema, palpable pulse    NEURO: Patient has MS unable to fully express herself    Diet, NPO with Tube Feed:   Glucerna 1.2 Julio    Tube Feeding Modality: Nasogastric  Total Volume for 24 Hours (mL): 1280  Bolus   Total Volume of Bolus (mL):  320  Bolus Feed Rate (mL per Hour): 320   Bolus Feed Duration (in Hours): 1  Tube Feed Frequency: Every 6 hours   Tube Feed Start Time: 00:00 (18 @ 11:47)  Diet, NPO with Tube Feed:   Glucerna 1.2 Julio    Tube Feeding Modality: Nasogastric  Total Volume for 24 Hours (mL): 1280  Bolus   Total Volume of Bolus (mL):  320  Bolus Feed Rate (mL per Hour): 320   Bolus Feed Duration (in Hours): 1  Tube Feed Frequency: Every 6 hours   Tube Feed Start Time: 00:00 (18 @ 11:52)    MEDICATIONS  (STANDING):  bisacodyl Suppository 10 milliGRAM(s) Rectal daily  carBAMazepine Suspension 400 milliGRAM(s) Oral daily  dextrose 5%. 1000 milliLiter(s) (150 mL/Hr) IV Continuous <Continuous>  docusate sodium 100 milliGRAM(s) Oral three times a day  fluconAZOLE IVPB      fluconAZOLE IVPB 100 milliGRAM(s) IV Intermittent every 24 hours  folic acid 1 milliGRAM(s) Oral daily  lactobacillus acidophilus 1 Tablet(s) Oral two times a day with meals  lactulose Syrup 30 Gram(s) Oral three times a day  levothyroxine 50 MICROGram(s) Oral daily  multivitamin 1 Tablet(s) Oral daily  pantoprazole   Suspension 40 milliGRAM(s) Oral two times a day before meals  piperacillin/tazobactam IVPB. 3.375 Gram(s) IV Intermittent every 12 hours  silver sulfADIAZINE 1% Cream 1 Application(s) Topical every 12 hours  simethicone 80 milliGRAM(s) Chew daily  thiamine 100 milliGRAM(s) Oral daily  valproic  acid Syrup 250 milliGRAM(s) Oral two times a day  valproic  acid Syrup 500 milliGRAM(s) Oral at bedtime  zinc sulfate 220 milliGRAM(s) Oral daily    MEDICATIONS  (PRN):  morphine  - Injectable 4 milliGRAM(s) IV Push every 4 hours PRN Severe Pain (7 - 10)  senna 2 Tablet(s) Oral at bedtime PRN Constipation                              9.4    9.63  )-----------( 279      ( 2018 04:51 )             29.5                       151   |  122   |  28                 Ca: 8.2    BMP:   ----------------------------< 118    Mg: x     (18 @ 11:23)             3.3    |  19    | 1.7                Ph: x        LFT:     TPro: 4.9 / Alb: 1.4 / TBili: 0.8 / DBili: x / AST: 18 / ALT: 6 / AlkPhos: 86   (18 @ 04:51)    LFTs:             4.9  | 0.8  | 18       ------------------[86      ( 2018 04:51 )  1.4  | x    | 6               Culture - Blood (collected 2018 11:46)  Source: .Blood None  Preliminary Report (2018 01:02):    No growth to date.    IMAGING: < from: CT Abdomen and Pelvis No Cont (18 @ 16:26) >  **Sacral decubitus ulcer with extension to the coccyx demonstrating bony   destruction of the coccyx consistent with osteomyelitis. Surrounding   phlegmon measures 6 cm transverse, stable, with no definite involvement   of the rectum or vagina.    Trace persistent right hydroureteronephrosis with suggestion of bilateral   urothelial thickening likely related to chronic infection.    Stable 2.2 cm right adrenal adenoma.    Stable diffuse bladder wall thickening and trabeculation.    < end of copied text >        SPECTRA: [id number]

## 2018-02-28 NOTE — PROGRESS NOTE ADULT - ASSESSMENT
HYPERNATREMIA LIKELY DUE TO DEHYDRATION  NA HAS IMPROVED  CAN STOP IV FLUIDS  SUGGEST KEEPING FREE WATER TO A TOTAL OF 1L POST FEEDING    CKD STAGE 4 CREATININE IS STABLE    SEPSIS /UTI  WE ARE SIGNING OFF RECALL IF NEEDED

## 2018-03-01 LAB
ANION GAP SERPL CALC-SCNC: 12 MMOL/L — SIGNIFICANT CHANGE UP (ref 7–14)
ANION GAP SERPL CALC-SCNC: 13 MMOL/L — SIGNIFICANT CHANGE UP (ref 7–14)
BASOPHILS # BLD AUTO: 0.06 K/UL — SIGNIFICANT CHANGE UP (ref 0–0.2)
BASOPHILS NFR BLD AUTO: 0.7 % — SIGNIFICANT CHANGE UP (ref 0–1)
BUN SERPL-MCNC: 27 MG/DL — HIGH (ref 10–20)
BUN SERPL-MCNC: 28 MG/DL — HIGH (ref 10–20)
CALCIUM SERPL-MCNC: 8.3 MG/DL — LOW (ref 8.5–10.1)
CALCIUM SERPL-MCNC: 8.5 MG/DL — SIGNIFICANT CHANGE UP (ref 8.5–10.1)
CARBAMAZEPINE SERPL-MCNC: 3.4 UG/ML — LOW (ref 4–12)
CHLORIDE SERPL-SCNC: 117 MMOL/L — HIGH (ref 98–110)
CHLORIDE SERPL-SCNC: 118 MMOL/L — HIGH (ref 98–110)
CO2 SERPL-SCNC: 18 MMOL/L — SIGNIFICANT CHANGE UP (ref 17–32)
CO2 SERPL-SCNC: 19 MMOL/L — SIGNIFICANT CHANGE UP (ref 17–32)
CREAT SERPL-MCNC: 1.9 MG/DL — HIGH (ref 0.7–1.5)
CREAT SERPL-MCNC: 1.9 MG/DL — HIGH (ref 0.7–1.5)
EOSINOPHIL # BLD AUTO: 0.42 K/UL — SIGNIFICANT CHANGE UP (ref 0–0.7)
EOSINOPHIL NFR BLD AUTO: 5 % — SIGNIFICANT CHANGE UP (ref 0–8)
GLUCOSE SERPL-MCNC: 101 MG/DL — SIGNIFICANT CHANGE UP (ref 70–110)
GLUCOSE SERPL-MCNC: 98 MG/DL — SIGNIFICANT CHANGE UP (ref 70–110)
HCT VFR BLD CALC: 28.4 % — LOW (ref 37–47)
HGB BLD-MCNC: 9.1 G/DL — LOW (ref 14–18)
IMM GRANULOCYTES NFR BLD AUTO: 0.6 % — HIGH (ref 0.1–0.3)
IRON SATN MFR SERPL: 25 UG/DL — LOW (ref 35–150)
IRON SATN MFR SERPL: 26 % — SIGNIFICANT CHANGE UP (ref 15–50)
LYMPHOCYTES # BLD AUTO: 2.01 K/UL — SIGNIFICANT CHANGE UP (ref 1.2–3.4)
LYMPHOCYTES # BLD AUTO: 23.7 % — SIGNIFICANT CHANGE UP (ref 20.5–51.1)
MCHC RBC-ENTMCNC: 28.9 PG — SIGNIFICANT CHANGE UP (ref 27–31)
MCHC RBC-ENTMCNC: 32 G/DL — SIGNIFICANT CHANGE UP (ref 32–37)
MCV RBC AUTO: 90.2 FL — SIGNIFICANT CHANGE UP (ref 81–91)
MONOCYTES # BLD AUTO: 0.81 K/UL — HIGH (ref 0.1–0.6)
MONOCYTES NFR BLD AUTO: 9.6 % — HIGH (ref 1.7–9.3)
NEUTROPHILS # BLD AUTO: 5.12 K/UL — SIGNIFICANT CHANGE UP (ref 1.4–6.5)
NEUTROPHILS NFR BLD AUTO: 60.4 % — SIGNIFICANT CHANGE UP (ref 42.2–75.2)
PLATELET # BLD AUTO: 232 K/UL — SIGNIFICANT CHANGE UP (ref 130–400)
POTASSIUM SERPL-MCNC: 4.1 MMOL/L — SIGNIFICANT CHANGE UP (ref 3.5–5)
POTASSIUM SERPL-MCNC: 4.3 MMOL/L — SIGNIFICANT CHANGE UP (ref 3.5–5)
POTASSIUM SERPL-SCNC: 4.1 MMOL/L — SIGNIFICANT CHANGE UP (ref 3.5–5)
POTASSIUM SERPL-SCNC: 4.3 MMOL/L — SIGNIFICANT CHANGE UP (ref 3.5–5)
RBC # BLD: 3.15 M/UL — LOW (ref 4.2–5.4)
RBC # FLD: 18 % — HIGH (ref 11.5–14.5)
SODIUM SERPL-SCNC: 148 MMOL/L — HIGH (ref 135–146)
SODIUM SERPL-SCNC: 149 MMOL/L — HIGH (ref 135–146)
TIBC SERPL-MCNC: 88 UG/ML — LOW (ref 260–445)
VALPROATE SERPL-MCNC: 10 UG/ML — LOW (ref 50–100)
WBC # BLD: 8.47 K/UL — SIGNIFICANT CHANGE UP (ref 4.8–10.8)
WBC # FLD AUTO: 8.47 K/UL — SIGNIFICANT CHANGE UP (ref 4.8–10.8)

## 2018-03-01 RX ORDER — FLUCONAZOLE 150 MG/1
TABLET ORAL
Qty: 0 | Refills: 0 | Status: DISCONTINUED | OUTPATIENT
Start: 2018-03-01 | End: 2018-03-02

## 2018-03-01 RX ORDER — SODIUM CHLORIDE 9 MG/ML
1000 INJECTION, SOLUTION INTRAVENOUS
Qty: 0 | Refills: 0 | Status: DISCONTINUED | OUTPATIENT
Start: 2018-03-01 | End: 2018-03-05

## 2018-03-01 RX ORDER — FLUCONAZOLE 150 MG/1
400 TABLET ORAL EVERY 24 HOURS
Qty: 0 | Refills: 0 | Status: DISCONTINUED | OUTPATIENT
Start: 2018-03-02 | End: 2018-03-02

## 2018-03-01 RX ORDER — SODIUM BICARBONATE 1 MEQ/ML
350 SYRINGE (ML) INTRAVENOUS THREE TIMES A DAY
Qty: 0 | Refills: 0 | Status: DISCONTINUED | OUTPATIENT
Start: 2018-03-01 | End: 2018-03-04

## 2018-03-01 RX ORDER — FLUCONAZOLE 150 MG/1
400 TABLET ORAL ONCE
Qty: 0 | Refills: 0 | Status: COMPLETED | OUTPATIENT
Start: 2018-03-01 | End: 2018-03-01

## 2018-03-01 RX ORDER — SODIUM CHLORIDE 9 MG/ML
1000 INJECTION, SOLUTION INTRAVENOUS
Qty: 0 | Refills: 0 | Status: DISCONTINUED | OUTPATIENT
Start: 2018-03-01 | End: 2018-03-01

## 2018-03-01 RX ADMIN — Medication 50 MICROGRAM(S): at 05:31

## 2018-03-01 RX ADMIN — HEPARIN SODIUM 5000 UNIT(S): 5000 INJECTION INTRAVENOUS; SUBCUTANEOUS at 13:01

## 2018-03-01 RX ADMIN — Medication 1 TABLET(S): at 12:23

## 2018-03-01 RX ADMIN — LACTULOSE 30 GRAM(S): 10 SOLUTION ORAL at 12:22

## 2018-03-01 RX ADMIN — LACTULOSE 30 GRAM(S): 10 SOLUTION ORAL at 05:31

## 2018-03-01 RX ADMIN — Medication 350 MILLIGRAM(S): at 18:11

## 2018-03-01 RX ADMIN — Medication 1 APPLICATION(S): at 18:14

## 2018-03-01 RX ADMIN — HEPARIN SODIUM 5000 UNIT(S): 5000 INJECTION INTRAVENOUS; SUBCUTANEOUS at 21:43

## 2018-03-01 RX ADMIN — Medication 500 MILLIGRAM(S): at 21:43

## 2018-03-01 RX ADMIN — MORPHINE SULFATE 2 MILLIGRAM(S): 50 CAPSULE, EXTENDED RELEASE ORAL at 12:22

## 2018-03-01 RX ADMIN — Medication 1 APPLICATION(S): at 05:34

## 2018-03-01 RX ADMIN — MORPHINE SULFATE 2 MILLIGRAM(S): 50 CAPSULE, EXTENDED RELEASE ORAL at 23:15

## 2018-03-01 RX ADMIN — LACTULOSE 30 GRAM(S): 10 SOLUTION ORAL at 23:17

## 2018-03-01 RX ADMIN — PANTOPRAZOLE SODIUM 40 MILLIGRAM(S): 20 TABLET, DELAYED RELEASE ORAL at 18:12

## 2018-03-01 RX ADMIN — SODIUM CHLORIDE 50 MILLILITER(S): 9 INJECTION, SOLUTION INTRAVENOUS at 01:02

## 2018-03-01 RX ADMIN — FLUCONAZOLE 100 MILLIGRAM(S): 150 TABLET ORAL at 15:39

## 2018-03-01 RX ADMIN — SIMETHICONE 80 MILLIGRAM(S): 80 TABLET, CHEWABLE ORAL at 12:23

## 2018-03-01 RX ADMIN — Medication 1 TABLET(S): at 08:37

## 2018-03-01 RX ADMIN — Medication 10 MILLIGRAM(S): at 12:22

## 2018-03-01 RX ADMIN — PIPERACILLIN AND TAZOBACTAM 200 GRAM(S): 4; .5 INJECTION, POWDER, LYOPHILIZED, FOR SOLUTION INTRAVENOUS at 18:10

## 2018-03-01 RX ADMIN — HEPARIN SODIUM 5000 UNIT(S): 5000 INJECTION INTRAVENOUS; SUBCUTANEOUS at 05:32

## 2018-03-01 RX ADMIN — Medication 1 MILLIGRAM(S): at 12:22

## 2018-03-01 RX ADMIN — LACTULOSE 30 GRAM(S): 10 SOLUTION ORAL at 18:13

## 2018-03-01 RX ADMIN — Medication 1 TABLET(S): at 18:12

## 2018-03-01 RX ADMIN — MORPHINE SULFATE 2 MILLIGRAM(S): 50 CAPSULE, EXTENDED RELEASE ORAL at 12:57

## 2018-03-01 RX ADMIN — PIPERACILLIN AND TAZOBACTAM 200 GRAM(S): 4; .5 INJECTION, POWDER, LYOPHILIZED, FOR SOLUTION INTRAVENOUS at 05:34

## 2018-03-01 RX ADMIN — Medication 100 MILLIGRAM(S): at 21:43

## 2018-03-01 RX ADMIN — MORPHINE SULFATE 2 MILLIGRAM(S): 50 CAPSULE, EXTENDED RELEASE ORAL at 09:30

## 2018-03-01 RX ADMIN — Medication 350 MILLIGRAM(S): at 21:43

## 2018-03-01 RX ADMIN — Medication 100 MILLIGRAM(S): at 12:23

## 2018-03-01 RX ADMIN — Medication 100 MILLIGRAM(S): at 08:39

## 2018-03-01 RX ADMIN — PANTOPRAZOLE SODIUM 40 MILLIGRAM(S): 20 TABLET, DELAYED RELEASE ORAL at 06:19

## 2018-03-01 RX ADMIN — Medication 250 MILLIGRAM(S): at 05:32

## 2018-03-01 RX ADMIN — Medication 400 MILLIGRAM(S): at 12:24

## 2018-03-01 RX ADMIN — ZINC SULFATE TAB 220 MG (50 MG ZINC EQUIVALENT) 220 MILLIGRAM(S): 220 (50 ZN) TAB at 12:23

## 2018-03-01 RX ADMIN — Medication 100 MILLIGRAM(S): at 05:32

## 2018-03-01 RX ADMIN — MORPHINE SULFATE 2 MILLIGRAM(S): 50 CAPSULE, EXTENDED RELEASE ORAL at 08:39

## 2018-03-01 RX ADMIN — Medication 250 MILLIGRAM(S): at 18:14

## 2018-03-01 NOTE — PROGRESS NOTE ADULT - SUBJECTIVE AND OBJECTIVE BOX
Patient is a 62y old  Female who presents with a chief complaint of altered mental status (24 Feb 2018 18:44)   Patient seen and examined at bedside. She is still altered and non-verbal.    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  Chronic kidney disease (CKD), stage IV (severe)  Psychosis  Bedridden  Tyson catheter in place on admission  Osteomyelitis of sacrum  Sacral ulcer  Osteoporosis  Diabetes mellitus  Seizure disorder  Bipolar affective disorder  Status post debridement: of sacral ulcer      MEDICATIONS  (STANDING):  bisacodyl Suppository 10 milliGRAM(s) Rectal daily  carBAMazepine Suspension 400 milliGRAM(s) Oral daily  dextrose 5%. 1000 milliLiter(s) (50 mL/Hr) IV Continuous <Continuous>  docusate sodium 100 milliGRAM(s) Oral three times a day  fluconAZOLE IVPB      folic acid 1 milliGRAM(s) Oral daily  heparin  Injectable 5000 Unit(s) SubCutaneous every 8 hours  lactobacillus acidophilus 1 Tablet(s) Oral two times a day with meals  lactulose Syrup 30 Gram(s) Oral four times a day  levothyroxine 50 MICROGram(s) Oral daily  multivitamin 1 Tablet(s) Oral daily  pantoprazole   Suspension 40 milliGRAM(s) Oral two times a day before meals  piperacillin/tazobactam IVPB. 3.375 Gram(s) IV Intermittent every 12 hours  rifaximin 550 milliGRAM(s) Oral two times a day  silver sulfADIAZINE 1% Cream 1 Application(s) Topical every 12 hours  simethicone 80 milliGRAM(s) Chew daily  sodium bicarbonate 350 milliGRAM(s) Oral three times a day  thiamine 100 milliGRAM(s) Oral daily  valproic  acid Syrup 250 milliGRAM(s) Oral two times a day  valproic  acid Syrup 500 milliGRAM(s) Oral at bedtime  vancomycin  IVPB      vancomycin  IVPB 500 milliGRAM(s) IV Intermittent every 24 hours  zinc sulfate 220 milliGRAM(s) Oral daily    MEDICATIONS  (PRN):  acetaminophen  Suppository 650 milliGRAM(s) Rectal four times a day PRN For Temp greater than 38 C (100.4 F)  morphine  - Injectable 2 milliGRAM(s) IV Push every 4 hours PRN Moderate Pain (4 - 6)  senna 2 Tablet(s) Oral at bedtime PRN Constipation      Overnight events:    Vital Signs Last 24 Hrs  T(C): 37.2 (01 Mar 2018 05:51), Max: 37.8 (28 Feb 2018 20:24)  T(F): 99 (01 Mar 2018 05:51), Max: 100 (28 Feb 2018 20:24)  HR: 104 (01 Mar 2018 05:51) (90 - 108)  BP: 143/77 (01 Mar 2018 05:51) (93/50 - 143/77)  BP(mean): --  RR: 18 (01 Mar 2018 05:51) (17 - 18)  SpO2: 98% (28 Feb 2018 21:00) (98% - 98%)  CAPILLARY BLOOD GLUCOSE  113 (01 Mar 2018 11:26)  94 (01 Mar 2018 07:33)  110 (28 Feb 2018 20:50)  94 (28 Feb 2018 16:19)        I&O's Summary    28 Feb 2018 07:01  -  01 Mar 2018 07:00  --------------------------------------------------------  IN: 3580 mL / OUT: 1000 mL / NET: 2580 mL        Physical Exam:    GENERAL APPEARANCE:  laying in bed, appears uncomfortable at times  HEENT: Head Normocephalic/Atraumatic; PERRL.  NECK: Neck supple, non-tender without lymphadenopathy, masses or thyromegaly.  CARDIAC: RRR, Normal S1 and S2.  LUNGS: Clear to auscultation without rales, rhonchi or wheezing.  ABDOMEN: Positive bowel sounds. Soft, nondistended, nontender. No guarding or rebound. No HSM.  MUSCULOSKELETAL: No joint erythema or tenderness.  EXTREMITIES: No edema. Peripheral pulses intact. No varicosities.  NEUROLOGICAL: Non-verbal  SKIN: Skin normal color, texture and turgor with no lesions or eruptions.       Labs:                        9.1    8.47  )-----------( 232      ( 01 Mar 2018 05:27 )             28.4             03-01    148<H>  |  117<H>  |  27<H>  ----------------------------<  101  4.3   |  18  |  1.9<H>    Ca    8.3<L>      01 Mar 2018 05:27  Mg     1.5     02-28    TPro  5.9<L>  /  Alb  1.6<L>  /  TBili  0.7  /  DBili  x   /  AST  47<H>  /  ALT  13  /  AlkPhos  269<H>  02-28    LIVER FUNCTIONS - ( 28 Feb 2018 06:31 )  Alb: 1.6 g/dL / Pro: 5.9 g/dL / ALK PHOS: 269 U/L / ALT: 13 U/L / AST: 47 U/L / GGT: x                               Imaging:    ECG: Patient is a 62y old  Female who presents with a chief complaint of altered mental status (24 Feb 2018 18:44)   Patient seen and examined at bedside. She is still altered and non-verbal.    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  Chronic kidney disease (CKD), stage IV (severe)  Psychosis  Bedridden  Tyson catheter in place on admission  Osteomyelitis of sacrum  Sacral ulcer  Osteoporosis  Diabetes mellitus  Seizure disorder  Bipolar affective disorder  Status post debridement: of sacral ulcer      MEDICATIONS  (STANDING):  bisacodyl Suppository 10 milliGRAM(s) Rectal daily  carBAMazepine Suspension 400 milliGRAM(s) Oral daily  dextrose 5%. 1000 milliLiter(s) (50 mL/Hr) IV Continuous <Continuous>  docusate sodium 100 milliGRAM(s) Oral three times a day  fluconAZOLE IVPB      folic acid 1 milliGRAM(s) Oral daily  heparin  Injectable 5000 Unit(s) SubCutaneous every 8 hours  lactobacillus acidophilus 1 Tablet(s) Oral two times a day with meals  lactulose Syrup 30 Gram(s) Oral four times a day  levothyroxine 50 MICROGram(s) Oral daily  multivitamin 1 Tablet(s) Oral daily  pantoprazole   Suspension 40 milliGRAM(s) Oral two times a day before meals  piperacillin/tazobactam IVPB. 3.375 Gram(s) IV Intermittent every 12 hours  rifaximin 550 milliGRAM(s) Oral two times a day  silver sulfADIAZINE 1% Cream 1 Application(s) Topical every 12 hours  simethicone 80 milliGRAM(s) Chew daily  sodium bicarbonate 350 milliGRAM(s) Oral three times a day  thiamine 100 milliGRAM(s) Oral daily  valproic  acid Syrup 250 milliGRAM(s) Oral two times a day  valproic  acid Syrup 500 milliGRAM(s) Oral at bedtime  vancomycin  IVPB      vancomycin  IVPB 500 milliGRAM(s) IV Intermittent every 24 hours  zinc sulfate 220 milliGRAM(s) Oral daily    MEDICATIONS  (PRN):  acetaminophen  Suppository 650 milliGRAM(s) Rectal four times a day PRN For Temp greater than 38 C (100.4 F)  morphine  - Injectable 2 milliGRAM(s) IV Push every 4 hours PRN Moderate Pain (4 - 6)  senna 2 Tablet(s) Oral at bedtime PRN Constipation      Overnight events:    Vital Signs Last 24 Hrs  T(C): 37.2 (01 Mar 2018 05:51), Max: 37.8 (28 Feb 2018 20:24)  T(F): 99 (01 Mar 2018 05:51), Max: 100 (28 Feb 2018 20:24)  HR: 104 (01 Mar 2018 05:51) (90 - 108)  BP: 143/77 (01 Mar 2018 05:51) (93/50 - 143/77)  BP(mean): --  RR: 18 (01 Mar 2018 05:51) (17 - 18)  SpO2: 98% (28 Feb 2018 21:00) (98% - 98%)  CAPILLARY BLOOD GLUCOSE  113 (01 Mar 2018 11:26)  94 (01 Mar 2018 07:33)  110 (28 Feb 2018 20:50)  94 (28 Feb 2018 16:19)        I&O's Summary    28 Feb 2018 07:01  -  01 Mar 2018 07:00  --------------------------------------------------------  IN: 3580 mL / OUT: 1000 mL / NET: 2580 mL        Physical Exam:    GENERAL APPEARANCE:  laying in bed, appears uncomfortable at times  HEENT: Head Normocephalic/Atraumatic; PERRL.  NECK: Neck supple, non-tender without lymphadenopathy, masses or thyromegaly.  CARDIAC: RRR, Normal S1 and S2.  LUNGS: Clear to auscultation without rales, rhonchi or wheezing.  ABDOMEN: Positive bowel sounds. Soft, nondistended, nontender. No guarding or rebound. No HSM.  MUSCULOSKELETAL: No joint erythema or tenderness.  EXTREMITIES: No edema. Peripheral pulses intact. No varicosities.  NEUROLOGICAL: Non-verbal  SKIN: Skin normal color, texture and turgor with no lesions or eruptions.       Labs:                        9.1    8.47  )-----------( 232      ( 01 Mar 2018 05:27 )             28.4             03-01    148<H>  |  117<H>  |  27<H>  ----------------------------<  101  4.3   |  18  |  1.9<H>    Ca    8.3<L>      01 Mar 2018 05:27  Mg     1.5     02-28    TPro  5.9<L>  /  Alb  1.6<L>  /  TBili  0.7  /  DBili  x   /  AST  47<H>  /  ALT  13  /  AlkPhos  269<H>  02-28    LIVER FUNCTIONS - ( 28 Feb 2018 06:31 )  Alb: 1.6 g/dL / Pro: 5.9 g/dL / ALK PHOS: 269 U/L / ALT: 13 U/L / AST: 47 U/L / GGT: x                       Imaging:    < from: [facility] Duplex Lower Ext Vein Scan, Bilat (02.26.18 @ 13:35) >  No evidence of deep venous thrombosis or superficial thrombophlebitis in   bilateral lower extremities.

## 2018-03-01 NOTE — PROGRESS NOTE ADULT - SUBJECTIVE AND OBJECTIVE BOX
Patient is a 62y old  Female who presents with a chief complaint of altered mental status (24 Feb 2018 18:44)  Patient was seen and examined.  patient Awake, confused.  Pt febroile and tachycardic overnight.    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    MEDICATIONS  (STANDING):  bisacodyl Suppository 10 milliGRAM(s) Rectal daily  carBAMazepine Suspension 400 milliGRAM(s) Oral daily  dextrose 5%. 1000 milliLiter(s) (50 mL/Hr) IV Continuous <Continuous>  docusate sodium 100 milliGRAM(s) Oral three times a day  folic acid 1 milliGRAM(s) Oral daily  heparin  Injectable 5000 Unit(s) SubCutaneous every 8 hours  lactobacillus acidophilus 1 Tablet(s) Oral two times a day with meals  lactulose Syrup 30 Gram(s) Oral four times a day  levothyroxine 50 MICROGram(s) Oral daily  multivitamin 1 Tablet(s) Oral daily  pantoprazole   Suspension 40 milliGRAM(s) Oral two times a day before meals  piperacillin/tazobactam IVPB. 3.375 Gram(s) IV Intermittent every 12 hours  rifaximin 550 milliGRAM(s) Oral two times a day  silver sulfADIAZINE 1% Cream 1 Application(s) Topical every 12 hours  simethicone 80 milliGRAM(s) Chew daily  thiamine 100 milliGRAM(s) Oral daily  valproic  acid Syrup 250 milliGRAM(s) Oral two times a day  valproic  acid Syrup 500 milliGRAM(s) Oral at bedtime  vancomycin  IVPB      vancomycin  IVPB 500 milliGRAM(s) IV Intermittent every 24 hours  zinc sulfate 220 milliGRAM(s) Oral daily    MEDICATIONS  (PRN):  acetaminophen  Suppository 650 milliGRAM(s) Rectal four times a day PRN For Temp greater than 38 C (100.4 F)  fluconAZOLE IVPB     PRN as per id dr knox  fluconAZOLE IVPB 400 milliGRAM(s) IV Intermittent every 24 hours PRN as per id dr knox  morphine  - Injectable 2 milliGRAM(s) IV Push every 4 hours PRN Moderate Pain (4 - 6)  senna 2 Tablet(s) Oral at bedtime PRN Constipation    Vital Signs Last 24 Hrs  T(C): 37.2  T(F): 99  HR: 104  BP: 143/77  BP(mean): --  RR: 18  SpO2: 98%  O/E:  Awake, Confused.  HEENT: atraumatic.  Chest: clear.  CVS: SIS2 +, no murmur. tachycardic+  P/A: Soft, BS+  CNS: confused. Moves all ext.  Ext: no edema feet.  Skin: sacral ulcer stage 4  All systems reviewed positive findings as above.                             9.1<L>  8.47  )-----------( 232      ( 01 Mar 2018 05:27 )             28.4<L>                        9.9<L>  12.33<H> )-----------( 276      ( 28 Feb 2018 06:31 )             31.6<L>  03-01    148<H>  |  117<H>  |  27<H>  ----------------------------<  101  4.3   |  18  |  1.9<H>  02-28    148<H>  |  118<H>  |  25<H>  ----------------------------<  104  4.0   |  19  |  1.7<H>    Ca    8.3<L>      01 Mar 2018 05:27  Ca    8.2<L>      28 Feb 2018 21:43  Ca    8.3<L>      28 Feb 2018 06:31      TPro  5.9<L>  /  Alb  1.6<L>  /  TBili  0.7  /  DBili  x   /  AST  47<H>  /  ALT  13  /  AlkPhos  269<H>  02-28    TPro  5.9<L>  /  Alb  1.6<L>  /  TBili  0.7  /  DBili  x   /  AST  47<H>  /  ALT  13  /  AlkPhos  269<H>  02-28  TPro  4.9<L>  /  Alb  1.4<L>  /  TBili  0.8  /  DBili  x   /  AST  18  /  ALT  6   /  AlkPhos  86  02-27  TPro  6.2  /  Alb  1.8<L>  /  TBili  0.8  /  DBili  x   /  AST  20  /  ALT  7   /  AlkPhos  102  02-26    TPro  4.9<L>  /  Alb  1.4<L>  /  TBili  0.8  /  DBili  x   /  AST  18  /  ALT  6   /  AlkPhos  86  02-27  TPro  6.2  /  Alb  1.8<L>  /  TBili  0.8  /  DBili  x   /  AST  20  /  ALT  7   /  AlkPhos  102  02-26    Ammonia, Serum: 141 mmol/L (02.28.18 @ 09:24)

## 2018-03-01 NOTE — PROGRESS NOTE ADULT - ASSESSMENT
# hypernatremia    - monitor BMP and UO closely   - increase D5W 75 ml/hr iv, check Na level tomorrow, if still high, can increase D5W to 100 ml/hr.    # CKD 4 and High AG metabolic acidosis: Cr stable.    - sodium bicar 350mg tid oral, keep HCO3 >22  - check iron study, ferritin, phos level, iPTH  - PRO/CR ratio noted, probably due to DM. will f/u.    # UTI: c/w antibiotic, f/u ID # hypernatremia    - monitor BMP and UO closely   - Continue D5W at 50  ml/hr iv, check Na level tomorrow,    # CKD 4 and High AG metabolic acidosis: Cr stable.    - start sodium bicarbonate  350mg tid oral, keep HCO3 >22  - check iron study, ferritin, phos level, iPTH  - PRO/CR ratio noted, probably due to DM. will f/u.    # UTI: on vanco, Zosyn will repeat vanco trough today     will follow

## 2018-03-01 NOTE — PROGRESS NOTE ADULT - SUBJECTIVE AND OBJECTIVE BOX
infectious diseases progress note:  JUAN JOSE RAMACHANDRAN is a 62yFemale patient    SEPSIS, UTI        ROS:  CONSTITUTIONAL:  Negative fever or chills, feels well, good appetite  EYES:  Negative  blurry vision or double vision  CARDIOVASCULAR:  Negative for chest pain or palpitations  RESPIRATORY:  Negative for cough, wheezing, or SOB   GASTROINTESTINAL:  Negative for nausea, vomiting, diarrhea, constipation, or abdominal pain  GENITOURINARY:  Negative frequency, urgency or dysuria  NEUROLOGIC:  No headache, confusion, dizziness, lightheadedness    Allergies    No Known Allergies    Intolerances        ANTIBIOTICS/RELEVANT:  antimicrobials  fluconAZOLE IVPB     PRN  fluconAZOLE IVPB 400 milliGRAM(s) IV Intermittent every 24 hours PRN  piperacillin/tazobactam IVPB. 3.375 Gram(s) IV Intermittent every 12 hours  rifaximin 550 milliGRAM(s) Oral two times a day  vancomycin  IVPB      vancomycin  IVPB 500 milliGRAM(s) IV Intermittent every 24 hours    immunologic:    OTHER:  acetaminophen  Suppository 650 milliGRAM(s) Rectal four times a day PRN  bisacodyl Suppository 10 milliGRAM(s) Rectal daily  carBAMazepine Suspension 400 milliGRAM(s) Oral daily  dextrose 5%. 1000 milliLiter(s) IV Continuous <Continuous>  docusate sodium 100 milliGRAM(s) Oral three times a day  folic acid 1 milliGRAM(s) Oral daily  heparin  Injectable 5000 Unit(s) SubCutaneous every 8 hours  lactobacillus acidophilus 1 Tablet(s) Oral two times a day with meals  lactulose Syrup 30 Gram(s) Oral four times a day  levothyroxine 50 MICROGram(s) Oral daily  morphine  - Injectable 2 milliGRAM(s) IV Push every 4 hours PRN  multivitamin 1 Tablet(s) Oral daily  pantoprazole   Suspension 40 milliGRAM(s) Oral two times a day before meals  senna 2 Tablet(s) Oral at bedtime PRN  silver sulfADIAZINE 1% Cream 1 Application(s) Topical every 12 hours  simethicone 80 milliGRAM(s) Chew daily  thiamine 100 milliGRAM(s) Oral daily  valproic  acid Syrup 250 milliGRAM(s) Oral two times a day  valproic  acid Syrup 500 milliGRAM(s) Oral at bedtime  zinc sulfate 220 milliGRAM(s) Oral daily      Objective:  T(F): 99 (03-01-18 @ 05:51), Max: 100 (02-28-18 @ 20:24)  HR: 104 (03-01-18 @ 05:51) (90 - 108)  BP: 143/77 (03-01-18 @ 05:51) (93/50 - 143/77)  RR: 18 (03-01-18 @ 05:51) (17 - 18)  SpO2: 98% (02-28-18 @ 21:00) (98% - 98%)    PHYSICAL EXAM  Constitutional:Well-developed, well nourished  Eyes:NGUYỄN, EOMI  Ear/Nose/Throat: no oral lesion, no sinus tenderness on percussion	  Neck:no JVD, no lymphadenopathy, supple  Respiratory: CTA radha  Cardiovascular: S1S2 RRR, no murmurs  Gastrointestinal:soft, (+) BS, no HSM  Extremities:no e/e/c    03-01    148<H>  |  117<H>  |  27<H>  ----------------------------<  101  4.3   |  18  |  1.9<H>    Mg     1.5     02-28    TPro  5.9<L>  /  Alb  1.6<L>  /  TBili  0.7  /  DBili  x   /  AST  47<H>  /  ALT  13  /  AlkPhos  269<H>  02-28    Vancomycin Level, Trough: 31.0 ug/mL (02-26-18 @ 21:46)                          9.1    8.47  )-----------( 232      ( 01 Mar 2018 05:27 )             28.4           Culture - Blood (collected 26 Feb 2018 11:46)  Source: .Blood None  Preliminary Report (28 Feb 2018 01:02):    No growth to date.    Culture - Blood (collected 24 Feb 2018 13:16)  Source: .Blood Blood  Preliminary Report (26 Feb 2018 01:01):    No growth to date.    Culture - Urine (collected 24 Feb 2018 12:28)  Source: .Urine Catheterized  Final Report (25 Feb 2018 23:24):    50,000 - 99,000 CFU/mL Presumptive Candida albicans

## 2018-03-01 NOTE — PROGRESS NOTE ADULT - SUBJECTIVE AND OBJECTIVE BOX
Nephrology progress note    Patient is seen and examined, events over the last 24 h noted .    Allergies:  No Known Allergies    Hospital Medications:   MEDICATIONS  (STANDING):  bisacodyl Suppository 10 milliGRAM(s) Rectal daily  carBAMazepine Suspension 400 milliGRAM(s) Oral daily  dextrose 5%. 1000 milliLiter(s) (50 mL/Hr) IV Continuous <Continuous>  docusate sodium 100 milliGRAM(s) Oral three times a day  folic acid 1 milliGRAM(s) Oral daily  heparin  Injectable 5000 Unit(s) SubCutaneous every 8 hours  lactobacillus acidophilus 1 Tablet(s) Oral two times a day with meals  lactulose Syrup 30 Gram(s) Oral four times a day  levothyroxine 50 MICROGram(s) Oral daily  multivitamin 1 Tablet(s) Oral daily  pantoprazole   Suspension 40 milliGRAM(s) Oral two times a day before meals  piperacillin/tazobactam IVPB. 3.375 Gram(s) IV Intermittent every 12 hours  rifaximin 550 milliGRAM(s) Oral two times a day  silver sulfADIAZINE 1% Cream 1 Application(s) Topical every 12 hours  simethicone 80 milliGRAM(s) Chew daily  thiamine 100 milliGRAM(s) Oral daily  valproic  acid Syrup 250 milliGRAM(s) Oral two times a day  valproic  acid Syrup 500 milliGRAM(s) Oral at bedtime  vancomycin  IVPB 500 milliGRAM(s) IV Intermittent every 24 hours  zinc sulfate 220 milliGRAM(s) Oral daily      VITALS:  T(F): 99 (18 @ 05:51), Max: 101.2 (18 @ 07:59)  HR: 104 (18 @ 05:51)  BP: 143/77 (18 @ 05:51)  RR: 18 (18 @ 05:51)  SpO2: 98% (18 @ 21:00)       @ 07:01  -   @ 07:00  --------------------------------------------------------  IN: 5855 mL / OUT: 3000 mL / NET: 2855 mL     @ 07:01  -  03- @ 07:00  --------------------------------------------------------  IN: 3580 mL / OUT: 1000 mL / NET: 2580 mL    PHYSICAL EXAM:  Constitutional: NAD  HEENT: anicteric sclera, oropharynx clear, MMM  Neck: No JVD  Respiratory: CTAB, no wheezes, rales or rhonchi  Cardiovascular: S1, S2, RRR  Gastrointestinal: BS+, soft, NT/ND  Extremities: No cyanosis or clubbing. No peripheral edema  Neurological: A/O x 3, no focal deficits  : No CVA tenderness. No antony.     LABS:      148<H>  |  118<H>  |  25<H>  ----------------------------<  104  4.0   |  19  |  1.7<H>  Creatinine Trend: 1.7<--, 1.6<--, 1.7<--, 1.8<--, 1.8<--, 1.9<--  SODIUM TREND:  Sodium 148 [ @ 21:43]  Sodium 143 [ @ 06:31]  Sodium 151 [ @ 11:23]  Sodium 155 [ @ 04:51]  Sodium 152 [ @ 21:46]  Sodium 147 [ @ 05:21]  Sodium 151 [ @ 23:29]  Sodium 150 [ @ 09:04]  Sodium 147 [ @ 12:06]  Sodium 137 [ @ 07:40]    Ca    8.2<L>      2018 21:43  Mg     1.5         TPro  5.9<L>  /  Alb  1.6<L>  /  TBili  0.7  /  DBili      /  AST  47<H>  /  ALT  13  /  AlkPhos  269<H>                            9.9    12.33 )-----------( 276      ( 2018 06:31 )             31.6     Urine Studies:  Urinalysis Basic - ( 2018 18:19 )    Color: Yellow / Appearance: Cloudy / S.015 / pH:   Gluc:  / Ketone: 15  / Bili: Negative / Urobili: 0.2 mg/dL   Blood:  / Protein: 30 mg/dL / Nitrite: Negative   Leuk Esterase: Large / RBC: 5-10 /HPF / WBC 10-25 /HPF   Sq Epi:  / Non Sq Epi: Occasional /HPF / Bacteria:       Sodium, Random Urine: 67 mmol/L ( @ 07:40)  Osmolality, Random Urine: 279 mos/kg ( @ 18:19)  Creatinine, Random Urine: 21 mg/dL ( @ 18:19)  Protein/Creatinine Ratio Calculation: 3.0 Ratio ( @ 18:19)  Chloride, Random Urine: 79 mmol/L ( @ 18:19)  Sodium, Random Urine: 89 mmol/L ( @ 18:19)  Potassium, Random Urine: 9 mmol/L ( @ 18:19)  Calcium, Random Urine: 3 mg/dL ( @ 18:19)    RADIOLOGY & ADDITIONAL STUDIES:  < from: Xray Chest 1 View-PORTABLE IMMEDIATE (18 @ 18:30) >    New enteric tube with tip extending below the gastroesophageal junction.    Low lung volumes with no focal parenchymal opacities, pleural effusions   or pneumothorax.    < end of copied text > Nephrology progress note    Patient is seen and examined, events over the last 24 h noted.  altered and non-verbal, receiving NG feeds now.    Allergies:  No Known Allergies    Hospital Medications:   MEDICATIONS  (STANDING):  bisacodyl Suppository 10 milliGRAM(s) Rectal daily  carBAMazepine Suspension 400 milliGRAM(s) Oral daily  dextrose 5%. 1000 milliLiter(s) (50 mL/Hr) IV Continuous <Continuous>  docusate sodium 100 milliGRAM(s) Oral three times a day  folic acid 1 milliGRAM(s) Oral daily  heparin  Injectable 5000 Unit(s) SubCutaneous every 8 hours  lactobacillus acidophilus 1 Tablet(s) Oral two times a day with meals  lactulose Syrup 30 Gram(s) Oral four times a day  levothyroxine 50 MICROGram(s) Oral daily  multivitamin 1 Tablet(s) Oral daily  pantoprazole   Suspension 40 milliGRAM(s) Oral two times a day before meals  piperacillin/tazobactam IVPB. 3.375 Gram(s) IV Intermittent every 12 hours  rifaximin 550 milliGRAM(s) Oral two times a day  silver sulfADIAZINE 1% Cream 1 Application(s) Topical every 12 hours  simethicone 80 milliGRAM(s) Chew daily  thiamine 100 milliGRAM(s) Oral daily  valproic  acid Syrup 250 milliGRAM(s) Oral two times a day  valproic  acid Syrup 500 milliGRAM(s) Oral at bedtime  vancomycin  IVPB 500 milliGRAM(s) IV Intermittent every 24 hours  zinc sulfate 220 milliGRAM(s) Oral daily      VITALS:  T(F): 99 (18 @ 05:51), Max: 101.2 (18 @ 07:59)  HR: 104 (18 @ 05:51)  BP: 143/77 (18 @ 05:51)  RR: 18 (18 @ 05:51)  SpO2: 98% (18 @ 21:00)     @ 07:01  -   @ 07:00  --------------------------------------------------------  IN: 5855 mL / OUT: 3000 mL / NET: 2855 mL     @ 07:01  -  03-01 @ 07:00  --------------------------------------------------------  IN: 3580 mL / OUT: 1000 mL / NET: 2580 mL    PHYSICAL EXAM:  Constitutional: non-verbal, NG tube  Neck: No JVD  Respiratory: CTAB, no wheezes, rales or rhonchi  Cardiovascular: S1, S2, RRR  Gastrointestinal: BS+, soft, NT/ND  Extremities: No cyanosis or clubbing. No leg edema  : positive antony.     LABS:      148<H>  |  118<H>  |  25<H>  ----------------------------<  104  4.0   |  19  |  1.7<H>  Creatinine Trend: 1.7<--, 1.6<--, 1.7<--, 1.8<--, 1.8<--, 1.9<--  SODIUM TREND:  Sodium 148 [ @ 21:43]  Sodium 143 [ @ 06:31]  Sodium 151 [ @ 11:23]  Sodium 155 [ @ 04:51]  Sodium 152 [ @ 21:46]  Sodium 147 [ @ 05:21]  Sodium 151 [ @ 23:29]  Sodium 150 [ @ 09:04]  Sodium 147 [ @ 12:06]  Sodium 137 [ @ 07:40]    Ca    8.2<L>      2018 21:43  Mg     1.5         TPro  5.9<L>  /  Alb  1.6<L>  /  TBili  0.7  /  DBili      /  AST  47<H>  /  ALT  13  /  AlkPhos  269<H>                            9.9    12.33 )-----------( 276      ( 2018 06:31 )             31.6   Blood Gas Profile - Arterial (18 @ 12:04)    pH, Arterial: 7.41    pCO2, Arterial: 28 mmHg    pO2, Arterial: 99 mmHg    HCO3, Arterial: 18 mmoL/L    Base Excess, Arterial: -5.8 mmoL/L    Oxygen Saturation, Arterial: 99 %  Blood Gas Arterial, Lactate (18 @ 12:04)    Blood Gas Arterial, Lactate: 0.6 mmoL/L    Urine Studies:  Urinalysis Basic - ( 2018 18:19 )    Color: Yellow / Appearance: Cloudy / S.015 / pH:   Gluc:  / Ketone: 15  / Bili: Negative / Urobili: 0.2 mg/dL   Blood:  / Protein: 30 mg/dL / Nitrite: Negative   Leuk Esterase: Large / RBC: 5-10 /HPF / WBC 10-25 /HPF   Sq Epi:  / Non Sq Epi: Occasional /HPF / Bacteria:     Sodium, Random Urine: 67 mmol/L ( @ 07:40)  Osmolality, Random Urine: 279 mos/kg ( @ 18:19)  Creatinine, Random Urine: 21 mg/dL ( @ 18:19)  Protein/Creatinine Ratio Calculation: 3.0 Ratio ( @ 18:19)  Chloride, Random Urine: 79 mmol/L ( @ 18:19)  Sodium, Random Urine: 89 mmol/L ( @ 18:19)  Potassium, Random Urine: 9 mmol/L ( @ 18:19)  Calcium, Random Urine: 3 mg/dL ( @ 18:19)  Protein/Creatinine Ratio, Urine (18 @ 18:19)    Creatinine, Random Urine: 21:     Total Protein, Random Urine: 62 mg/dL    Protein/Creatinine Ratio Calculation: 3.0 Ratio    RADIOLOGY & ADDITIONAL STUDIES:  < from: Xray Chest 1 View-PORTABLE IMMEDIATE (18 @ 18:30) >    New enteric tube with tip extending below the gastroesophageal junction.    Low lung volumes with no focal parenchymal opacities, pleural effusions   or pneumothorax.    < end of copied text > Nephrology progress note    Patient is seen and examined, events over the last 24 h noted.  altered and non-verbal, receiving NG feeds now.  still receiving IVF D5w    Allergies:  No Known Allergies    Hospital Medications:   MEDICATIONS  (STANDING):  bisacodyl Suppository 10 milliGRAM(s) Rectal daily  carBAMazepine Suspension 400 milliGRAM(s) Oral daily  dextrose 5%. 1000 milliLiter(s) (50 mL/Hr) IV Continuous <Continuous>  docusate sodium 100 milliGRAM(s) Oral three times a day  folic acid 1 milliGRAM(s) Oral daily  heparin  Injectable 5000 Unit(s) SubCutaneous every 8 hours  lactobacillus acidophilus 1 Tablet(s) Oral two times a day with meals  lactulose Syrup 30 Gram(s) Oral four times a day  levothyroxine 50 MICROGram(s) Oral daily  multivitamin 1 Tablet(s) Oral daily  pantoprazole   Suspension 40 milliGRAM(s) Oral two times a day before meals  piperacillin/tazobactam IVPB. 3.375 Gram(s) IV Intermittent every 12 hours  rifaximin 550 milliGRAM(s) Oral two times a day  silver sulfADIAZINE 1% Cream 1 Application(s) Topical every 12 hours  simethicone 80 milliGRAM(s) Chew daily  thiamine 100 milliGRAM(s) Oral daily  valproic  acid Syrup 250 milliGRAM(s) Oral two times a day  valproic  acid Syrup 500 milliGRAM(s) Oral at bedtime  vancomycin  IVPB 500 milliGRAM(s) IV Intermittent every 24 hours  zinc sulfate 220 milliGRAM(s) Oral daily      VITALS:  T(F): 99 (18 @ 05:51), Max: 101.2 (18 @ 07:59)  HR: 104 (18 @ 05:51)  BP: 143/77 (18 @ 05:51)  RR: 18 (18 @ 05:51)  SpO2: 98% (18 @ 21:00)     @ 07:01  -   @ 07:00  --------------------------------------------------------  IN: 5855 mL / OUT: 3000 mL / NET: 2855 mL     @ 07:01  -  03-01 @ 07:00  --------------------------------------------------------  IN: 3580 mL / OUT: 1000 mL / NET: 2580 mL    PHYSICAL EXAM:  Constitutional: non-verbal, NG tube  Neck: No JVD  Respiratory: CTAB, no wheezes, rales or rhonchi  Cardiovascular: S1, S2, RRR  Gastrointestinal: BS+, soft, NT/ND  Extremities: No cyanosis or clubbing. No leg edema  : positive antony.     LABS:      148<H>  |  118<H>  |  25<H>  ----------------------------<  104  4.0   |  19  |  1.7<H>  Creatinine Trend: 1.7<--, 1.6<--, 1.7<--, 1.8<--, 1.8<--, 1.9<--    SODIUM TREND:  Sodium 148 [ @ 21:43]  Sodium 143 [ @ 06:31]  Sodium 151 [ @ 11:23]  Sodium 155 [ @ 04:51]  Sodium 152 [ @ 21:46]  Sodium 147 [ @ 05:21]  Sodium 151 [ @ 23:29]  Sodium 150 [ @ 09:04]  Sodium 147 [ @ 12:06]  Sodium 137 [ @ 07:40]    Ca    8.2<L>      2018 21:43  Mg     1.5         TPro  5.9<L>  /  Alb  1.6<L>  /  TBili  0.7  /  DBili      /  AST  47<H>  /  ALT  13  /  AlkPhos  269<H>                            9.9    12.33 )-----------( 276      ( 2018 06:31 )             31.6   Blood Gas Profile - Arterial (18 @ 12:04)    pH, Arterial: 7.41    pCO2, Arterial: 28 mmHg    pO2, Arterial: 99 mmHg    HCO3, Arterial: 18 mmoL/L    Base Excess, Arterial: -5.8 mmoL/L    Oxygen Saturation, Arterial: 99 %  Blood Gas Arterial, Lactate (18 @ 12:04)    Blood Gas Arterial, Lactate: 0.6 mmoL/L    Urine Studies:  Urinalysis Basic - ( 2018 18:19 )    Color: Yellow / Appearance: Cloudy / S.015 / pH:   Gluc:  / Ketone: 15  / Bili: Negative / Urobili: 0.2 mg/dL   Blood:  / Protein: 30 mg/dL / Nitrite: Negative   Leuk Esterase: Large / RBC: 5-10 /HPF / WBC 10-25 /HPF   Sq Epi:  / Non Sq Epi: Occasional /HPF / Bacteria:     Sodium, Random Urine: 67 mmol/L ( @ 07:40)  Osmolality, Random Urine: 279 mos/kg ( @ 18:19)  Creatinine, Random Urine: 21 mg/dL ( @ 18:19)  Protein/Creatinine Ratio Calculation: 3.0 Ratio ( @ 18:19)  Chloride, Random Urine: 79 mmol/L ( @ 18:19)  Sodium, Random Urine: 89 mmol/L ( @ 18:19)  Potassium, Random Urine: 9 mmol/L ( @ 18:19)  Calcium, Random Urine: 3 mg/dL ( @ 18:19)  Protein/Creatinine Ratio, Urine (18 @ 18:19)    Creatinine, Random Urine: 21:     Total Protein, Random Urine: 62 mg/dL    Protein/Creatinine Ratio Calculation: 3.0 Ratio    RADIOLOGY & ADDITIONAL STUDIES:  < from: Xray Chest 1 View-PORTABLE IMMEDIATE (18 @ 18:30) >    New enteric tube with tip extending below the gastroesophageal junction.    Low lung volumes with no focal parenchymal opacities, pleural effusions   or pneumothorax.    < end of copied text >

## 2018-03-01 NOTE — PROGRESS NOTE ADULT - ASSESSMENT
62F who is bedridden at [facility] because of MS, who is currently being treated for sacral OM with vancomycin and meropenem  Patient is sent in by [facility] because of altered mental status.     1. Metabolic encephalopathy probably sec to sepsis/ sacral osteomyelitis/UTI with Hyperammonemia  -  CT Head No Cont (02.24.18 @ 16:25) no definite evidence of acute intracranial pathology.  - CT Abdomen and Pelvis No Cont (02.24.18 @ 16:26) Sacral decubitus ulcer with extension to the coccyx demonstrating bony   destruction of the coccyx consistent with osteomyelitis. Surrounding phlegmon measures 6 cm transverse,Trace persistent right hydroureteronephrosis with suggestion of bilateral urothelial thickening likely related to chronic infection.Stable 2.2 cm right adrenal adenoma.Stable diffuse bladder wall thickening and trabeculation.  - EEG - generalized slowing w/ triphasic waves (reportedly) - consistent w/ metabolic encephalopathy    - pt was evaluated by ID ct zosyn, Diflucan,Vancomycin . Vancomycin trough  - Surgery consult  for plegmon- likely related to pressure ulcer, no plan for surgical intervention at this time continue management as per burn  - High  Ammonia level- ct lactulose.ct Xifaxan  -evaluated by Neurology, most likely toxic metabolic encephalopathy     2.Metabolic Acidosis, SALOME on CKDstage 4, hypernatremia.  - pt was evaluated by renal.  - Metabolic acidosis - start sodium bicarbonate  350mg tid oral, keep HCO3 >22    -Hypernatremia - daily BMP. ct free water flushes. IV fluids- D5W    3. Possible GI bleed, H/o colon cancer. HB/ HCT stable  - S/p 1 unit PRBC  - pt evaluated by GI recommend- Switch to PPI BID. EGD on urgent basis if HD instability acute drop in Hb or evidence of active GIB  - Monitor CBC    4. Dysphagia- failed Speech and Swallow.  - NGT feeds.  - Aspiration precautions.    5. Seizure Disorder/ Bipolar disorder  - ct tegretol, dapakote.    6. DM type 2   - Monitor Fs.    7. Hypokalemia.  - resolved.    8. Hypothyroidism  - ct synthroid.    9. Multiple Sclerosis with neurogenic bladder  -  chr antony .    10 Sinus tachycardia   - start metoprolol.      10. GI/DVT prophylaxis.    Prognosis guarded.

## 2018-03-01 NOTE — PROGRESS NOTE ADULT - ASSESSMENT
Patient is a 62y old Female with PMH of MS and being bed-bound, CKD IV, chronic sacral osteo, recently diagnosed colon CA at [facility], and seizure disorder who presents with a chief complaint of altered mental status (24 Feb 2018 18:44). Patient's daughter/healthcare proxy present at bedside. She reports patient's mental status has gradually declined since October after repeated hospitalizations for lung infections, UTIs, and the chronic sacral ulcer. Patient's hospital course complicated by metabolic acidosis and sepsis. Patient is still non-verbal and altered. As per daughter, patient more verbal a week prior to admission.    1. Metabolic encephalopathy probably 2/2 sepsis/ sacral osteomyelitis/UTI 2/2 chronic indwelling antony  - CT Head No Cont (02.24.18 @ 16:25): negative  - CT Abdomen and Pelvis No Cont (02.24.18 @ 16:26) Sacral decubitus ulcer with extension to the coccyx demonstrating bony destruction of the coccyx consistent with osteomyelitis. Surrounding phlegmon measures 6 cm transverse. Trace persistent right hydroureteronephrosis with suggestion of bilateral urothelial thickening likely related to chronic infection.Stable 2.2 cm right adrenal adenoma.Stable diffuse bladder wall thickening and trabeculation.  - As per ID, restart Vanco with Zosyn and increas Diflucan dose.  - Surgery not planning any intervention for phlegmon. Burn not planning any intervention for now.  - Ammonia elevated - increased lactulose, titrate to 3-4 BMs per day. Started Rifaximin.  - Morphine decreased to 2mg q4h because patient too lethargic on 4mg    2. Hypernatremia:  - Nephro following: recommend keep bicarb >22, started pt on oral bicarb 350mg TID. F/U iron studies, phosphorous, iPTH  - Pt on D5 @ 50 cc/hr  - daily BMP    3. AMS, can't take PO  - NGT feeds: Glucerna  - Aspiration precautions.    4. Seizure Disorder/ Bipolar disorder  - Valproic acid level 10; Carbamazepine level 3.4, kwasi recall neuro  - c/w Tegretol and Depakote oral suspensions via NGT    5. Sigmoid Colon AdenoCA  - GI following, no intervention  - Hemoglobin stable  - on protonix  - EGD done at [facility] on 1/28/18 showed 4cm semi-circumferential mass in sigmoid colon, which was biopsied - reports show moderately differentiated adenoCA  - Of note, pt is on iron but will monitor CBC  - pt to follow up at [facility] after discharge    5. DM type 2   - Monitor Fs.    6. Hypothyroidism  - c/w synthroid.    7. Multiple Sclerosis with neurogenic bladder  - Chronic antony     8. B/L upper extremity edema  - Venous duplex showed no evidence of DVT or superficial thrombophlebitis    9. GI/DVT prophylaxis.    Prognosis guarded. Patient is a 62y old Female with PMH of MS and being bed-bound, CKD IV, chronic sacral osteo, recently diagnosed colon CA at [facility], and seizure disorder who presents with a chief complaint of altered mental status (24 Feb 2018 18:44). Patient's daughter/healthcare proxy present at bedside. She reports patient's mental status has gradually declined since October after repeated hospitalizations for lung infections, UTIs, and the chronic sacral ulcer. Patient's hospital course complicated by metabolic acidosis and sepsis. Patient is still non-verbal and altered. As per daughter, patient more verbal a week prior to admission.    1. Metabolic encephalopathy probably 2/2 sepsis/ sacral osteomyelitis/UTI 2/2 chronic indwelling antony  - CT Head No Cont (02.24.18 @ 16:25): negative  - CT Abdomen and Pelvis No Cont (02.24.18 @ 16:26) Sacral decubitus ulcer with extension to the coccyx demonstrating bony destruction of the coccyx consistent with osteomyelitis. Surrounding phlegmon measures 6 cm transverse. Trace persistent right hydroureteronephrosis with suggestion of bilateral urothelial thickening likely related to chronic infection.Stable 2.2 cm right adrenal adenoma.Stable diffuse bladder wall thickening and trabeculation.  - As per ID, restart Vanco with Zosyn and increas Diflucan dose.  - Surgery not planning any intervention for phlegmon. Burn not planning any intervention for now.  - Ammonia elevated - increased lactulose, titrate to 3-4 BMs per day. Started Rifaximin.  - Morphine decreased to 2mg q4h because patient too lethargic on 4mg    2. Hypernatremia:  - Nephro following: recommend keep bicarb >22, started pt on oral bicarb 350mg TID. F/U iron studies, phosphorous, iPTH  - Pt on D5 @ 50 cc/hr  - daily BMP    3. AMS, can't take PO  - NGT feeds: Glucerna  - Aspiration precautions.    4. Seizure Disorder/ Bipolar disorder  - Valproic acid level 10; Carbamazepine level 3.4, kwasi recall neuro  - c/w Tegretol and Depakote oral suspensions via NGT    5. Sigmoid Colon AdenoCA  - GI following, no intervention  - Hemoglobin stable  - on protonix  - EGD done at [facility] on 1/28/18 showed 4cm semi-circumferential mass in sigmoid colon, which was biopsied - reports show moderately differentiated adenoCA  - Of note, pt is on iron but will monitor CBC  - pt to follow up at [facility] after discharge    5. DM type 2   - Monitor Fs.    6. Hypothyroidism  - c/w synthroid.    7. Multiple Sclerosis with neurogenic bladder  - Chronic antony     8. B/L upper extremity edema  - Venous duplex showed evidence of superficial thrombophlebitis in left arm distal to PICC line    9. GI/DVT prophylaxis.    Prognosis guarded.

## 2018-03-01 NOTE — CHART NOTE - NSCHARTNOTEFT_GEN_A_CORE
Registered Dietitian Follow-Up     Pertinent Subjective Information: Pt asleep at bedside     Pertinent Medical Interventions: CKD4 noted, creatinine stable, renal following. Pt starting on NG feeds since last assessment, receiving Glucerna 1.2 @ 320 mls 4x/day. RN confirms pt is actually receiving 320 mls as ordered and pt has so far been tolerating feeds.     Diet order: Glucerna 1.2 @ 320 mls 4x/day (1280 kcals, 64 g pro). Also receiving D5 @ 50mls/hr (provides an additonal 204 kcals for net total of 1484 kcals)      Anthropometrics:  - Ht. 152.4 cm  - Wt. trending up, now 79 kg. Will continue to base needs on lower admit wt of 73.6 kg  - BMI: 31.7  - IBW: 100 lbs     Pertinent Lab Data: Na 148, K 4.2, BUN 27, Cr 1.9, glucose 101     Pertinent Meds: thiamine, heparin, vanco, fluconazole, lactulose, pantoprazole, docusate, folic acid, multivit, senna, zinc     Physical Findings:  - Appearance: disoriented x 4  - GI function:+ BM 2/28, large dark brown, formed  - Tubes: NG tube  - Oral/Mouth cavity: NPO/TF only  - Skin: stage IV sacral spine     Nutrition Requirements  Weight Used: 45.5 kg IBW     Estimated Energy Needs    Continue []  Adjust [x]  Adjusted Energy Recommendations: 5135-1816 kcals/day (30-35 kcals/kg IBW) for obese pt w/ stage IV pressure ulcer        Estimated Protein Needs    Continue []  Adjust [x]  Adjusted Protein Recommendations:  60-64 g/day (1.3-1.4 g/kg IBW) for obese pt w/ stage IV pressure ulcer and CKD IV. Will monitor renal fx and adjust prn        Estimated Fluid Needs        Continue [x]  Adjust []     Nutrient Intake: Pt meeting needs        [] Previous Nutrition Diagnosis: Inadequate oral intake resolved     Nutrition Intervention: Enteral Nutrition     Goal/Expected Outcome: Pt to continue receiving Glucerna 1.2 @ 320 mls q 6 hrs     Indicator/Monitoring: RD to monitor energy intake, renal fx/lytes, glucose/endocrine, NFPF (tolerance)

## 2018-03-01 NOTE — PROGRESS NOTE ADULT - ASSESSMENT
Recent fever with normal wbc.    fluconAZOLE IVPB 400 milliGRAM(s) IV Intermittent every 24 hours PRN  piperacillin/tazobactam IVPB. 3.375 Gram(s) IV Intermittent every 12 hours  rifaximin 550 milliGRAM(s) Oral two times a day     vancomycin  IVPB 500 milliGRAM(s) IV Intermittent every 24 hours    Need Vanco trough, complete course of Diflucan and await blood culture

## 2018-03-02 LAB
ALBUMIN SERPL ELPH-MCNC: 1.4 G/DL — LOW (ref 3–5.5)
ALP SERPL-CCNC: 197 U/L — HIGH (ref 30–115)
ALT FLD-CCNC: 12 U/L — SIGNIFICANT CHANGE UP (ref 0–41)
ANION GAP SERPL CALC-SCNC: 15 MMOL/L — HIGH (ref 7–14)
ANION GAP SERPL CALC-SCNC: 9 MMOL/L — SIGNIFICANT CHANGE UP (ref 7–14)
AST SERPL-CCNC: 21 U/L — SIGNIFICANT CHANGE UP (ref 0–41)
BASOPHILS # BLD AUTO: 0.08 K/UL — SIGNIFICANT CHANGE UP (ref 0–0.2)
BASOPHILS NFR BLD AUTO: 1 % — SIGNIFICANT CHANGE UP (ref 0–1)
BILIRUB SERPL-MCNC: 0.5 MG/DL — SIGNIFICANT CHANGE UP (ref 0.2–1.2)
BUN SERPL-MCNC: 28 MG/DL — HIGH (ref 10–20)
BUN SERPL-MCNC: 29 MG/DL — HIGH (ref 10–20)
CALCIUM SERPL-MCNC: 8.3 MG/DL — LOW (ref 8.5–10.1)
CALCIUM SERPL-MCNC: 8.6 MG/DL — SIGNIFICANT CHANGE UP (ref 8.5–10.1)
CALCIUM SERPL-MCNC: 8.7 MG/DL — SIGNIFICANT CHANGE UP (ref 8.4–10.5)
CHLORIDE SERPL-SCNC: 113 MMOL/L — HIGH (ref 98–110)
CHLORIDE SERPL-SCNC: 117 MMOL/L — HIGH (ref 98–110)
CO2 SERPL-SCNC: 17 MMOL/L — SIGNIFICANT CHANGE UP (ref 17–32)
CO2 SERPL-SCNC: 17 MMOL/L — SIGNIFICANT CHANGE UP (ref 17–32)
CREAT SERPL-MCNC: 1.9 MG/DL — HIGH (ref 0.7–1.5)
CREAT SERPL-MCNC: 2 MG/DL — HIGH (ref 0.7–1.5)
CULTURE RESULTS: SIGNIFICANT CHANGE UP
EOSINOPHIL # BLD AUTO: 0.33 K/UL — SIGNIFICANT CHANGE UP (ref 0–0.7)
EOSINOPHIL NFR BLD AUTO: 4.1 % — SIGNIFICANT CHANGE UP (ref 0–8)
FERRITIN SERPL-MCNC: 537 NG/ML — HIGH (ref 15–150)
GLUCOSE SERPL-MCNC: 131 MG/DL — HIGH (ref 70–110)
GLUCOSE SERPL-MCNC: 99 MG/DL — SIGNIFICANT CHANGE UP (ref 70–110)
HCT VFR BLD CALC: 28.2 % — LOW (ref 37–47)
HGB BLD-MCNC: 8.7 G/DL — LOW (ref 12–16)
IMM GRANULOCYTES NFR BLD AUTO: 0.5 % — HIGH (ref 0.1–0.3)
LYMPHOCYTES # BLD AUTO: 2.72 K/UL — SIGNIFICANT CHANGE UP (ref 1.2–3.4)
LYMPHOCYTES # BLD AUTO: 33.9 % — SIGNIFICANT CHANGE UP (ref 20.5–51.1)
MAGNESIUM SERPL-MCNC: 2.2 MG/DL — SIGNIFICANT CHANGE UP (ref 1.8–2.4)
MCHC RBC-ENTMCNC: 28.1 PG — SIGNIFICANT CHANGE UP (ref 27–31)
MCHC RBC-ENTMCNC: 30.9 G/DL — LOW (ref 32–37)
MCV RBC AUTO: 91 FL — SIGNIFICANT CHANGE UP (ref 81–99)
MONOCYTES # BLD AUTO: 0.79 K/UL — HIGH (ref 0.1–0.6)
MONOCYTES NFR BLD AUTO: 9.8 % — HIGH (ref 1.7–9.3)
NEUTROPHILS # BLD AUTO: 4.07 K/UL — SIGNIFICANT CHANGE UP (ref 1.4–6.5)
NEUTROPHILS NFR BLD AUTO: 50.7 % — SIGNIFICANT CHANGE UP (ref 42.2–75.2)
NRBC # BLD: 0 /100 WBCS — SIGNIFICANT CHANGE UP (ref 0–0)
PHOSPHATE SERPL-MCNC: 3.5 MG/DL — SIGNIFICANT CHANGE UP (ref 2.1–4.9)
PLATELET # BLD AUTO: 235 K/UL — SIGNIFICANT CHANGE UP (ref 130–400)
POTASSIUM SERPL-MCNC: 4 MMOL/L — SIGNIFICANT CHANGE UP (ref 3.5–5)
POTASSIUM SERPL-MCNC: 4.1 MMOL/L — SIGNIFICANT CHANGE UP (ref 3.5–5)
POTASSIUM SERPL-SCNC: 4 MMOL/L — SIGNIFICANT CHANGE UP (ref 3.5–5)
POTASSIUM SERPL-SCNC: 4.1 MMOL/L — SIGNIFICANT CHANGE UP (ref 3.5–5)
PROT SERPL-MCNC: 5.3 G/DL — LOW (ref 6–8)
PTH-INTACT FLD-MCNC: 85 PG/ML — HIGH (ref 15–65)
RBC # BLD: 3.1 M/UL — LOW (ref 4.2–5.4)
RBC # FLD: 18.5 % — HIGH (ref 11.5–14.5)
SODIUM SERPL-SCNC: 143 MMOL/L — SIGNIFICANT CHANGE UP (ref 135–146)
SODIUM SERPL-SCNC: 145 MMOL/L — SIGNIFICANT CHANGE UP (ref 135–146)
SPECIMEN SOURCE: SIGNIFICANT CHANGE UP
VANCOMYCIN TROUGH SERPL-MCNC: 27.5 UG/ML — HIGH (ref 5–10)
WBC # BLD: 8.03 K/UL — SIGNIFICANT CHANGE UP (ref 4.8–10.8)
WBC # FLD AUTO: 8.03 K/UL — SIGNIFICANT CHANGE UP (ref 4.8–10.8)

## 2018-03-02 RX ORDER — CARBAMAZEPINE 200 MG
600 TABLET ORAL DAILY
Qty: 0 | Refills: 0 | Status: DISCONTINUED | OUTPATIENT
Start: 2018-03-02 | End: 2018-03-19

## 2018-03-02 RX ORDER — VALPROIC ACID (AS SODIUM SALT) 250 MG/5ML
250 SOLUTION, ORAL ORAL ONCE
Qty: 0 | Refills: 0 | Status: COMPLETED | OUTPATIENT
Start: 2018-03-02 | End: 2018-03-02

## 2018-03-02 RX ORDER — VALPROIC ACID (AS SODIUM SALT) 250 MG/5ML
500 SOLUTION, ORAL ORAL DAILY
Qty: 0 | Refills: 0 | Status: DISCONTINUED | OUTPATIENT
Start: 2018-03-02 | End: 2018-03-19

## 2018-03-02 RX ORDER — LACTULOSE 10 G/15ML
40 SOLUTION ORAL THREE TIMES A DAY
Qty: 0 | Refills: 0 | Status: DISCONTINUED | OUTPATIENT
Start: 2018-03-02 | End: 2018-03-09

## 2018-03-02 RX ORDER — MORPHINE SULFATE 50 MG/1
4 CAPSULE, EXTENDED RELEASE ORAL EVERY 6 HOURS
Qty: 0 | Refills: 0 | Status: DISCONTINUED | OUTPATIENT
Start: 2018-03-02 | End: 2018-03-06

## 2018-03-02 RX ORDER — VALPROIC ACID (AS SODIUM SALT) 250 MG/5ML
250 SOLUTION, ORAL ORAL DAILY
Qty: 0 | Refills: 0 | Status: DISCONTINUED | OUTPATIENT
Start: 2018-03-02 | End: 2018-03-02

## 2018-03-02 RX ORDER — VALPROIC ACID (AS SODIUM SALT) 250 MG/5ML
250 SOLUTION, ORAL ORAL DAILY
Qty: 0 | Refills: 0 | Status: DISCONTINUED | OUTPATIENT
Start: 2018-03-02 | End: 2018-03-19

## 2018-03-02 RX ADMIN — HEPARIN SODIUM 5000 UNIT(S): 5000 INJECTION INTRAVENOUS; SUBCUTANEOUS at 15:14

## 2018-03-02 RX ADMIN — Medication 1 MILLIGRAM(S): at 12:11

## 2018-03-02 RX ADMIN — HEPARIN SODIUM 5000 UNIT(S): 5000 INJECTION INTRAVENOUS; SUBCUTANEOUS at 21:44

## 2018-03-02 RX ADMIN — LACTULOSE 30 GRAM(S): 10 SOLUTION ORAL at 12:11

## 2018-03-02 RX ADMIN — SODIUM CHLORIDE 100 MILLILITER(S): 9 INJECTION, SOLUTION INTRAVENOUS at 00:05

## 2018-03-02 RX ADMIN — Medication 500 MILLIGRAM(S): at 21:43

## 2018-03-02 RX ADMIN — HEPARIN SODIUM 5000 UNIT(S): 5000 INJECTION INTRAVENOUS; SUBCUTANEOUS at 05:49

## 2018-03-02 RX ADMIN — LACTULOSE 30 GRAM(S): 10 SOLUTION ORAL at 17:49

## 2018-03-02 RX ADMIN — Medication 600 MILLIGRAM(S): at 12:10

## 2018-03-02 RX ADMIN — Medication 1 TABLET(S): at 16:53

## 2018-03-02 RX ADMIN — Medication 350 MILLIGRAM(S): at 05:49

## 2018-03-02 RX ADMIN — LACTULOSE 30 GRAM(S): 10 SOLUTION ORAL at 05:48

## 2018-03-02 RX ADMIN — Medication 1 TABLET(S): at 12:11

## 2018-03-02 RX ADMIN — MORPHINE SULFATE 4 MILLIGRAM(S): 50 CAPSULE, EXTENDED RELEASE ORAL at 09:21

## 2018-03-02 RX ADMIN — Medication 100 MILLIGRAM(S): at 21:44

## 2018-03-02 RX ADMIN — Medication 250 MILLIGRAM(S): at 06:50

## 2018-03-02 RX ADMIN — Medication 250 MILLIGRAM(S): at 05:49

## 2018-03-02 RX ADMIN — Medication 50 MICROGRAM(S): at 05:49

## 2018-03-02 RX ADMIN — PIPERACILLIN AND TAZOBACTAM 200 GRAM(S): 4; .5 INJECTION, POWDER, LYOPHILIZED, FOR SOLUTION INTRAVENOUS at 05:50

## 2018-03-02 RX ADMIN — Medication 1 APPLICATION(S): at 05:51

## 2018-03-02 RX ADMIN — Medication 1 TABLET(S): at 08:33

## 2018-03-02 RX ADMIN — Medication 10 MILLIGRAM(S): at 12:01

## 2018-03-02 RX ADMIN — Medication 350 MILLIGRAM(S): at 12:15

## 2018-03-02 RX ADMIN — PANTOPRAZOLE SODIUM 40 MILLIGRAM(S): 20 TABLET, DELAYED RELEASE ORAL at 16:52

## 2018-03-02 RX ADMIN — SIMETHICONE 80 MILLIGRAM(S): 80 TABLET, CHEWABLE ORAL at 12:13

## 2018-03-02 RX ADMIN — Medication 100 MILLIGRAM(S): at 12:13

## 2018-03-02 RX ADMIN — MORPHINE SULFATE 2 MILLIGRAM(S): 50 CAPSULE, EXTENDED RELEASE ORAL at 06:08

## 2018-03-02 RX ADMIN — SODIUM CHLORIDE 100 MILLILITER(S): 9 INJECTION, SOLUTION INTRAVENOUS at 12:14

## 2018-03-02 RX ADMIN — Medication 350 MILLIGRAM(S): at 21:43

## 2018-03-02 RX ADMIN — MORPHINE SULFATE 2 MILLIGRAM(S): 50 CAPSULE, EXTENDED RELEASE ORAL at 00:27

## 2018-03-02 RX ADMIN — ZINC SULFATE TAB 220 MG (50 MG ZINC EQUIVALENT) 220 MILLIGRAM(S): 220 (50 ZN) TAB at 12:11

## 2018-03-02 RX ADMIN — PIPERACILLIN AND TAZOBACTAM 200 GRAM(S): 4; .5 INJECTION, POWDER, LYOPHILIZED, FOR SOLUTION INTRAVENOUS at 17:59

## 2018-03-02 RX ADMIN — Medication 250 MILLIGRAM(S): at 16:51

## 2018-03-02 RX ADMIN — PANTOPRAZOLE SODIUM 40 MILLIGRAM(S): 20 TABLET, DELAYED RELEASE ORAL at 06:09

## 2018-03-02 RX ADMIN — Medication 1 APPLICATION(S): at 17:48

## 2018-03-02 RX ADMIN — LACTULOSE 40 GRAM(S): 10 SOLUTION ORAL at 21:44

## 2018-03-02 NOTE — PROGRESS NOTE ADULT - SUBJECTIVE AND OBJECTIVE BOX
infectious diseases progress note:  JUAN JOSE RAMACHANDRAN is a 62yFemale patient    SEPSIS, UTI        ROS:  CONSTITUTIONAL:  Negative fever or chills, feels well, good appetite  EYES:  Negative  blurry vision or double vision  CARDIOVASCULAR:  Negative for chest pain or palpitations  RESPIRATORY:  Negative for cough, wheezing, or SOB   GASTROINTESTINAL:  Negative for nausea, vomiting, diarrhea, constipation, or abdominal pain  GENITOURINARY:  Negative frequency, urgency or dysuria  NEUROLOGIC:  No headache, confusion, dizziness, lightheadedness    Allergies    No Known Allergies    Intolerances        ANTIBIOTICS/RELEVANT:  antimicrobials  piperacillin/tazobactam IVPB. 3.375 Gram(s) IV Intermittent every 12 hours  rifaximin 550 milliGRAM(s) Oral two times a day  vancomycin  IVPB      vancomycin  IVPB 500 milliGRAM(s) IV Intermittent every 24 hours    immunologic:    OTHER:  acetaminophen  Suppository 650 milliGRAM(s) Rectal four times a day PRN  bisacodyl Suppository 10 milliGRAM(s) Rectal daily  carBAMazepine Suspension 600 milliGRAM(s) Oral daily  dextrose 5%. 1000 milliLiter(s) IV Continuous <Continuous>  docusate sodium 100 milliGRAM(s) Oral three times a day  folic acid 1 milliGRAM(s) Oral daily  heparin  Injectable 5000 Unit(s) SubCutaneous every 8 hours  lactobacillus acidophilus 1 Tablet(s) Oral two times a day with meals  lactulose Syrup 30 Gram(s) Oral four times a day  levothyroxine 50 MICROGram(s) Oral daily  morphine  - Injectable 4 milliGRAM(s) IV Push every 6 hours PRN  multivitamin 1 Tablet(s) Oral daily  pantoprazole   Suspension 40 milliGRAM(s) Oral two times a day before meals  senna 2 Tablet(s) Oral at bedtime PRN  silver sulfADIAZINE 1% Cream 1 Application(s) Topical every 12 hours  simethicone 80 milliGRAM(s) Chew daily  sodium bicarbonate 350 milliGRAM(s) Oral three times a day  thiamine 100 milliGRAM(s) Oral daily  valproic  acid Syrup 250 milliGRAM(s) Oral daily  valproic  acid Syrup 500 milliGRAM(s) Oral daily  valproic  acid Syrup 500 milliGRAM(s) Oral at bedtime  zinc sulfate 220 milliGRAM(s) Oral daily      Objective:  T(F): 99 (03-02-18 @ 05:42), Max: 99.6 (03-01-18 @ 13:32)  HR: 109 (03-02-18 @ 05:42) (106 - 109)  BP: 110/64 (03-02-18 @ 05:42) (110/64 - 173/87)  RR: 18 (03-02-18 @ 05:42) (18 - 20)  SpO2: 97% (03-01-18 @ 20:55) (97% - 97%)    PHYSICAL EXAM  Constitutional:Well-developed, well nourished  Eyes:NGUYỄN, EOMI  Ear/Nose/Throat: no oral lesion, no sinus tenderness on percussion	  Neck:no JVD, no lymphadenopathy, supple  Respiratory: CTA radha  Cardiovascular: S1S2 RRR, no murmurs  Gastrointestinal:soft, (+) BS, no HSM  Extremities:no e/e/c    03-02    143  |  117<H>  |  28<H>  ----------------------------<  99  4.1   |  17  |  1.9<H>    Mg     2.2     03-02    TPro  5.3<L>  /  Alb  1.4<L>  /  TBili  0.5  /  DBili  x   /  AST  21  /  ALT  12  /  AlkPhos  197<H>  03-02    Vancomycin Level, Trough: 31.0 ug/mL (02-26-18 @ 21:46)                          8.7    8.03  )-----------( 235      ( 02 Mar 2018 04:36 )             28.2           Culture - Blood (collected 28 Feb 2018 08:30)  Source: .Blood Blood  Preliminary Report (01 Mar 2018 16:02):    No growth to date.    Culture - Blood (collected 26 Feb 2018 11:46)  Source: .Blood None  Preliminary Report (28 Feb 2018 01:02):    No growth to date.    Culture - Blood (collected 24 Feb 2018 13:16)  Source: .Blood Blood  Final Report (02 Mar 2018 01:00):    No growth at 5 days.    Culture - Urine (collected 24 Feb 2018 12:28)  Source: .Urine Catheterized  Final Report (25 Feb 2018 23:24):    50,000 - 99,000 CFU/mL Presumptive Candida albicans

## 2018-03-02 NOTE — PROGRESS NOTE ADULT - SUBJECTIVE AND OBJECTIVE BOX
Patient is a 62y old  Female who presents with a chief complaint of altered mental status (24 Feb 2018 18:44)   Patient seen and examined at bedside. She is still altered and non-verbal.    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  Chronic kidney disease (CKD), stage IV (severe)  Psychosis  Bedridden  Tyson catheter in place on admission  Osteomyelitis of sacrum  Sacral ulcer  Osteoporosis  Diabetes mellitus  Seizure disorder  Bipolar affective disorder  Status post debridement: of sacral ulcer      MEDICATIONS  (STANDING):  bisacodyl Suppository 10 milliGRAM(s) Rectal daily  carBAMazepine Suspension 600 milliGRAM(s) Oral daily  dextrose 5%. 1000 milliLiter(s) (100 mL/Hr) IV Continuous <Continuous>  docusate sodium 100 milliGRAM(s) Oral three times a day  folic acid 1 milliGRAM(s) Oral daily  heparin  Injectable 5000 Unit(s) SubCutaneous every 8 hours  lactobacillus acidophilus 1 Tablet(s) Oral two times a day with meals  lactulose Syrup 30 Gram(s) Oral four times a day  levothyroxine 50 MICROGram(s) Oral daily  multivitamin 1 Tablet(s) Oral daily  pantoprazole   Suspension 40 milliGRAM(s) Oral two times a day before meals  piperacillin/tazobactam IVPB. 3.375 Gram(s) IV Intermittent every 12 hours  rifaximin 550 milliGRAM(s) Oral two times a day  silver sulfADIAZINE 1% Cream 1 Application(s) Topical every 12 hours  simethicone 80 milliGRAM(s) Chew daily  sodium bicarbonate 350 milliGRAM(s) Oral three times a day  thiamine 100 milliGRAM(s) Oral daily  valproic  acid Syrup 250 milliGRAM(s) Oral daily  valproic  acid Syrup 500 milliGRAM(s) Oral daily  valproic  acid Syrup 500 milliGRAM(s) Oral at bedtime  zinc sulfate 220 milliGRAM(s) Oral daily    MEDICATIONS  (PRN):  acetaminophen  Suppository 650 milliGRAM(s) Rectal four times a day PRN For Temp greater than 38 C (100.4 F)  morphine  - Injectable 4 milliGRAM(s) IV Push every 6 hours PRN Moderate Pain (4 - 6)  senna 2 Tablet(s) Oral at bedtime PRN Constipation      Overnight events:    Vital Signs Last 24 Hrs  T(C): 37.1 (02 Mar 2018 14:08), Max: 37.5 (01 Mar 2018 20:31)  T(F): 98.8 (02 Mar 2018 14:08), Max: 99.5 (01 Mar 2018 20:31)  HR: 101 (02 Mar 2018 14:08) (101 - 109)  BP: 103/62 (02 Mar 2018 14:08) (103/62 - 118/63)  BP(mean): --  RR: 18 (02 Mar 2018 14:08) (18 - 20)  SpO2: 97% (01 Mar 2018 20:55) (97% - 97%)  CAPILLARY BLOOD GLUCOSE  121 (02 Mar 2018 16:09)  100 (02 Mar 2018 11:30)  109 (02 Mar 2018 07:30)  98 (01 Mar 2018 20:57)        I&O's Summary    01 Mar 2018 07:01  -  02 Mar 2018 07:00  --------------------------------------------------------  IN: 4540 mL / OUT: 3100 mL / NET: 1440 mL    02 Mar 2018 07:01  -  02 Mar 2018 17:06  --------------------------------------------------------  IN: 820 mL / OUT: 750 mL / NET: 70 mL        Physical Exam:    GENERAL APPEARANCE:  laying in bed, looks uncomfortable  HEENT: unremarkable  NECK: Neck supple, non-tender  CARDIAC: RRR, Normal S1 and S2. No S3, S4 or murmurs  LUNGS: Clear to auscultation without rales, rhonchi or wheezing.  ABDOMEN: Positive bowel sounds. Soft, nondistended, nontender. No guarding or rebound. No HSM.  MUSCULOSKELETAL: No joint erythema or tenderness.  EXTREMITIES: Less edema. Peripheral pulses intact.  NEUROLOGICAL: non-verbal  SKIN: Skin normal color, texture and turgor with no lesions or eruptions.       Labs:                        8.7    8.03  )-----------( 235      ( 02 Mar 2018 04:36 )             28.2             03-02    143  |  117<H>  |  28<H>  ----------------------------<  99  4.1   |  17  |  1.9<H>    Ca    8.3<L>      02 Mar 2018 04:36  Phos  3.5     03-02  Mg     2.2     03-02    TPro  5.3<L>  /  Alb  1.4<L>  /  TBili  0.5  /  DBili  x   /  AST  21  /  ALT  12  /  AlkPhos  197<H>  03-02    LIVER FUNCTIONS - ( 02 Mar 2018 04:36 )  Alb: 1.4 g/dL / Pro: 5.3 g/dL / ALK PHOS: 197 U/L / ALT: 12 U/L / AST: 21 U/L / GGT: x                       Culture - Blood (collected 28 Feb 2018 08:30)  Source: .Blood Blood  Preliminary Report (01 Mar 2018 16:02):    No growth to date.        Imaging:    < from: [facility] Duplex Upper Ext Vein Scan, Bilat (03.01.18 @ 23:04) >    No evidence of deep vein thrombosis in the left upper extremity. Left   cephalic vein superficial thrombophlebitis

## 2018-03-02 NOTE — PROGRESS NOTE ADULT - SUBJECTIVE AND OBJECTIVE BOX
Patient is a 62y old  Female who presents with a chief complaint of altered mental status (24 Feb 2018 18:44)  Patient was seen and examined.  patient Awake, confused.    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    MEDICATIONS  (STANDING):  bisacodyl Suppository 10 milliGRAM(s) Rectal daily  carBAMazepine Suspension 600 milliGRAM(s) Oral daily  dextrose 5%. 1000 milliLiter(s) (100 mL/Hr) IV Continuous <Continuous>  docusate sodium 100 milliGRAM(s) Oral three times a day  folic acid 1 milliGRAM(s) Oral daily  heparin  Injectable 5000 Unit(s) SubCutaneous every 8 hours  lactobacillus acidophilus 1 Tablet(s) Oral two times a day with meals  lactulose Syrup 30 Gram(s) Oral four times a day  levothyroxine 50 MICROGram(s) Oral daily  multivitamin 1 Tablet(s) Oral daily  pantoprazole   Suspension 40 milliGRAM(s) Oral two times a day before meals  piperacillin/tazobactam IVPB. 3.375 Gram(s) IV Intermittent every 12 hours  rifaximin 550 milliGRAM(s) Oral two times a day  silver sulfADIAZINE 1% Cream 1 Application(s) Topical every 12 hours  simethicone 80 milliGRAM(s) Chew daily  sodium bicarbonate 350 milliGRAM(s) Oral three times a day  thiamine 100 milliGRAM(s) Oral daily  valproic  acid Syrup 250 milliGRAM(s) Oral daily  valproic  acid Syrup 500 milliGRAM(s) Oral daily  valproic  acid Syrup 500 milliGRAM(s) Oral at bedtime  zinc sulfate 220 milliGRAM(s) Oral daily    MEDICATIONS  (PRN):  acetaminophen  Suppository 650 milliGRAM(s) Rectal four times a day PRN For Temp greater than 38 C (100.4 F)  morphine  - Injectable 4 milliGRAM(s) IV Push every 6 hours PRN Moderate Pain (4 - 6)  senna 2 Tablet(s) Oral at bedtime PRN Constipation    T(C): 37.2 (03-02-18 @ 05:42), Max: 37.6 (03-01-18 @ 13:32)  HR: 109 (03-02-18 @ 05:42) (106 - 109)  BP: 110/64 (03-02-18 @ 05:42) (110/64 - 173/87)  RR: 18 (03-02-18 @ 05:42) (18 - 20)  SpO2: 97% (03-01-18 @ 20:55) (97% - 97%)O/E:  Awake, Confused.  HEENT: atraumatic.  Chest: clear.  CVS: SIS2 +, no murmur. tachycardic+  P/A: Soft, BS+  CNS: confused. Moves all ext.  Ext: no edema feet.  Skin: sacral ulcer stage 4  All systems reviewed positive findings as above.                                              8.7<L>  8.03  )-----------( 235      ( 02 Mar 2018 04:36 )             28.2<L>                        9.1<L>  8.47  )-----------( 232      ( 01 Mar 2018 05:27 )             28.4<L>  03-02    143  |  117<H>  |  28<H>  ----------------------------<  99  4.1   |  17  |  1.9<H>  03-01    149<H>  |  118<H>  |  28<H>  ----------------------------<  98  4.1   |  19  |  1.9<H>    Ca    8.3<L>      02 Mar 2018 04:36  Ca    8.5      01 Mar 2018 19:45  Ca    8.3<L>      01 Mar 2018 05:27  Ca    8.2<L>      28 Feb 2018 21:43  Phos  3.5     03-02  Mg     2.2     03-02    TPro  5.3<L>  /  Alb  1.4<L>  /  TBili  0.5  /  DBili  x   /  AST  21  /  ALT  12  /  AlkPhos  197<H>  03-02

## 2018-03-02 NOTE — PROGRESS NOTE ADULT - ASSESSMENT
Afebrile with normal wbc.    S/P Diflucan      2/28 Blood culture negative    Need Vanco trough (pt with increased creatinine)...Just drawn    Continue:  piperacillin/tazobactam IVPB. 3.375 Gram(s) IV Intermittent every 12 hours  rifaximin 550 milliGRAM(s) Oral two times a day     vancomycin  IVPB 500 milliGRAM(s) IV Intermittent every 24 hours

## 2018-03-02 NOTE — PROGRESS NOTE ADULT - ASSESSMENT
62F who is bedridden at [facility] because of MS, who is currently being treated for sacral OM with vancomycin and meropenem  Patient is sent in by [facility] because of altered mental status.     1. Metabolic encephalopathy probably sec to sepsis/ sacral osteomyelitis/UTI with Hyperammonemia  -  CT Head No Cont (02.24.18 @ 16:25) no definite evidence of acute intracranial pathology.  - CT Abdomen and Pelvis No Cont (02.24.18 @ 16:26) Sacral decubitus ulcer with extension to the coccyx demonstrating bony   destruction of the coccyx consistent with osteomyelitis. Surrounding phlegmon measures 6 cm transverse,Trace persistent right hydroureteronephrosis with suggestion of bilateral urothelial thickening likely related to chronic infection.Stable 2.2 cm right adrenal adenoma.Stable diffuse bladder wall thickening and trabeculation.  - EEG - generalized slowing w/ triphasic waves (reportedly) - consistent w/ metabolic encephalopathy    - pt was evaluated by ID ct zosyn, Diflucan, Hold vancomycin.  - Surgery consult  for plegmon- likely related to pressure ulcer, no plan for surgical intervention at this time continue management as per burn  - High  Ammonia level- ct lactulose.ct Xifaxan  -evaluated by Neurology, most likely toxic metabolic encephalopathy     2.Metabolic Acidosis, SALOME on CKDstage 4, hypernatremia.  - pt was evaluated by renal.  - Metabolic acidosis - ct sodium bicarbonate  350mg tid oral, keep HCO3 >22    -Hypernatremia - resolved  daily BMP. ct free water flushes. IV fluids- D5W    3. Possible GI bleed, H/o colon cancer. HB/ HCT stable  - S/p 1 unit PRBC  - pt evaluated by GI recommend- Switch to PPI BID. EGD on urgent basis if HD instability acute drop in Hb or evidence of active GIB  - Monitor CBC    4. Dysphagia- failed Speech and Swallow.  - NGT feeds.  - Aspiration precautions.    5. Seizure Disorder/ Bipolar disorder  - ct tegretol, dapakote.    6. DM type 2   - Monitor Fs.    7. Hypokalemia.  - resolved.    8. Hypothyroidism  - ct synthroid.    9. Multiple Sclerosis with neurogenic bladder  -  chr antony .    10 Sinus tachycardia   - sec to sepsis/pain. increase morphine dose.      10. GI/DVT prophylaxis.    Prognosis guarded.

## 2018-03-02 NOTE — PROGRESS NOTE ADULT - ASSESSMENT
Patient is a 62y old Female with PMH of MS and being bed-bound, CKD IV, chronic sacral osteo, recently diagnosed colon CA at [facility], and seizure disorder who presents with a chief complaint of altered mental status (24 Feb 2018 18:44). Patient's daughter/healthcare proxy present at bedside. She reports patient's mental status has gradually declined since October after repeated hospitalizations for lung infections, UTIs, and the chronic sacral ulcer. Patient's hospital course complicated by metabolic acidosis and sepsis. Patient is still non-verbal and altered. As per daughter, patient more verbal a week prior to admission.    1. Metabolic encephalopathy probably 2/2 sepsis/ sacral osteomyelitis/UTI 2/2 chronic indwelling antony  - CT Head No Cont (02.24.18 @ 16:25): negative  - CT Abdomen and Pelvis No Cont (02.24.18 @ 16:26) Sacral decubitus ulcer with extension to the coccyx demonstrating bony destruction of the coccyx consistent with osteomyelitis. Surrounding phlegmon measures 6 cm transverse. Trace persistent right hydroureteronephrosis with suggestion of bilateral urothelial thickening likely related to chronic infection.Stable 2.2 cm right adrenal adenoma.Stable diffuse bladder wall thickening and trabeculation.  - EEG showed generalized slowing w/ triphasic waves (reportedly) - consistent w/ metabolic encephalopathy  - As per ID, c/w Vanco and Zosyn  - Surgery not planning any intervention for phlegmon. Burn not planning any intervention for now.  - Ammonia elevated - increased lactulose, titrate to 3-4 BMs per day. C/w Rifaximin.  - Morphine increased to 4mg q6h    2. Hypernatremia, resolving:  - Nephro following: recommend keep bicarb >22, started pt on oral bicarb 350mg TID. F/U iron studies, phosphorous, iPTH  - Pt on D5  - daily BMP    3. AMS, can't take PO  - NGT feeds: Glucerna  - Aspiration precautions.    4. Seizure Disorder/ Bipolar disorder  - Valproic acid level 10; Carbamazepine level 3.4.   - As per Neuro, Valproic acid increased to 500mg in the morning and at bedtime and 250mg in between. Tegretol increased to 600mg QD. Recheck levels in 2 days.    5. Sigmoid Colon AdenoCA  - GI following, no intervention  - Hemoglobin stable  - on protonix  - EGD done at [facility] on 1/28/18 showed 4cm semi-circumferential mass in sigmoid colon, which was biopsied - reports show moderately differentiated adenoCA  - Of note, pt is on iron but will monitor CBC  - pt to follow up at [facility] after discharge    5. DM type 2   - Monitor FS.    6. Hypothyroidism  - c/w synthroid.    7. Multiple Sclerosis with neurogenic bladder  - Chronic antony     8. B/L upper extremity edema  - Venous duplex showed evidence of superficial thrombophlebitis in left arm distal to PICC line, results above.    9. GI/DVT prophylaxis.    Prognosis guarded.  DNR Patient is a 62y old Female with PMH of MS and being bed-bound, CKD IV, chronic sacral osteo, recently diagnosed colon CA at [facility], and seizure disorder who presents with a chief complaint of altered mental status (24 Feb 2018 18:44). Patient's daughter/healthcare proxy present at bedside. She reports patient's mental status has gradually declined since October after repeated hospitalizations for lung infections, UTIs, and the chronic sacral ulcer. Patient's hospital course complicated by metabolic acidosis and sepsis. Patient is still non-verbal and altered. As per daughter, patient more verbal a week prior to admission.    1. Metabolic encephalopathy probably 2/2 sepsis/ sacral osteomyelitis/UTI 2/2 chronic indwelling antony  - CT Head No Cont (02.24.18 @ 16:25): negative  - CT Abdomen and Pelvis No Cont (02.24.18 @ 16:26) Sacral decubitus ulcer with extension to the coccyx demonstrating bony destruction of the coccyx consistent with osteomyelitis. Surrounding phlegmon measures 6 cm transverse. Trace persistent right hydroureteronephrosis with suggestion of bilateral urothelial thickening likely related to chronic infection.Stable 2.2 cm right adrenal adenoma.Stable diffuse bladder wall thickening and trabeculation.  - EEG showed generalized slowing w/ triphasic waves (reportedly) - consistent w/ metabolic encephalopathy  - As per ID, c/w Vanco and Zosyn. Vanco dose held due to high vanco trough  - Surgery not planning any intervention for phlegmon. Burn not planning any intervention for now.  - Ammonia elevated - increased lactulose, titrate to 3-4 BMs per day. C/w Rifaximin.  - Morphine increased to 4mg q6h    2. Hypernatremia, resolving:  - Nephro following: recommend keep bicarb >22, started pt on oral bicarb 350mg TID. F/U iron studies, phosphorous, iPTH  - Pt on D5  - daily BMP    3. AMS, can't take PO  - NGT feeds: Glucerna  - Aspiration precautions.    4. Seizure Disorder/ Bipolar disorder  - Valproic acid level 10; Carbamazepine level 3.4.   - As per Neuro, Valproic acid increased to 500mg in the morning and at bedtime and 250mg in between. Tegretol increased to 600mg QD. Recheck levels in 2 days.    5. Sigmoid Colon AdenoCA  - GI following, no intervention  - Hemoglobin stable  - on protonix  - EGD done at [facility] on 1/28/18 showed 4cm semi-circumferential mass in sigmoid colon, which was biopsied - reports show moderately differentiated adenoCA  - Of note, pt is on iron but will monitor CBC  - [facility]    5. DM type 2   - Monitor FS.    6. Hypothyroidism  - c/w synthroid.    7. Multiple Sclerosis with neurogenic bladder  - Chronic antony     8. B/L upper extremity edema  - Venous duplex showed evidence of superficial thrombophlebitis in left arm distal to PICC line, results above.    9. GI/DVT prophylaxis.    Prognosis guarded.  DNR

## 2018-03-03 LAB
ALBUMIN SERPL ELPH-MCNC: 1.4 G/DL — LOW (ref 3–5.5)
ALP SERPL-CCNC: 203 U/L — HIGH (ref 30–115)
ALT FLD-CCNC: 13 U/L — SIGNIFICANT CHANGE UP (ref 0–41)
AMMONIA BLD-MCNC: 116 MMOL/L — CRITICAL HIGH (ref 11–35)
ANION GAP SERPL CALC-SCNC: 13 MMOL/L — SIGNIFICANT CHANGE UP (ref 7–14)
AST SERPL-CCNC: 21 U/L — SIGNIFICANT CHANGE UP (ref 0–41)
BASOPHILS # BLD AUTO: 0.06 K/UL — SIGNIFICANT CHANGE UP (ref 0–0.2)
BASOPHILS NFR BLD AUTO: 0.7 % — SIGNIFICANT CHANGE UP (ref 0–1)
BILIRUB SERPL-MCNC: 0.4 MG/DL — SIGNIFICANT CHANGE UP (ref 0.2–1.2)
BUN SERPL-MCNC: 26 MG/DL — HIGH (ref 10–20)
CALCIUM SERPL-MCNC: 8.6 MG/DL — SIGNIFICANT CHANGE UP (ref 8.5–10.1)
CHLORIDE SERPL-SCNC: 113 MMOL/L — HIGH (ref 98–110)
CO2 SERPL-SCNC: 17 MMOL/L — SIGNIFICANT CHANGE UP (ref 17–32)
CREAT SERPL-MCNC: 1.9 MG/DL — HIGH (ref 0.7–1.5)
EOSINOPHIL # BLD AUTO: 0.3 K/UL — SIGNIFICANT CHANGE UP (ref 0–0.7)
EOSINOPHIL NFR BLD AUTO: 3.4 % — SIGNIFICANT CHANGE UP (ref 0–8)
GLUCOSE SERPL-MCNC: 95 MG/DL — SIGNIFICANT CHANGE UP (ref 70–110)
HCT VFR BLD CALC: 29.7 % — LOW (ref 37–47)
HGB BLD-MCNC: 9.2 G/DL — LOW (ref 12–16)
IMM GRANULOCYTES NFR BLD AUTO: 0.5 % — HIGH (ref 0.1–0.3)
LYMPHOCYTES # BLD AUTO: 2.81 K/UL — SIGNIFICANT CHANGE UP (ref 1.2–3.4)
LYMPHOCYTES # BLD AUTO: 32 % — SIGNIFICANT CHANGE UP (ref 20.5–51.1)
MAGNESIUM SERPL-MCNC: 2.1 MG/DL — SIGNIFICANT CHANGE UP (ref 1.8–2.4)
MCHC RBC-ENTMCNC: 28.6 PG — SIGNIFICANT CHANGE UP (ref 27–31)
MCHC RBC-ENTMCNC: 31 G/DL — LOW (ref 32–37)
MCV RBC AUTO: 92.2 FL — SIGNIFICANT CHANGE UP (ref 81–99)
MONOCYTES # BLD AUTO: 0.89 K/UL — HIGH (ref 0.1–0.6)
MONOCYTES NFR BLD AUTO: 10.1 % — HIGH (ref 1.7–9.3)
NEUTROPHILS # BLD AUTO: 4.67 K/UL — SIGNIFICANT CHANGE UP (ref 1.4–6.5)
NEUTROPHILS NFR BLD AUTO: 53.3 % — SIGNIFICANT CHANGE UP (ref 42.2–75.2)
NRBC # BLD: 0 /100 WBCS — SIGNIFICANT CHANGE UP (ref 0–0)
PLATELET # BLD AUTO: 238 K/UL — SIGNIFICANT CHANGE UP (ref 130–400)
POTASSIUM SERPL-MCNC: 4 MMOL/L — SIGNIFICANT CHANGE UP (ref 3.5–5)
POTASSIUM SERPL-SCNC: 4 MMOL/L — SIGNIFICANT CHANGE UP (ref 3.5–5)
PROT SERPL-MCNC: 5.7 G/DL — LOW (ref 6–8)
RBC # BLD: 3.22 M/UL — LOW (ref 4.2–5.4)
RBC # FLD: 17.7 % — HIGH (ref 11.5–14.5)
SODIUM SERPL-SCNC: 143 MMOL/L — SIGNIFICANT CHANGE UP (ref 135–146)
WBC # BLD: 8.77 K/UL — SIGNIFICANT CHANGE UP (ref 4.8–10.8)
WBC # FLD AUTO: 8.77 K/UL — SIGNIFICANT CHANGE UP (ref 4.8–10.8)

## 2018-03-03 RX ADMIN — HEPARIN SODIUM 5000 UNIT(S): 5000 INJECTION INTRAVENOUS; SUBCUTANEOUS at 13:03

## 2018-03-03 RX ADMIN — Medication 100 MILLIGRAM(S): at 22:42

## 2018-03-03 RX ADMIN — Medication 1 TABLET(S): at 16:24

## 2018-03-03 RX ADMIN — Medication 1 TABLET(S): at 11:42

## 2018-03-03 RX ADMIN — Medication 100 MILLIGRAM(S): at 17:15

## 2018-03-03 RX ADMIN — PIPERACILLIN AND TAZOBACTAM 200 GRAM(S): 4; .5 INJECTION, POWDER, LYOPHILIZED, FOR SOLUTION INTRAVENOUS at 06:44

## 2018-03-03 RX ADMIN — PIPERACILLIN AND TAZOBACTAM 200 GRAM(S): 4; .5 INJECTION, POWDER, LYOPHILIZED, FOR SOLUTION INTRAVENOUS at 17:20

## 2018-03-03 RX ADMIN — HEPARIN SODIUM 5000 UNIT(S): 5000 INJECTION INTRAVENOUS; SUBCUTANEOUS at 06:34

## 2018-03-03 RX ADMIN — LACTULOSE 40 GRAM(S): 10 SOLUTION ORAL at 13:04

## 2018-03-03 RX ADMIN — Medication 250 MILLIGRAM(S): at 13:02

## 2018-03-03 RX ADMIN — Medication 10 MILLIGRAM(S): at 11:52

## 2018-03-03 RX ADMIN — MORPHINE SULFATE 4 MILLIGRAM(S): 50 CAPSULE, EXTENDED RELEASE ORAL at 06:18

## 2018-03-03 RX ADMIN — Medication 500 MILLIGRAM(S): at 22:35

## 2018-03-03 RX ADMIN — Medication 100 MILLIGRAM(S): at 11:44

## 2018-03-03 RX ADMIN — HEPARIN SODIUM 5000 UNIT(S): 5000 INJECTION INTRAVENOUS; SUBCUTANEOUS at 22:42

## 2018-03-03 RX ADMIN — SIMETHICONE 80 MILLIGRAM(S): 80 TABLET, CHEWABLE ORAL at 11:43

## 2018-03-03 RX ADMIN — Medication 1 APPLICATION(S): at 17:14

## 2018-03-03 RX ADMIN — LACTULOSE 40 GRAM(S): 10 SOLUTION ORAL at 22:42

## 2018-03-03 RX ADMIN — Medication 350 MILLIGRAM(S): at 22:35

## 2018-03-03 RX ADMIN — Medication 100 MILLIGRAM(S): at 13:03

## 2018-03-03 RX ADMIN — Medication 1 MILLIGRAM(S): at 11:49

## 2018-03-03 RX ADMIN — PANTOPRAZOLE SODIUM 40 MILLIGRAM(S): 20 TABLET, DELAYED RELEASE ORAL at 16:25

## 2018-03-03 RX ADMIN — Medication 1 APPLICATION(S): at 06:59

## 2018-03-03 RX ADMIN — Medication 600 MILLIGRAM(S): at 12:04

## 2018-03-03 RX ADMIN — ZINC SULFATE TAB 220 MG (50 MG ZINC EQUIVALENT) 220 MILLIGRAM(S): 220 (50 ZN) TAB at 11:47

## 2018-03-03 RX ADMIN — Medication 350 MILLIGRAM(S): at 13:01

## 2018-03-03 NOTE — PROGRESS NOTE ADULT - ASSESSMENT
stage 4 pressure sore sacrum--> no infection--> santyl ointment/ dakins wet to dry dressing change bid--> no surgery needed at this time

## 2018-03-03 NOTE — PROGRESS NOTE ADULT - SUBJECTIVE AND OBJECTIVE BOX
Patient is a 62y old  Female who presents with a chief complaint of altered mental status (24 Feb 2018 18:44)  Patient was seen and examined.  patient Awake, confused.    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    MEDICATIONS  (STANDING):  bisacodyl Suppository 10 milliGRAM(s) Rectal daily  carBAMazepine Suspension 600 milliGRAM(s) Oral daily  dextrose 5%. 1000 milliLiter(s) (100 mL/Hr) IV Continuous <Continuous>  docusate sodium 100 milliGRAM(s) Oral three times a day  folic acid 1 milliGRAM(s) Oral daily  heparin  Injectable 5000 Unit(s) SubCutaneous every 8 hours  lactobacillus acidophilus 1 Tablet(s) Oral two times a day with meals  lactulose Syrup 40 Gram(s) Enteral Tube three times a day  levothyroxine 50 MICROGram(s) Oral daily  multivitamin 1 Tablet(s) Oral daily  pantoprazole   Suspension 40 milliGRAM(s) Oral two times a day before meals  piperacillin/tazobactam IVPB. 3.375 Gram(s) IV Intermittent every 12 hours  rifaximin 550 milliGRAM(s) Oral two times a day  silver sulfADIAZINE 1% Cream 1 Application(s) Topical every 12 hours  simethicone 80 milliGRAM(s) Chew daily  sodium bicarbonate 350 milliGRAM(s) Oral three times a day  thiamine 100 milliGRAM(s) Oral daily  valproic  acid Syrup 250 milliGRAM(s) Oral daily  valproic  acid Syrup 500 milliGRAM(s) Oral daily  valproic  acid Syrup 500 milliGRAM(s) Oral at bedtime  zinc sulfate 220 milliGRAM(s) Oral daily    MEDICATIONS  (PRN):  acetaminophen  Suppository 650 milliGRAM(s) Rectal four times a day PRN For Temp greater than 38 C (100.4 F)  morphine  - Injectable 4 milliGRAM(s) IV Push every 6 hours PRN Moderate Pain (4 - 6)  senna 2 Tablet(s) Oral at bedtime PRN Constipation    Vital Signs Last 24 Hrs  T(C): 36.5  T(F): 97.7  HR: 102  BP: 118/69  BP(mean): --  RR: 18  SpO2: --    O/E:  Awake, Confused.  HEENT: atraumatic.  Chest: clear  CVS: SIS2 +, no murmur. tachycardic+  P/A: Soft, BS+  CNS: confused. Moves all ext.  Ext: no edema feet.  Skin: sacral ulcer stage 4  All systems reviewed positive findings as above.                                        9.2<L>  8.77  )-----------( 238      ( 03 Mar 2018 05:59 )             29.7<L>                        8.7<L>  8.03  )-----------( 235      ( 02 Mar 2018 04:36 )             28.2<L>  03-03    143  |  113<H>  |  26<H>  ----------------------------<  95  4.0   |  17  |  1.9<H>  03-02    145  |  113<H>  |  29<H>  ----------------------------<  131<H>  4.0   |  17  |  2.0<H>    Ca    8.6      03 Mar 2018 05:59  Ca    8.6      02 Mar 2018 22:55  Ca    8.3<L>      02 Mar 2018 04:36  Ca    8.5      01 Mar 2018 19:45  Phos  3.5     03-02  Mg     2.1     03-03    TPro  5.7<L>  /  Alb  1.4<L>  /  TBili  0.4  /  DBili  x   /  AST  21  /  ALT  13  /  AlkPhos  203<H>  03-03  TPro  5.3<L>  /  Alb  1.4<L>  /  TBili  0.5  /  DBili  x   /  AST  21  /  ALT  12  /  AlkPhos  197<H>  03-02    Ammonia, Serum: 116.

## 2018-03-03 NOTE — PROGRESS NOTE ADULT - ASSESSMENT
62F who is bedridden at [facility] because of MS, who is currently being treated for sacral OM with vancomycin and meropenem  Patient is sent in by [facility] because of altered mental status.     1. Metabolic encephalopathy probably sec to sepsis/ sacral osteomyelitis/UTI with Hyperammonemia  -  CT Head No Cont (02.24.18 @ 16:25) no definite evidence of acute intracranial pathology.  - CT Abdomen and Pelvis No Cont (02.24.18 @ 16:26) Sacral decubitus ulcer with extension to the coccyx demonstrating bony   destruction of the coccyx consistent with osteomyelitis. Surrounding phlegmon measures 6 cm transverse,Trace persistent right hydroureteronephrosis with suggestion of bilateral urothelial thickening likely related to chronic infection.Stable 2.2 cm right adrenal adenoma.Stable diffuse bladder wall thickening and trabeculation.  - EEG - generalized slowing w/ triphasic waves (reportedly) - consistent w/ metabolic encephalopathy    - pt was evaluated by ID ct zosyn. Hold vancomycin.  - Surgery consult  for plegmon- likely related to pressure ulcer, no plan for surgical intervention at this time continue management as per burn  - High  Ammonia level- ct lactulose.ct Xifaxan  -evaluated by Neurology, most likely toxic metabolic encephalopathy     2.Metabolic Acidosis, SALOME on CKDstage 4, hypernatremia.  - pt was evaluated by renal.  - Metabolic acidosis - ct sodium bicarbonate  350mg tid oral, keep HCO3 >22    -Hypernatremia - resolved  daily BMP. ct free water flushes. IV fluids- D5W    3. Possible GI bleed, H/o colon cancer. HB/ HCT stable  - S/p 1 unit PRBC  - pt evaluated by GI recommend- Switch to PPI BID. EGD on urgent basis if HD instability acute drop in Hb or evidence of active GIB  - Monitor CBC    4. Dysphagia- failed Speech and Swallow.  - NGT feeds.  - Aspiration precautions.    5. Seizure Disorder/ Bipolar disorder  - ct tegretol, dapakote.    6. DM type 2   - Monitor Fs.    7. Hypokalemia.  - resolved.    8. Hypothyroidism  - ct synthroid.    9. Multiple Sclerosis with neurogenic bladder  -  chr antony .    10 Sinus tachycardia   - sec to sepsis/pain.       10. GI/DVT prophylaxis.    Prognosis guarded.

## 2018-03-04 LAB
ALBUMIN SERPL ELPH-MCNC: 1.4 G/DL — LOW (ref 3–5.5)
ALP SERPL-CCNC: 176 U/L — HIGH (ref 30–115)
ALT FLD-CCNC: 10 U/L — SIGNIFICANT CHANGE UP (ref 0–41)
ANION GAP SERPL CALC-SCNC: 12 MMOL/L — SIGNIFICANT CHANGE UP (ref 7–14)
AST SERPL-CCNC: 21 U/L — SIGNIFICANT CHANGE UP (ref 0–41)
BASOPHILS # BLD AUTO: 0.05 K/UL — SIGNIFICANT CHANGE UP (ref 0–0.2)
BASOPHILS NFR BLD AUTO: 0.6 % — SIGNIFICANT CHANGE UP (ref 0–1)
BILIRUB SERPL-MCNC: 0.6 MG/DL — SIGNIFICANT CHANGE UP (ref 0.2–1.2)
BUN SERPL-MCNC: 27 MG/DL — HIGH (ref 10–20)
CALCIUM SERPL-MCNC: 8.6 MG/DL — SIGNIFICANT CHANGE UP (ref 8.5–10.1)
CARBAMAZEPINE SERPL-MCNC: 7.3 UG/ML — SIGNIFICANT CHANGE UP (ref 4–12)
CHLORIDE SERPL-SCNC: 110 MMOL/L — SIGNIFICANT CHANGE UP (ref 98–110)
CO2 SERPL-SCNC: 16 MMOL/L — LOW (ref 17–32)
CREAT SERPL-MCNC: 2 MG/DL — HIGH (ref 0.7–1.5)
CULTURE RESULTS: SIGNIFICANT CHANGE UP
EOSINOPHIL # BLD AUTO: 0.26 K/UL — SIGNIFICANT CHANGE UP (ref 0–0.7)
EOSINOPHIL NFR BLD AUTO: 3.2 % — SIGNIFICANT CHANGE UP (ref 0–8)
GLUCOSE SERPL-MCNC: 90 MG/DL — SIGNIFICANT CHANGE UP (ref 70–110)
HCT VFR BLD CALC: 26.6 % — LOW (ref 37–47)
HGB BLD-MCNC: 8.2 G/DL — LOW (ref 12–16)
IMM GRANULOCYTES NFR BLD AUTO: 0.5 % — HIGH (ref 0.1–0.3)
LYMPHOCYTES # BLD AUTO: 2.42 K/UL — SIGNIFICANT CHANGE UP (ref 1.2–3.4)
LYMPHOCYTES # BLD AUTO: 29.5 % — SIGNIFICANT CHANGE UP (ref 20.5–51.1)
MCHC RBC-ENTMCNC: 28.2 PG — SIGNIFICANT CHANGE UP (ref 27–31)
MCHC RBC-ENTMCNC: 30.8 G/DL — LOW (ref 32–37)
MCV RBC AUTO: 91.4 FL — SIGNIFICANT CHANGE UP (ref 81–99)
MONOCYTES # BLD AUTO: 0.71 K/UL — HIGH (ref 0.1–0.6)
MONOCYTES NFR BLD AUTO: 8.6 % — SIGNIFICANT CHANGE UP (ref 1.7–9.3)
NEUTROPHILS # BLD AUTO: 4.73 K/UL — SIGNIFICANT CHANGE UP (ref 1.4–6.5)
NEUTROPHILS NFR BLD AUTO: 57.6 % — SIGNIFICANT CHANGE UP (ref 42.2–75.2)
NRBC # BLD: 0 /100 WBCS — SIGNIFICANT CHANGE UP (ref 0–0)
PLATELET # BLD AUTO: 236 K/UL — SIGNIFICANT CHANGE UP (ref 130–400)
POTASSIUM SERPL-MCNC: 4.3 MMOL/L — SIGNIFICANT CHANGE UP (ref 3.5–5)
POTASSIUM SERPL-SCNC: 4.3 MMOL/L — SIGNIFICANT CHANGE UP (ref 3.5–5)
PROT SERPL-MCNC: 5.3 G/DL — LOW (ref 6–8)
RBC # BLD: 2.91 M/UL — LOW (ref 4.2–5.4)
RBC # FLD: 17.8 % — HIGH (ref 11.5–14.5)
SODIUM SERPL-SCNC: 138 MMOL/L — SIGNIFICANT CHANGE UP (ref 135–146)
SPECIMEN SOURCE: SIGNIFICANT CHANGE UP
VALPROATE SERPL-MCNC: 26 UG/ML — LOW (ref 50–100)
WBC # BLD: 8.21 K/UL — SIGNIFICANT CHANGE UP (ref 4.8–10.8)
WBC # FLD AUTO: 8.21 K/UL — SIGNIFICANT CHANGE UP (ref 4.8–10.8)

## 2018-03-04 RX ORDER — SODIUM BICARBONATE 1 MEQ/ML
325 SYRINGE (ML) INTRAVENOUS
Qty: 0 | Refills: 0 | Status: DISCONTINUED | OUTPATIENT
Start: 2018-03-04 | End: 2018-03-05

## 2018-03-04 RX ADMIN — HEPARIN SODIUM 5000 UNIT(S): 5000 INJECTION INTRAVENOUS; SUBCUTANEOUS at 13:31

## 2018-03-04 RX ADMIN — Medication 1 APPLICATION(S): at 05:19

## 2018-03-04 RX ADMIN — PIPERACILLIN AND TAZOBACTAM 200 GRAM(S): 4; .5 INJECTION, POWDER, LYOPHILIZED, FOR SOLUTION INTRAVENOUS at 05:17

## 2018-03-04 RX ADMIN — PIPERACILLIN AND TAZOBACTAM 200 GRAM(S): 4; .5 INJECTION, POWDER, LYOPHILIZED, FOR SOLUTION INTRAVENOUS at 17:55

## 2018-03-04 RX ADMIN — LACTULOSE 40 GRAM(S): 10 SOLUTION ORAL at 05:18

## 2018-03-04 RX ADMIN — Medication 1 TABLET(S): at 11:05

## 2018-03-04 RX ADMIN — HEPARIN SODIUM 5000 UNIT(S): 5000 INJECTION INTRAVENOUS; SUBCUTANEOUS at 05:18

## 2018-03-04 RX ADMIN — Medication 500 MILLIGRAM(S): at 05:19

## 2018-03-04 RX ADMIN — Medication 600 MILLIGRAM(S): at 11:04

## 2018-03-04 RX ADMIN — Medication 100 MILLIGRAM(S): at 11:11

## 2018-03-04 RX ADMIN — PANTOPRAZOLE SODIUM 40 MILLIGRAM(S): 20 TABLET, DELAYED RELEASE ORAL at 06:12

## 2018-03-04 RX ADMIN — Medication 100 MILLIGRAM(S): at 05:17

## 2018-03-04 RX ADMIN — HEPARIN SODIUM 5000 UNIT(S): 5000 INJECTION INTRAVENOUS; SUBCUTANEOUS at 21:30

## 2018-03-04 RX ADMIN — SODIUM CHLORIDE 100 MILLILITER(S): 9 INJECTION, SOLUTION INTRAVENOUS at 03:11

## 2018-03-04 RX ADMIN — Medication 325 MILLIGRAM(S): at 20:18

## 2018-03-04 RX ADMIN — Medication 50 MICROGRAM(S): at 05:17

## 2018-03-04 RX ADMIN — Medication 350 MILLIGRAM(S): at 13:31

## 2018-03-04 RX ADMIN — SIMETHICONE 80 MILLIGRAM(S): 80 TABLET, CHEWABLE ORAL at 11:11

## 2018-03-04 RX ADMIN — Medication 1 APPLICATION(S): at 17:55

## 2018-03-04 RX ADMIN — Medication 1 TABLET(S): at 07:51

## 2018-03-04 RX ADMIN — LACTULOSE 40 GRAM(S): 10 SOLUTION ORAL at 21:29

## 2018-03-04 RX ADMIN — Medication 1 TABLET(S): at 17:29

## 2018-03-04 RX ADMIN — Medication 1 MILLIGRAM(S): at 11:05

## 2018-03-04 RX ADMIN — LACTULOSE 40 GRAM(S): 10 SOLUTION ORAL at 13:30

## 2018-03-04 RX ADMIN — Medication 100 MILLIGRAM(S): at 13:31

## 2018-03-04 RX ADMIN — MORPHINE SULFATE 4 MILLIGRAM(S): 50 CAPSULE, EXTENDED RELEASE ORAL at 11:12

## 2018-03-04 RX ADMIN — Medication 350 MILLIGRAM(S): at 05:17

## 2018-03-04 RX ADMIN — Medication 500 MILLIGRAM(S): at 21:28

## 2018-03-04 RX ADMIN — Medication 250 MILLIGRAM(S): at 17:54

## 2018-03-04 RX ADMIN — ZINC SULFATE TAB 220 MG (50 MG ZINC EQUIVALENT) 220 MILLIGRAM(S): 220 (50 ZN) TAB at 11:12

## 2018-03-04 RX ADMIN — MORPHINE SULFATE 4 MILLIGRAM(S): 50 CAPSULE, EXTENDED RELEASE ORAL at 16:25

## 2018-03-04 RX ADMIN — Medication 10 MILLIGRAM(S): at 11:04

## 2018-03-04 RX ADMIN — Medication 325 MILLIGRAM(S): at 23:14

## 2018-03-04 NOTE — PROGRESS NOTE ADULT - SUBJECTIVE AND OBJECTIVE BOX
Patient is a 62y old  Female who presents with a chief complaint of altered mental status (24 Feb 2018 18:44)  Patient was seen and examined.  patient Awake, confused.    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    MEDICATIONS  (STANDING):  bisacodyl Suppository 10 milliGRAM(s) Rectal daily  carBAMazepine Suspension 600 milliGRAM(s) Oral daily  dextrose 5%. 1000 milliLiter(s) (100 mL/Hr) IV Continuous <Continuous>  docusate sodium 100 milliGRAM(s) Oral three times a day  folic acid 1 milliGRAM(s) Oral daily  heparin  Injectable 5000 Unit(s) SubCutaneous every 8 hours  lactobacillus acidophilus 1 Tablet(s) Oral two times a day with meals  lactulose Syrup 40 Gram(s) Enteral Tube three times a day  levothyroxine 50 MICROGram(s) Oral daily  multivitamin 1 Tablet(s) Oral daily  pantoprazole   Suspension 40 milliGRAM(s) Oral two times a day before meals  piperacillin/tazobactam IVPB. 3.375 Gram(s) IV Intermittent every 12 hours  rifaximin 550 milliGRAM(s) Oral two times a day  silver sulfADIAZINE 1% Cream 1 Application(s) Topical every 12 hours  simethicone 80 milliGRAM(s) Chew daily  sodium bicarbonate 350 milliGRAM(s) Oral three times a day  thiamine 100 milliGRAM(s) Oral daily  valproic  acid Syrup 250 milliGRAM(s) Oral daily  valproic  acid Syrup 500 milliGRAM(s) Oral daily  valproic  acid Syrup 500 milliGRAM(s) Oral at bedtime  zinc sulfate 220 milliGRAM(s) Oral daily    MEDICATIONS  (PRN):  acetaminophen  Suppository 650 milliGRAM(s) Rectal four times a day PRN For Temp greater than 38 C (100.4 F)  morphine  - Injectable 4 milliGRAM(s) IV Push every 6 hours PRN Moderate Pain (4 - 6)  senna 2 Tablet(s) Oral at bedtime PRN Constipation      Vital Signs Last 24 Hrs  T(C): 36.2  T(F): 97.1  HR: 99  BP: 97/52  BP(mean): --  RR: 20  SpO2: --  O/E:  Awake, Confused.  HEENT: atraumatic.  Chest: clear  CVS: SIS2 +, no murmur. tachycardic+  P/A: Soft, BS+  CNS: confused. Moves all ext.  Ext: no edema feet.  Skin: sacral ulcer stage 4  All systems reviewed positive findings as above.                                        9.2<L>  8.77  )-----------( 238      ( 03 Mar 2018 05:59 )             29.7<L>                        8.7<L>  8.03  )-----------( 235      ( 02 Mar 2018 04:36 )             28.2<L>  03-03    143  |  113<H>  |  26<H>  ----------------------------<  95  4.0   |  17  |  1.9<H>  03-02    145  |  113<H>  |  29<H>  ----------------------------<  131<H>  4.0   |  17  |  2.0<H>    Ca    8.6      03 Mar 2018 05:59  Ca    8.6      02 Mar 2018 22:55  Ca    8.3<L>      02 Mar 2018 04:36  Ca    8.5      01 Mar 2018 19:45  Phos  3.5     03-02  Mg     2.1     03-03    TPro  5.7<L>  /  Alb  1.4<L>  /  TBili  0.4  /  DBili  x   /  AST  21  /  ALT  13  /  AlkPhos  203<H>  03-03  TPro  5.3<L>  /  Alb  1.4<L>  /  TBili  0.5  /  DBili  x   /  AST  21  /  ALT  12  /  AlkPhos  197<H>  03-02    Ammonia, Serum: 116.

## 2018-03-04 NOTE — PROGRESS NOTE ADULT - ASSESSMENT
62F who is bedridden at [facility] because of MS, who is currently being treated for sacral OM with vancomycin and meropenem  Patient is sent in by [facility] because of altered mental status.     1. Metabolic encephalopathy probably sec to sepsis/ sacral osteomyelitis/UTI with Hyperammonemia  -  CT Head No Cont (02.24.18 @ 16:25) no definite evidence of acute intracranial pathology.  - CT Abdomen and Pelvis No Cont (02.24.18 @ 16:26) Sacral decubitus ulcer with extension to the coccyx demonstrating bony   destruction of the coccyx consistent with osteomyelitis. Surrounding phlegmon measures 6 cm transverse,Trace persistent right hydroureteronephrosis with suggestion of bilateral urothelial thickening likely related to chronic infection.Stable 2.2 cm right adrenal adenoma.Stable diffuse bladder wall thickening and trabeculation.  - EEG - generalized slowing w/ triphasic waves (reportedly) - consistent w/ metabolic encephalopathy    - pt was evaluated by ID ct zosyn. Hold vancomycin.  - Surgery consult  for plegmon- likely related to pressure ulcer, no plan for surgical intervention at this time continue management as per burn  - High  Ammonia level- ct lactulose.ct Xifaxan  -evaluated by Neurology, most likely toxic metabolic encephalopathy     2.Metabolic Acidosis, SALOME on CKDstage 4, hypernatremia.  - pt was evaluated by renal.  - Metabolic acidosis - increase sodium bicarbonate, keep HCO3 >22    -Hypernatremia - resolved  daily BMP. ct free water flushes. IV fluids- D5W    3. Possible GI bleed, H/o colon cancer. HB/ HCT stable  - S/p 1 unit PRBC  - pt evaluated by GI recommend- Switch to PPI BID. EGD on urgent basis if HD instability acute drop in Hb or evidence of active GIB  - Monitor CBC    4. Dysphagia- failed Speech and Swallow.  - NGT feeds.  - Aspiration precautions.    5. Seizure Disorder/ Bipolar disorder  - ct tegretol, dapakote.    6. DM type 2   - Monitor Fs.    7. Hypokalemia.  - resolved.    8. Hypothyroidism  - ct synthroid.    9. Multiple Sclerosis with neurogenic bladder  -  chr antony .    10 Sinus tachycardia   - sec to sepsis/pain.       10. GI/DVT prophylaxis.    Prognosis guarded.

## 2018-03-05 DIAGNOSIS — Z51.5 ENCOUNTER FOR PALLIATIVE CARE: ICD-10-CM

## 2018-03-05 DIAGNOSIS — R52 PAIN, UNSPECIFIED: ICD-10-CM

## 2018-03-05 LAB
ALBUMIN SERPL ELPH-MCNC: 1.4 G/DL — LOW (ref 3–5.5)
ALP SERPL-CCNC: 175 U/L — HIGH (ref 30–115)
ALT FLD-CCNC: 12 U/L — SIGNIFICANT CHANGE UP (ref 0–41)
AMMONIA BLD-MCNC: 192 MMOL/L — CRITICAL HIGH (ref 11–35)
ANION GAP SERPL CALC-SCNC: 19 MMOL/L — HIGH (ref 7–14)
AST SERPL-CCNC: 19 U/L — SIGNIFICANT CHANGE UP (ref 0–41)
BASE EXCESS BLDA CALC-SCNC: -7.4 MMOL/L — LOW (ref -2–2)
BASOPHILS # BLD AUTO: 0.04 K/UL — SIGNIFICANT CHANGE UP (ref 0–0.2)
BASOPHILS NFR BLD AUTO: 0.5 % — SIGNIFICANT CHANGE UP (ref 0–1)
BILIRUB SERPL-MCNC: 0.4 MG/DL — SIGNIFICANT CHANGE UP (ref 0.2–1.2)
BUN SERPL-MCNC: 30 MG/DL — HIGH (ref 10–20)
CALCIUM SERPL-MCNC: 8.7 MG/DL — SIGNIFICANT CHANGE UP (ref 8.5–10.1)
CHLORIDE SERPL-SCNC: 105 MMOL/L — SIGNIFICANT CHANGE UP (ref 98–110)
CO2 SERPL-SCNC: 13 MMOL/L — LOW (ref 17–32)
CREAT SERPL-MCNC: 1.9 MG/DL — HIGH (ref 0.7–1.5)
CULTURE RESULTS: SIGNIFICANT CHANGE UP
EOSINOPHIL # BLD AUTO: 0.23 K/UL — SIGNIFICANT CHANGE UP (ref 0–0.7)
EOSINOPHIL NFR BLD AUTO: 2.8 % — SIGNIFICANT CHANGE UP (ref 0–8)
GLUCOSE SERPL-MCNC: 117 MG/DL — HIGH (ref 70–110)
HCO3 BLDA-SCNC: 17 MMOL/L — LOW (ref 23–27)
HCT VFR BLD CALC: 27.7 % — LOW (ref 37–47)
HGB BLD-MCNC: 8.7 G/DL — LOW (ref 12–16)
HOROWITZ INDEX BLDA+IHG-RTO: 21 — SIGNIFICANT CHANGE UP
IMM GRANULOCYTES NFR BLD AUTO: 0.7 % — HIGH (ref 0.1–0.3)
LACTATE SERPL-SCNC: 1.3 MMOL/L — SIGNIFICANT CHANGE UP (ref 0.5–2.2)
LYMPHOCYTES # BLD AUTO: 2.55 K/UL — SIGNIFICANT CHANGE UP (ref 1.2–3.4)
LYMPHOCYTES # BLD AUTO: 30.5 % — SIGNIFICANT CHANGE UP (ref 20.5–51.1)
MAGNESIUM SERPL-MCNC: 2.5 MG/DL — HIGH (ref 1.8–2.4)
MCHC RBC-ENTMCNC: 28.3 PG — SIGNIFICANT CHANGE UP (ref 27–31)
MCHC RBC-ENTMCNC: 31.4 G/DL — LOW (ref 32–37)
MCV RBC AUTO: 90.2 FL — SIGNIFICANT CHANGE UP (ref 81–99)
MONOCYTES # BLD AUTO: 0.96 K/UL — HIGH (ref 0.1–0.6)
MONOCYTES NFR BLD AUTO: 11.5 % — HIGH (ref 1.7–9.3)
NEUTROPHILS # BLD AUTO: 4.51 K/UL — SIGNIFICANT CHANGE UP (ref 1.4–6.5)
NEUTROPHILS NFR BLD AUTO: 54 % — SIGNIFICANT CHANGE UP (ref 42.2–75.2)
NRBC # BLD: 0 /100 WBCS — SIGNIFICANT CHANGE UP (ref 0–0)
PCO2 BLDA: 29 MMHG — LOW (ref 38–42)
PH BLDA: 7.38 — SIGNIFICANT CHANGE UP (ref 7.38–7.42)
PHOSPHATE SERPL-MCNC: 4 MG/DL — SIGNIFICANT CHANGE UP (ref 2.1–4.9)
PLATELET # BLD AUTO: 235 K/UL — SIGNIFICANT CHANGE UP (ref 130–400)
PO2 BLDA: 110 MMHG — HIGH (ref 78–95)
POTASSIUM SERPL-MCNC: 5 MMOL/L — SIGNIFICANT CHANGE UP (ref 3.5–5)
POTASSIUM SERPL-SCNC: 5 MMOL/L — SIGNIFICANT CHANGE UP (ref 3.5–5)
PROT SERPL-MCNC: 5.7 G/DL — LOW (ref 6–8)
RBC # BLD: 3.07 M/UL — LOW (ref 4.2–5.4)
RBC # FLD: 17.3 % — HIGH (ref 11.5–14.5)
SAO2 % BLDA: 99 % — HIGH (ref 94–98)
SODIUM SERPL-SCNC: 137 MMOL/L — SIGNIFICANT CHANGE UP (ref 135–146)
SPECIMEN SOURCE: SIGNIFICANT CHANGE UP
WBC # BLD: 8.35 K/UL — SIGNIFICANT CHANGE UP (ref 4.8–10.8)
WBC # FLD AUTO: 8.35 K/UL — SIGNIFICANT CHANGE UP (ref 4.8–10.8)

## 2018-03-05 RX ORDER — METRONIDAZOLE 500 MG
500 TABLET ORAL EVERY 12 HOURS
Qty: 0 | Refills: 0 | Status: DISCONTINUED | OUTPATIENT
Start: 2018-03-05 | End: 2018-03-07

## 2018-03-05 RX ORDER — FENTANYL CITRATE 50 UG/ML
1 INJECTION INTRAVENOUS
Qty: 0 | Refills: 0 | Status: DISCONTINUED | OUTPATIENT
Start: 2018-03-05 | End: 2018-03-07

## 2018-03-05 RX ORDER — CEFEPIME 1 G/1
1000 INJECTION, POWDER, FOR SOLUTION INTRAMUSCULAR; INTRAVENOUS DAILY
Qty: 0 | Refills: 0 | Status: DISCONTINUED | OUTPATIENT
Start: 2018-03-05 | End: 2018-03-07

## 2018-03-05 RX ORDER — OXYCODONE HYDROCHLORIDE 5 MG/1
5 TABLET ORAL EVERY 4 HOURS
Qty: 0 | Refills: 0 | Status: DISCONTINUED | OUTPATIENT
Start: 2018-03-05 | End: 2018-03-07

## 2018-03-05 RX ORDER — SODIUM BICARBONATE 1 MEQ/ML
650 SYRINGE (ML) INTRAVENOUS THREE TIMES A DAY
Qty: 0 | Refills: 0 | Status: DISCONTINUED | OUTPATIENT
Start: 2018-03-05 | End: 2018-03-17

## 2018-03-05 RX ORDER — METRONIDAZOLE 500 MG
500 TABLET ORAL EVERY 12 HOURS
Qty: 0 | Refills: 0 | Status: DISCONTINUED | OUTPATIENT
Start: 2018-03-05 | End: 2018-03-05

## 2018-03-05 RX ADMIN — PANTOPRAZOLE SODIUM 40 MILLIGRAM(S): 20 TABLET, DELAYED RELEASE ORAL at 17:07

## 2018-03-05 RX ADMIN — HEPARIN SODIUM 5000 UNIT(S): 5000 INJECTION INTRAVENOUS; SUBCUTANEOUS at 13:29

## 2018-03-05 RX ADMIN — Medication 100 MILLIGRAM(S): at 11:50

## 2018-03-05 RX ADMIN — LACTULOSE 40 GRAM(S): 10 SOLUTION ORAL at 21:14

## 2018-03-05 RX ADMIN — ZINC SULFATE TAB 220 MG (50 MG ZINC EQUIVALENT) 220 MILLIGRAM(S): 220 (50 ZN) TAB at 11:49

## 2018-03-05 RX ADMIN — HEPARIN SODIUM 5000 UNIT(S): 5000 INJECTION INTRAVENOUS; SUBCUTANEOUS at 05:02

## 2018-03-05 RX ADMIN — Medication 500 MILLIGRAM(S): at 21:11

## 2018-03-05 RX ADMIN — Medication 500 MILLIGRAM(S): at 05:03

## 2018-03-05 RX ADMIN — Medication 1 MILLIGRAM(S): at 11:49

## 2018-03-05 RX ADMIN — PANTOPRAZOLE SODIUM 40 MILLIGRAM(S): 20 TABLET, DELAYED RELEASE ORAL at 06:41

## 2018-03-05 RX ADMIN — Medication 250 MILLIGRAM(S): at 17:08

## 2018-03-05 RX ADMIN — MORPHINE SULFATE 4 MILLIGRAM(S): 50 CAPSULE, EXTENDED RELEASE ORAL at 13:26

## 2018-03-05 RX ADMIN — SIMETHICONE 80 MILLIGRAM(S): 80 TABLET, CHEWABLE ORAL at 11:48

## 2018-03-05 RX ADMIN — Medication 500 MILLIGRAM(S): at 17:07

## 2018-03-05 RX ADMIN — Medication 1 APPLICATION(S): at 17:08

## 2018-03-05 RX ADMIN — Medication 500 MILLIGRAM(S): at 10:48

## 2018-03-05 RX ADMIN — LACTULOSE 40 GRAM(S): 10 SOLUTION ORAL at 05:04

## 2018-03-05 RX ADMIN — Medication 650 MILLIGRAM(S): at 21:11

## 2018-03-05 RX ADMIN — CEFEPIME 100 MILLIGRAM(S): 1 INJECTION, POWDER, FOR SOLUTION INTRAMUSCULAR; INTRAVENOUS at 12:07

## 2018-03-05 RX ADMIN — Medication 1 TABLET(S): at 17:07

## 2018-03-05 RX ADMIN — Medication 1 TABLET(S): at 11:48

## 2018-03-05 RX ADMIN — Medication 650 MILLIGRAM(S): at 10:29

## 2018-03-05 RX ADMIN — Medication 1 APPLICATION(S): at 05:05

## 2018-03-05 RX ADMIN — PIPERACILLIN AND TAZOBACTAM 200 GRAM(S): 4; .5 INJECTION, POWDER, LYOPHILIZED, FOR SOLUTION INTRAVENOUS at 05:03

## 2018-03-05 RX ADMIN — Medication 600 MILLIGRAM(S): at 13:30

## 2018-03-05 RX ADMIN — FENTANYL CITRATE 1 PATCH: 50 INJECTION INTRAVENOUS at 17:37

## 2018-03-05 RX ADMIN — Medication 50 MICROGRAM(S): at 05:02

## 2018-03-05 RX ADMIN — HEPARIN SODIUM 5000 UNIT(S): 5000 INJECTION INTRAVENOUS; SUBCUTANEOUS at 21:11

## 2018-03-05 RX ADMIN — Medication 1 TABLET(S): at 10:36

## 2018-03-05 NOTE — PROGRESS NOTE ADULT - ASSESSMENT
chronic coccyx OM:  off loading  d/c current antibiotics.  Cefepime 1gm q24h and po flagyl 500mg q12h  for 4 more weeks.

## 2018-03-05 NOTE — CONSULT NOTE ADULT - ATTENDING COMMENTS
Patient seen, plan as above. Will cont to discuss further goals of care with family.   Cont med management Patient seen, plan as above. Will cont to discuss further goals of care with family.   Cont med management  Overall poor prognosis

## 2018-03-05 NOTE — CONSULT NOTE ADULT - SUBJECTIVE AND OBJECTIVE BOX
REQUESTED OF: DR Claudia RAMACHANDRAN 62yFemale  HPI: Chart reviewd/ d/w Kemal Orozco and Jose and staff RN  62F who is bedridden at [facility] because of MS (30 years in wheelchair and resident of [facility] since 2009), who is currently being treated for sacral OM with vancomycin and meropenem  Patient is sent in by [facility] because of altered mental status. The patient was recently admitted at [facility] for AMS also and was found to have a sacral ulcer that required debridement and IV antibiotics. Has had 4 hospitalizations since Oct 2017. On admission (2/24) - was mostly non-verbal and moaning. She doesn't answer any questions.    Also recent diagnosis of colon cancer - [facility] had stated that if functional status improved then would continue with surgery and staging.     Recently has been treated with bicarb for metabolic acidosis and lactulose for high NH3      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  Chronic kidney disease (CKD), stage IV (severe)  Psychosis  Bedridden  Tyson catheter in place on admission  Osteomyelitis of sacrum  Sacral ulcer  Osteoporosis  Diabetes mellitus  Seizure disorder  Bipolar affective disorder  Status post debridement: of sacral ulcer    PHYSICAL EXAM: before morphine moaning; after calm. Non-responsive; no eye tracking    Constitutional: chronically ill; anasarca    Eyes: TODD    Respiratory: No distress/depress    Extremities: contracted lower extremities    Skin: as per record - nonstageble/operatable sacral ulcer        T(C): 37.2, Max: 37.3 (20:13)  HR: 117 (97 - 117)  BP: 110/58 (103/50 - 110/59)  RR: 18 (18 - 20)  SpO2: --      LABS/STUDIES:  03-05    137  |  105  |  30<H>  ----------------------------<  117<H>  5.0   |  13<L>  |  1.9<H>    Ca    8.7      05 Mar 2018 06:45  Phos  4.0     03-05  Mg     2.5     03-05    TPro  5.7<L>  /  Alb  1.4<L>  /  TBili  0.4  /  DBili  x   /  AST  19  /  ALT  12  /  AlkPhos  175<H>  03-05                      8.7    8.35  )-----------( 235      ( 05 Mar 2018 06:45 )             27.7       MEDICATIONS  (STANDING):  bisacodyl Suppository 10 milliGRAM(s) Rectal daily  carBAMazepine Suspension 600 milliGRAM(s) Oral daily  cefepime  IVPB 1000 milliGRAM(s) IV Intermittent daily  docusate sodium 100 milliGRAM(s) Oral three times a day  folic acid 1 milliGRAM(s) Oral daily  heparin  Injectable 5000 Unit(s) SubCutaneous every 8 hours  lactobacillus acidophilus 1 Tablet(s) Oral two times a day with meals  lactulose Syrup 40 Gram(s) Enteral Tube three times a day  levothyroxine 50 MICROGram(s) Oral daily  metroNIDAZOLE    Tablet 500 milliGRAM(s) Oral every 12 hours  multivitamin 1 Tablet(s) Oral daily  pantoprazole   Suspension 40 milliGRAM(s) Oral two times a day before meals  rifaximin 550 milliGRAM(s) Oral two times a day  silver sulfADIAZINE 1% Cream 1 Application(s) Topical every 12 hours  simethicone 80 milliGRAM(s) Chew daily  sodium bicarbonate 650 milliGRAM(s) Oral three times a day  thiamine 100 milliGRAM(s) Oral daily  valproic  acid Syrup 250 milliGRAM(s) Oral daily  valproic  acid Syrup 500 milliGRAM(s) Oral daily  valproic  acid Syrup 500 milliGRAM(s) Oral at bedtime  zinc sulfate 220 milliGRAM(s) Oral daily    MEDICATIONS  (PRN):  acetaminophen  Suppository 650 milliGRAM(s) Rectal four times a day PRN For Temp greater than 38 C (100.4 F)  morphine  - Injectable 4 milliGRAM(s) IV Push every 6 hours PRN Moderate Pain (4 - 6)  senna 2 Tablet(s) Oral at bedtime PRN Constipation      PPS (Palliative Performance Scale): 10% REQUESTED OF: DR Claudia RAMACHANDRAN 62yFemale  HPI: Chart reviewd/ d/w Kemal Orozco and Jose and staff RN  62F who is bedridden at [facility] because of MS (30 years in wheelchair and resident of [facility] since 2009), who is currently being treated for sacral OM with vancomycin and meropenem  Patient is sent in by [facility] because of altered mental status. The patient was recently admitted at [facility] for AMS also and was found to have a sacral ulcer that required debridement and IV antibiotics. Has had 4 hospitalizations since Oct 2017. On admission (2/24) - was mostly non-verbal and moaning. She doesn't answer any questions.    Also recent diagnosis of colon cancer - [facility] had stated that if functional status improved then would continue with surgery and staging.     Recently has been treated with bicarb for metabolic acidosis and lactulose for high NH3 (metabolic encephalopathy)     Consults appreciated    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  Chronic kidney disease (CKD), stage IV (severe)  Psychosis  Bedridden  Tyson catheter in place on admission  Osteomyelitis of sacrum  Sacral ulcer  Osteoporosis  Diabetes mellitus  Seizure disorder  Bipolar affective disorder  Status post debridement: of sacral ulcer    PHYSICAL EXAM: before morphine moaning; after calm. Non-responsive; no eye tracking    Constitutional: chronically ill; anasarca    Eyes: TODD    Respiratory: No distress/depress    GI - NG tube    Extremities: contracted lower extremities; Lt arm picc line    Skin: as per record - nonstageble/operatable sacral ulcer        T(C): 37.2, Max: 37.3 (20:13)  HR: 117 (97 - 117)  BP: 110/58 (103/50 - 110/59)  RR: 18 (18 - 20)  SpO2: --      LABS/STUDIES:  03-05    137  |  105  |  30<H>  ----------------------------<  117<H>  5.0   |  13<L>  |  1.9<H>    Ca    8.7      05 Mar 2018 06:45  Phos  4.0     03-05  Mg     2.5     03-05    TPro  5.7<L>  /  Alb  1.4<L>  /  TBili  0.4  /  DBili  x   /  AST  19  /  ALT  12  /  AlkPhos  175<H>  03-05                      8.7    8.35  )-----------( 235      ( 05 Mar 2018 06:45 )             27.7       MEDICATIONS  (STANDING):  bisacodyl Suppository 10 milliGRAM(s) Rectal daily  carBAMazepine Suspension 600 milliGRAM(s) Oral daily  cefepime  IVPB 1000 milliGRAM(s) IV Intermittent daily  docusate sodium 100 milliGRAM(s) Oral three times a day  folic acid 1 milliGRAM(s) Oral daily  heparin  Injectable 5000 Unit(s) SubCutaneous every 8 hours  lactobacillus acidophilus 1 Tablet(s) Oral two times a day with meals  lactulose Syrup 40 Gram(s) Enteral Tube three times a day  levothyroxine 50 MICROGram(s) Oral daily  metroNIDAZOLE    Tablet 500 milliGRAM(s) Oral every 12 hours  multivitamin 1 Tablet(s) Oral daily  pantoprazole   Suspension 40 milliGRAM(s) Oral two times a day before meals  rifaximin 550 milliGRAM(s) Oral two times a day  silver sulfADIAZINE 1% Cream 1 Application(s) Topical every 12 hours  simethicone 80 milliGRAM(s) Chew daily  sodium bicarbonate 650 milliGRAM(s) Oral three times a day  thiamine 100 milliGRAM(s) Oral daily  valproic  acid Syrup 250 milliGRAM(s) Oral daily  valproic  acid Syrup 500 milliGRAM(s) Oral daily  valproic  acid Syrup 500 milliGRAM(s) Oral at bedtime  zinc sulfate 220 milliGRAM(s) Oral daily    MEDICATIONS  (PRN):  acetaminophen  Suppository 650 milliGRAM(s) Rectal four times a day PRN For Temp greater than 38 C (100.4 F)  morphine  - Injectable 4 milliGRAM(s) IV Push every 6 hours PRN Moderate Pain (4 - 6)  senna 2 Tablet(s) Oral at bedtime PRN Constipation      PPS (Palliative Performance Scale): 10%

## 2018-03-05 NOTE — PROGRESS NOTE ADULT - ASSESSMENT
Patient is a 62y old Female with PMH of MS and being bed-bound, CKD IV, chronic sacral osteo, recently diagnosed colon CA at [facility], and seizure disorder who presents with a chief complaint of altered mental status (24 Feb 2018 18:44). Patient's daughter/healthcare proxy present at bedside. She reports patient's mental status has gradually declined since October after repeated hospitalizations for lung infections, UTIs, and the chronic sacral ulcer. Patient's hospital course complicated by metabolic acidosis and sepsis. Patient is still non-verbal and altered. As per daughter, patient more verbal a week prior to admission.    1. Metabolic encephalopathy probably 2/2 sepsis/ sacral osteomyelitis/UTI 2/2 chronic indwelling antony  - CT Head No Cont (02.24.18 @ 16:25): negative  - CT Abdomen and Pelvis No Cont (02.24.18 @ 16:26) Sacral decubitus ulcer with extension to the coccyx demonstrating bony destruction of the coccyx consistent with osteomyelitis. Surrounding phlegmon measures 6 cm transverse. Trace persistent right hydroureteronephrosis with suggestion of bilateral urothelial thickening likely related to chronic infection.Stable 2.2 cm right adrenal adenoma.Stable diffuse bladder wall thickening and trabeculation.  - EEG showed generalized slowing w/ triphasic waves (reportedly) - consistent w/ metabolic encephalopathy  - As per ID, switched abxs to Cefepime 1gm and Flagyl 500mg PO BID  - Surgery not planning any intervention for phlegmon. Burn not planning any intervention for now.  - Ammonia elevated - increased lactulose, titrate to 3-4 BMs per day. C/w Rifaximin. Ammonia trend 135->141->116->192  - Morphine increased to 4mg q6h    2. Hypernatremia, resolving:  - Nephro following: recommend keep bicarb >22, started pt on oral bicarb 350mg TID. F/U iron studies, phosphorous, iPTH  - Pt on D5  - daily BMP    3. AMS, can't take PO  - NGT feeds: Glucerna  - Aspiration precautions.    4. Seizure Disorder/ Bipolar disorder  - Valproic acid level 10; Carbamazepine level 3.4.   - As per Neuro, Valproic acid increased to 500mg in the morning and at bedtime and 250mg in between. Tegretol increased to 600mg QD. Recheck levels in 2 days.    5. Sigmoid Colon AdenoCA  - GI following, no intervention  - Hemoglobin stable  - on protonix  - EGD done at [facility] on 1/28/18 showed 4cm semi-circumferential mass in sigmoid colon, which was biopsied - reports show moderately differentiated adenoCA  - Of note, pt is on iron but will monitor CBC  - pt to follow up at [facility] after discharge    5. DM type 2   - Monitor FS.    6. Hypothyroidism  - c/w synthroid.    7. Multiple Sclerosis with neurogenic bladder  - Chronic antony     8. B/L upper extremity edema  - Venous duplex showed evidence of superficial thrombophlebitis in left arm distal to PICC line, results above.    9. GI/DVT prophylaxis.    Prognosis guarded.  DNR Patient is a 62y old Female with PMH of MS and being bed-bound, CKD IV, chronic sacral osteo, recently diagnosed colon CA at [facility], and seizure disorder who presents with a chief complaint of altered mental status (24 Feb 2018 18:44). Patient's daughter/healthcare proxy present at bedside. She reports patient's mental status has gradually declined since October after repeated hospitalizations for lung infections, UTIs, and the chronic sacral ulcer. Patient's hospital course complicated by metabolic acidosis and sepsis. Patient is still non-verbal and altered. As per daughter, patient more verbal a week prior to admission.    1. Metabolic encephalopathy probably 2/2 sepsis/ sacral osteomyelitis/UTI 2/2 chronic indwelling antony  - CT Head No Cont (02.24.18 @ 16:25): negative  - CT Abdomen and Pelvis No Cont (02.24.18 @ 16:26) Sacral decubitus ulcer with extension to the coccyx demonstrating bony destruction of the coccyx consistent with osteomyelitis. Surrounding phlegmon measures 6 cm transverse. Trace persistent right hydroureteronephrosis with suggestion of bilateral urothelial thickening likely related to chronic infection.Stable 2.2 cm right adrenal adenoma.Stable diffuse bladder wall thickening and trabeculation.  - EEG showed generalized slowing w/ triphasic waves (reportedly) - consistent w/ metabolic encephalopathy  - As per ID, switched abxs to Cefepime 1gm and Flagyl 500mg PO BID  - Surgery not planning any intervention for phlegmon. Burn not planning any intervention for now.  - Ammonia elevated - increased lactulose, titrate to 3-4 BMs per day. C/w Rifaximin. Ammonia trend 135->141->116->192    2. Hypernatremia, resolving:  - Nephro following: recommend keep bicarb >22, increased oral bicarb to 650mg TID.  - d/c D5  - daily BMP    3. AMS, can't take PO  - NGT feeds: Glucerna  - Aspiration precautions.    4. Seizure Disorder/ Bipolar disorder  - Valproic acid level 26; Carbamazepine level 7.3. These levels recorded after increasing both meds  - As per Neuro, Valproic acid increased to 500mg in the morning and at bedtime and 250mg in between. Tegretol increased to 600mg QD.    5. Sigmoid Colon AdenoCA  - GI following, no intervention  - Hemoglobin stable  - on protonix  - EGD done at [facility] on 1/28/18 showed 4cm semi-circumferential mass in sigmoid colon, which was biopsied - reports show moderately differentiated adenoCA  - Of note, pt is on iron but will monitor CBC    5. DM type 2   - Monitor FS.    6. Hypothyroidism  - c/w synthroid.    7. Multiple Sclerosis with neurogenic bladder  - Chronic antony     8. B/L upper extremity edema  - Venous duplex showed evidence of superficial thrombophlebitis in left arm distal to PICC line, results above.    9. GI/DVT prophylaxis.    10. Prognosis guarded.   - Palliative care meeting today. As per family, pt is now DNR and DNI. Family does not want to escalate care but are not ready for hospice care discussion yet. Will adjust pain regimen per palliative care recs.

## 2018-03-05 NOTE — PROGRESS NOTE ADULT - SUBJECTIVE AND OBJECTIVE BOX
JUAN JOSE RAMACHANDRAN  62y, Female      OVERNIGHT EVENTS:    no fevers, alert, weak, has an NG tube, no abdominal pain, antony in place.    VITALS:  T(F): 98.9, Max: 99.2 (03-04-18 @ 20:13)  HR: 106  BP: 110/59  RR: 19Vital Signs Last 24 Hrs  T(C): 37.2 (05 Mar 2018 04:59), Max: 37.3 (04 Mar 2018 20:13)  T(F): 98.9 (05 Mar 2018 04:59), Max: 99.2 (04 Mar 2018 20:13)  HR: 106 (05 Mar 2018 04:59) (97 - 106)  BP: 110/59 (05 Mar 2018 04:59) (97/52 - 110/59)  BP(mean): --  RR: 19 (05 Mar 2018 04:59) (19 - 20)  SpO2: --    TESTS & MEASUREMENTS:                        8.2    8.21  )-----------( 236      ( 04 Mar 2018 04:29 )             26.6     03-04    138  |  110  |  27<H>  ----------------------------<  90  4.3   |  16<L>  |  2.0<H>    Ca    8.6      04 Mar 2018 04:29    TPro  5.3<L>  /  Alb  1.4<L>  /  TBili  0.6  /  DBili  x   /  AST  21  /  ALT  10  /  AlkPhos  176<H>  03-04    LIVER FUNCTIONS - ( 04 Mar 2018 04:29 )  Alb: 1.4 g/dL / Pro: 5.3 g/dL / ALK PHOS: 176 U/L / ALT: 10 U/L / AST: 21 U/L / GGT: x             Culture - Blood (collected 02-28-18 @ 08:30)  Source: .Blood Blood  Preliminary Report (03-01-18 @ 16:02):    No growth to date.    Culture - Blood (collected 02-26-18 @ 11:46)  Source: .Blood None  Final Report (03-04-18 @ 01:00):    No growth at 5 days.            RADIOLOGY & ADDITIONAL TESTS:    ANTIBIOTICS:  piperacillin/tazobactam IVPB. 3.375 Gram(s) IV Intermittent every 12 hours  rifaximin 550 milliGRAM(s) Oral two times a day

## 2018-03-05 NOTE — PROGRESS NOTE ADULT - SUBJECTIVE AND OBJECTIVE BOX
Patient is a 62y old  Female who presents with a chief complaint of altered mental status (24 Feb 2018 18:44)   Patient seen and examined at bedside. She is still altered and non-verbal.    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  Chronic kidney disease (CKD), stage IV (severe)  Psychosis  Bedridden  Tyson catheter in place on admission  Osteomyelitis of sacrum  Sacral ulcer  Osteoporosis  Diabetes mellitus  Seizure disorder  Bipolar affective disorder  Status post debridement: of sacral ulcer      MEDICATIONS  (STANDING):  bisacodyl Suppository 10 milliGRAM(s) Rectal daily  carBAMazepine Suspension 600 milliGRAM(s) Oral daily  cefepime  IVPB 1000 milliGRAM(s) IV Intermittent daily  dextrose 5%. 1000 milliLiter(s) (100 mL/Hr) IV Continuous <Continuous>  docusate sodium 100 milliGRAM(s) Oral three times a day  folic acid 1 milliGRAM(s) Oral daily  heparin  Injectable 5000 Unit(s) SubCutaneous every 8 hours  lactobacillus acidophilus 1 Tablet(s) Oral two times a day with meals  lactulose Syrup 40 Gram(s) Enteral Tube three times a day  levothyroxine 50 MICROGram(s) Oral daily  metroNIDAZOLE    Tablet 500 milliGRAM(s) Oral every 12 hours  multivitamin 1 Tablet(s) Oral daily  pantoprazole   Suspension 40 milliGRAM(s) Oral two times a day before meals  rifaximin 550 milliGRAM(s) Oral two times a day  silver sulfADIAZINE 1% Cream 1 Application(s) Topical every 12 hours  simethicone 80 milliGRAM(s) Chew daily  sodium bicarbonate 650 milliGRAM(s) Oral three times a day  thiamine 100 milliGRAM(s) Oral daily  valproic  acid Syrup 250 milliGRAM(s) Oral daily  valproic  acid Syrup 500 milliGRAM(s) Oral daily  valproic  acid Syrup 500 milliGRAM(s) Oral at bedtime  zinc sulfate 220 milliGRAM(s) Oral daily    MEDICATIONS  (PRN):  acetaminophen  Suppository 650 milliGRAM(s) Rectal four times a day PRN For Temp greater than 38 C (100.4 F)  morphine  - Injectable 4 milliGRAM(s) IV Push every 6 hours PRN Moderate Pain (4 - 6)  senna 2 Tablet(s) Oral at bedtime PRN Constipation      Overnight events:    Vital Signs Last 24 Hrs  T(C): 37.2 (05 Mar 2018 04:59), Max: 37.3 (04 Mar 2018 20:13)  T(F): 98.9 (05 Mar 2018 04:59), Max: 99.2 (04 Mar 2018 20:13)  HR: 106 (05 Mar 2018 04:59) (97 - 106)  BP: 110/59 (05 Mar 2018 04:59) (97/52 - 110/59)  BP(mean): --  RR: 19 (05 Mar 2018 04:59) (19 - 20)  SpO2: --  CAPILLARY BLOOD GLUCOSE  127 (05 Mar 2018 08:19)  110 (04 Mar 2018 21:10)  94 (04 Mar 2018 16:44)  118 (04 Mar 2018 11:55)        I&O's Summary    04 Mar 2018 07:01  -  05 Mar 2018 07:00  --------------------------------------------------------  IN: 4530 mL / OUT: 2400 mL / NET: 2130 mL        Physical Exam:    GENERAL APPEARANCE:  alert and cooperative, and appears to be in no acute distress.  HEENT: Head Normocephalic/Atraumatic; PERRL; Oral cavity and pharynx normal. No inflammation, swelling, exudate, or lesions. Teeth and gingiva in good general condition.  NECK: Neck supple, non-tender without lymphadenopathy, masses or thyromegaly.  CARDIAC: RRR, Normal S1 and S2. No S3, S4 or murmurs  LUNGS: Clear to auscultation without rales, rhonchi or wheezing.  ABDOMEN: Positive bowel sounds. Soft, nondistended, nontender. No guarding or rebound. No HSM.  MUSCULOSKELETAL: No joint erythema or tenderness. Normal gait.  EXTREMITIES: No edema. Peripheral pulses intact. No varicosities.  NEUROLOGICAL: CN II-XII intact. Strength and sensation symmetric and intact throughout. Reflexes 2+ throughout. Cerebellar testing normal.  SKIN: Skin normal color, texture and turgor with no lesions or eruptions.       Labs:                        8.7    8.35  )-----------( 235      ( 05 Mar 2018 06:45 )             27.7             03-05    137  |  105  |  30<H>  ----------------------------<  117<H>  5.0   |  13<L>  |  1.9<H>    Ca    8.7      05 Mar 2018 06:45  Phos  4.0     03-05  Mg     2.5     03-05    TPro  5.7<L>  /  Alb  1.4<L>  /  TBili  0.4  /  DBili  x   /  AST  19  /  ALT  12  /  AlkPhos  175<H>  03-05    LIVER FUNCTIONS - ( 05 Mar 2018 06:45 )  Alb: 1.4 g/dL / Pro: 5.7 g/dL / ALK PHOS: 175 U/L / ALT: 12 U/L / AST: 19 U/L / GGT: x

## 2018-03-06 RX ORDER — COLLAGENASE CLOSTRIDIUM HIST. 250 UNIT/G
1 OINTMENT (GRAM) TOPICAL
Qty: 0 | Refills: 0 | Status: DISCONTINUED | OUTPATIENT
Start: 2018-03-06 | End: 2018-03-19

## 2018-03-06 RX ORDER — SODIUM HYPOCHLORITE 0.125 %
1 SOLUTION, NON-ORAL MISCELLANEOUS
Qty: 0 | Refills: 0 | Status: DISCONTINUED | OUTPATIENT
Start: 2018-03-06 | End: 2018-03-19

## 2018-03-06 RX ADMIN — LACTULOSE 40 GRAM(S): 10 SOLUTION ORAL at 14:21

## 2018-03-06 RX ADMIN — Medication 500 MILLIGRAM(S): at 05:28

## 2018-03-06 RX ADMIN — Medication 100 MILLIGRAM(S): at 11:34

## 2018-03-06 RX ADMIN — HEPARIN SODIUM 5000 UNIT(S): 5000 INJECTION INTRAVENOUS; SUBCUTANEOUS at 14:13

## 2018-03-06 RX ADMIN — MORPHINE SULFATE 4 MILLIGRAM(S): 50 CAPSULE, EXTENDED RELEASE ORAL at 11:34

## 2018-03-06 RX ADMIN — Medication 600 MILLIGRAM(S): at 11:34

## 2018-03-06 RX ADMIN — Medication 500 MILLIGRAM(S): at 21:28

## 2018-03-06 RX ADMIN — Medication 1 APPLICATION(S): at 17:20

## 2018-03-06 RX ADMIN — ZINC SULFATE TAB 220 MG (50 MG ZINC EQUIVALENT) 220 MILLIGRAM(S): 220 (50 ZN) TAB at 11:34

## 2018-03-06 RX ADMIN — Medication 500 MILLIGRAM(S): at 17:20

## 2018-03-06 RX ADMIN — Medication 1 TABLET(S): at 11:34

## 2018-03-06 RX ADMIN — Medication 1 MILLIGRAM(S): at 11:34

## 2018-03-06 RX ADMIN — Medication 250 MILLIGRAM(S): at 16:02

## 2018-03-06 RX ADMIN — Medication 50 MICROGRAM(S): at 05:26

## 2018-03-06 RX ADMIN — Medication 1 APPLICATION(S): at 05:28

## 2018-03-06 RX ADMIN — HEPARIN SODIUM 5000 UNIT(S): 5000 INJECTION INTRAVENOUS; SUBCUTANEOUS at 05:27

## 2018-03-06 RX ADMIN — Medication 1 APPLICATION(S): at 17:18

## 2018-03-06 RX ADMIN — Medication 650 MILLIGRAM(S): at 05:26

## 2018-03-06 RX ADMIN — LACTULOSE 40 GRAM(S): 10 SOLUTION ORAL at 06:14

## 2018-03-06 RX ADMIN — CEFEPIME 100 MILLIGRAM(S): 1 INJECTION, POWDER, FOR SOLUTION INTRAMUSCULAR; INTRAVENOUS at 11:37

## 2018-03-06 RX ADMIN — Medication 650 MILLIGRAM(S): at 14:14

## 2018-03-06 RX ADMIN — PANTOPRAZOLE SODIUM 40 MILLIGRAM(S): 20 TABLET, DELAYED RELEASE ORAL at 16:02

## 2018-03-06 RX ADMIN — OXYCODONE HYDROCHLORIDE 5 MILLIGRAM(S): 5 TABLET ORAL at 10:10

## 2018-03-06 RX ADMIN — PANTOPRAZOLE SODIUM 40 MILLIGRAM(S): 20 TABLET, DELAYED RELEASE ORAL at 17:18

## 2018-03-06 RX ADMIN — Medication 10 MILLIGRAM(S): at 11:33

## 2018-03-06 RX ADMIN — Medication 1 TABLET(S): at 07:47

## 2018-03-06 RX ADMIN — Medication 500 MILLIGRAM(S): at 05:26

## 2018-03-06 RX ADMIN — PANTOPRAZOLE SODIUM 40 MILLIGRAM(S): 20 TABLET, DELAYED RELEASE ORAL at 06:14

## 2018-03-06 RX ADMIN — SIMETHICONE 80 MILLIGRAM(S): 80 TABLET, CHEWABLE ORAL at 11:34

## 2018-03-06 RX ADMIN — MORPHINE SULFATE 4 MILLIGRAM(S): 50 CAPSULE, EXTENDED RELEASE ORAL at 04:53

## 2018-03-06 RX ADMIN — OXYCODONE HYDROCHLORIDE 5 MILLIGRAM(S): 5 TABLET ORAL at 21:28

## 2018-03-06 RX ADMIN — Medication 650 MILLIGRAM(S): at 21:27

## 2018-03-06 RX ADMIN — MORPHINE SULFATE 4 MILLIGRAM(S): 50 CAPSULE, EXTENDED RELEASE ORAL at 06:29

## 2018-03-06 RX ADMIN — Medication 1 TABLET(S): at 16:32

## 2018-03-06 NOTE — PROGRESS NOTE ADULT - SUBJECTIVE AND OBJECTIVE BOX
Patient is a 62y old  Female who presents with a chief complaint of altered mental status (24 Feb 2018 18:44)   Patient seen and examined at bedside. She is still altered and non-verbal.    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  Chronic kidney disease (CKD), stage IV (severe)  Psychosis  Bedridden  Tyson catheter in place on admission  Osteomyelitis of sacrum  Sacral ulcer  Osteoporosis  Diabetes mellitus  Seizure disorder  Bipolar affective disorder  Status post debridement: of sacral ulcer      MEDICATIONS  (STANDING):  bisacodyl Suppository 10 milliGRAM(s) Rectal daily  carBAMazepine Suspension 600 milliGRAM(s) Oral daily  cefepime  IVPB 1000 milliGRAM(s) IV Intermittent daily  docusate sodium 100 milliGRAM(s) Oral three times a day  fentaNYL   Patch  12 MICROgram(s)/Hr 1 Patch Transdermal every 72 hours  folic acid 1 milliGRAM(s) Oral daily  heparin  Injectable 5000 Unit(s) SubCutaneous every 8 hours  lactobacillus acidophilus 1 Tablet(s) Oral two times a day with meals  lactulose Syrup 40 Gram(s) Enteral Tube three times a day  levothyroxine 50 MICROGram(s) Oral daily  metroNIDAZOLE    Tablet 500 milliGRAM(s) Oral every 12 hours  multivitamin 1 Tablet(s) Oral daily  pantoprazole   Suspension 40 milliGRAM(s) Oral two times a day before meals  rifaximin 550 milliGRAM(s) Oral two times a day  silver sulfADIAZINE 1% Cream 1 Application(s) Topical every 12 hours  simethicone 80 milliGRAM(s) Chew daily  sodium bicarbonate 650 milliGRAM(s) Oral three times a day  thiamine 100 milliGRAM(s) Oral daily  valproic  acid Syrup 250 milliGRAM(s) Oral daily  valproic  acid Syrup 500 milliGRAM(s) Oral daily  valproic  acid Syrup 500 milliGRAM(s) Oral at bedtime  zinc sulfate 220 milliGRAM(s) Oral daily    MEDICATIONS  (PRN):  acetaminophen  Suppository 650 milliGRAM(s) Rectal four times a day PRN For Temp greater than 38 C (100.4 F)  morphine  - Injectable 4 milliGRAM(s) IV Push every 6 hours PRN Moderate Pain (4 - 6)  oxyCODONE    IR 5 milliGRAM(s) Oral every 4 hours PRN Severe Pain (7 - 10)  senna 2 Tablet(s) Oral at bedtime PRN Constipation      Overnight events:    Vital Signs Last 24 Hrs  T(C): 37 (06 Mar 2018 06:35), Max: 37.9 (06 Mar 2018 05:19)  T(F): 98.6 (06 Mar 2018 06:35), Max: 100.2 (06 Mar 2018 05:19)  HR: 126 (06 Mar 2018 05:19) (106 - 126)  BP: 96/52 (06 Mar 2018 05:19) (96/52 - 110/58)  BP(mean): --  RR: 19 (06 Mar 2018 05:19) (17 - 19)  SpO2: --  CAPILLARY BLOOD GLUCOSE  109 (06 Mar 2018 11:58)  110 (06 Mar 2018 07:45)  91 (05 Mar 2018 21:06)  90 (05 Mar 2018 16:00)        I&O's Summary    05 Mar 2018 07:01  -  06 Mar 2018 07:00  --------------------------------------------------------  IN: 1340 mL / OUT: 3400 mL / NET: -2060 mL    06 Mar 2018 07:01  -  06 Mar 2018 12:54  --------------------------------------------------------  IN: 570 mL / OUT: 0 mL / NET: 570 mL        Physical Exam:    GENERAL APPEARANCE:  alert and cooperative, and appears to be in no acute distress.  HEENT: Head Normocephalic/Atraumatic; PERRL; Oral cavity and pharynx normal. No inflammation, swelling, exudate, or lesions. Teeth and gingiva in good general condition.  NECK: Neck supple, non-tender without lymphadenopathy, masses or thyromegaly.  CARDIAC: RRR, Normal S1 and S2. No S3, S4 or murmurs  LUNGS: Clear to auscultation without rales, rhonchi or wheezing.  ABDOMEN: Positive bowel sounds. Soft, nondistended, nontender. No guarding or rebound. No HSM.  MUSCULOSKELETAL: No joint erythema or tenderness. Normal gait.  EXTREMITIES: No edema. Peripheral pulses intact. No varicosities.  NEUROLOGICAL: CN II-XII intact. Strength and sensation symmetric and intact throughout. Reflexes 2+ throughout. Cerebellar testing normal.  SKIN: Skin normal color, texture and turgor with no lesions or eruptions.       Labs:                        8.7    8.35  )-----------( 235      ( 05 Mar 2018 06:45 )             27.7             03-05    137  |  105  |  30<H>  ----------------------------<  117<H>  5.0   |  13<L>  |  1.9<H>    Ca    8.7      05 Mar 2018 06:45  Phos  4.0     03-05  Mg     2.5     03-05    TPro  5.7<L>  /  Alb  1.4<L>  /  TBili  0.4  /  DBili  x   /  AST  19  /  ALT  12  /  AlkPhos  175<H>  03-05    LIVER FUNCTIONS - ( 05 Mar 2018 06:45 )  Alb: 1.4 g/dL / Pro: 5.7 g/dL / ALK PHOS: 175 U/L / ALT: 12 U/L / AST: 19 U/L / GGT: x                       ABG - ( 05 Mar 2018 11:00 )  pH: 7.38  /  pCO2: 29    /  pO2: 110   / HCO3: 17    / Base Excess: -7.4  /  SaO2: 99 Patient is a 62y old  Female who presents with a chief complaint of altered mental status (24 Feb 2018 18:44)   Patient seen and examined at bedside. She is still altered and non-verbal.    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  Chronic kidney disease (CKD), stage IV (severe)  Psychosis  Bedridden  Tyson catheter in place on admission  Osteomyelitis of sacrum  Sacral ulcer  Osteoporosis  Diabetes mellitus  Seizure disorder  Bipolar affective disorder  Status post debridement: of sacral ulcer      MEDICATIONS  (STANDING):  bisacodyl Suppository 10 milliGRAM(s) Rectal daily  carBAMazepine Suspension 600 milliGRAM(s) Oral daily  cefepime  IVPB 1000 milliGRAM(s) IV Intermittent daily  docusate sodium 100 milliGRAM(s) Oral three times a day  fentaNYL   Patch  12 MICROgram(s)/Hr 1 Patch Transdermal every 72 hours  folic acid 1 milliGRAM(s) Oral daily  heparin  Injectable 5000 Unit(s) SubCutaneous every 8 hours  lactobacillus acidophilus 1 Tablet(s) Oral two times a day with meals  lactulose Syrup 40 Gram(s) Enteral Tube three times a day  levothyroxine 50 MICROGram(s) Oral daily  metroNIDAZOLE    Tablet 500 milliGRAM(s) Oral every 12 hours  multivitamin 1 Tablet(s) Oral daily  pantoprazole   Suspension 40 milliGRAM(s) Oral two times a day before meals  rifaximin 550 milliGRAM(s) Oral two times a day  silver sulfADIAZINE 1% Cream 1 Application(s) Topical every 12 hours  simethicone 80 milliGRAM(s) Chew daily  sodium bicarbonate 650 milliGRAM(s) Oral three times a day  thiamine 100 milliGRAM(s) Oral daily  valproic  acid Syrup 250 milliGRAM(s) Oral daily  valproic  acid Syrup 500 milliGRAM(s) Oral daily  valproic  acid Syrup 500 milliGRAM(s) Oral at bedtime  zinc sulfate 220 milliGRAM(s) Oral daily    MEDICATIONS  (PRN):  acetaminophen  Suppository 650 milliGRAM(s) Rectal four times a day PRN For Temp greater than 38 C (100.4 F)  morphine  - Injectable 4 milliGRAM(s) IV Push every 6 hours PRN Moderate Pain (4 - 6)  oxyCODONE    IR 5 milliGRAM(s) Oral every 4 hours PRN Severe Pain (7 - 10)  senna 2 Tablet(s) Oral at bedtime PRN Constipation      Overnight events:    Vital Signs Last 24 Hrs  T(C): 37 (06 Mar 2018 06:35), Max: 37.9 (06 Mar 2018 05:19)  T(F): 98.6 (06 Mar 2018 06:35), Max: 100.2 (06 Mar 2018 05:19)  HR: 126 (06 Mar 2018 05:19) (106 - 126)  BP: 96/52 (06 Mar 2018 05:19) (96/52 - 110/58)  BP(mean): --  RR: 19 (06 Mar 2018 05:19) (17 - 19)  SpO2: --  CAPILLARY BLOOD GLUCOSE  109 (06 Mar 2018 11:58)  110 (06 Mar 2018 07:45)  91 (05 Mar 2018 21:06)  90 (05 Mar 2018 16:00)        I&O's Summary    05 Mar 2018 07:01  -  06 Mar 2018 07:00  --------------------------------------------------------  IN: 1340 mL / OUT: 3400 mL / NET: -2060 mL    06 Mar 2018 07:01  -  06 Mar 2018 12:54  --------------------------------------------------------  IN: 570 mL / OUT: 0 mL / NET: 570 mL        Physical Exam:    GENERAL APPEARANCE:  non-verbal, moaning in pain  HEENT: Head Normocephalic/Atraumatic  NECK: Neck supple  CARDIAC: RRR, Normal S1 and S2. No S3, S4 or murmurs  LUNGS: Clear to auscultation without rales, rhonchi or wheezing.  ABDOMEN: Positive bowel sounds. Soft, nondistended  MUSCULOSKELETAL: No joint erythema   EXTREMITIES: No edema. Peripheral pulses intact.   NEUROLOGICAL: Non-verbal  SKIN: Skin normal color, texture and turgor with no lesions or eruptions.       Labs:                        8.7    8.35  )-----------( 235      ( 05 Mar 2018 06:45 )             27.7             03-05    137  |  105  |  30<H>  ----------------------------<  117<H>  5.0   |  13<L>  |  1.9<H>    Ca    8.7      05 Mar 2018 06:45  Phos  4.0     03-05  Mg     2.5     03-05    TPro  5.7<L>  /  Alb  1.4<L>  /  TBili  0.4  /  DBili  x   /  AST  19  /  ALT  12  /  AlkPhos  175<H>  03-05    LIVER FUNCTIONS - ( 05 Mar 2018 06:45 )  Alb: 1.4 g/dL / Pro: 5.7 g/dL / ALK PHOS: 175 U/L / ALT: 12 U/L / AST: 19 U/L / GGT: x                       ABG - ( 05 Mar 2018 11:00 )  pH: 7.38  /  pCO2: 29    /  pO2: 110   / HCO3: 17    / Base Excess: -7.4  /  SaO2: 99

## 2018-03-06 NOTE — PROGRESS NOTE ADULT - ASSESSMENT
1. Metabolic encephalopathy probably sec to sepsis/ sacral osteomyelitis/UTI with  IV abx per ID for high ammonia patient is on lactulose and rifaxamin  patient not improving  palliative on board  poor prognosis and not responding to treatment   2.Metabolic Acidosis, follow bmp today  on bicarb po   3. Possible GI bleed, H/o colon cancer. HB/ HCT stable - S/p 1 unit PRBC - pt evaluated by GI recommend- Switch to PPI BID. EGD on urgent basis if HD instability acute drop in Hb or evidence of active GIB - Monitor CBC  4. Dysphagia- failed Speech and Swallow. - NGT feeds. - Aspiration precautions.  5. Seizure Disorder/ Bipolar disorder - ct tegretol, dapakote.  6. DM type 2  - Monitor Fs.  8. Hypothyroidism - ct synthroid.  9. Multiple Sclerosis with neurogenic bladder -  chr antony .  10. History of colon cancer

## 2018-03-06 NOTE — PROGRESS NOTE ADULT - ASSESSMENT
Patient is a 62y old Female with PMH of MS and being bed-bound, CKD IV, chronic sacral osteo, recently diagnosed colon CA at [facility], and seizure disorder who presents with a chief complaint of altered mental status (24 Feb 2018 18:44). Patient's daughter/healthcare proxy present at bedside. She reports patient's mental status has gradually declined since October after repeated hospitalizations for lung infections, UTIs, and the chronic sacral ulcer. Patient's hospital course complicated by metabolic acidosis and sepsis. Patient is still non-verbal and altered. As per daughter, patient more verbal a week prior to admission.    1. Metabolic encephalopathy probably 2/2 sepsis/ sacral osteomyelitis/UTI 2/2 chronic indwelling antony  - CT Head No Cont (02.24.18 @ 16:25): negative  - CT Abdomen and Pelvis No Cont (02.24.18 @ 16:26) Sacral decubitus ulcer with extension to the coccyx demonstrating bony destruction of the coccyx consistent with osteomyelitis. Surrounding phlegmon measures 6 cm transverse. Trace persistent right hydroureteronephrosis with suggestion of bilateral urothelial thickening likely related to chronic infection.Stable 2.2 cm right adrenal adenoma.Stable diffuse bladder wall thickening and trabeculation.  - EEG showed generalized slowing w/ triphasic waves (reportedly) - consistent w/ metabolic encephalopathy  - As per ID, switched abxs to Cefepime 1gm and Flagyl 500mg PO BID  - Surgery not planning any intervention for phlegmon. Burn not planning any intervention for now.  - Ammonia elevated - increased lactulose, titrate to 3-4 BMs per day. C/w Rifaximin. Ammonia trend 135->141->116->192    2. Hypernatremia, resolving:  - Nephro following: recommend keep bicarb >22, increased oral bicarb to 650mg TID.  - d/c D5  - daily BMP    3. AMS, can't take PO  - NGT feeds: Glucerna  - Aspiration precautions.    4. Seizure Disorder/ Bipolar disorder  - Valproic acid level 26; Carbamazepine level 7.3. These levels recorded after increasing both meds  - As per Neuro, Valproic acid increased to 500mg in the morning and at bedtime and 250mg in between. Tegretol increased to 600mg QD.    5. Sigmoid Colon AdenoCA  - GI following, no intervention  - Hemoglobin stable  - on protonix  - EGD done at [facility], [place] on 1/28/18 showed 4cm semi-circumferential mass in sigmoid colon, which was biopsied - reports show moderately differentiated adenoCA  - Of note, pt is on iron but will monitor CBC    5. DM type 2   - Monitor FS.    6. Hypothyroidism  - c/w synthroid.    7. Multiple Sclerosis with neurogenic bladder  - Chronic antony     8. B/L upper extremity edema  - Venous duplex showed evidence of superficial thrombophlebitis in left arm distal to PICC line, results above.    9. GI/DVT prophylaxis.    10. Prognosis guarded.   - Palliative care meeting: As per family, pt is now DNR and DNI. Family does not want to escalate care but are not ready for hospice care discussion yet. Will adjust pain regimen per palliative care recs.  - Patient not responding well to treatment.

## 2018-03-06 NOTE — PROGRESS NOTE ADULT - ASSESSMENT
chronic coccyx OM:  off loading  Cefepime 1gm q24h and po flagyl 500mg q12h  for 4 more weeks.  I doubt antibiotics will make any significant difference in the outcome.

## 2018-03-06 NOTE — PROGRESS NOTE ADULT - ASSESSMENT
62yFemale being evaluated for severe metabolic encephalopathy, OM, MS, poor prognosis.    Awaiting f/u w family to discuss further goals of care.  Considering comfort care.  DNR/DNI status    Recommendations:  Ongoing med management  cont Fentanyl/Oxy prn - will titrate as needed  plan to f/u w family    We will follow  [phone]

## 2018-03-06 NOTE — PROGRESS NOTE ADULT - SUBJECTIVE AND OBJECTIVE BOX
patient moaning. not responding to questions.     Vital Signs Last 24 Hrs  T(C): 37 (06 Mar 2018 06:35), Max: 37.9 (06 Mar 2018 05:19)  T(F): 98.6 (06 Mar 2018 06:35), Max: 100.2 (06 Mar 2018 05:19)  HR: 126 (06 Mar 2018 05:19) (106 - 126)  BP: 96/52 (06 Mar 2018 05:19) (96/52 - 110/58)  BP(mean): --  RR: 19 (06 Mar 2018 05:19) (17 - 19)  SpO2: --    PHYSICAL EXAM:    O/E:  Awake, Confused.  HEENT: atraumatic.  Chest: clear  CVS: SIS2 +, no murmur. tachycardic+  P/A: Soft, BS+  CNS: confused. Moves all ext.  Ext: no edema feet.  Skin: sacral ulcer stage 4                          8.7    8.35  )-----------( 235      ( 05 Mar 2018 06:45 )             27.7     03-05    137  |  105  |  30<H>  ----------------------------<  117<H>  5.0   |  13<L>  |  1.9<H>    Ca    8.7      05 Mar 2018 06:45  Phos  4.0     03-05  Mg     2.5     03-05    TPro  5.7<L>  /  Alb  1.4<L>  /  TBili  0.4  /  DBili  x   /  AST  19  /  ALT  12  /  AlkPhos  175<H>  03-05    LIVER FUNCTIONS - ( 05 Mar 2018 06:45 )  Alb: 1.4 g/dL / Pro: 5.7 g/dL / ALK PHOS: 175 U/L / ALT: 12 U/L / AST: 19 U/L / GGT: x

## 2018-03-06 NOTE — PROGRESS NOTE ADULT - SUBJECTIVE AND OBJECTIVE BOX
62yFemale with diagnosis: SEPSIS, UTI, AMS, metabolic encephalopathy      Patient seen, no significant change in status, remains nonverbal. No noted pain/distress.   No family present.      PHYSICAL EXAM unchanged.    T(C): , Max: 37.9 (05:19)  T(F): 98.6  HR: 104 (104 - 126)  BP: 126/55 (96/52 - 126/55)  RR: 18 (17 - 19)  SpO2: --              LABS:                          8.7    8.35  )-----------( 235      ( 05 Mar 2018 06:45 )             27.7                                                                                      03-05    137  |  105  |  30<H>  ----------------------------<  117<H>  5.0   |  13<L>  |  1.9<H>    Ca    8.7      05 Mar 2018 06:45  Phos  4.0     03-05  Mg     2.5     03-05    TPro  5.7<L>  /  Alb  1.4<L>  /  TBili  0.4  /  DBili  x   /  AST  19  /  ALT  12  /  AlkPhos  175<H>  03-05                                                      MEDICATIONS  (STANDING):  bisacodyl Suppository 10 milliGRAM(s) Rectal daily  carBAMazepine Suspension 600 milliGRAM(s) Oral daily  cefepime  IVPB 1000 milliGRAM(s) IV Intermittent daily  collagenase Ointment 1 Application(s) Topical two times a day  Dakins Solution - Full Strength 1 Application(s) Topical two times a day  docusate sodium 100 milliGRAM(s) Oral three times a day  fentaNYL   Patch  12 MICROgram(s)/Hr 1 Patch Transdermal every 72 hours  folic acid 1 milliGRAM(s) Oral daily  heparin  Injectable 5000 Unit(s) SubCutaneous every 8 hours  lactobacillus acidophilus 1 Tablet(s) Oral two times a day with meals  lactulose Syrup 40 Gram(s) Enteral Tube three times a day  levothyroxine 50 MICROGram(s) Oral daily  metroNIDAZOLE    Tablet 500 milliGRAM(s) Oral every 12 hours  multivitamin 1 Tablet(s) Oral daily  pantoprazole   Suspension 40 milliGRAM(s) Oral two times a day before meals  rifaximin 550 milliGRAM(s) Oral two times a day  silver sulfADIAZINE 1% Cream 1 Application(s) Topical every 12 hours  simethicone 80 milliGRAM(s) Chew daily  sodium bicarbonate 650 milliGRAM(s) Oral three times a day  thiamine 100 milliGRAM(s) Oral daily  valproic  acid Syrup 250 milliGRAM(s) Oral daily  valproic  acid Syrup 500 milliGRAM(s) Oral daily  valproic  acid Syrup 500 milliGRAM(s) Oral at bedtime  zinc sulfate 220 milliGRAM(s) Oral daily    MEDICATIONS  (PRN):  acetaminophen  Suppository 650 milliGRAM(s) Rectal four times a day PRN For Temp greater than 38 C (100.4 F)  oxyCODONE    IR 5 milliGRAM(s) Oral every 4 hours PRN Severe Pain (7 - 10)  senna 2 Tablet(s) Oral at bedtime PRN Constipation

## 2018-03-06 NOTE — PROGRESS NOTE ADULT - SUBJECTIVE AND OBJECTIVE BOX
JUAN JOSE RAMACHANDRAN  62y, Female      OVERNIGHT EVENTS:    low grade fevers, weak, clinically no change.    VITALS:  T(F): 98.6, Max: 100.2 (03-06-18 @ 05:19)  HR: 126  BP: 96/52  RR: 19Vital Signs Last 24 Hrs  T(C): 37 (06 Mar 2018 06:35), Max: 37.9 (06 Mar 2018 05:19)  T(F): 98.6 (06 Mar 2018 06:35), Max: 100.2 (06 Mar 2018 05:19)  HR: 126 (06 Mar 2018 05:19) (106 - 126)  BP: 96/52 (06 Mar 2018 05:19) (96/52 - 110/58)  BP(mean): --  RR: 19 (06 Mar 2018 05:19) (17 - 19)  SpO2: --    TESTS & MEASUREMENTS:                        8.7    8.35  )-----------( 235      ( 05 Mar 2018 06:45 )             27.7     03-05    137  |  105  |  30<H>  ----------------------------<  117<H>  5.0   |  13<L>  |  1.9<H>    Ca    8.7      05 Mar 2018 06:45  Phos  4.0     03-05  Mg     2.5     03-05    TPro  5.7<L>  /  Alb  1.4<L>  /  TBili  0.4  /  DBili  x   /  AST  19  /  ALT  12  /  AlkPhos  175<H>  03-05    LIVER FUNCTIONS - ( 05 Mar 2018 06:45 )  Alb: 1.4 g/dL / Pro: 5.7 g/dL / ALK PHOS: 175 U/L / ALT: 12 U/L / AST: 19 U/L / GGT: x             Culture - Blood (collected 02-28-18 @ 08:30)  Source: .Blood Blood  Final Report (03-05-18 @ 16:01):    No growth at 5 days.            RADIOLOGY & ADDITIONAL TESTS:    ANTIBIOTICS:  cefepime  IVPB 1000 milliGRAM(s) IV Intermittent daily  metroNIDAZOLE    Tablet 500 milliGRAM(s) Oral every 12 hours  rifaximin 550 milliGRAM(s) Oral two times a day

## 2018-03-07 LAB
ALBUMIN SERPL ELPH-MCNC: 1.5 G/DL — LOW (ref 3–5.5)
ALP SERPL-CCNC: 134 U/L — HIGH (ref 30–115)
ALT FLD-CCNC: 11 U/L — SIGNIFICANT CHANGE UP (ref 0–41)
AMMONIA BLD-MCNC: 113 MMOL/L — CRITICAL HIGH (ref 11–35)
ANION GAP SERPL CALC-SCNC: 12 MMOL/L — SIGNIFICANT CHANGE UP (ref 7–14)
AST SERPL-CCNC: 16 U/L — SIGNIFICANT CHANGE UP (ref 0–41)
BASOPHILS # BLD AUTO: 0.05 K/UL — SIGNIFICANT CHANGE UP (ref 0–0.2)
BASOPHILS NFR BLD AUTO: 0.5 % — SIGNIFICANT CHANGE UP (ref 0–1)
BILIRUB SERPL-MCNC: 0.6 MG/DL — SIGNIFICANT CHANGE UP (ref 0.2–1.2)
BUN SERPL-MCNC: 48 MG/DL — HIGH (ref 10–20)
CALCIUM SERPL-MCNC: 9.3 MG/DL — SIGNIFICANT CHANGE UP (ref 8.5–10.1)
CHLORIDE SERPL-SCNC: 112 MMOL/L — HIGH (ref 98–110)
CO2 SERPL-SCNC: 20 MMOL/L — SIGNIFICANT CHANGE UP (ref 17–32)
CREAT SERPL-MCNC: 2.2 MG/DL — HIGH (ref 0.7–1.5)
EOSINOPHIL # BLD AUTO: 0.15 K/UL — SIGNIFICANT CHANGE UP (ref 0–0.7)
EOSINOPHIL NFR BLD AUTO: 1.4 % — SIGNIFICANT CHANGE UP (ref 0–8)
GLUCOSE SERPL-MCNC: 124 MG/DL — HIGH (ref 70–110)
HCT VFR BLD CALC: 27.2 % — LOW (ref 37–47)
HGB BLD-MCNC: 8.4 G/DL — LOW (ref 12–16)
IMM GRANULOCYTES NFR BLD AUTO: 0.7 % — HIGH (ref 0.1–0.3)
LYMPHOCYTES # BLD AUTO: 1.67 K/UL — SIGNIFICANT CHANGE UP (ref 1.2–3.4)
LYMPHOCYTES # BLD AUTO: 15 % — LOW (ref 20.5–51.1)
MAGNESIUM SERPL-MCNC: 3 MG/DL — HIGH (ref 1.8–2.4)
MCHC RBC-ENTMCNC: 28.9 PG — SIGNIFICANT CHANGE UP (ref 27–31)
MCHC RBC-ENTMCNC: 30.9 G/DL — LOW (ref 32–37)
MCV RBC AUTO: 93.5 FL — SIGNIFICANT CHANGE UP (ref 81–99)
MONOCYTES # BLD AUTO: 0.96 K/UL — HIGH (ref 0.1–0.6)
MONOCYTES NFR BLD AUTO: 8.6 % — SIGNIFICANT CHANGE UP (ref 1.7–9.3)
NEUTROPHILS # BLD AUTO: 8.2 K/UL — HIGH (ref 1.4–6.5)
NEUTROPHILS NFR BLD AUTO: 73.8 % — SIGNIFICANT CHANGE UP (ref 42.2–75.2)
NRBC # BLD: 0 /100 WBCS — SIGNIFICANT CHANGE UP (ref 0–0)
PHOSPHATE SERPL-MCNC: 5.8 MG/DL — HIGH (ref 2.1–4.9)
PLATELET # BLD AUTO: 244 K/UL — SIGNIFICANT CHANGE UP (ref 130–400)
POTASSIUM SERPL-MCNC: 5.2 MMOL/L — HIGH (ref 3.5–5)
POTASSIUM SERPL-SCNC: 5.2 MMOL/L — HIGH (ref 3.5–5)
PROT SERPL-MCNC: 5.5 G/DL — LOW (ref 6–8)
RBC # BLD: 2.91 M/UL — LOW (ref 4.2–5.4)
RBC # FLD: 17.3 % — HIGH (ref 11.5–14.5)
SODIUM SERPL-SCNC: 144 MMOL/L — SIGNIFICANT CHANGE UP (ref 135–146)
WBC # BLD: 11.11 K/UL — HIGH (ref 4.8–10.8)
WBC # FLD AUTO: 11.11 K/UL — HIGH (ref 4.8–10.8)

## 2018-03-07 RX ORDER — OXYCODONE HYDROCHLORIDE 5 MG/1
5 TABLET ORAL
Qty: 0 | Refills: 0 | Status: DISCONTINUED | OUTPATIENT
Start: 2018-03-07 | End: 2018-03-07

## 2018-03-07 RX ORDER — METRONIDAZOLE 500 MG
500 TABLET ORAL EVERY 12 HOURS
Qty: 0 | Refills: 0 | Status: DISCONTINUED | OUTPATIENT
Start: 2018-03-07 | End: 2018-03-08

## 2018-03-07 RX ORDER — MORPHINE SULFATE 50 MG/1
2 CAPSULE, EXTENDED RELEASE ORAL ONCE
Qty: 0 | Refills: 0 | Status: DISCONTINUED | OUTPATIENT
Start: 2018-03-07 | End: 2018-03-07

## 2018-03-07 RX ORDER — FENTANYL CITRATE 50 UG/ML
1 INJECTION INTRAVENOUS
Qty: 0 | Refills: 0 | Status: DISCONTINUED | OUTPATIENT
Start: 2018-03-07 | End: 2018-03-08

## 2018-03-07 RX ORDER — MORPHINE SULFATE 50 MG/1
2 CAPSULE, EXTENDED RELEASE ORAL EVERY 6 HOURS
Qty: 0 | Refills: 0 | Status: DISCONTINUED | OUTPATIENT
Start: 2018-03-07 | End: 2018-03-07

## 2018-03-07 RX ORDER — OXYCODONE HYDROCHLORIDE 5 MG/1
5 TABLET ORAL
Qty: 0 | Refills: 0 | Status: DISCONTINUED | OUTPATIENT
Start: 2018-03-07 | End: 2018-03-08

## 2018-03-07 RX ADMIN — MORPHINE SULFATE 2 MILLIGRAM(S): 50 CAPSULE, EXTENDED RELEASE ORAL at 15:23

## 2018-03-07 RX ADMIN — OXYCODONE HYDROCHLORIDE 5 MILLIGRAM(S): 5 TABLET ORAL at 14:01

## 2018-03-07 RX ADMIN — Medication 1 TABLET(S): at 11:16

## 2018-03-07 RX ADMIN — Medication 1 APPLICATION(S): at 17:44

## 2018-03-07 RX ADMIN — MORPHINE SULFATE 2 MILLIGRAM(S): 50 CAPSULE, EXTENDED RELEASE ORAL at 12:36

## 2018-03-07 RX ADMIN — HEPARIN SODIUM 5000 UNIT(S): 5000 INJECTION INTRAVENOUS; SUBCUTANEOUS at 14:04

## 2018-03-07 RX ADMIN — OXYCODONE HYDROCHLORIDE 5 MILLIGRAM(S): 5 TABLET ORAL at 07:42

## 2018-03-07 RX ADMIN — Medication 500 MILLIGRAM(S): at 05:42

## 2018-03-07 RX ADMIN — OXYCODONE HYDROCHLORIDE 5 MILLIGRAM(S): 5 TABLET ORAL at 22:23

## 2018-03-07 RX ADMIN — Medication 1 TABLET(S): at 09:22

## 2018-03-07 RX ADMIN — LACTULOSE 40 GRAM(S): 10 SOLUTION ORAL at 22:14

## 2018-03-07 RX ADMIN — HEPARIN SODIUM 5000 UNIT(S): 5000 INJECTION INTRAVENOUS; SUBCUTANEOUS at 05:36

## 2018-03-07 RX ADMIN — HEPARIN SODIUM 5000 UNIT(S): 5000 INJECTION INTRAVENOUS; SUBCUTANEOUS at 22:14

## 2018-03-07 RX ADMIN — OXYCODONE HYDROCHLORIDE 5 MILLIGRAM(S): 5 TABLET ORAL at 12:05

## 2018-03-07 RX ADMIN — OXYCODONE HYDROCHLORIDE 5 MILLIGRAM(S): 5 TABLET ORAL at 15:23

## 2018-03-07 RX ADMIN — LACTULOSE 40 GRAM(S): 10 SOLUTION ORAL at 01:20

## 2018-03-07 RX ADMIN — Medication 1 TABLET(S): at 17:42

## 2018-03-07 RX ADMIN — Medication 600 MILLIGRAM(S): at 11:17

## 2018-03-07 RX ADMIN — SIMETHICONE 80 MILLIGRAM(S): 80 TABLET, CHEWABLE ORAL at 12:06

## 2018-03-07 RX ADMIN — Medication 650 MILLIGRAM(S): at 05:41

## 2018-03-07 RX ADMIN — MORPHINE SULFATE 2 MILLIGRAM(S): 50 CAPSULE, EXTENDED RELEASE ORAL at 02:52

## 2018-03-07 RX ADMIN — Medication 650 MILLIGRAM(S): at 14:04

## 2018-03-07 RX ADMIN — Medication 500 MILLIGRAM(S): at 17:42

## 2018-03-07 RX ADMIN — Medication 1 APPLICATION(S): at 09:23

## 2018-03-07 RX ADMIN — OXYCODONE HYDROCHLORIDE 5 MILLIGRAM(S): 5 TABLET ORAL at 17:43

## 2018-03-07 RX ADMIN — Medication 1 MILLIGRAM(S): at 11:16

## 2018-03-07 RX ADMIN — LACTULOSE 40 GRAM(S): 10 SOLUTION ORAL at 14:04

## 2018-03-07 RX ADMIN — MORPHINE SULFATE 2 MILLIGRAM(S): 50 CAPSULE, EXTENDED RELEASE ORAL at 05:46

## 2018-03-07 RX ADMIN — Medication 650 MILLIGRAM(S): at 15:40

## 2018-03-07 RX ADMIN — ZINC SULFATE TAB 220 MG (50 MG ZINC EQUIVALENT) 220 MILLIGRAM(S): 220 (50 ZN) TAB at 11:16

## 2018-03-07 RX ADMIN — PANTOPRAZOLE SODIUM 40 MILLIGRAM(S): 20 TABLET, DELAYED RELEASE ORAL at 09:22

## 2018-03-07 RX ADMIN — Medication 10 MILLIGRAM(S): at 11:16

## 2018-03-07 RX ADMIN — Medication 1 APPLICATION(S): at 05:35

## 2018-03-07 RX ADMIN — Medication 100 MILLIGRAM(S): at 14:04

## 2018-03-07 RX ADMIN — Medication 650 MILLIGRAM(S): at 22:17

## 2018-03-07 RX ADMIN — Medication 1 APPLICATION(S): at 17:42

## 2018-03-07 RX ADMIN — Medication 500 MILLIGRAM(S): at 22:17

## 2018-03-07 RX ADMIN — PANTOPRAZOLE SODIUM 40 MILLIGRAM(S): 20 TABLET, DELAYED RELEASE ORAL at 15:40

## 2018-03-07 RX ADMIN — HEPARIN SODIUM 5000 UNIT(S): 5000 INJECTION INTRAVENOUS; SUBCUTANEOUS at 01:13

## 2018-03-07 RX ADMIN — Medication 50 MICROGRAM(S): at 05:39

## 2018-03-07 RX ADMIN — Medication 250 MILLIGRAM(S): at 15:40

## 2018-03-07 RX ADMIN — OXYCODONE HYDROCHLORIDE 5 MILLIGRAM(S): 5 TABLET ORAL at 18:55

## 2018-03-07 RX ADMIN — LACTULOSE 40 GRAM(S): 10 SOLUTION ORAL at 05:37

## 2018-03-07 RX ADMIN — Medication 500 MILLIGRAM(S): at 05:40

## 2018-03-07 RX ADMIN — Medication 100 MILLIGRAM(S): at 11:16

## 2018-03-07 RX ADMIN — FENTANYL CITRATE 1 PATCH: 50 INJECTION INTRAVENOUS at 14:00

## 2018-03-07 RX ADMIN — CEFEPIME 100 MILLIGRAM(S): 1 INJECTION, POWDER, FOR SOLUTION INTRAMUSCULAR; INTRAVENOUS at 15:25

## 2018-03-07 NOTE — PROGRESS NOTE ADULT - SUBJECTIVE AND OBJECTIVE BOX
Patient has taken 15 mg oxycodone and 2 mg iv morphine in the last 24 hours.  When seen she is not responding, but moans and grimaces with each breath and appears uncomfortable

## 2018-03-07 NOTE — PROGRESS NOTE ADULT - ASSESSMENT
chronic coccyx OM:  palliative care.   d/c iv antibiotics.  po vantin 100mg q12h and [id number] q12h for 2 weeks.  recall prn please.

## 2018-03-07 NOTE — PROGRESS NOTE ADULT - SUBJECTIVE AND OBJECTIVE BOX
MADIE JUAN JOSE  62y, Female      OVERNIGHT EVENTS:    no fevers, non verbal. NAD    VITALS:  T(F): 98, Max: 99.1 (03-06-18 @ 20:15)  HR: 109  BP: 105/54  RR: 18Vital Signs Last 24 Hrs  T(C): 36.7 (07 Mar 2018 04:10), Max: 37.3 (06 Mar 2018 20:15)  T(F): 98 (07 Mar 2018 04:10), Max: 99.1 (06 Mar 2018 20:15)  HR: 109 (07 Mar 2018 04:10) (104 - 109)  BP: 105/54 (07 Mar 2018 04:10) (93/54 - 126/55)  BP(mean): --  RR: 18 (07 Mar 2018 04:10) (18 - 18)  SpO2: --    TESTS & MEASUREMENTS:                    RADIOLOGY & ADDITIONAL TESTS:    ANTIBIOTICS:  cefepime  IVPB 1000 milliGRAM(s) IV Intermittent daily  metroNIDAZOLE    Tablet 500 milliGRAM(s) Oral every 12 hours  rifaximin 550 milliGRAM(s) Oral two times a day

## 2018-03-07 NOTE — PROGRESS NOTE ADULT - ASSESSMENT
Patient is a 62y old Female with PMH of MS and being bed-bound, CKD IV, chronic sacral osteo, recently diagnosed colon CA at [facility], and seizure disorder who presents with a chief complaint of altered mental status (24 Feb 2018 18:44). Patient's daughter/healthcare proxy present at bedside. She reports patient's mental status has gradually declined since October after repeated hospitalizations for lung infections, UTIs, and the chronic sacral ulcer. Patient's hospital course complicated by metabolic acidosis and sepsis. Patient is still non-verbal and altered. As per daughter, patient more verbal a week prior to admission.    1. Metabolic encephalopathy probably 2/2 sepsis/ sacral osteomyelitis/UTI 2/2 chronic indwelling antony  - CT Head No Cont (02.24.18 @ 16:25): negative  - CT Abdomen and Pelvis No Cont (02.24.18 @ 16:26) Sacral decubitus ulcer with extension to the coccyx demonstrating bony destruction of the coccyx consistent with osteomyelitis. Surrounding phlegmon measures 6 cm transverse. Trace persistent right hydroureteronephrosis with suggestion of bilateral urothelial thickening likely related to chronic infection.Stable 2.2 cm right adrenal adenoma.Stable diffuse bladder wall thickening and trabeculation.  - EEG showed generalized slowing w/ triphasic waves (reportedly) - consistent w/ metabolic encephalopathy  - ID on board - Rx switching Abx to PO Vantin 100mg BID + Flagyl 500mg BID x 14 days  - Burn on board - Rx no intervention @ this time. C/w Dakins + Santyl wet-to-dry dressings for now.  - Ammonia elevated - increased lactulose, titrate to 3-4 BMs per day. C/w Rifaximin. Ammonia trend 135->141->116->192 -> 113  - Palliative on board - Rx's appreciated    2. Hypernatremia, resolving:  - Nephro on board - Rx keeping Bicarb >22  - daily BMP    3. AMS, can't take PO  - NGT feeds: Glucerna  - Aspiration precautions.    4. Seizure Disorder/ Bipolar disorder  - Valproic acid level 26; Carbamazepine level 7.3. These levels recorded after increasing both meds  - Neuro on board - Rx Valproic acid increased to 500mg in the morning and at bedtime and 250mg in between. Tegretol increased to 600mg QD.    5. Sigmoid Colon AdenoCA  - GI on board - Rx no intervention for now  - H/H stable  - c/w protonix  - EGD done at [facility] on 1/28/18 showed 4cm semi-circumferential mass in sigmoid colon, which was biopsied - reports show moderately differentiated adenoCA  - Of note, pt is on iron but will monitor CBC    5. DM type 2   - Monitor FS.    6. Hypothyroidism  - c/w synthroid.    7. Multiple Sclerosis with neurogenic bladder  - Chronic antony     8. B/L upper extremity edema  - Venous duplex showed evidence of superficial thrombophlebitis in left arm distal to PICC line, results above.    DVT ppx: Heparin subQ  Code status: DNR/DNI  Dispo: from [facility] (long-term)    Prognosis guarded.   - Palliative care meeting: As per family, pt is now DNR and DNI. Family does not want to escalate care but are not ready for hospice care discussion yet. Will adjust pain regimen per palliative care recs. Family meeting planned for this upcoming Friday 3/9  - Patient not responding well to treatment.

## 2018-03-08 LAB
ANION GAP SERPL CALC-SCNC: 13 MMOL/L — SIGNIFICANT CHANGE UP (ref 7–14)
ANION GAP SERPL CALC-SCNC: 15 MMOL/L — HIGH (ref 7–14)
BASOPHILS # BLD AUTO: 0.04 K/UL — SIGNIFICANT CHANGE UP (ref 0–0.2)
BASOPHILS # BLD AUTO: 0.05 K/UL — SIGNIFICANT CHANGE UP (ref 0–0.2)
BASOPHILS NFR BLD AUTO: 0.3 % — SIGNIFICANT CHANGE UP (ref 0–1)
BASOPHILS NFR BLD AUTO: 0.4 % — SIGNIFICANT CHANGE UP (ref 0–1)
BUN SERPL-MCNC: 54 MG/DL — HIGH (ref 10–20)
BUN SERPL-MCNC: 57 MG/DL — HIGH (ref 10–20)
CALCIUM SERPL-MCNC: 9.2 MG/DL — SIGNIFICANT CHANGE UP (ref 8.5–10.1)
CALCIUM SERPL-MCNC: 9.4 MG/DL — SIGNIFICANT CHANGE UP (ref 8.5–10.1)
CHLORIDE SERPL-SCNC: 111 MMOL/L — HIGH (ref 98–110)
CHLORIDE SERPL-SCNC: 112 MMOL/L — HIGH (ref 98–110)
CO2 SERPL-SCNC: 19 MMOL/L — SIGNIFICANT CHANGE UP (ref 17–32)
CO2 SERPL-SCNC: 19 MMOL/L — SIGNIFICANT CHANGE UP (ref 17–32)
CREAT SERPL-MCNC: 2.2 MG/DL — HIGH (ref 0.7–1.5)
CREAT SERPL-MCNC: 2.3 MG/DL — HIGH (ref 0.7–1.5)
EOSINOPHIL # BLD AUTO: 0.12 K/UL — SIGNIFICANT CHANGE UP (ref 0–0.7)
EOSINOPHIL # BLD AUTO: 0.13 K/UL — SIGNIFICANT CHANGE UP (ref 0–0.7)
EOSINOPHIL NFR BLD AUTO: 0.9 % — SIGNIFICANT CHANGE UP (ref 0–8)
EOSINOPHIL NFR BLD AUTO: 1.1 % — SIGNIFICANT CHANGE UP (ref 0–8)
GLUCOSE SERPL-MCNC: 120 MG/DL — HIGH (ref 70–110)
GLUCOSE SERPL-MCNC: 123 MG/DL — HIGH (ref 70–110)
HCT VFR BLD CALC: 26.5 % — LOW (ref 37–47)
HCT VFR BLD CALC: 29.3 % — LOW (ref 37–47)
HGB BLD-MCNC: 8.2 G/DL — LOW (ref 12–16)
HGB BLD-MCNC: 8.9 G/DL — LOW (ref 12–16)
IMM GRANULOCYTES NFR BLD AUTO: 0.7 % — HIGH (ref 0.1–0.3)
IMM GRANULOCYTES NFR BLD AUTO: 1.2 % — HIGH (ref 0.1–0.3)
LYMPHOCYTES # BLD AUTO: 15.3 % — LOW (ref 20.5–51.1)
LYMPHOCYTES # BLD AUTO: 17.4 % — LOW (ref 20.5–51.1)
LYMPHOCYTES # BLD AUTO: 2.01 K/UL — SIGNIFICANT CHANGE UP (ref 1.2–3.4)
LYMPHOCYTES # BLD AUTO: 2.03 K/UL — SIGNIFICANT CHANGE UP (ref 1.2–3.4)
MCHC RBC-ENTMCNC: 28.7 PG — SIGNIFICANT CHANGE UP (ref 27–31)
MCHC RBC-ENTMCNC: 29 PG — SIGNIFICANT CHANGE UP (ref 27–31)
MCHC RBC-ENTMCNC: 30.4 G/DL — LOW (ref 32–37)
MCHC RBC-ENTMCNC: 30.9 G/DL — LOW (ref 32–37)
MCV RBC AUTO: 93.6 FL — SIGNIFICANT CHANGE UP (ref 81–99)
MCV RBC AUTO: 94.5 FL — SIGNIFICANT CHANGE UP (ref 81–99)
MONOCYTES # BLD AUTO: 1.18 K/UL — HIGH (ref 0.1–0.6)
MONOCYTES # BLD AUTO: 1.21 K/UL — HIGH (ref 0.1–0.6)
MONOCYTES NFR BLD AUTO: 10.4 % — HIGH (ref 1.7–9.3)
MONOCYTES NFR BLD AUTO: 9 % — SIGNIFICANT CHANGE UP (ref 1.7–9.3)
NEUTROPHILS # BLD AUTO: 8.12 K/UL — HIGH (ref 1.4–6.5)
NEUTROPHILS # BLD AUTO: 9.67 K/UL — HIGH (ref 1.4–6.5)
NEUTROPHILS NFR BLD AUTO: 69.6 % — SIGNIFICANT CHANGE UP (ref 42.2–75.2)
NEUTROPHILS NFR BLD AUTO: 73.7 % — SIGNIFICANT CHANGE UP (ref 42.2–75.2)
NRBC # BLD: 0 /100 WBCS — SIGNIFICANT CHANGE UP (ref 0–0)
NRBC # BLD: 0 /100 WBCS — SIGNIFICANT CHANGE UP (ref 0–0)
PLATELET # BLD AUTO: 259 K/UL — SIGNIFICANT CHANGE UP (ref 130–400)
PLATELET # BLD AUTO: 278 K/UL — SIGNIFICANT CHANGE UP (ref 130–400)
POTASSIUM SERPL-MCNC: 5 MMOL/L — SIGNIFICANT CHANGE UP (ref 3.5–5)
POTASSIUM SERPL-MCNC: 5.3 MMOL/L — HIGH (ref 3.5–5)
POTASSIUM SERPL-SCNC: 5 MMOL/L — SIGNIFICANT CHANGE UP (ref 3.5–5)
POTASSIUM SERPL-SCNC: 5.3 MMOL/L — HIGH (ref 3.5–5)
RBC # BLD: 2.83 M/UL — LOW (ref 4.2–5.4)
RBC # BLD: 3.1 M/UL — LOW (ref 4.2–5.4)
RBC # FLD: 17.3 % — HIGH (ref 11.5–14.5)
RBC # FLD: 17.4 % — HIGH (ref 11.5–14.5)
SODIUM SERPL-SCNC: 143 MMOL/L — SIGNIFICANT CHANGE UP (ref 135–146)
SODIUM SERPL-SCNC: 146 MMOL/L — SIGNIFICANT CHANGE UP (ref 135–146)
WBC # BLD: 11.67 K/UL — HIGH (ref 4.8–10.8)
WBC # BLD: 13.12 K/UL — HIGH (ref 4.8–10.8)
WBC # FLD AUTO: 11.67 K/UL — HIGH (ref 4.8–10.8)
WBC # FLD AUTO: 13.12 K/UL — HIGH (ref 4.8–10.8)

## 2018-03-08 RX ORDER — MORPHINE SULFATE 50 MG/1
2 CAPSULE, EXTENDED RELEASE ORAL EVERY 4 HOURS
Qty: 0 | Refills: 0 | Status: DISCONTINUED | OUTPATIENT
Start: 2018-03-08 | End: 2018-03-13

## 2018-03-08 RX ORDER — CEFEPIME 1 G/1
INJECTION, POWDER, FOR SOLUTION INTRAMUSCULAR; INTRAVENOUS
Qty: 0 | Refills: 0 | Status: DISCONTINUED | OUTPATIENT
Start: 2018-03-08 | End: 2018-03-09

## 2018-03-08 RX ORDER — FENTANYL CITRATE 50 UG/ML
1 INJECTION INTRAVENOUS
Qty: 0 | Refills: 0 | Status: DISCONTINUED | OUTPATIENT
Start: 2018-03-08 | End: 2018-03-09

## 2018-03-08 RX ORDER — INSULIN HUMAN 100 [IU]/ML
10 INJECTION, SOLUTION SUBCUTANEOUS ONCE
Qty: 0 | Refills: 0 | Status: DISCONTINUED | OUTPATIENT
Start: 2018-03-08 | End: 2018-03-08

## 2018-03-08 RX ORDER — DEXTROSE 50 % IN WATER 50 %
25 SYRINGE (ML) INTRAVENOUS ONCE
Qty: 0 | Refills: 0 | Status: COMPLETED | OUTPATIENT
Start: 2018-03-08 | End: 2018-03-08

## 2018-03-08 RX ORDER — SODIUM CHLORIDE 9 MG/ML
1000 INJECTION INTRAMUSCULAR; INTRAVENOUS; SUBCUTANEOUS ONCE
Qty: 0 | Refills: 0 | Status: COMPLETED | OUTPATIENT
Start: 2018-03-08 | End: 2018-03-08

## 2018-03-08 RX ORDER — VANCOMYCIN HCL 1 G
VIAL (EA) INTRAVENOUS
Qty: 0 | Refills: 0 | Status: DISCONTINUED | OUTPATIENT
Start: 2018-03-08 | End: 2018-03-08

## 2018-03-08 RX ORDER — CEFEPIME 1 G/1
1000 INJECTION, POWDER, FOR SOLUTION INTRAMUSCULAR; INTRAVENOUS EVERY 12 HOURS
Qty: 0 | Refills: 0 | Status: DISCONTINUED | OUTPATIENT
Start: 2018-03-09 | End: 2018-03-09

## 2018-03-08 RX ORDER — INSULIN HUMAN 100 [IU]/ML
10 INJECTION, SOLUTION SUBCUTANEOUS ONCE
Qty: 0 | Refills: 0 | Status: COMPLETED | OUTPATIENT
Start: 2018-03-08 | End: 2018-03-08

## 2018-03-08 RX ORDER — CEFEPIME 1 G/1
1000 INJECTION, POWDER, FOR SOLUTION INTRAMUSCULAR; INTRAVENOUS ONCE
Qty: 0 | Refills: 0 | Status: COMPLETED | OUTPATIENT
Start: 2018-03-08 | End: 2018-03-08

## 2018-03-08 RX ORDER — VANCOMYCIN HCL 1 G
1000 VIAL (EA) INTRAVENOUS ONCE
Qty: 0 | Refills: 0 | Status: COMPLETED | OUTPATIENT
Start: 2018-03-08 | End: 2018-03-08

## 2018-03-08 RX ADMIN — Medication 250 MILLIGRAM(S): at 15:16

## 2018-03-08 RX ADMIN — LACTULOSE 40 GRAM(S): 10 SOLUTION ORAL at 15:04

## 2018-03-08 RX ADMIN — Medication 100 MILLIGRAM(S): at 06:40

## 2018-03-08 RX ADMIN — Medication 1 TABLET(S): at 17:16

## 2018-03-08 RX ADMIN — Medication 1 APPLICATION(S): at 17:17

## 2018-03-08 RX ADMIN — Medication 250 MILLIGRAM(S): at 15:06

## 2018-03-08 RX ADMIN — Medication 50 MICROGRAM(S): at 06:39

## 2018-03-08 RX ADMIN — Medication 1 APPLICATION(S): at 04:43

## 2018-03-08 RX ADMIN — Medication 500 MILLIGRAM(S): at 06:38

## 2018-03-08 RX ADMIN — HEPARIN SODIUM 5000 UNIT(S): 5000 INJECTION INTRAVENOUS; SUBCUTANEOUS at 13:41

## 2018-03-08 RX ADMIN — Medication 500 MILLIGRAM(S): at 22:07

## 2018-03-08 RX ADMIN — Medication 1 APPLICATION(S): at 17:18

## 2018-03-08 RX ADMIN — Medication 10 MILLIGRAM(S): at 11:13

## 2018-03-08 RX ADMIN — ZINC SULFATE TAB 220 MG (50 MG ZINC EQUIVALENT) 220 MILLIGRAM(S): 220 (50 ZN) TAB at 11:14

## 2018-03-08 RX ADMIN — PANTOPRAZOLE SODIUM 40 MILLIGRAM(S): 20 TABLET, DELAYED RELEASE ORAL at 17:16

## 2018-03-08 RX ADMIN — Medication 650 MILLIGRAM(S): at 22:06

## 2018-03-08 RX ADMIN — LACTULOSE 40 GRAM(S): 10 SOLUTION ORAL at 06:40

## 2018-03-08 RX ADMIN — Medication 650 MILLIGRAM(S): at 17:35

## 2018-03-08 RX ADMIN — INSULIN HUMAN 10 UNIT(S): 100 INJECTION, SOLUTION SUBCUTANEOUS at 11:56

## 2018-03-08 RX ADMIN — SODIUM CHLORIDE 1000 MILLILITER(S): 9 INJECTION INTRAMUSCULAR; INTRAVENOUS; SUBCUTANEOUS at 18:36

## 2018-03-08 RX ADMIN — LACTULOSE 40 GRAM(S): 10 SOLUTION ORAL at 22:03

## 2018-03-08 RX ADMIN — HEPARIN SODIUM 5000 UNIT(S): 5000 INJECTION INTRAVENOUS; SUBCUTANEOUS at 06:39

## 2018-03-08 RX ADMIN — Medication 1 TABLET(S): at 11:14

## 2018-03-08 RX ADMIN — OXYCODONE HYDROCHLORIDE 5 MILLIGRAM(S): 5 TABLET ORAL at 04:44

## 2018-03-08 RX ADMIN — HEPARIN SODIUM 5000 UNIT(S): 5000 INJECTION INTRAVENOUS; SUBCUTANEOUS at 22:00

## 2018-03-08 RX ADMIN — Medication 600 MILLIGRAM(S): at 11:14

## 2018-03-08 RX ADMIN — CEFEPIME 100 MILLIGRAM(S): 1 INJECTION, POWDER, FOR SOLUTION INTRAMUSCULAR; INTRAVENOUS at 15:06

## 2018-03-08 RX ADMIN — PANTOPRAZOLE SODIUM 40 MILLIGRAM(S): 20 TABLET, DELAYED RELEASE ORAL at 06:39

## 2018-03-08 RX ADMIN — Medication 650 MILLIGRAM(S): at 06:38

## 2018-03-08 RX ADMIN — OXYCODONE HYDROCHLORIDE 5 MILLIGRAM(S): 5 TABLET ORAL at 06:19

## 2018-03-08 RX ADMIN — Medication 1 APPLICATION(S): at 06:18

## 2018-03-08 RX ADMIN — SIMETHICONE 80 MILLIGRAM(S): 80 TABLET, CHEWABLE ORAL at 11:15

## 2018-03-08 RX ADMIN — SODIUM CHLORIDE 1000 MILLILITER(S): 9 INJECTION INTRAMUSCULAR; INTRAVENOUS; SUBCUTANEOUS at 14:46

## 2018-03-08 RX ADMIN — Medication 25 MILLILITER(S): at 11:55

## 2018-03-08 RX ADMIN — Medication 1 MILLIGRAM(S): at 11:14

## 2018-03-08 RX ADMIN — Medication 100 MILLIGRAM(S): at 11:14

## 2018-03-08 RX ADMIN — Medication 650 MILLIGRAM(S): at 13:41

## 2018-03-08 RX ADMIN — Medication 650 MILLIGRAM(S): at 14:24

## 2018-03-08 RX ADMIN — Medication 1 TABLET(S): at 08:16

## 2018-03-08 NOTE — PROGRESS NOTE ADULT - ASSESSMENT
Patient is a 62y old Female with PMH of MS and being bed-bound, CKD IV, chronic sacral osteo, recently diagnosed colon CA at [facility], and seizure disorder who presents with a chief complaint of altered mental status (24 Feb 2018 18:44). Patient's daughter/healthcare proxy present at bedside. She reports patient's mental status has gradually declined since October after repeated hospitalizations for lung infections, UTIs, and the chronic sacral ulcer. Patient's hospital course complicated by metabolic acidosis and sepsis. Patient is still non-verbal and altered. As per daughter, patient more verbal a week prior to admission.    1. Metabolic encephalopathy probably 2/2 sepsis/ sacral osteomyelitis/UTI 2/2 chronic indwelling antony  - CT Head No Cont (02.24.18 @ 16:25): negative  - CT Abdomen and Pelvis No Cont (02.24.18 @ 16:26) Sacral decubitus ulcer with extension to the coccyx demonstrating bony destruction of the coccyx consistent with osteomyelitis. Surrounding phlegmon measures 6 cm transverse. Trace persistent right hydroureteronephrosis with suggestion of bilateral urothelial thickening likely related to chronic infection.Stable 2.2 cm right adrenal adenoma.Stable diffuse bladder wall thickening and trabeculation.  - EEG showed generalized slowing w/ triphasic waves (reportedly) - consistent w/ metabolic encephalopathy  - ID on board - Rx switching Abx to PO Vantin 100mg BID + Flagyl 500mg BID x 14 days  - Burn on board - Rx no intervention @ this time. C/w Dakins + Santyl wet-to-dry dressings for now.  - Ammonia elevated - increased lactulose, titrate to 3-4 BMs per day. C/w Rifaximin. Ammonia trend 135->141->116->192 -> 113  - Palliative on board - Rx's appreciated    2. Hypernatremia, resolving:  - Nephro on board - Rx keeping Bicarb >22  - daily BMP    3. AMS, can't take PO  - NGT feeds: Glucerna  - Aspiration precautions.    4. Seizure Disorder/ Bipolar disorder  - Valproic acid level 26; Carbamazepine level 7.3. These levels recorded after increasing both meds  - Neuro on board - Rx Valproic acid increased to 500mg in the morning and at bedtime and 250mg in between. Tegretol increased to 600mg QD.    5. Sigmoid Colon AdenoCA  - GI on board - Rx no intervention for now  - H/H stable  - c/w protonix  - EGD done at [facility] on 1/28/18 showed 4cm semi-circumferential mass in sigmoid colon, which was biopsied - reports show moderately differentiated adenoCA    5. DM type 2   - Monitor FS.    6. Hypothyroidism  - c/w synthroid.    7. Multiple Sclerosis with neurogenic bladder  - Chronic antony     8. B/L upper extremity edema  - Venous duplex showed evidence of superficial thrombophlebitis in left arm distal to PICC line, results above.    DVT ppx: Heparin subQ  Code status: DNR/DNI  Dispo: from [facility] (long-term)    Prognosis guarded.   - Palliative care meeting: As per family, pt is now DNR and DNI. Family does not want to escalate care but are not ready for hospice care discussion yet. Will adjust pain regimen per palliative care recs. Family meeting planned for this upcoming Friday 3/9  - Patient not responding well to treatment.

## 2018-03-08 NOTE — CHART NOTE - NSCHARTNOTEFT_GEN_A_CORE
Registered Dietitian Limited Follow-Up    Pt presents with AMS, sepsis, and CKD stage 4. As per family, pt is now DNR and DNI. Family does not want to escalate care but are not ready for hospice care discussion yet. Pain regimen will be altered per palliative care recs. Family meeting planned for this upcoming Friday 3/9. Pt NPO with tube feed (nasogastric). Receiving Glucerna 1.2, 320mL Q6hrs (1536 kcal, 77g pro). PT non verbal and asleep. Per RN, pt is receiving full feeds and tolerating. Wt (3/6): 77 kg (169.12#). - wt stable with fluctuations (73-79 kg) since admit. Will continue to monitor wt trends. LBM 3/8.  Pt receiving 220mg zinc since 2/24. Skin: Edema 2+ generalized, PU unstageable to sacrum, DTI right heel. Recommend d/c zinc as pt has been receiving for 12 days.     Rec: d/c zinc  Pt not at nutritional risk at this time.

## 2018-03-08 NOTE — PROGRESS NOTE ADULT - SUBJECTIVE AND OBJECTIVE BOX
SUBJECTIVE:    Patient is a 62y old Female who presents with a chief complaint of altered mental status (24 Feb 2018 18:44)    Currently admitted to medicine with the primary diagnosis of Sepsis     Today is hospital day 12d. This morning she is resting comfortably in bed and reports no new issues or overnight events.     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     ALLERGIES:  No Known Allergies    MEDICATIONS:  STANDING MEDICATIONS  bisacodyl Suppository 10 milliGRAM(s) Rectal daily  carBAMazepine Suspension 600 milliGRAM(s) Oral daily  collagenase Ointment 1 Application(s) Topical two times a day  Dakins Solution - Full Strength 1 Application(s) Topical two times a day  dextrose 50% Injectable 25 milliLiter(s) IV Push once  docusate sodium 100 milliGRAM(s) Oral three times a day  fentaNYL   Patch  25 MICROgram(s)/Hr 1 Patch Transdermal every 72 hours  folic acid 1 milliGRAM(s) Oral daily  heparin  Injectable 5000 Unit(s) SubCutaneous every 8 hours  insulin regular  human recombinant. 10 Unit(s) SubCutaneous once  lactobacillus acidophilus 1 Tablet(s) Oral two times a day with meals  lactulose Syrup 40 Gram(s) Enteral Tube three times a day  levothyroxine 50 MICROGram(s) Oral daily  metroNIDAZOLE    Tablet 500 milliGRAM(s) Oral every 12 hours  multivitamin 1 Tablet(s) Oral daily  pantoprazole   Suspension 40 milliGRAM(s) Oral two times a day before meals  rifaximin 550 milliGRAM(s) Oral two times a day  silver sulfADIAZINE 1% Cream 1 Application(s) Topical every 12 hours  simethicone 80 milliGRAM(s) Chew daily  sodium bicarbonate 650 milliGRAM(s) Oral three times a day  thiamine 100 milliGRAM(s) Oral daily  valproic  acid Syrup 250 milliGRAM(s) Oral daily  valproic  acid Syrup 500 milliGRAM(s) Oral daily  valproic  acid Syrup 500 milliGRAM(s) Oral at bedtime  vantin 100 milliGRAM(s) 100 milliGRAM(s) Oral every 12 hours  zinc sulfate 220 milliGRAM(s) Oral daily    PRN MEDICATIONS  acetaminophen  Suppository 650 milliGRAM(s) Rectal four times a day PRN  oxyCODONE    IR 5 milliGRAM(s) Oral every 3 hours PRN  senna 2 Tablet(s) Oral at bedtime PRN    VITALS:   T(F): 99.1  HR: 115  BP: 104/60  RR: 18  SpO2: --    LABS:                        8.9    13.12 )-----------( 278      ( 08 Mar 2018 06:10 )             29.3     03-08    146  |  112<H>  |  54<H>  ----------------------------<  123<H>  5.3<H>   |  19  |  2.3<H>    Ca    9.4      08 Mar 2018 06:10  Phos  5.8     03-07  Mg     3.0     03-07    TPro  5.5<L>  /  Alb  1.5<L>  /  TBili  0.6  /  DBili  x   /  AST  16  /  ALT  11  /  AlkPhos  134<H>  03-07    PHYSICAL EXAM:  GEN: looks mildly uncomfortable, occasional moaning  LUNGS: CTAB, no w/r/r  HEART: RRR, no m/r/g  ABD: soft, NT/ND, +BS  EXT: NC/NC/NE/2+PP/MERCEDES  NEURO: AAOx0, non-verbal

## 2018-03-08 NOTE — PROGRESS NOTE ADULT - SUBJECTIVE AND OBJECTIVE BOX
62yFemale with diagnosis: SEPSIS, UTI; multiple comorbidities with declining functional status since Oct 2017.     Patient seen, daughter Enma at bedside verbalizing difficulty accepting prognosis    PHYSICAL EXAM    T(C): , Max: 38.6 (14:15)  T(F): 101.3  HR: 121 (113 - 123)  BP: 97/50 (93/58 - 104/60)  RR: 18 (18 - 18)  SpO2: --    LABS:                        8.2    11.67 )-----------( 259      ( 08 Mar 2018 15:21 )             26.5                                                                                    03-08    143  |  111<H>  |  57<H>  ----------------------------<  120<H>  5.0   |  19  |  2.2<H>    Ca    9.2      08 Mar 2018 15:21  Phos  5.8     03-07  Mg     3.0     03-07    TPro  5.5<L>  /  Alb  1.5<L>  /  TBili  0.6  /  DBili  x   /  AST  16  /  ALT  11  /  AlkPhos  134<H>  03-07                                                MEDICATIONS  (STANDING):  bisacodyl Suppository 10 milliGRAM(s) Rectal daily  carBAMazepine Suspension 600 milliGRAM(s) Oral daily  cefepime  IVPB      collagenase Ointment 1 Application(s) Topical two times a day  Dakins Solution - Full Strength 1 Application(s) Topical two times a day  docusate sodium 100 milliGRAM(s) Oral three times a day  fentaNYL   Patch  50 MICROgram(s)/Hr 1 Patch Transdermal every 72 hours  folic acid 1 milliGRAM(s) Oral daily  heparin  Injectable 5000 Unit(s) SubCutaneous every 8 hours  lactobacillus acidophilus 1 Tablet(s) Oral two times a day with meals  lactulose Syrup 40 Gram(s) Enteral Tube three times a day  levothyroxine 50 MICROGram(s) Oral daily  multivitamin 1 Tablet(s) Oral daily  pantoprazole   Suspension 40 milliGRAM(s) Oral two times a day before meals  rifaximin 550 milliGRAM(s) Oral two times a day  silver sulfADIAZINE 1% Cream 1 Application(s) Topical every 12 hours  simethicone 80 milliGRAM(s) Chew daily  sodium bicarbonate 650 milliGRAM(s) Oral three times a day  thiamine 100 milliGRAM(s) Oral daily  valproic  acid Syrup 250 milliGRAM(s) Oral daily  valproic  acid Syrup 500 milliGRAM(s) Oral daily  valproic  acid Syrup 500 milliGRAM(s) Oral at bedtime  vancomycin  IVPB      zinc sulfate 220 milliGRAM(s) Oral daily    MEDICATIONS  (PRN):  acetaminophen  Suppository 650 milliGRAM(s) Rectal four times a day PRN For Temp greater than 38 C (100.4 F)  morphine  - Injectable 2 milliGRAM(s) IV Push every 4 hours PRN Severe Pain (7 - 10)  senna 2 Tablet(s) Oral at bedtime PRN Constipation 62yFemale with diagnosis: SEPSIS, UTI; multiple comorbidities with declining functional status since Oct 2017.     Patient seen, daughter Enma at bedside verbalizing difficulty accepting poor prognosis. Concerned that patient is not regaining any consciousness as had done during last hospitalizations.    I recapped the [facility] meeting from 3/5.     PHYSICAL EXAM  Minimally responsive to tactile/and verbal stimuli - moans and returns to restful state. No evidence of ability to follow commands. Nursing reports that with body care will moan but no purposeful movement.  Respirations 16-18 no depress/distress  Hands and feet warm  Gross anasarca     T(C): , Max: 38.6 (14:15)  T(F): 101.3  HR: 121 (113 - 123)  BP: 97/50 (93/58 - 104/60)  RR: 18 (18 - 18)  SpO2: --    LABS:                        8.2    11.67 )-----------( 259      ( 08 Mar 2018 15:21 )             26.5                                                                                  03-08    143  |  111<H>  |  57<H>  ----------------------------<  120<H>  5.0   |  19  |  2.2<H>    Ca    9.2      08 Mar 2018 15:21  Phos  5.8     03-07  Mg     3.0     03-07    TPro  5.5<L>  /  Alb  1.5<L>  /  TBili  0.6  /  DBili  x   /  AST  16  /  ALT  11  /  AlkPhos  134<H>  03-07                                               MEDICATIONS  (STANDING):  bisacodyl Suppository 10 milliGRAM(s) Rectal daily  carBAMazepine Suspension 600 milliGRAM(s) Oral daily  cefepime  IVPB      collagenase Ointment 1 Application(s) Topical two times a day  Dakins Solution - Full Strength 1 Application(s) Topical two times a day  docusate sodium 100 milliGRAM(s) Oral three times a day  fentaNYL   Patch  50 MICROgram(s)/Hr 1 Patch Transdermal every 72 hours - our recommendation was 25mcg/72H which was placed 3/7 at 1400  folic acid 1 milliGRAM(s) Oral daily  heparin  Injectable 5000 Unit(s) SubCutaneous every 8 hours  lactobacillus acidophilus 1 Tablet(s) Oral two times a day with meals  lactulose Syrup 40 Gram(s) Enteral Tube three times a day  levothyroxine 50 MICROGram(s) Oral daily  multivitamin 1 Tablet(s) Oral daily  pantoprazole   Suspension 40 milliGRAM(s) Oral two times a day before meals  rifaximin 550 milliGRAM(s) Oral two times a day  silver sulfADIAZINE 1% Cream 1 Application(s) Topical every 12 hours  simethicone 80 milliGRAM(s) Chew daily  sodium bicarbonate 650 milliGRAM(s) Oral three times a day  thiamine 100 milliGRAM(s) Oral daily  valproic  acid Syrup 250 milliGRAM(s) Oral daily  valproic  acid Syrup 500 milliGRAM(s) Oral daily  valproic  acid Syrup 500 milliGRAM(s) Oral at bedtime  vancomycin  IVPB      zinc sulfate 220 milliGRAM(s) Oral daily    MEDICATIONS  (PRN):  acetaminophen  Suppository 650 milliGRAM(s) Rectal four times a day PRN For Temp greater than 38 C (100.4 F)  morphine  - Injectable 2 milliGRAM(s) IV Push every 4 hours PRN Severe Pain (7 - 10) - our recommendation was oxycodone 5mg every 2H for breakthrough pain of which 2 doses/ 24H  senna 2 Tablet(s) Oral at bedtime PRN Constipation

## 2018-03-08 NOTE — PROGRESS NOTE ADULT - ASSESSMENT
62 year old with multiple advanced comorbidities; daughter and other children are coordinating a time to re-meet to readdress goals of care for tomorrow 3/9

## 2018-03-09 LAB
AMMONIA BLD-MCNC: 82 MMOL/L — CRITICAL HIGH (ref 11–35)
ANION GAP SERPL CALC-SCNC: 16 MMOL/L — HIGH (ref 7–14)
BASOPHILS # BLD AUTO: 0.07 K/UL — SIGNIFICANT CHANGE UP (ref 0–0.2)
BASOPHILS NFR BLD AUTO: 0.6 % — SIGNIFICANT CHANGE UP (ref 0–1)
BUN SERPL-MCNC: 56 MG/DL — HIGH (ref 10–20)
CALCIUM SERPL-MCNC: 9 MG/DL — SIGNIFICANT CHANGE UP (ref 8.5–10.1)
CHLORIDE SERPL-SCNC: 112 MMOL/L — HIGH (ref 98–110)
CO2 SERPL-SCNC: 18 MMOL/L — SIGNIFICANT CHANGE UP (ref 17–32)
CREAT SERPL-MCNC: 2.1 MG/DL — HIGH (ref 0.7–1.5)
EOSINOPHIL # BLD AUTO: 0.23 K/UL — SIGNIFICANT CHANGE UP (ref 0–0.7)
EOSINOPHIL NFR BLD AUTO: 2 % — SIGNIFICANT CHANGE UP (ref 0–8)
GLUCOSE SERPL-MCNC: 130 MG/DL — HIGH (ref 70–110)
HCT VFR BLD CALC: 26.5 % — LOW (ref 37–47)
HGB BLD-MCNC: 8.2 G/DL — LOW (ref 12–16)
IMM GRANULOCYTES NFR BLD AUTO: 1.1 % — HIGH (ref 0.1–0.3)
LYMPHOCYTES # BLD AUTO: 2.87 K/UL — SIGNIFICANT CHANGE UP (ref 1.2–3.4)
LYMPHOCYTES # BLD AUTO: 24.9 % — SIGNIFICANT CHANGE UP (ref 20.5–51.1)
MCHC RBC-ENTMCNC: 29.5 PG — SIGNIFICANT CHANGE UP (ref 27–31)
MCHC RBC-ENTMCNC: 30.9 G/DL — LOW (ref 32–37)
MCV RBC AUTO: 95.3 FL — SIGNIFICANT CHANGE UP (ref 81–99)
MONOCYTES # BLD AUTO: 1.07 K/UL — HIGH (ref 0.1–0.6)
MONOCYTES NFR BLD AUTO: 9.3 % — SIGNIFICANT CHANGE UP (ref 1.7–9.3)
NEUTROPHILS # BLD AUTO: 7.15 K/UL — HIGH (ref 1.4–6.5)
NEUTROPHILS NFR BLD AUTO: 62.1 % — SIGNIFICANT CHANGE UP (ref 42.2–75.2)
NRBC # BLD: 0 /100 WBCS — SIGNIFICANT CHANGE UP (ref 0–0)
PLATELET # BLD AUTO: 265 K/UL — SIGNIFICANT CHANGE UP (ref 130–400)
POTASSIUM SERPL-MCNC: 4.4 MMOL/L — SIGNIFICANT CHANGE UP (ref 3.5–5)
POTASSIUM SERPL-SCNC: 4.4 MMOL/L — SIGNIFICANT CHANGE UP (ref 3.5–5)
RBC # BLD: 2.78 M/UL — LOW (ref 4.2–5.4)
RBC # FLD: 17.3 % — HIGH (ref 11.5–14.5)
SODIUM SERPL-SCNC: 146 MMOL/L — SIGNIFICANT CHANGE UP (ref 135–146)
WBC # BLD: 11.52 K/UL — HIGH (ref 4.8–10.8)
WBC # FLD AUTO: 11.52 K/UL — HIGH (ref 4.8–10.8)

## 2018-03-09 RX ORDER — LACTULOSE 10 G/15ML
10 SOLUTION ORAL THREE TIMES A DAY
Qty: 0 | Refills: 0 | Status: DISCONTINUED | OUTPATIENT
Start: 2018-03-09 | End: 2018-03-19

## 2018-03-09 RX ORDER — FENTANYL CITRATE 50 UG/ML
1 INJECTION INTRAVENOUS
Qty: 0 | Refills: 0 | Status: DISCONTINUED | OUTPATIENT
Start: 2018-03-09 | End: 2018-03-14

## 2018-03-09 RX ORDER — CEFEPIME 1 G/1
1000 INJECTION, POWDER, FOR SOLUTION INTRAMUSCULAR; INTRAVENOUS EVERY 24 HOURS
Qty: 0 | Refills: 0 | Status: DISCONTINUED | OUTPATIENT
Start: 2018-03-09 | End: 2018-03-16

## 2018-03-09 RX ADMIN — Medication 1 MILLIGRAM(S): at 11:55

## 2018-03-09 RX ADMIN — Medication 1 TABLET(S): at 17:38

## 2018-03-09 RX ADMIN — HEPARIN SODIUM 5000 UNIT(S): 5000 INJECTION INTRAVENOUS; SUBCUTANEOUS at 13:24

## 2018-03-09 RX ADMIN — Medication 1 TABLET(S): at 11:55

## 2018-03-09 RX ADMIN — Medication 1 APPLICATION(S): at 05:44

## 2018-03-09 RX ADMIN — Medication 650 MILLIGRAM(S): at 22:08

## 2018-03-09 RX ADMIN — Medication 500 MILLIGRAM(S): at 22:11

## 2018-03-09 RX ADMIN — Medication 50 MICROGRAM(S): at 05:37

## 2018-03-09 RX ADMIN — Medication 650 MILLIGRAM(S): at 06:17

## 2018-03-09 RX ADMIN — Medication 100 MILLIGRAM(S): at 11:55

## 2018-03-09 RX ADMIN — HEPARIN SODIUM 5000 UNIT(S): 5000 INJECTION INTRAVENOUS; SUBCUTANEOUS at 05:37

## 2018-03-09 RX ADMIN — LACTULOSE 40 GRAM(S): 10 SOLUTION ORAL at 05:40

## 2018-03-09 RX ADMIN — Medication 500 MILLIGRAM(S): at 06:17

## 2018-03-09 RX ADMIN — Medication 600 MILLIGRAM(S): at 11:56

## 2018-03-09 RX ADMIN — CEFEPIME 100 MILLIGRAM(S): 1 INJECTION, POWDER, FOR SOLUTION INTRAMUSCULAR; INTRAVENOUS at 05:36

## 2018-03-09 RX ADMIN — MORPHINE SULFATE 2 MILLIGRAM(S): 50 CAPSULE, EXTENDED RELEASE ORAL at 06:40

## 2018-03-09 RX ADMIN — PANTOPRAZOLE SODIUM 40 MILLIGRAM(S): 20 TABLET, DELAYED RELEASE ORAL at 15:40

## 2018-03-09 RX ADMIN — Medication 1 APPLICATION(S): at 17:37

## 2018-03-09 RX ADMIN — ZINC SULFATE TAB 220 MG (50 MG ZINC EQUIVALENT) 220 MILLIGRAM(S): 220 (50 ZN) TAB at 11:55

## 2018-03-09 RX ADMIN — HEPARIN SODIUM 5000 UNIT(S): 5000 INJECTION INTRAVENOUS; SUBCUTANEOUS at 22:03

## 2018-03-09 RX ADMIN — Medication 1 APPLICATION(S): at 17:36

## 2018-03-09 RX ADMIN — Medication 1 APPLICATION(S): at 05:36

## 2018-03-09 RX ADMIN — Medication 250 MILLIGRAM(S): at 15:40

## 2018-03-09 RX ADMIN — Medication 1 TABLET(S): at 08:25

## 2018-03-09 RX ADMIN — LACTULOSE 40 GRAM(S): 10 SOLUTION ORAL at 13:24

## 2018-03-09 RX ADMIN — Medication 650 MILLIGRAM(S): at 13:24

## 2018-03-09 RX ADMIN — LACTULOSE 10 GRAM(S): 10 SOLUTION ORAL at 22:07

## 2018-03-09 RX ADMIN — PANTOPRAZOLE SODIUM 40 MILLIGRAM(S): 20 TABLET, DELAYED RELEASE ORAL at 06:18

## 2018-03-09 RX ADMIN — Medication 1 APPLICATION(S): at 17:38

## 2018-03-09 RX ADMIN — SIMETHICONE 80 MILLIGRAM(S): 80 TABLET, CHEWABLE ORAL at 11:55

## 2018-03-09 NOTE — PROGRESS NOTE ADULT - SUBJECTIVE AND OBJECTIVE BOX
JUAN JOSE RAMACHANDRAN  [facility] T2-3A 025 B ([facility] T2-3A)            Patient was evaluated and examined  by bedside, remains lethargic, confused, with profound generalized body weakness, spiked fever for past 24 hours, no nausea, no vomiting        REVIEW OF SYSTEMS:  unable to obtain due to patient's mental status change      T(C): , Max: 38.6 (03-08-18 @ 14:15)  HR: 119 (03-08-18 @ 20:21)  BP: 130/78 (03-09-18 @ 07:10)  RR: 109 (03-09-18 @ 07:10)  SpO2: --  CAPILLARY BLOOD GLUCOSE  102 (09 Mar 2018 12:21)  133 (09 Mar 2018 07:41)  123 (08 Mar 2018 22:34)  113 (08 Mar 2018 16:26)          PHYSICAL EXAM:  General: NAD, Awake, lethargic, patient is laying comfortably in bed  HEENT: AT, NC, Supple, NO JVD, NO CB, NGT present  Lungs: good breath sounds B/L, no wheezing, no rhonchi  CVS: normal S1, S2, RRR, NO M/G/R  Abdomen: soft, bowel sounds present, non-tender, non-distended  Extremities: no edema, no clubbing, no cyanosis, positive peripheral pulses b/l  Neuro: chronic b/l lower extremities weakness, minimal strength of b/l upper extremities  Skin: no rush, no ecchymosis, sacral decub present      LAB  CBC  Date: 03-09-18 @ 06:51  Mean cell Byzemodhrg43.5  Mean cell Hemoglobin Conc30.9  Mean cell Volum 95.3  Platelet count-Automate 265  RBC Count 2.78  Red Cell Distrib Width17.3  WBC Count11.52  % Albumin, Urine--  Hematocrit 26.5  Hemoglobin 8.2  CBC  Date: 03-08-18 @ 15:21  Mean cell Plokazlpbt37.0  Mean cell Hemoglobin Conc30.9  Mean cell Volum 93.6  Platelet count-Automate 259  RBC Count 2.83  Red Cell Distrib Width17.3  WBC Count11.67  % Albumin, Urine--  Hematocrit 26.5  Hemoglobin 8.2  CBC  Date: 03-08-18 @ 06:10  Mean cell Notfyruedr56.7  Mean cell Hemoglobin Conc30.4  Mean cell Volum 94.5  Platelet count-Automate 278  RBC Count 3.10  Red Cell Distrib Width17.4  WBC Count13.12  % Albumin, Urine--  Hematocrit 29.3  Hemoglobin 8.9  CBC  Date: 03-07-18 @ 10:03  Mean cell Cfmtomdlqd24.9  Mean cell Hemoglobin Conc30.9  Mean cell Volum 93.5  Platelet count-Automate 244  RBC Count 2.91  Red Cell Distrib Width17.3  WBC Count11.11  % Albumin, Urine--  Hematocrit 27.2  Hemoglobin 8.4  CBC  Date: 03-05-18 @ 06:45  Mean cell Cgavtmmrbm21.3  Mean cell Hemoglobin Conc31.4  Mean cell Volum 90.2  Platelet count-Automate 235  RBC Count 3.07  Red Cell Distrib Width17.3  WBC Count8.35  % Albumin, Urine--  Hematocrit 27.7  Hemoglobin 8.7  CBC  Date: 03-04-18 @ 04:29  Mean cell Biyvmislmz59.2  Mean cell Hemoglobin Conc30.8  Mean cell Volum 91.4  Platelet count-Automate 236  RBC Count 2.91  Red Cell Distrib Width17.8  WBC Count8.21  % Albumin, Urine--  Hematocrit 26.6  Hemoglobin 8.2  CBC  Date: 03-03-18 @ 05:59  Mean cell Gdhiqqjddh80.6  Mean cell Hemoglobin Conc31.0  Mean cell Volum 92.2  Platelet count-Automate 238  RBC Count 3.22  Red Cell Distrib Width17.7  WBC Count8.77  % Albumin, Urine--  Hematocrit 29.7  Hemoglobin 9.2    BMP  03-09-18 @ 06:51  Blood Gas Arterial-Calcium,Ionized--  Blood Urea Nitrogen, Serum 56 mg/dL<H> [10 - 20]  Carbon Dioxide, Serum18 mmol/L [17 - 32]  Chloride, Bmier684 mmol/L<H> [98 - 110]  Creatinie, Serum2.1 mg/dL<H> [0.7 - 1.5]  Glucose, Coiao962 mg/dL<H> [70 - 110]  Potassium, Serum4.4 mmol/L [3.5 - 5.0]  Sodium, Serum 146 mmol/L [135 - 146]  [facility]  03-08-18 @ 15:21  Blood Gas Arterial-Calcium,Ionized--  Blood Urea Nitrogen, Serum 57 mg/dL<H> [10 - 20]  Carbon Dioxide, Serum19 mmol/L [17 - 32]  Chloride, Qqfan843 mmol/L<H> [98 - 110]  Creatinie, Serum2.2 mg/dL<H> [0.7 - 1.5]  Glucose, Ukhgw630 mg/dL<H> [70 - 110]  Potassium, Serum5.0 mmol/L [3.5 - 5.0]  Sodium, Serum 143 mmol/L [135 - 146]  [facility]  03-08-18 @ 06:10  Blood Gas Arterial-Calcium,Ionized--  Blood Urea Nitrogen, Serum 54 mg/dL<H> [10 - 20]  Carbon Dioxide, Serum19 mmol/L [17 - 32]  Chloride, Ktjis049 mmol/L<H> [98 - 110]  Creatinie, Serum2.3 mg/dL<H> [0.7 - 1.5]  Glucose, Ktotp547 mg/dL<H> [70 - 110]  Potassium, Serum5.3 mmol/L<H> [3.5 - 5.0]  Sodium, Serum 146 mmol/L [135 - 146]  [facility]  03-07-18 @ 10:03  Blood Gas Arterial-Calcium,Ionized--  Blood Urea Nitrogen, Serum 48 mg/dL<H> [10 - 20]  Carbon Dioxide, Serum20 mmol/L [17 - 32]  Chloride, Jnkot337 mmol/L<H> [98 - 110]  Creatinie, Serum2.2 mg/dL<H> [0.7 - 1.5]  Glucose, Gnxjc034 mg/dL<H> [70 - 110]  Potassium, Serum5.2 mmol/L<H> [3.5 - 5.0]  Sodium, Serum 144 mmol/L [135 - 146]  [facility]  03-05-18 @ 06:45  Blood Gas Arterial-Calcium,Ionized--  Blood Urea Nitrogen, Serum 30 mg/dL<H> [10 - 20]  Carbon Dioxide, Serum13 mmol/L<L> [17 - 32]  Chloride, Eeeti034 mmol/L [98 - 110]  Creatinie, Serum1.9 mg/dL<H> [0.7 - 1.5]  Glucose, Xwgyp450 mg/dL<H> [70 - 110]  Potassium, Serum5.0 mmol/L [3.5 - 5.0]  Sodium, Serum 137 mmol/L [135 - 146]              Microbiology:    Culture - Blood (collected 02-28-18 @ 08:30)  Source: .Blood Blood  Final Report (03-05-18 @ 16:01):    No growth at 5 days.    Culture - Blood (collected 02-26-18 @ 11:46)  Source: .Blood None  Final Report (03-04-18 @ 01:00):    No growth at 5 days.    Culture - Blood (collected 02-24-18 @ 13:16)  Source: .Blood Blood  Final Report (03-02-18 @ 01:00):    No growth at 5 days.    Culture - Urine (collected 02-24-18 @ 12:28)  Source: .Urine Catheterized  Final Report (02-25-18 @ 23:24):    50,000 - 99,000 CFU/mL Presumptive Candida albicans            Medications:  acetaminophen  Suppository 650 milliGRAM(s) Rectal four times a day PRN  bisacodyl Suppository 10 milliGRAM(s) Rectal daily  carBAMazepine Suspension 600 milliGRAM(s) Oral daily  cefepime  IVPB 1000 milliGRAM(s) IV Intermittent every 24 hours  collagenase Ointment 1 Application(s) Topical two times a day  Dakins Solution - Full Strength 1 Application(s) Topical two times a day  docusate sodium 100 milliGRAM(s) Oral three times a day  fentaNYL   Patch  50 MICROgram(s)/Hr 1 Patch Transdermal every 72 hours  folic acid 1 milliGRAM(s) Oral daily  heparin  Injectable 5000 Unit(s) SubCutaneous every 8 hours  lactobacillus acidophilus 1 Tablet(s) Oral two times a day with meals  lactulose Syrup 40 Gram(s) Enteral Tube three times a day  levothyroxine 50 MICROGram(s) Oral daily  morphine  - Injectable 2 milliGRAM(s) IV Push every 4 hours PRN  multivitamin 1 Tablet(s) Oral daily  pantoprazole   Suspension 40 milliGRAM(s) Oral two times a day before meals  rifaximin 550 milliGRAM(s) Oral two times a day  senna 2 Tablet(s) Oral at bedtime PRN  silver sulfADIAZINE 1% Cream 1 Application(s) Topical every 12 hours  simethicone 80 milliGRAM(s) Chew daily  sodium bicarbonate 650 milliGRAM(s) Oral three times a day  thiamine 100 milliGRAM(s) Oral daily  valproic  acid Syrup 250 milliGRAM(s) Oral daily  valproic  acid Syrup 500 milliGRAM(s) Oral daily  valproic  acid Syrup 500 milliGRAM(s) Oral at bedtime  zinc sulfate 220 milliGRAM(s) Oral daily        Assessment and Plan:  1. Metabolic encephalopathy probably 2/2 sepsis/ sacral osteomyelitis/UTI 2/2 chronic indwelling antony  - CT Head No Cont (02.24.18 @ 16:25): negative  - CT Abdomen and Pelvis No Cont (02.24.18 @ 16:26) Sacral decubitus ulcer with extension to the coccyx demonstrating bony destruction of the coccyx consistent with osteomyelitis. Surrounding phlegmon measures 6 cm transverse. Trace persistent right hydroureteronephrosis with suggestion of bilateral urothelial thickening likely related to chronic infection.Stable 2.2 cm right adrenal adenoma.Stable diffuse bladder wall thickening and trabeculation.  - EEG showed generalized slowing w/ triphasic waves (reportedly) - consistent w/ metabolic encephalopathy  - ID on board - restarted on IV Cefepime due to fever and leukocytosis  - Wound care specialist recommending local daily wound care-  Dakins + Santyl wet-to-dry dressings for now.  - Ammonia elevated - continue  lactulose       2. Hypernatremia, resolving:  -resolved    3. AMS, can't take PO  - NGT feeds: Glucerna  - Aspiration precautions.    4. Seizure Disorder/ Bipolar disorder    - Neuro on board - Rx Valproic acid increased to 500mg in the morning and at bedtime and 250mg in between. Tegretol increased to 600mg QD.    5. Sigmoid Colon AdenoCA  - GI on board - Rx no intervention for now  - H/H stable  - c/w protonix  - EGD done at [facility] on 1/28/18 showed 4cm semi-circumferential mass in sigmoid colon, which was biopsied - reports show moderately differentiated adenoCA    5. DM type 2   - Monitor FS.    6. Hypothyroidism  - c/w synthroid.    7. Multiple Sclerosis with neurogenic bladder  - Chronic antony     8. B/L upper extremity edema  - Venous duplex showed evidence of superficial thrombophlebitis in left arm distal to PICC line, results above.    9. Overall very poor prognosis- continue Pain management, Palliative care, consider Hospice evaluation with failure to thrive, bedridden state

## 2018-03-09 NOTE — PROGRESS NOTE ADULT - ASSESSMENT
Patient is a 62y old Female with PMH of MS and being bed-bound, CKD IV, chronic sacral osteo, recently diagnosed colon CA at [facility], and seizure disorder who presents with a chief complaint of altered mental status (24 Feb 2018 18:44). Patient's daughter/healthcare proxy present at bedside. She reports patient's mental status has gradually declined since October after repeated hospitalizations for lung infections, UTIs, and the chronic sacral ulcer. Patient's hospital course complicated by metabolic acidosis and sepsis. Patient is still non-verbal and altered. As per daughter, patient more verbal a week prior to admission.    1. Metabolic encephalopathy probably 2/2 sepsis/ sacral osteomyelitis/UTI 2/2 chronic indwelling antony  - CT Head No Cont (02.24.18 @ 16:25): negative  - CT Abdomen and Pelvis No Cont (02.24.18 @ 16:26) Sacral decubitus ulcer with extension to the coccyx demonstrating bony destruction of the coccyx consistent with osteomyelitis. Surrounding phlegmon measures 6 cm transverse. Trace persistent right hydroureteronephrosis with suggestion of bilateral urothelial thickening likely related to chronic infection.Stable 2.2 cm right adrenal adenoma.Stable diffuse bladder wall thickening and trabeculation.  - EEG showed generalized slowing w/ triphasic waves (reportedly) - consistent w/ metabolic encephalopathy  - ID on board - Rx switching Abx to PO Vantin 100mg BID + Flagyl 500mg BID x 14 days. Switched patient back to Cefepime as patient spike temp yesterday + hypotensive + tachycardic  - Burn on board - Rx no intervention @ this time. C/w Dakins + Santyl wet-to-dry dressings for now.  - Ammonia elevated - increased lactulose, titrate to 3-4 BMs per day. C/w Rifaximin. Ammonia trend 135->141->116->192 -> 113 -> 82  - Palliative on board - Rx's appreciated  - repeat Blood Cx pending    2. Hypernatremia, resolving:  - Nephro on board - Rx keeping Bicarb >22  - daily BMP    3. AMS, can't take PO  - NGT feeds: Glucerna  - Aspiration precautions.    4. Seizure Disorder/ Bipolar disorder  - Valproic acid level 26; Carbamazepine level 7.3. These levels recorded after increasing both meds  - Neuro on board - Rx Valproic acid increased to 500mg in the morning and at bedtime and 250mg in between. Tegretol increased to 600mg QD.    5. Sigmoid Colon AdenoCA  - GI on board - Rx no intervention for now  - H/H stable  - c/w protonix  - EGD done at [facility] on 1/28/18 showed 4cm semi-circumferential mass in sigmoid colon, which was biopsied - reports show moderately differentiated adenoCA    5. DM type 2   - Monitor FS.    6. Hypothyroidism  - c/w synthroid.    7. Multiple Sclerosis with neurogenic bladder  - Chronic antony     8. B/L upper extremity edema  - Venous duplex showed evidence of superficial thrombophlebitis in left arm distal to PICC line, results above.    DVT ppx: Heparin subQ  Code status: DNR/DNI  Dispo: from [facility] (long-term)    Prognosis guarded.   - Palliative care meeting: As per family, pt is now DNR and DNI. Had discussion at length today with all 4 children over conference call w/ Dr Brunson and team. Family is open to Hospice consult at this time.  - Patient not responding well to treatment.

## 2018-03-09 NOTE — GOALS OF CARE CONVERSATION - PERSONAL ADVANCE DIRECTIVE - CONVERSATION DETAILS
We discussed in detail current status; history; recent history; patients past wishes "just pull the plug".
Readdressed Goals of care  Stated that mom has not responded to current treatments and that will likely continue vicious cycle of infection/sepsis and that her illnesses are complex/advanced.   She shared their disbelief and difficulty accepting.   Their questions were addressed.

## 2018-03-09 NOTE — GOALS OF CARE CONVERSATION - PERSONAL ADVANCE DIRECTIVE - WHAT MATTERS MOST
Comfort. Hesitant about withdrawing current level of care; realistic; open to Hospice.
Comfort - and trying to accept the 'terminal' nature of mom's condition.

## 2018-03-09 NOTE — PROGRESS NOTE ADULT - SUBJECTIVE AND OBJECTIVE BOX
SUBJECTIVE:    Patient is a 62y old Female who presents with a chief complaint of altered mental status (24 Feb 2018 18:44)    Currently admitted to medicine with the primary diagnosis of Sepsis     Today is hospital day 13d. Patient appears slightly more alert this morning. Still nonverbal. Spoke with length at family over conference call w/ Dr Brunson about patient's poor prognosis. Currently, they are open to discussing case with Hospice (though not ready yet for hospice care). Will get consult.    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     ALLERGIES:  No Known Allergies    MEDICATIONS:  STANDING MEDICATIONS  bisacodyl Suppository 10 milliGRAM(s) Rectal daily  carBAMazepine Suspension 600 milliGRAM(s) Oral daily  cefepime  IVPB 1000 milliGRAM(s) IV Intermittent every 24 hours  collagenase Ointment 1 Application(s) Topical two times a day  Dakins Solution - Full Strength 1 Application(s) Topical two times a day  docusate sodium 100 milliGRAM(s) Oral three times a day  fentaNYL   Patch  25 MICROgram(s)/Hr. 1 Patch Transdermal every 48 hours  folic acid 1 milliGRAM(s) Oral daily  heparin  Injectable 5000 Unit(s) SubCutaneous every 8 hours  lactobacillus acidophilus 1 Tablet(s) Oral two times a day with meals  lactulose Syrup 10 Gram(s) Oral three times a day  levothyroxine 50 MICROGram(s) Oral daily  multivitamin 1 Tablet(s) Oral daily  pantoprazole   Suspension 40 milliGRAM(s) Oral two times a day before meals  rifaximin 550 milliGRAM(s) Oral two times a day  silver sulfADIAZINE 1% Cream 1 Application(s) Topical every 12 hours  simethicone 80 milliGRAM(s) Chew daily  sodium bicarbonate 650 milliGRAM(s) Oral three times a day  thiamine 100 milliGRAM(s) Oral daily  valproic  acid Syrup 250 milliGRAM(s) Oral daily  valproic  acid Syrup 500 milliGRAM(s) Oral daily  valproic  acid Syrup 500 milliGRAM(s) Oral at bedtime  zinc sulfate 220 milliGRAM(s) Oral daily    PRN MEDICATIONS  acetaminophen  Suppository 650 milliGRAM(s) Rectal four times a day PRN  morphine  - Injectable 2 milliGRAM(s) IV Push every 4 hours PRN  senna 2 Tablet(s) Oral at bedtime PRN    VITALS:   T(F): 99.1  HR: 109  BP: 82/67  RR: 109  SpO2: --    LABS:                        8.2    11.52 )-----------( 265      ( 09 Mar 2018 06:51 )             26.5     03-09    146  |  112<H>  |  56<H>  ----------------------------<  130<H>  4.4   |  18  |  2.1<H>    Ca    9.0      09 Mar 2018 06:51      PHYSICAL EXAM:  GEN: appears more comfortable than yesterday, eyes open  LUNGS: CTAB, no w/r/r  HEART: RRR, no m/r/g  ABD: soft, NT/ND, +BS  EXT: NC/NC/2+PP/MERCEDES, UE edema b/l  NEURO: AAOx0, non-verbal

## 2018-03-09 NOTE — PROGRESS NOTE ADULT - SUBJECTIVE AND OBJECTIVE BOX
patient is more awake, tracks to voice but no purposeful movements.  No apparent startle reflex.  Patient is presently  on 25 mcg/hr fentanyl patch, and has no grunting or grimacing.  Discussed with house staff. They relate that patient's family requested continuing that dose.  Appears comfortable.

## 2018-03-09 NOTE — GOALS OF CARE CONVERSATION - PERSONAL ADVANCE DIRECTIVE - TREATMENT GUIDELINE COMMENT
Continue current level of care.   No escalation  At present - no PEG. If patient pulls out NG again - will readdress at that point.
45 minutes spent in meeting with family

## 2018-03-10 LAB
ANION GAP SERPL CALC-SCNC: 13 MMOL/L — SIGNIFICANT CHANGE UP (ref 7–14)
BASOPHILS # BLD AUTO: 0.06 K/UL — SIGNIFICANT CHANGE UP (ref 0–0.2)
BASOPHILS NFR BLD AUTO: 0.5 % — SIGNIFICANT CHANGE UP (ref 0–1)
BUN SERPL-MCNC: 61 MG/DL — CRITICAL HIGH (ref 10–20)
CALCIUM SERPL-MCNC: 9.4 MG/DL — SIGNIFICANT CHANGE UP (ref 8.5–10.1)
CHLORIDE SERPL-SCNC: 116 MMOL/L — HIGH (ref 98–110)
CO2 SERPL-SCNC: 20 MMOL/L — SIGNIFICANT CHANGE UP (ref 17–32)
CREAT SERPL-MCNC: 2.2 MG/DL — HIGH (ref 0.7–1.5)
EOSINOPHIL # BLD AUTO: 0.15 K/UL — SIGNIFICANT CHANGE UP (ref 0–0.7)
EOSINOPHIL NFR BLD AUTO: 1.3 % — SIGNIFICANT CHANGE UP (ref 0–8)
GLUCOSE SERPL-MCNC: 104 MG/DL — SIGNIFICANT CHANGE UP (ref 70–110)
HCT VFR BLD CALC: 27.2 % — LOW (ref 37–47)
HGB BLD-MCNC: 8.2 G/DL — LOW (ref 12–16)
IMM GRANULOCYTES NFR BLD AUTO: 1 % — HIGH (ref 0.1–0.3)
LYMPHOCYTES # BLD AUTO: 2.52 K/UL — SIGNIFICANT CHANGE UP (ref 1.2–3.4)
LYMPHOCYTES # BLD AUTO: 21.8 % — SIGNIFICANT CHANGE UP (ref 20.5–51.1)
MCHC RBC-ENTMCNC: 28.8 PG — SIGNIFICANT CHANGE UP (ref 27–31)
MCHC RBC-ENTMCNC: 30.1 G/DL — LOW (ref 32–37)
MCV RBC AUTO: 95.4 FL — SIGNIFICANT CHANGE UP (ref 81–99)
MONOCYTES # BLD AUTO: 1.18 K/UL — HIGH (ref 0.1–0.6)
MONOCYTES NFR BLD AUTO: 10.2 % — HIGH (ref 1.7–9.3)
NEUTROPHILS # BLD AUTO: 7.51 K/UL — HIGH (ref 1.4–6.5)
NEUTROPHILS NFR BLD AUTO: 65.2 % — SIGNIFICANT CHANGE UP (ref 42.2–75.2)
NRBC # BLD: 0 /100 WBCS — SIGNIFICANT CHANGE UP (ref 0–0)
PLATELET # BLD AUTO: 285 K/UL — SIGNIFICANT CHANGE UP (ref 130–400)
POTASSIUM SERPL-MCNC: 5.2 MMOL/L — HIGH (ref 3.5–5)
POTASSIUM SERPL-SCNC: 5.2 MMOL/L — HIGH (ref 3.5–5)
RBC # BLD: 2.85 M/UL — LOW (ref 4.2–5.4)
RBC # FLD: 17.6 % — HIGH (ref 11.5–14.5)
SODIUM SERPL-SCNC: 149 MMOL/L — HIGH (ref 135–146)
WBC # BLD: 11.54 K/UL — HIGH (ref 4.8–10.8)
WBC # FLD AUTO: 11.54 K/UL — HIGH (ref 4.8–10.8)

## 2018-03-10 RX ADMIN — CEFEPIME 100 MILLIGRAM(S): 1 INJECTION, POWDER, FOR SOLUTION INTRAMUSCULAR; INTRAVENOUS at 11:34

## 2018-03-10 RX ADMIN — Medication 500 MILLIGRAM(S): at 05:31

## 2018-03-10 RX ADMIN — Medication 500 MILLIGRAM(S): at 21:47

## 2018-03-10 RX ADMIN — HEPARIN SODIUM 5000 UNIT(S): 5000 INJECTION INTRAVENOUS; SUBCUTANEOUS at 05:30

## 2018-03-10 RX ADMIN — PANTOPRAZOLE SODIUM 40 MILLIGRAM(S): 20 TABLET, DELAYED RELEASE ORAL at 15:25

## 2018-03-10 RX ADMIN — Medication 100 MILLIGRAM(S): at 11:39

## 2018-03-10 RX ADMIN — Medication 1 APPLICATION(S): at 17:03

## 2018-03-10 RX ADMIN — Medication 1 TABLET(S): at 15:27

## 2018-03-10 RX ADMIN — LACTULOSE 10 GRAM(S): 10 SOLUTION ORAL at 21:46

## 2018-03-10 RX ADMIN — Medication 650 MILLIGRAM(S): at 13:40

## 2018-03-10 RX ADMIN — LACTULOSE 10 GRAM(S): 10 SOLUTION ORAL at 13:40

## 2018-03-10 RX ADMIN — FENTANYL CITRATE 1 PATCH: 50 INJECTION INTRAVENOUS at 11:43

## 2018-03-10 RX ADMIN — MORPHINE SULFATE 2 MILLIGRAM(S): 50 CAPSULE, EXTENDED RELEASE ORAL at 05:03

## 2018-03-10 RX ADMIN — Medication 1 MILLIGRAM(S): at 11:39

## 2018-03-10 RX ADMIN — LACTULOSE 10 GRAM(S): 10 SOLUTION ORAL at 05:30

## 2018-03-10 RX ADMIN — HEPARIN SODIUM 5000 UNIT(S): 5000 INJECTION INTRAVENOUS; SUBCUTANEOUS at 21:45

## 2018-03-10 RX ADMIN — Medication 1 APPLICATION(S): at 05:09

## 2018-03-10 RX ADMIN — Medication 600 MILLIGRAM(S): at 15:26

## 2018-03-10 RX ADMIN — Medication 1 APPLICATION(S): at 17:04

## 2018-03-10 RX ADMIN — Medication 650 MILLIGRAM(S): at 05:05

## 2018-03-10 RX ADMIN — FENTANYL CITRATE 1 PATCH: 50 INJECTION INTRAVENOUS at 11:44

## 2018-03-10 RX ADMIN — Medication 650 MILLIGRAM(S): at 05:06

## 2018-03-10 RX ADMIN — Medication 250 MILLIGRAM(S): at 15:27

## 2018-03-10 RX ADMIN — HEPARIN SODIUM 5000 UNIT(S): 5000 INJECTION INTRAVENOUS; SUBCUTANEOUS at 13:40

## 2018-03-10 RX ADMIN — Medication 1 TABLET(S): at 12:00

## 2018-03-10 RX ADMIN — PANTOPRAZOLE SODIUM 40 MILLIGRAM(S): 20 TABLET, DELAYED RELEASE ORAL at 05:30

## 2018-03-10 RX ADMIN — Medication 650 MILLIGRAM(S): at 21:47

## 2018-03-10 RX ADMIN — Medication 50 MICROGRAM(S): at 05:05

## 2018-03-10 RX ADMIN — ZINC SULFATE TAB 220 MG (50 MG ZINC EQUIVALENT) 220 MILLIGRAM(S): 220 (50 ZN) TAB at 11:39

## 2018-03-10 RX ADMIN — Medication 1 APPLICATION(S): at 05:30

## 2018-03-10 RX ADMIN — OXYCODONE HYDROCHLORIDE 5 MILLIGRAM(S): 5 TABLET ORAL at 01:00

## 2018-03-10 RX ADMIN — SIMETHICONE 80 MILLIGRAM(S): 80 TABLET, CHEWABLE ORAL at 11:39

## 2018-03-10 NOTE — PROGRESS NOTE ADULT - ASSESSMENT
Patient is a 62y old Female with PMH of MS and being bed-bound, CKD IV, chronic sacral osteo, recently diagnosed colon CA at [facility], and seizure disorder who presents with a chief complaint of altered mental status (24 Feb 2018 18:44). Patient's daughter/healthcare proxy present at bedside. She reports patient's mental status has gradually declined since October after repeated hospitalizations for lung infections, UTIs, and the chronic sacral ulcer. Patient's hospital course complicated by metabolic acidosis and sepsis. Patient is still non-verbal and altered. As per daughter, patient more verbal a week prior to admission.    1. Metabolic encephalopathy probably 2/2 sepsis/ sacral osteomyelitis/UTI 2/2 chronic indwelling antony  - CT Head No Cont (02.24.18 @ 16:25): negative  - CT Abdomen and Pelvis No Cont (02.24.18 @ 16:26) Sacral decubitus ulcer with extension to the coccyx demonstrating bony destruction of the coccyx consistent with osteomyelitis. Surrounding phlegmon measures 6 cm transverse. Trace persistent right hydroureteronephrosis with suggestion of bilateral urothelial thickening likely related to chronic infection.Stable 2.2 cm right adrenal adenoma.Stable diffuse bladder wall thickening and trabeculation.  - EEG showed generalized slowing w/ triphasic waves (reportedly) - consistent w/ metabolic encephalopathy  - ID on board - Rx switching Abx to PO Vantin 100mg BID + Flagyl 500mg BID x 14 days. Switched patient back to Cefepime.  - Burn on board - Rx no intervention @ this time. C/w Dakins + Santyl wet-to-dry dressings for now.  - Ammonia elevated - increased lactulose, titrate to 3-4 BMs per day. C/w Rifaximin. Ammonia trend 135->141->116->192 -> 113 -> 82  - Palliative on board - Rx's appreciated  - repeat Blood Cx - NGTD  - Hospice consult - pending    2. Hypernatremia, resolving:  - Nephro on board - Rx keeping Bicarb >22  - daily BMP    3. AMS, can't take PO  - NGT feeds: Glucerna  - Aspiration precautions.    4. Seizure Disorder/ Bipolar disorder  - Valproic acid level 26; Carbamazepine level 7.3. These levels recorded after increasing both meds  - Neuro on board - Rx Valproic acid increased to 500mg in the morning and at bedtime and 250mg in between. Tegretol increased to 600mg QD.    5. Sigmoid Colon AdenoCA  - GI on board - Rx no intervention for now  - H/H stable  - c/w protonix  - EGD done at [facility] on 1/28/18 showed 4cm semi-circumferential mass in sigmoid colon, which was biopsied - reports show moderately differentiated adenoCA    5. DM type 2   - Monitor FS.    6. Hypothyroidism  - c/w synthroid.    7. Multiple Sclerosis with neurogenic bladder  - Chronic antony     8. B/L upper extremity edema  - Venous duplex showed evidence of superficial thrombophlebitis in left arm distal to PICC line, results above.    DVT ppx: Heparin subQ  Code status: DNR/DNI  Dispo: from [facility] (long-term)    Prognosis guarded.   - Palliative care meeting: As per family, pt is now DNR and DNI. Family is open to Hospice consult at this time - ordered, pending  - Patient not responding well to treatment.

## 2018-03-11 LAB
ANION GAP SERPL CALC-SCNC: 12 MMOL/L — SIGNIFICANT CHANGE UP (ref 7–14)
BUN SERPL-MCNC: 67 MG/DL — CRITICAL HIGH (ref 10–20)
CALCIUM SERPL-MCNC: 9.7 MG/DL — SIGNIFICANT CHANGE UP (ref 8.5–10.1)
CHLORIDE SERPL-SCNC: 118 MMOL/L — HIGH (ref 98–110)
CO2 SERPL-SCNC: 21 MMOL/L — SIGNIFICANT CHANGE UP (ref 17–32)
CREAT SERPL-MCNC: 2.2 MG/DL — HIGH (ref 0.7–1.5)
GLUCOSE SERPL-MCNC: 109 MG/DL — SIGNIFICANT CHANGE UP (ref 70–110)
POTASSIUM SERPL-MCNC: 5.6 MMOL/L — HIGH (ref 3.5–5)
POTASSIUM SERPL-SCNC: 5.6 MMOL/L — HIGH (ref 3.5–5)
SODIUM SERPL-SCNC: 151 MMOL/L — HIGH (ref 135–146)

## 2018-03-11 RX ORDER — SODIUM CHLORIDE 9 MG/ML
1000 INJECTION, SOLUTION INTRAVENOUS
Qty: 0 | Refills: 0 | Status: DISCONTINUED | OUTPATIENT
Start: 2018-03-11 | End: 2018-03-13

## 2018-03-11 RX ADMIN — Medication 600 MILLIGRAM(S): at 12:06

## 2018-03-11 RX ADMIN — Medication 1 APPLICATION(S): at 06:11

## 2018-03-11 RX ADMIN — HEPARIN SODIUM 5000 UNIT(S): 5000 INJECTION INTRAVENOUS; SUBCUTANEOUS at 06:10

## 2018-03-11 RX ADMIN — LACTULOSE 10 GRAM(S): 10 SOLUTION ORAL at 14:00

## 2018-03-11 RX ADMIN — SODIUM CHLORIDE 75 MILLILITER(S): 9 INJECTION, SOLUTION INTRAVENOUS at 10:05

## 2018-03-11 RX ADMIN — Medication 500 MILLIGRAM(S): at 06:13

## 2018-03-11 RX ADMIN — MORPHINE SULFATE 2 MILLIGRAM(S): 50 CAPSULE, EXTENDED RELEASE ORAL at 21:11

## 2018-03-11 RX ADMIN — Medication 650 MILLIGRAM(S): at 06:14

## 2018-03-11 RX ADMIN — HEPARIN SODIUM 5000 UNIT(S): 5000 INJECTION INTRAVENOUS; SUBCUTANEOUS at 13:59

## 2018-03-11 RX ADMIN — Medication 1 APPLICATION(S): at 17:54

## 2018-03-11 RX ADMIN — LACTULOSE 10 GRAM(S): 10 SOLUTION ORAL at 21:15

## 2018-03-11 RX ADMIN — Medication 100 MILLIGRAM(S): at 14:00

## 2018-03-11 RX ADMIN — Medication 1 MILLIGRAM(S): at 12:05

## 2018-03-11 RX ADMIN — PANTOPRAZOLE SODIUM 40 MILLIGRAM(S): 20 TABLET, DELAYED RELEASE ORAL at 06:15

## 2018-03-11 RX ADMIN — PANTOPRAZOLE SODIUM 40 MILLIGRAM(S): 20 TABLET, DELAYED RELEASE ORAL at 16:47

## 2018-03-11 RX ADMIN — Medication 500 MILLIGRAM(S): at 21:15

## 2018-03-11 RX ADMIN — CEFEPIME 100 MILLIGRAM(S): 1 INJECTION, POWDER, FOR SOLUTION INTRAMUSCULAR; INTRAVENOUS at 12:04

## 2018-03-11 RX ADMIN — Medication 250 MILLIGRAM(S): at 16:47

## 2018-03-11 RX ADMIN — Medication 1 APPLICATION(S): at 06:55

## 2018-03-11 RX ADMIN — Medication 1 TABLET(S): at 16:47

## 2018-03-11 RX ADMIN — Medication 1 TABLET(S): at 12:05

## 2018-03-11 RX ADMIN — Medication 650 MILLIGRAM(S): at 21:16

## 2018-03-11 RX ADMIN — Medication 50 MICROGRAM(S): at 06:12

## 2018-03-11 RX ADMIN — HEPARIN SODIUM 5000 UNIT(S): 5000 INJECTION INTRAVENOUS; SUBCUTANEOUS at 21:13

## 2018-03-11 RX ADMIN — ZINC SULFATE TAB 220 MG (50 MG ZINC EQUIVALENT) 220 MILLIGRAM(S): 220 (50 ZN) TAB at 12:07

## 2018-03-11 RX ADMIN — Medication 650 MILLIGRAM(S): at 14:01

## 2018-03-11 RX ADMIN — SIMETHICONE 80 MILLIGRAM(S): 80 TABLET, CHEWABLE ORAL at 12:07

## 2018-03-11 RX ADMIN — Medication 1 TABLET(S): at 08:21

## 2018-03-11 RX ADMIN — LACTULOSE 10 GRAM(S): 10 SOLUTION ORAL at 06:11

## 2018-03-11 RX ADMIN — Medication 100 MILLIGRAM(S): at 12:08

## 2018-03-11 NOTE — PROGRESS NOTE ADULT - SUBJECTIVE AND OBJECTIVE BOX
JUAN JOSE ARMACHANDRAN  [facility] T2-3A 025 B ([facility] T2-3A)            Patient was evaluated and examined  by bedside, lethargic, unable to follow commands, moaning at times        REVIEW OF SYSTEMS:  unable to obtain due to patients lethargic state      T(C): , Max: 37.7 (03-10-18 @ 14:02)  HR: 105 (03-11-18 @ 05:48)  BP: 110/60 (03-11-18 @ 05:48)  RR: 16 (03-11-18 @ 05:48)  SpO2: --  CAPILLARY BLOOD GLUCOSE  118 (11 Mar 2018 08:21)  96 (10 Mar 2018 20:32)  118 (10 Mar 2018 16:20)          PHYSICAL EXAM:  GENERAL: NAD, lethargic,  patient is laying comfortably in bed  HEENT: AT, NC, SUPPLE, NO JVD, NO CB  LUNG: good breath sounds b/l  CVS: normal S1, S2, RRR, NO M/G/R  ABDOMEN: soft, bowel sounds present, normoactive in all 4 quadrants, non-tender, non-distended  EXT: no C/C, positive PP b/l extremities  NEURO: functional quadriplegia, bedridden state  SKIN: edema of b/l upper extremities      LAB  CBC  Date: 03-10-18 @ 10:17  Mean cell Qapmgycbph72.8  Mean cell Hemoglobin Conc30.1  Mean cell Volum 95.4  Platelet count-Automate 285  RBC Count 2.85  Red Cell Distrib Width17.6  WBC Count11.54  % Albumin, Urine--  Hematocrit 27.2  Hemoglobin 8.2  CBC  Date: 03-09-18 @ 06:51  Mean cell Lmfdopexfg59.5  Mean cell Hemoglobin Conc30.9  Mean cell Volum 95.3  Platelet count-Automate 265  RBC Count 2.78  Red Cell Distrib Width17.3  WBC Count11.52  % Albumin, Urine--  Hematocrit 26.5  Hemoglobin 8.2  CBC  Date: 03-08-18 @ 15:21  Mean cell Iljpcvrduh45.0  Mean cell Hemoglobin Conc30.9  Mean cell Volum 93.6  Platelet count-Automate 259  RBC Count 2.83  Red Cell Distrib Width17.3  WBC Count11.67  % Albumin, Urine--  Hematocrit 26.5  Hemoglobin 8.2  CBC  Date: 03-08-18 @ 06:10  Mean cell Lpjkoldxgz98.7  Mean cell Hemoglobin Conc30.4  Mean cell Volum 94.5  Platelet count-Automate 278  RBC Count 3.10  Red Cell Distrib Width17.4  WBC Count13.12  % Albumin, Urine--  Hematocrit 29.3  Hemoglobin 8.9  CBC  Date: 03-07-18 @ 10:03  Mean cell Xrpjenrbuz34.9  Mean cell Hemoglobin Conc30.9  Mean cell Volum 93.5  Platelet count-Automate 244  RBC Count 2.91  Red Cell Distrib Width17.3  WBC Count11.11  % Albumin, Urine--  Hematocrit 27.2  Hemoglobin 8.4  CBC  Date: 03-05-18 @ 06:45  Mean cell Zfdphmgtwq67.3  Mean cell Hemoglobin Conc31.4  Mean cell Volum 90.2  Platelet count-Automate 235  RBC Count 3.07  Red Cell Distrib Width17.3  WBC Count8.35  % Albumin, Urine--  Hematocrit 27.7  Hemoglobin 8.7    [facility] [place] @ 10:17  Blood Gas Arterial-Calcium,Ionized--  Blood Urea Nitrogen, Serum 61 mg/dL<HH> [10 - 20]  Carbon Dioxide, Serum20 mmol/L [17 - 32]  Chloride, Enqkr424 mmol/L<H> [98 - 110]  Creatinie, Serum2.2 mg/dL<H> [0.7 - 1.5]  Glucose, Ptohp758 mg/dL [70 - 110]  Potassium, Serum5.2 mmol/L<H> [3.5 - 5.0]  Sodium, Serum 149 mmol/L<H> [135 - 146]  [facility] [place] @ 06:51  Blood Gas Arterial-Calcium,Ionized--  Blood Urea Nitrogen, Serum 56 mg/dL<H> [10 - 20]  Carbon Dioxide, Serum18 mmol/L [17 - 32]  Chloride, Rtawy106 mmol/L<H> [98 - 110]  Creatinie, Serum2.1 mg/dL<H> [0.7 - 1.5]  Glucose, Fxfnp405 mg/dL<H> [70 - 110]  Potassium, Serum4.4 mmol/L [3.5 - 5.0]  Sodium, Serum 146 mmol/L [135 - 146]  [facility] [place] @ 15:21  Blood Gas Arterial-Calcium,Ionized--  Blood Urea Nitrogen, Serum 57 mg/dL<H> [10 - 20]  Carbon Dioxide, Serum19 mmol/L [17 - 32]  Chloride, Izvyp969 mmol/L<H> [98 - 110]  Creatinie, Serum2.2 mg/dL<H> [0.7 - 1.5]  Glucose, Vukgf834 mg/dL<H> [70 - 110]  Potassium, Serum5.0 mmol/L [3.5 - 5.0]  Sodium, Serum 143 mmol/L [135 - 146]  [facility] [place] @ 06:10  Blood Gas Arterial-Calcium,Ionized--  Blood Urea Nitrogen, Serum 54 mg/dL<H> [10 - 20]  Carbon Dioxide, Serum19 mmol/L [17 - 32]  Chloride, Delab002 mmol/L<H> [98 - 110]  Creatinie, Serum2.3 mg/dL<H> [0.7 - 1.5]  Glucose, Klkxa259 mg/dL<H> [70 - 110]  Potassium, Serum5.3 mmol/L<H> [3.5 - 5.0]  Sodium, Serum 146 mmol/L [135 - 146]  [facility] [place] @ 10:03  Blood Gas Arterial-Calcium,Ionized--  Blood Urea Nitrogen, Serum 48 mg/dL<H> [10 - 20]  Carbon Dioxide, Serum20 mmol/L [17 - 32]  Chloride, Durcw658 mmol/L<H> [98 - 110]  Creatinie, Serum2.2 mg/dL<H> [0.7 - 1.5]  Glucose, Ubqpu172 mg/dL<H> [70 - 110]  Potassium, Serum5.2 mmol/L<H> [3.5 - 5.0]  Sodium, Serum 144 mmol/L [135 - 146]              Microbiology:    Culture - Blood (collected 03-08-18 @ 15:37)  Source: .Blood Blood  Preliminary Report (03-10-18 @ 01:02):    No growth to date.    Culture - Blood (collected 02-28-18 @ 08:30)  Source: .Blood Blood  Final Report (03-05-18 @ 16:01):    No growth at 5 days.    Culture - Blood (collected 02-26-18 @ 11:46)  Source: .Blood None  Final Report (03-04-18 @ 01:00):    No growth at 5 days.    Culture - Blood (collected 02-24-18 @ 13:16)  Source: .Blood Blood  Final Report (03-02-18 @ 01:00):    No growth at 5 days.    Culture - Urine (collected 02-24-18 @ 12:28)  Source: .Urine Catheterized  Final Report (02-25-18 @ 23:24):    50,000 - 99,000 CFU/mL Presumptive Candida albicans        RADIOLOGY & ADDITIONAL TESTS:        Medications:  acetaminophen  Suppository 650 milliGRAM(s) Rectal four times a day PRN  bisacodyl Suppository 10 milliGRAM(s) Rectal daily  carBAMazepine Suspension 600 milliGRAM(s) Oral daily  cefepime  IVPB 1000 milliGRAM(s) IV Intermittent every 24 hours  clog zipper 1 Packet(s) 1 Each Enteral Tube once  collagenase Ointment 1 Application(s) Topical two times a day  Dakins Solution - Full Strength 1 Application(s) Topical two times a day  dextrose 5% + sodium chloride 0.45%. 1000 milliLiter(s) IV Continuous <Continuous>  docusate sodium 100 milliGRAM(s) Oral three times a day  fentaNYL   Patch  25 MICROgram(s)/Hr. 1 Patch Transdermal every 48 hours  folic acid 1 milliGRAM(s) Oral daily  heparin  Injectable 5000 Unit(s) SubCutaneous every 8 hours  lactobacillus acidophilus 1 Tablet(s) Oral two times a day with meals  lactulose Syrup 10 Gram(s) Oral three times a day  levothyroxine 50 MICROGram(s) Oral daily  morphine  - Injectable 2 milliGRAM(s) IV Push every 4 hours PRN  multivitamin 1 Tablet(s) Oral daily  pantoprazole   Suspension 40 milliGRAM(s) Oral two times a day before meals  senna 2 Tablet(s) Oral at bedtime PRN  silver sulfADIAZINE 1% Cream 1 Application(s) Topical every 12 hours  simethicone 80 milliGRAM(s) Chew daily  sodium bicarbonate 650 milliGRAM(s) Oral three times a day  thiamine 100 milliGRAM(s) Oral daily  valproic  acid Syrup 250 milliGRAM(s) Oral daily  valproic  acid Syrup 500 milliGRAM(s) Oral daily  valproic  acid Syrup 500 milliGRAM(s) Oral at bedtime  zinc sulfate 220 milliGRAM(s) Oral daily        Assessment and Plan:  1. Metabolic encephalopathy probably 2/2 sepsis/ sacral osteomyelitis/UTI 2/2 chronic indwelling antony  - CT Head No Cont (02.24.18 @ 16:25): negative  - CT Abdomen and Pelvis No Cont (02.24.18 @ 16:26) Sacral decubitus ulcer with extension to the coccyx demonstrating bony destruction of the coccyx consistent with osteomyelitis. Surrounding phlegmon measures 6 cm transverse. Trace persistent right hydroureteronephrosis with suggestion of bilateral urothelial thickening likely related to chronic infection.Stable 2.2 cm right adrenal adenoma.Stable diffuse bladder wall thickening and trabeculation.  - EEG showed generalized slowing w/ triphasic waves (reportedly) - consistent w/ metabolic encephalopathy  - ID on board - restarted on IV Cefepime due to fever and leukocytosis  - Wound care specialist recommending local daily wound care-  Dakins + Santyl wet-to-dry dressings for now.  - Ammonia elevated - continue  lactulose       2. Hypernatremia, resolving:  -IVF, bmp in am    3. AMS, can't take PO  - NGT feeds: Glucerna  - Aspiration precautions.    4. Seizure Disorder/ Bipolar disorder    - Neuro on board - Rx Valproic acid increased to 500mg in the morning and at bedtime and 250mg in between. Tegretol increased to 600mg QD.    5. Sigmoid Colon AdenoCA  - GI on board - Rx no intervention for now  - H/H stable  - c/w protonix  - EGD done at [facility] on 1/28/18 showed 4cm semi-circumferential mass in sigmoid colon, which was biopsied - reports show moderately differentiated adenoCA    5. DM type 2   - Monitor FS.    6. Hypothyroidism  - c/w synthroid.    7. Multiple Sclerosis with neurogenic bladder  - Chronic antony     8. B/L upper extremity edema  - Venous duplex showed evidence of superficial thrombophlebitis in left arm distal to PICC line, results above.    9. Overall very poor prognosis- continue Pain management, Palliative care,   - family agree for  Hospice evaluation with failure to thrive, bedridden state

## 2018-03-12 LAB
ANION GAP SERPL CALC-SCNC: 11 MMOL/L — SIGNIFICANT CHANGE UP (ref 7–14)
ANION GAP SERPL CALC-SCNC: 11 MMOL/L — SIGNIFICANT CHANGE UP (ref 7–14)
BUN SERPL-MCNC: 60 MG/DL — HIGH (ref 10–20)
BUN SERPL-MCNC: 63 MG/DL — CRITICAL HIGH (ref 10–20)
CALCIUM SERPL-MCNC: 9.2 MG/DL — SIGNIFICANT CHANGE UP (ref 8.5–10.1)
CALCIUM SERPL-MCNC: 9.3 MG/DL — SIGNIFICANT CHANGE UP (ref 8.5–10.1)
CHLORIDE SERPL-SCNC: 115 MMOL/L — HIGH (ref 98–110)
CHLORIDE SERPL-SCNC: 115 MMOL/L — HIGH (ref 98–110)
CO2 SERPL-SCNC: 22 MMOL/L — SIGNIFICANT CHANGE UP (ref 17–32)
CO2 SERPL-SCNC: 23 MMOL/L — SIGNIFICANT CHANGE UP (ref 17–32)
CREAT SERPL-MCNC: 2.1 MG/DL — HIGH (ref 0.7–1.5)
CREAT SERPL-MCNC: 2.2 MG/DL — HIGH (ref 0.7–1.5)
GLUCOSE SERPL-MCNC: 136 MG/DL — HIGH (ref 70–110)
GLUCOSE SERPL-MCNC: 141 MG/DL — HIGH (ref 70–110)
POTASSIUM SERPL-MCNC: 5.1 MMOL/L — HIGH (ref 3.5–5)
POTASSIUM SERPL-MCNC: 5.3 MMOL/L — HIGH (ref 3.5–5)
POTASSIUM SERPL-SCNC: 5.1 MMOL/L — HIGH (ref 3.5–5)
POTASSIUM SERPL-SCNC: 5.3 MMOL/L — HIGH (ref 3.5–5)
SODIUM SERPL-SCNC: 148 MMOL/L — HIGH (ref 135–146)
SODIUM SERPL-SCNC: 149 MMOL/L — HIGH (ref 135–146)

## 2018-03-12 RX ADMIN — MORPHINE SULFATE 2 MILLIGRAM(S): 50 CAPSULE, EXTENDED RELEASE ORAL at 21:59

## 2018-03-12 RX ADMIN — Medication 100 MILLIGRAM(S): at 11:35

## 2018-03-12 RX ADMIN — Medication 1 MILLIGRAM(S): at 11:36

## 2018-03-12 RX ADMIN — PANTOPRAZOLE SODIUM 40 MILLIGRAM(S): 20 TABLET, DELAYED RELEASE ORAL at 16:41

## 2018-03-12 RX ADMIN — SIMETHICONE 80 MILLIGRAM(S): 80 TABLET, CHEWABLE ORAL at 11:34

## 2018-03-12 RX ADMIN — Medication 1 TABLET(S): at 11:31

## 2018-03-12 RX ADMIN — Medication 1 APPLICATION(S): at 17:39

## 2018-03-12 RX ADMIN — Medication 500 MILLIGRAM(S): at 21:59

## 2018-03-12 RX ADMIN — Medication 1 TABLET(S): at 11:43

## 2018-03-12 RX ADMIN — Medication 500 MILLIGRAM(S): at 05:42

## 2018-03-12 RX ADMIN — Medication 650 MILLIGRAM(S): at 13:38

## 2018-03-12 RX ADMIN — LACTULOSE 10 GRAM(S): 10 SOLUTION ORAL at 05:41

## 2018-03-12 RX ADMIN — FENTANYL CITRATE 1 PATCH: 50 INJECTION INTRAVENOUS at 11:45

## 2018-03-12 RX ADMIN — Medication 1 APPLICATION(S): at 05:39

## 2018-03-12 RX ADMIN — CEFEPIME 100 MILLIGRAM(S): 1 INJECTION, POWDER, FOR SOLUTION INTRAMUSCULAR; INTRAVENOUS at 16:50

## 2018-03-12 RX ADMIN — ZINC SULFATE TAB 220 MG (50 MG ZINC EQUIVALENT) 220 MILLIGRAM(S): 220 (50 ZN) TAB at 11:35

## 2018-03-12 RX ADMIN — Medication 250 MILLIGRAM(S): at 16:41

## 2018-03-12 RX ADMIN — LACTULOSE 10 GRAM(S): 10 SOLUTION ORAL at 13:37

## 2018-03-12 RX ADMIN — LACTULOSE 10 GRAM(S): 10 SOLUTION ORAL at 21:59

## 2018-03-12 RX ADMIN — Medication 650 MILLIGRAM(S): at 16:50

## 2018-03-12 RX ADMIN — HEPARIN SODIUM 5000 UNIT(S): 5000 INJECTION INTRAVENOUS; SUBCUTANEOUS at 05:41

## 2018-03-12 RX ADMIN — Medication 650 MILLIGRAM(S): at 21:59

## 2018-03-12 RX ADMIN — Medication 1 TABLET(S): at 16:51

## 2018-03-12 RX ADMIN — PANTOPRAZOLE SODIUM 40 MILLIGRAM(S): 20 TABLET, DELAYED RELEASE ORAL at 06:00

## 2018-03-12 RX ADMIN — MORPHINE SULFATE 2 MILLIGRAM(S): 50 CAPSULE, EXTENDED RELEASE ORAL at 03:22

## 2018-03-12 RX ADMIN — HEPARIN SODIUM 5000 UNIT(S): 5000 INJECTION INTRAVENOUS; SUBCUTANEOUS at 22:00

## 2018-03-12 RX ADMIN — Medication 50 MICROGRAM(S): at 05:41

## 2018-03-12 RX ADMIN — MORPHINE SULFATE 2 MILLIGRAM(S): 50 CAPSULE, EXTENDED RELEASE ORAL at 19:39

## 2018-03-12 RX ADMIN — Medication 100 MILLIGRAM(S): at 13:38

## 2018-03-12 RX ADMIN — Medication 10 MILLIGRAM(S): at 11:32

## 2018-03-12 RX ADMIN — HEPARIN SODIUM 5000 UNIT(S): 5000 INJECTION INTRAVENOUS; SUBCUTANEOUS at 13:37

## 2018-03-12 RX ADMIN — Medication 650 MILLIGRAM(S): at 05:41

## 2018-03-12 RX ADMIN — MORPHINE SULFATE 2 MILLIGRAM(S): 50 CAPSULE, EXTENDED RELEASE ORAL at 16:40

## 2018-03-12 RX ADMIN — Medication 600 MILLIGRAM(S): at 16:50

## 2018-03-12 RX ADMIN — FENTANYL CITRATE 1 PATCH: 50 INJECTION INTRAVENOUS at 11:38

## 2018-03-12 NOTE — PROGRESS NOTE ADULT - SUBJECTIVE AND OBJECTIVE BOX
SUBJECTIVE:    Patient is a 62y old Female who presents with a chief complaint of altered mental status (24 Feb 2018 18:44)    Currently admitted to medicine with the primary diagnosis of Sepsis     Today is hospital day 16d. This morning she is resting comfortably in bed and reports no new issues or overnight events.     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     ALLERGIES:  No Known Allergies    MEDICATIONS:  STANDING MEDICATIONS  bisacodyl Suppository 10 milliGRAM(s) Rectal daily  carBAMazepine Suspension 600 milliGRAM(s) Oral daily  cefepime  IVPB 1000 milliGRAM(s) IV Intermittent every 24 hours  clog zipper 1 Packet(s) 1 Each Enteral Tube once  collagenase Ointment 1 Application(s) Topical two times a day  Dakins Solution - Full Strength 1 Application(s) Topical two times a day  dextrose 5% + sodium chloride 0.45%. 1000 milliLiter(s) IV Continuous <Continuous>  docusate sodium 100 milliGRAM(s) Oral three times a day  fentaNYL   Patch  25 MICROgram(s)/Hr. 1 Patch Transdermal every 48 hours  folic acid 1 milliGRAM(s) Oral daily  heparin  Injectable 5000 Unit(s) SubCutaneous every 8 hours  lactobacillus acidophilus 1 Tablet(s) Oral two times a day with meals  lactulose Syrup 10 Gram(s) Oral three times a day  levothyroxine 50 MICROGram(s) Oral daily  multivitamin 1 Tablet(s) Oral daily  pantoprazole   Suspension 40 milliGRAM(s) Oral two times a day before meals  silver sulfADIAZINE 1% Cream 1 Application(s) Topical every 12 hours  simethicone 80 milliGRAM(s) Chew daily  sodium bicarbonate 650 milliGRAM(s) Oral three times a day  thiamine 100 milliGRAM(s) Oral daily  valproic  acid Syrup 250 milliGRAM(s) Oral daily  valproic  acid Syrup 500 milliGRAM(s) Oral daily  valproic  acid Syrup 500 milliGRAM(s) Oral at bedtime  zinc sulfate 220 milliGRAM(s) Oral daily    PRN MEDICATIONS  acetaminophen  Suppository 650 milliGRAM(s) Rectal four times a day PRN  morphine  - Injectable 2 milliGRAM(s) IV Push every 4 hours PRN  senna 2 Tablet(s) Oral at bedtime PRN    VITALS:   T(F): 99.8  HR: 109  BP: 101/56  RR: 18  SpO2: 98%    LABS:    03-12    149<H>  |  115<H>  |  63<HH>  ----------------------------<  141<H>  5.1<H>   |  23  |  2.2<H>    Ca    9.2      12 Mar 2018 08:55      PHYSICAL EXAM:  GEN: NAD, appears more alert than previous days  LUNGS: CTAB, no w/r/r  HEART: RRR, no m/r/g  ABD: soft, NT/ND, +BS  EXT: NC/NC/NE/2+PP/MERCEDES  NEURO: AAOx0, non-verbal

## 2018-03-12 NOTE — PROGRESS NOTE ADULT - ASSESSMENT
Patient is a 62y old Female with PMH of MS and being bed-bound, CKD IV, chronic sacral osteo, recently diagnosed colon CA at [facility], and seizure disorder who presents with a chief complaint of altered mental status (24 Feb 2018 18:44). Patient's daughter/healthcare proxy present at bedside. She reports patient's mental status has gradually declined since October after repeated hospitalizations for lung infections, UTIs, and the chronic sacral ulcer. Patient's hospital course complicated by metabolic acidosis and sepsis. Patient is still non-verbal and altered. As per daughter, patient more verbal a week prior to admission.    1. Metabolic encephalopathy probably 2/2 sepsis/ sacral osteomyelitis/UTI 2/2 chronic indwelling antony  - CT Head No Cont (02.24.18 @ 16:25): negative  - CT Abdomen and Pelvis No Cont (02.24.18 @ 16:26) Sacral decubitus ulcer with extension to the coccyx demonstrating bony destruction of the coccyx consistent with osteomyelitis. Surrounding phlegmon measures 6 cm transverse. Trace persistent right hydroureteronephrosis with suggestion of bilateral urothelial thickening likely related to chronic infection.Stable 2.2 cm right adrenal adenoma.Stable diffuse bladder wall thickening and trabeculation.  - EEG showed generalized slowing w/ triphasic waves (reportedly) - consistent w/ metabolic encephalopathy  - ID on board - Rx switching Abx to PO Vantin 100mg BID + Flagyl 500mg BID x 14 days. Switched patient back to Cefepime.  - Burn on board - Rx no intervention @ this time. C/w Dakins + Santyl wet-to-dry dressings for now.  - Ammonia elevated - increased lactulose, titrate to 3-4 BMs per day. C/w Rifaximin. Ammonia trend 135->141->116->192 -> 113 -> 82  - Palliative on board - Rx's appreciated  - repeat Blood Cx - NGTD  - Hospice consult - pending    2. Hypernatremia  - Nephro on board - Rx keeping Bicarb >22  - daily BMP    3. AMS, can't take PO  - NGT feeds: Glucerna  - Aspiration precautions    4. Seizure Disorder/ Bipolar disorder  - Valproic acid level 26; Carbamazepine level 7.3. These levels recorded after increasing both meds  - Neuro on board - Rx Valproic acid increased to 500mg in the morning and at bedtime and 250mg in between. Tegretol increased to 600mg QD.    5. Sigmoid Colon AdenoCA  - GI on board - Rx no intervention for now  - H/H stable  - c/w protonix  - EGD done at [facility] on 1/28/18 showed 4cm semi-circumferential mass in sigmoid colon, which was biopsied - reports show moderately differentiated adenoCA    5. DM type 2   - Monitor FS.    6. Hypothyroidism  - c/w synthroid.    7. Multiple Sclerosis with neurogenic bladder  - Chronic antony     8. B/L upper extremity edema  - Venous duplex showed evidence of superficial thrombophlebitis in left arm distal to PICC line, results above.    DVT ppx: Heparin subQ  Code status: DNR/DNI  Dispo: from [facility] (long-term)    Prognosis guarded.   - Palliative care meeting: As per family, pt is now DNR and DNI. Family is open to Hospice consult at this time - ordered, pending  - Patient not responding well to treatment.

## 2018-03-13 LAB
AMMONIA BLD-MCNC: 75 MMOL/L — CRITICAL HIGH (ref 11–35)
ANION GAP SERPL CALC-SCNC: 9 MMOL/L — SIGNIFICANT CHANGE UP (ref 7–14)
BASOPHILS # BLD AUTO: 0.05 K/UL — SIGNIFICANT CHANGE UP (ref 0–0.2)
BASOPHILS NFR BLD AUTO: 0.5 % — SIGNIFICANT CHANGE UP (ref 0–1)
BUN SERPL-MCNC: 60 MG/DL — HIGH (ref 10–20)
CALCIUM SERPL-MCNC: 8.6 MG/DL — SIGNIFICANT CHANGE UP (ref 8.5–10.1)
CHLORIDE SERPL-SCNC: 107 MMOL/L — SIGNIFICANT CHANGE UP (ref 98–110)
CO2 SERPL-SCNC: 23 MMOL/L — SIGNIFICANT CHANGE UP (ref 17–32)
CREAT SERPL-MCNC: 2.2 MG/DL — HIGH (ref 0.7–1.5)
EOSINOPHIL # BLD AUTO: 0.24 K/UL — SIGNIFICANT CHANGE UP (ref 0–0.7)
EOSINOPHIL NFR BLD AUTO: 2.4 % — SIGNIFICANT CHANGE UP (ref 0–8)
GLUCOSE SERPL-MCNC: 134 MG/DL — HIGH (ref 70–110)
HCT VFR BLD CALC: 24.7 % — LOW (ref 37–47)
HGB BLD-MCNC: 7.4 G/DL — CRITICAL LOW (ref 12–16)
IMM GRANULOCYTES NFR BLD AUTO: 1 % — HIGH (ref 0.1–0.3)
LYMPHOCYTES # BLD AUTO: 2.31 K/UL — SIGNIFICANT CHANGE UP (ref 1.2–3.4)
LYMPHOCYTES # BLD AUTO: 22.6 % — SIGNIFICANT CHANGE UP (ref 20.5–51.1)
MCHC RBC-ENTMCNC: 29.2 PG — SIGNIFICANT CHANGE UP (ref 27–31)
MCHC RBC-ENTMCNC: 30 G/DL — LOW (ref 32–37)
MCV RBC AUTO: 97.6 FL — SIGNIFICANT CHANGE UP (ref 81–99)
MONOCYTES # BLD AUTO: 0.89 K/UL — HIGH (ref 0.1–0.6)
MONOCYTES NFR BLD AUTO: 8.7 % — SIGNIFICANT CHANGE UP (ref 1.7–9.3)
NEUTROPHILS # BLD AUTO: 6.62 K/UL — HIGH (ref 1.4–6.5)
NEUTROPHILS NFR BLD AUTO: 64.8 % — SIGNIFICANT CHANGE UP (ref 42.2–75.2)
NRBC # BLD: 0 /100 WBCS — SIGNIFICANT CHANGE UP (ref 0–0)
PLATELET # BLD AUTO: 307 K/UL — SIGNIFICANT CHANGE UP (ref 130–400)
POTASSIUM SERPL-MCNC: 4.9 MMOL/L — SIGNIFICANT CHANGE UP (ref 3.5–5)
POTASSIUM SERPL-SCNC: 4.9 MMOL/L — SIGNIFICANT CHANGE UP (ref 3.5–5)
RBC # BLD: 2.53 M/UL — LOW (ref 4.2–5.4)
RBC # FLD: 17.5 % — HIGH (ref 11.5–14.5)
SODIUM SERPL-SCNC: 139 MMOL/L — SIGNIFICANT CHANGE UP (ref 135–146)
WBC # BLD: 10.21 K/UL — SIGNIFICANT CHANGE UP (ref 4.8–10.8)
WBC # FLD AUTO: 10.21 K/UL — SIGNIFICANT CHANGE UP (ref 4.8–10.8)

## 2018-03-13 RX ADMIN — Medication 650 MILLIGRAM(S): at 15:40

## 2018-03-13 RX ADMIN — LACTULOSE 10 GRAM(S): 10 SOLUTION ORAL at 21:21

## 2018-03-13 RX ADMIN — LACTULOSE 10 GRAM(S): 10 SOLUTION ORAL at 05:59

## 2018-03-13 RX ADMIN — Medication 1 TABLET(S): at 17:19

## 2018-03-13 RX ADMIN — MORPHINE SULFATE 2 MILLIGRAM(S): 50 CAPSULE, EXTENDED RELEASE ORAL at 05:58

## 2018-03-13 RX ADMIN — Medication 1 APPLICATION(S): at 06:00

## 2018-03-13 RX ADMIN — HEPARIN SODIUM 5000 UNIT(S): 5000 INJECTION INTRAVENOUS; SUBCUTANEOUS at 06:00

## 2018-03-13 RX ADMIN — CEFEPIME 100 MILLIGRAM(S): 1 INJECTION, POWDER, FOR SOLUTION INTRAMUSCULAR; INTRAVENOUS at 10:46

## 2018-03-13 RX ADMIN — Medication 500 MILLIGRAM(S): at 08:33

## 2018-03-13 RX ADMIN — Medication 1 TABLET(S): at 08:33

## 2018-03-13 RX ADMIN — Medication 100 MILLIGRAM(S): at 15:41

## 2018-03-13 RX ADMIN — PANTOPRAZOLE SODIUM 40 MILLIGRAM(S): 20 TABLET, DELAYED RELEASE ORAL at 15:39

## 2018-03-13 RX ADMIN — Medication 650 MILLIGRAM(S): at 05:58

## 2018-03-13 RX ADMIN — HEPARIN SODIUM 5000 UNIT(S): 5000 INJECTION INTRAVENOUS; SUBCUTANEOUS at 21:21

## 2018-03-13 RX ADMIN — Medication 10 MILLIGRAM(S): at 11:44

## 2018-03-13 RX ADMIN — Medication 1 TABLET(S): at 11:45

## 2018-03-13 RX ADMIN — SIMETHICONE 80 MILLIGRAM(S): 80 TABLET, CHEWABLE ORAL at 11:53

## 2018-03-13 RX ADMIN — HEPARIN SODIUM 5000 UNIT(S): 5000 INJECTION INTRAVENOUS; SUBCUTANEOUS at 15:39

## 2018-03-13 RX ADMIN — LACTULOSE 10 GRAM(S): 10 SOLUTION ORAL at 15:40

## 2018-03-13 RX ADMIN — Medication 650 MILLIGRAM(S): at 21:22

## 2018-03-13 RX ADMIN — Medication 100 MILLIGRAM(S): at 21:22

## 2018-03-13 RX ADMIN — Medication 1 APPLICATION(S): at 17:18

## 2018-03-13 RX ADMIN — PANTOPRAZOLE SODIUM 40 MILLIGRAM(S): 20 TABLET, DELAYED RELEASE ORAL at 05:58

## 2018-03-13 RX ADMIN — Medication 1 APPLICATION(S): at 17:17

## 2018-03-13 RX ADMIN — Medication 600 MILLIGRAM(S): at 11:44

## 2018-03-13 RX ADMIN — Medication 1 APPLICATION(S): at 05:59

## 2018-03-13 RX ADMIN — Medication 100 MILLIGRAM(S): at 11:47

## 2018-03-13 RX ADMIN — Medication 50 MICROGRAM(S): at 05:58

## 2018-03-13 RX ADMIN — ZINC SULFATE TAB 220 MG (50 MG ZINC EQUIVALENT) 220 MILLIGRAM(S): 220 (50 ZN) TAB at 11:46

## 2018-03-13 RX ADMIN — Medication 250 MILLIGRAM(S): at 15:41

## 2018-03-13 RX ADMIN — Medication 1 MILLIGRAM(S): at 11:46

## 2018-03-13 RX ADMIN — Medication 500 MILLIGRAM(S): at 21:22

## 2018-03-13 NOTE — PROGRESS NOTE ADULT - SUBJECTIVE AND OBJECTIVE BOX
JUAN JOSE RAMACHANDRAN  [facility] T2-3A 025 B ([facility] T2-3A)            Patient was evaluated and examined  by bedside, remains non-verbal, awake and moaning at times, on NGT feedings, spikes low grade fever, with persistent decline in medical health status.        REVIEW OF SYSTEMS:  unable to obtain due to patients altered mentation state.      T(C): , Max: 38.5 (03-12-18 @ 20:19)  HR: 105 (03-13-18 @ 05:47)  BP: 111/70 (03-13-18 @ 05:47)  RR: 16 (03-13-18 @ 05:47)  SpO2: --  CAPILLARY BLOOD GLUCOSE  110 (13 Mar 2018 12:00)  136 (13 Mar 2018 08:02)          PHYSICAL EXAM:    GENERAL: NAD, lethargic, occasionally opens her eyes,   patient is laying comfortably in bed  HEENT: AT, NC, SUPPLE, NO JVD, NO CB, NGT present  LUNG: good breath sounds b/l  CVS: normal S1, S2, RRR, NO M/G/R  ABDOMEN: soft, bowel sounds present, normoactive in all 4 quadrants, non-tender, non-distended  EXT: no C/C, positive PP b/l extremities  NEURO: functional quadriplegia, bedridden state, patient is not following verbal commands  SKIN: plus 2 pitting edema of b/l upper extremities, unstagble  sacral decubitus(poa)    LAB  CBC  Date: 03-13-18 @ 08:52  Mean cell Dsietnfzol15.2  Mean cell Hemoglobin Conc30.0  Mean cell Volum 97.6  Platelet count-Automate 307  RBC Count 2.53  Red Cell Distrib Width17.5  WBC Count10.21  % Albumin, Urine--  Hematocrit 24.7  Hemoglobin 7.4  CBC  Date: 03-10-18 @ 10:17  Mean cell Sxkesohter89.8  Mean cell Hemoglobin Conc30.1  Mean cell Volum 95.4  Platelet count-Automate 285  RBC Count 2.85  Red Cell Distrib Width17.6  WBC Count11.54  % Albumin, Urine--  Hematocrit 27.2  Hemoglobin 8.2  CBC  Date: 03-09-18 @ 06:51  Mean cell Rypmmdmdek17.5  Mean cell Hemoglobin Conc30.9  Mean cell Volum 95.3  Platelet count-Automate 265  RBC Count 2.78  Red Cell Distrib Width17.3  WBC Count11.52  % Albumin, Urine--  Hematocrit 26.5  Hemoglobin 8.2  CBC  Date: 03-08-18 @ 15:21  Mean cell Umvdujvesd97.0  Mean cell Hemoglobin Conc30.9  Mean cell Volum 93.6  Platelet count-Automate 259  RBC Count 2.83  Red Cell Distrib Width17.3  WBC Count11.67  % Albumin, Urine--  Hematocrit 26.5  Hemoglobin 8.2  CBC  Date: 03-08-18 @ 06:10  Mean cell Wutkegpiyc28.7  Mean cell Hemoglobin Conc30.4  Mean cell Volum 94.5  Platelet count-Automate 278  RBC Count 3.10  Red Cell Distrib Width17.4  WBC Count13.12  % Albumin, Urine--  Hematocrit 29.3  Hemoglobin 8.9  CBC  Date: 03-07-18 @ 10:03  Mean cell Bjtiuukpwp68.9  Mean cell Hemoglobin Conc30.9  Mean cell Volum 93.5  Platelet count-Automate 244  RBC Count 2.91  Red Cell Distrib Width17.3  WBC Count11.11  % Albumin, Urine--  Hematocrit 27.2  Hemoglobin 8.4    [facility]  03-13-18 @ 08:52  Blood Gas Arterial-Calcium,Ionized--  Blood Urea Nitrogen, Serum 60 mg/dL<H> [10 - 20]  Carbon Dioxide, Serum23 mmol/L [17 - 32]  Chloride, Ldrig757 mmol/L [98 - 110]  Creatinie, Serum2.2 mg/dL<H> [0.7 - 1.5]  Glucose, Hebfm517 mg/dL<H> [70 - 110]  Potassium, Serum4.9 mmol/L [3.5 - 5.0]  Sodium, Serum 139 mmol/L [135 - 146]  [facility]  03-12-18 @ 08:55  Blood Gas Arterial-Calcium,Ionized--  Blood Urea Nitrogen, Serum 63 mg/dL<HH> [10 - 20]  Carbon Dioxide, Serum23 mmol/L [17 - 32]  Chloride, Irdna321 mmol/L<H> [98 - 110]  Creatinie, Serum2.2 mg/dL<H> [0.7 - 1.5]  Glucose, Qlpgl185 mg/dL<H> [70 - 110]  Potassium, Serum5.1 mmol/L<H> [3.5 - 5.0]  Sodium, Serum 149 mmol/L<H> [135 - 146]  [facility]  03-12-18 @ 02:23  Blood Gas Arterial-Calcium,Ionized--  Blood Urea Nitrogen, Serum 60 mg/dL<H> [10 - 20]  Carbon Dioxide, Serum22 mmol/L [17 - 32]  Chloride, Luytt302 mmol/L<H> [98 - 110]  Creatinie, Serum2.1 mg/dL<H> [0.7 - 1.5]  Glucose, Hawhr757 mg/dL<H> [70 - 110]  Potassium, Serum5.3 mmol/L<H> [3.5 - 5.0]  Sodium, Serum 148 mmol/L<H> [135 - 146]  [facility]  03-11-18 @ 13:02  Blood Gas Arterial-Calcium,Ionized--  Blood Urea Nitrogen, Serum 67 mg/dL<HH> [10 - 20]  Carbon Dioxide, Serum21 mmol/L [17 - 32]  Chloride, Jlyaz930 mmol/L<H> [98 - 110]  Creatinie, Serum2.2 mg/dL<H> [0.7 - 1.5]  Glucose, Aqhso565 mg/dL [70 - 110]  Potassium, Serum5.6 mmol/L<H> [3.5 - 5.0]  Sodium, Serum 151 mmol/L<H> [135 - 146]  [facility]  03-10-18 @ 10:17  Blood Gas Arterial-Calcium,Ionized--  Blood Urea Nitrogen, Serum 61 mg/dL<HH> [10 - 20]  Carbon Dioxide, Serum20 mmol/L [17 - 32]  Chloride, Meqzr529 mmol/L<H> [98 - 110]  Creatinie, Serum2.2 mg/dL<H> [0.7 - 1.5]  Glucose, Umrjr597 mg/dL [70 - 110]  Potassium, Serum5.2 mmol/L<H> [3.5 - 5.0]  Sodium, Serum 149 mmol/L<H> [135 - 146]  [facility]  03-09-18 @ 06:51  Blood Gas Arterial-Calcium,Ionized--  Blood Urea Nitrogen, Serum 56 mg/dL<H> [10 - 20]  Carbon Dioxide, Serum18 mmol/L [17 - 32]  Chloride, Pwrmt439 mmol/L<H> [98 - 110]  Creatinie, Serum2.1 mg/dL<H> [0.7 - 1.5]  Glucose, Kmjon157 mg/dL<H> [70 - 110]  Potassium, Serum4.4 mmol/L [3.5 - 5.0]  Sodium, Serum 146 mmol/L [135 - 146]  BMP  03-08-18 @ 15:21  Blood Gas Arterial-Calcium,Ionized--  Blood Urea Nitrogen, Serum 57 mg/dL<H> [10 - 20]  Carbon Dioxide, Serum19 mmol/L [17 - 32]  Chloride, Amnuu428 mmol/L<H> [98 - 110]  Creatinie, Serum2.2 mg/dL<H> [0.7 - 1.5]  Glucose, Hylzr398 mg/dL<H> [70 - 110]  Potassium, Serum5.0 mmol/L [3.5 - 5.0]  Sodium, Serum 143 mmol/L [135 - 146]              Microbiology:    Culture - Blood (collected 03-08-18 @ 15:37)  Source: .Blood Blood  Preliminary Report (03-10-18 @ 01:02):    No growth to date.    Culture - Blood (collected 02-28-18 @ 08:30)  Source: .Blood Blood  Final Report (03-05-18 @ 16:01):    No growth at 5 days.    Culture - Blood (collected 02-26-18 @ 11:46)  Source: .Blood None  Final Report (03-04-18 @ 01:00):    No growth at 5 days.    Culture - Blood (collected 02-24-18 @ 13:16)  Source: .Blood Blood  Final Report (03-02-18 @ 01:00):    No growth at 5 days.    Culture - Urine (collected 02-24-18 @ 12:28)  Source: .Urine Catheterized  Final Report (02-25-18 @ 23:24):    50,000 - 99,000 CFU/mL Presumptive Candida albicans        Medications:  acetaminophen  Suppository 650 milliGRAM(s) Rectal four times a day PRN  bisacodyl Suppository 10 milliGRAM(s) Rectal daily  carBAMazepine Suspension 600 milliGRAM(s) Oral daily  cefepime  IVPB 1000 milliGRAM(s) IV Intermittent every 24 hours  clog zipper 1 Packet(s) 1 Each Enteral Tube once  collagenase Ointment 1 Application(s) Topical two times a day  Dakins Solution - Full Strength 1 Application(s) Topical two times a day  docusate sodium 100 milliGRAM(s) Oral three times a day  fentaNYL   Patch  25 MICROgram(s)/Hr. 1 Patch Transdermal every 48 hours  folic acid 1 milliGRAM(s) Oral daily  heparin  Injectable 5000 Unit(s) SubCutaneous every 8 hours  lactobacillus acidophilus 1 Tablet(s) Oral two times a day with meals  lactulose Syrup 10 Gram(s) Oral three times a day  levothyroxine 50 MICROGram(s) Oral daily  morphine  - Injectable 2 milliGRAM(s) IV Push every 4 hours PRN  multivitamin 1 Tablet(s) Oral daily  pantoprazole   Suspension 40 milliGRAM(s) Oral two times a day before meals  senna 2 Tablet(s) Oral at bedtime PRN  silver sulfADIAZINE 1% Cream 1 Application(s) Topical every 12 hours  simethicone 80 milliGRAM(s) Chew daily  sodium bicarbonate 650 milliGRAM(s) Oral three times a day  thiamine 100 milliGRAM(s) Oral daily  valproic  acid Syrup 250 milliGRAM(s) Oral daily  valproic  acid Syrup 500 milliGRAM(s) Oral daily  valproic  acid Syrup 500 milliGRAM(s) Oral at bedtime  zinc sulfate 220 milliGRAM(s) Oral daily        Assessment and Plan:  1. Metabolic encephalopathy probably 2/2 sepsis/ sacral osteomyelitis/UTI 2/2 chronic indwelling antony  - CT Head No Cont (02.24.18 @ 16:25): negative  - CT Abdomen and Pelvis No Cont (02.24.18 @ 16:26) Sacral decubitus ulcer with extension to the coccyx demonstrating bony destruction of the coccyx consistent with osteomyelitis. Surrounding phlegmon measures 6 cm transverse. Trace persistent right hydroureteronephrosis with suggestion of bilateral urothelial thickening likely related to chronic infection.Stable 2.2 cm right adrenal adenoma.Stable diffuse bladder wall thickening and trabeculation.  - EEG showed generalized slowing w/ triphasic waves (reportedly) - consistent w/ metabolic encephalopathy  - ID on board -  on IV Cefepime due to fever and leukocytosis  - Wound care specialist recommending local daily wound care-  Dakins + Santyl wet-to-dry dressings for now.  - Ammonia elevated - continue  lactulose       2. Hypernatremia, resolving:  -IVF, bmp in am    3. AMS, can't take PO  - NGT feeds: Glucerna  - Aspiration precautions.    4. Seizure Disorder/ Bipolar disorder    - Neuro on board - Rx Valproic acid increased to 500mg in the morning and at bedtime and 250mg in between. Tegretol increased to 600mg QD.    5. Sigmoid Colon AdenoCA  - GI on board - Rx no intervention for now  - c/w protonix  - EGD done at [facility] on 1/28/18 showed 4cm semi-circumferential mass in sigmoid colon, which was biopsied - reports show moderately differentiated adenoCA    5. DM type 2   - Monitor FS.    6. Hypothyroidism  - c/w synthroid.    7. Multiple Sclerosis with neurogenic bladder  - Chronic antony     8. B/L upper extremity edema  - Venous duplex showed evidence of superficial thrombophlebitis in left arm distal to PICC line    9. Overall very poor prognosis- continue Pain management, Palliative care,   -  with failure to thrive, bedridden state- patient's daughter POA- with possible self denial, requests Oncology consult- we have discussed patient's poor functional status and inability to receive ant chemotherapy.   - Patient is very appropriate for Hospice care.

## 2018-03-14 LAB
CULTURE RESULTS: SIGNIFICANT CHANGE UP
SPECIMEN SOURCE: SIGNIFICANT CHANGE UP

## 2018-03-14 RX ORDER — FENTANYL CITRATE 50 UG/ML
1 INJECTION INTRAVENOUS
Qty: 0 | Refills: 0 | Status: DISCONTINUED | OUTPATIENT
Start: 2018-03-14 | End: 2018-03-19

## 2018-03-14 RX ORDER — SODIUM CHLORIDE 9 MG/ML
250 INJECTION INTRAMUSCULAR; INTRAVENOUS; SUBCUTANEOUS ONCE
Qty: 0 | Refills: 0 | Status: COMPLETED | OUTPATIENT
Start: 2018-03-14 | End: 2018-03-14

## 2018-03-14 RX ADMIN — PANTOPRAZOLE SODIUM 40 MILLIGRAM(S): 20 TABLET, DELAYED RELEASE ORAL at 16:18

## 2018-03-14 RX ADMIN — Medication 1 MILLIGRAM(S): at 11:51

## 2018-03-14 RX ADMIN — FENTANYL CITRATE 1 PATCH: 50 INJECTION INTRAVENOUS at 16:18

## 2018-03-14 RX ADMIN — SIMETHICONE 80 MILLIGRAM(S): 80 TABLET, CHEWABLE ORAL at 11:52

## 2018-03-14 RX ADMIN — HEPARIN SODIUM 5000 UNIT(S): 5000 INJECTION INTRAVENOUS; SUBCUTANEOUS at 21:22

## 2018-03-14 RX ADMIN — LACTULOSE 10 GRAM(S): 10 SOLUTION ORAL at 05:06

## 2018-03-14 RX ADMIN — Medication 1 TABLET(S): at 11:51

## 2018-03-14 RX ADMIN — Medication 650 MILLIGRAM(S): at 20:26

## 2018-03-14 RX ADMIN — Medication 50 MICROGRAM(S): at 05:08

## 2018-03-14 RX ADMIN — Medication 250 MILLIGRAM(S): at 16:18

## 2018-03-14 RX ADMIN — PANTOPRAZOLE SODIUM 40 MILLIGRAM(S): 20 TABLET, DELAYED RELEASE ORAL at 06:52

## 2018-03-14 RX ADMIN — ZINC SULFATE TAB 220 MG (50 MG ZINC EQUIVALENT) 220 MILLIGRAM(S): 220 (50 ZN) TAB at 11:50

## 2018-03-14 RX ADMIN — Medication 1 APPLICATION(S): at 17:10

## 2018-03-14 RX ADMIN — SODIUM CHLORIDE 250 MILLILITER(S): 9 INJECTION INTRAMUSCULAR; INTRAVENOUS; SUBCUTANEOUS at 22:08

## 2018-03-14 RX ADMIN — LACTULOSE 10 GRAM(S): 10 SOLUTION ORAL at 13:31

## 2018-03-14 RX ADMIN — LACTULOSE 10 GRAM(S): 10 SOLUTION ORAL at 21:19

## 2018-03-14 RX ADMIN — HEPARIN SODIUM 5000 UNIT(S): 5000 INJECTION INTRAVENOUS; SUBCUTANEOUS at 05:08

## 2018-03-14 RX ADMIN — Medication 650 MILLIGRAM(S): at 13:31

## 2018-03-14 RX ADMIN — Medication 1 APPLICATION(S): at 17:09

## 2018-03-14 RX ADMIN — Medication 500 MILLIGRAM(S): at 21:21

## 2018-03-14 RX ADMIN — HEPARIN SODIUM 5000 UNIT(S): 5000 INJECTION INTRAVENOUS; SUBCUTANEOUS at 13:32

## 2018-03-14 RX ADMIN — Medication 1 TABLET(S): at 17:09

## 2018-03-14 RX ADMIN — Medication 650 MILLIGRAM(S): at 05:09

## 2018-03-14 RX ADMIN — Medication 1 APPLICATION(S): at 05:10

## 2018-03-14 RX ADMIN — Medication 600 MILLIGRAM(S): at 13:30

## 2018-03-14 RX ADMIN — CEFEPIME 100 MILLIGRAM(S): 1 INJECTION, POWDER, FOR SOLUTION INTRAMUSCULAR; INTRAVENOUS at 11:46

## 2018-03-14 RX ADMIN — Medication 500 MILLIGRAM(S): at 05:07

## 2018-03-14 RX ADMIN — Medication 10 MILLIGRAM(S): at 11:51

## 2018-03-14 RX ADMIN — Medication 650 MILLIGRAM(S): at 21:22

## 2018-03-14 RX ADMIN — Medication 100 MILLIGRAM(S): at 11:52

## 2018-03-14 RX ADMIN — MORPHINE SULFATE 2 MILLIGRAM(S): 50 CAPSULE, EXTENDED RELEASE ORAL at 20:33

## 2018-03-14 RX ADMIN — Medication 1 TABLET(S): at 10:24

## 2018-03-14 RX ADMIN — Medication 100 MILLIGRAM(S): at 13:32

## 2018-03-14 NOTE — PROGRESS NOTE ADULT - ASSESSMENT
Patient is a 62y old Female with PMH of MS and being bed-bound, CKD IV, chronic sacral osteo, recently diagnosed colon CA at [facility], and seizure disorder who presents with a chief complaint of altered mental status (24 Feb 2018 18:44). Patient's daughter/healthcare proxy present at bedside. She reports patient's mental status has gradually declined since October after repeated hospitalizations for lung infections, UTIs, and the chronic sacral ulcer. Patient's hospital course complicated by metabolic acidosis and sepsis. Patient is still non-verbal and altered. As per daughter, patient more verbal a week prior to admission.    1. Metabolic encephalopathy probably 2/2 sepsis/ sacral osteomyelitis/UTI 2/2 chronic indwelling antony  - CT Head No Cont (02.24.18 @ 16:25): negative  - CT Abdomen and Pelvis No Cont (02.24.18 @ 16:26) Sacral decubitus ulcer with extension to the coccyx demonstrating bony destruction of the coccyx consistent with osteomyelitis. Surrounding phlegmon measures 6 cm transverse. Trace persistent right hydroureteronephrosis with suggestion of bilateral urothelial thickening likely related to chronic infection.Stable 2.2 cm right adrenal adenoma.Stable diffuse bladder wall thickening and trabeculation.  - EEG showed generalized slowing w/ triphasic waves (reportedly) - consistent w/ metabolic encephalopathy  - ID on board - Rx's appreciated  - Burn on board - Rx no intervention @ this time. C/w Dakins + Santyl wet-to-dry dressings for now.  - Ammonia elevated - increased lactulose, titrate to 3-4 BMs per day. C/w Rifaximin. Ammonia trend 135->141->116->192 -> 113 -> 82 -> 75  - Palliative on board - Rx's appreciated  - repeat Blood Cx - NGTD  - Hospice on board - to have meeting with family on Friday 3/16    2. Hypernatremia  - Nephro on board - Rx keeping Bicarb >22  - daily BMP    3. AMS, can't take PO  - NGT feeds: Glucerna  - Aspiration precautions    4. Seizure Disorder/ Bipolar disorder  - Valproic acid level 26; Carbamazepine level 7.3. These levels recorded after increasing both meds  - Neuro on board - Rx Valproic acid increased to 500mg in the morning and at bedtime and 250mg in between. Tegretol increased to 600mg QD.    5. Sigmoid Colon AdenoCA  - GI on board - Rx no intervention for now  - H/H stable  - c/w protonix  - EGD done at [facility] [place] on 1/28/18 showed 4cm semi-circumferential mass in sigmoid colon, which was biopsied - reports show moderately differentiated adenoCA  - Oncology on board - Rx's appreciated. Not a candidate for chemo at this time. Surgical resection best for localized colon ca but needs complete staging (i.e. CT chest/abd/pel w/ IV contrast)    5. DM type 2   - Monitor FS.    6. Hypothyroidism  - c/w synthroid.    7. Multiple Sclerosis with neurogenic bladder  - Chronic antony     8. B/L upper extremity edema  - Venous duplex showed evidence of superficial thrombophlebitis in left arm distal to PICC line, results above.    DVT ppx: Heparin subQ  Code status: DNR/DNI  Dispo: from [facility] (long-term)    Prognosis guarded.   - Palliative care meeting: As per family, pt is now DNR and DNI. Family now open to hospice care - to have family meeting again on Friday 3/16.

## 2018-03-14 NOTE — PROVIDER CONTACT NOTE (OTHER) - ACTION/TREATMENT ORDERED:
Administered rectal Tylenol for pts temperature. Will reassess in one hour. Administering NS bolus of 250ml for low blood pressure. Will continue to monitor and assess.

## 2018-03-14 NOTE — CONSULT NOTE ADULT - SUBJECTIVE AND OBJECTIVE BOX
Patient is a 62y old  Female who presents with a chief complaint of altered mental status (24 Feb 2018 18:44)    HPI:  62 year old female with past medical history of multiple sclerosis, CKD, diabetes, chronic antony, sacral osteomyelitis, bipolar affective disorder, and seizure disorder, who is bedridden at [facility] because of MS, was sent in by [facility] because of altered mental status. The patient was recently admitted at [facility] for altered mental status and was found to have a sacral ulcer that required debridement and IV antibiotics. The patient was discharged on IV meropenem and IV vancomycin on Feb 9. At the time of discharge, the patient was responding appropriately to questions. Today, the patient is mostly non-verbal and only is moaning. She doesn't answer any questions. (24 Feb 2018 18:44)  She was being seen at [facility] for recently diagnosed colon adenocarcinoma. [facility] had stated that if functional status improved then they would continue with surgery and staging.    ROS: Difficult to assess  General: no fever, weight loss or fatigue  Skin/Breast: No itching, burning, rashes or lesions, no pain, masses or nipple discharge  Opthamologic: no eye pain, visual disturbance or discharge  ENMT: no difficulty hearing, tinnitus or vertigo; No sinus or throat pain, no pain or stiffness in neck  Respiratory and Thorax: no current SOB or wheezing  Cardiovascular: No chest pain, palpitations, dizziness or leg swelling  Gastrointestinal: No abdominal or epigastric pain. No nausea, vomiting, hematemesis, diarrhea or constipation. No melena or hematochezia.   Musculoskeletal: No joint pain or swelling; No muscle, back or extremity pain.  Neurological: No headaches, memory loss, loss of strength, numbness or tremors  Psychiatric: No depression, anxiety, mood swings or difficulty sleeping  Hematology/Lymphatics: No enlarged glands, no easy bruising, or bleeding gums  Endocrine: No heat/cold intolerance, no hair loss  Allergic/Immunologic: No hives or eczema      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  Chronic kidney disease (CKD), stage IV (severe)  Psychosis  Bedridden  Antony catheter in place on admission  Osteomyelitis of sacrum  Sacral ulcer  Osteoporosis  Diabetes mellitus  Seizure disorder  Bipolar affective disorder  Status post debridement: of sacral ulcer      SOCIAL HISTORY:    FAMILY HISTORY:  Family history unknown      MEDICATIONS  (STANDING):  bisacodyl Suppository 10 milliGRAM(s) Rectal daily  carBAMazepine Suspension 600 milliGRAM(s) Oral daily  cefepime  IVPB 1000 milliGRAM(s) IV Intermittent every 24 hours  clog zipper 1 Packet(s) 1 Each Enteral Tube once  collagenase Ointment 1 Application(s) Topical two times a day  Dakins Solution - Full Strength 1 Application(s) Topical two times a day  docusate sodium 100 milliGRAM(s) Oral three times a day  fentaNYL   Patch  25 MICROgram(s)/Hr. 1 Patch Transdermal every 48 hours  folic acid 1 milliGRAM(s) Oral daily  heparin  Injectable 5000 Unit(s) SubCutaneous every 8 hours  lactobacillus acidophilus 1 Tablet(s) Oral two times a day with meals  lactulose Syrup 10 Gram(s) Oral three times a day  levothyroxine 50 MICROGram(s) Oral daily  multivitamin 1 Tablet(s) Oral daily  pantoprazole   Suspension 40 milliGRAM(s) Oral two times a day before meals  silver sulfADIAZINE 1% Cream 1 Application(s) Topical every 12 hours  simethicone 80 milliGRAM(s) Chew daily  sodium bicarbonate 650 milliGRAM(s) Oral three times a day  thiamine 100 milliGRAM(s) Oral daily  valproic  acid Syrup 250 milliGRAM(s) Oral daily  valproic  acid Syrup 500 milliGRAM(s) Oral daily  valproic  acid Syrup 500 milliGRAM(s) Oral at bedtime  zinc sulfate 220 milliGRAM(s) Oral daily    MEDICATIONS  (PRN):  acetaminophen  Suppository 650 milliGRAM(s) Rectal four times a day PRN For Temp greater than 38 C (100.4 F)  morphine  - Injectable 2 milliGRAM(s) IV Push every 4 hours PRN Severe Pain (7 - 10)  senna 2 Tablet(s) Oral at bedtime PRN Constipation      Allergies    No Known Allergies      Vital Signs Last 24 Hrs  T(C): 37.5 (14 Mar 2018 04:36), Max: 38.1 (13 Mar 2018 20:28)  T(F): 99.5 (14 Mar 2018 04:36), Max: 100.6 (13 Mar 2018 20:28)  HR: 114 (14 Mar 2018 04:36) (88 - 123)  BP: 105/61 (14 Mar 2018 04:36) (100/51 - 116/56)  BP(mean): --  RR: 18 (14 Mar 2018 04:36) (18 - 18)  SpO2: --    PHYSICAL EXAM  General: NAD, well groomed, well developed  HEENT: Nontraumatic, normocephalic, No tonsillar erythema, exudates or enlargement;  Moist Mucous membranes, good dentition, no lesions  Lymph: no lymphadenopathy noted  Neck: supple, no JVD, normal thyroid  CV: normal S1/S2 with no murmur rubs or gallops  Lungs: Bilateral air entry, clear to auscultation, no wheezes, no rales, no stridor  Abdomen: soft non-tender non-distended, no hepatosplenomegaly, bowel sounds present  Ext: 2+ Peripheral pulses, no clubbing cyanosis or edema. Wearing compression stockings  Skin: no rashes and no petechiae  Neuro: alert and oriented X 3, no focal deficits, good concentration      LABS:                          7.4    10.21 )-----------( 307      ( 13 Mar 2018 08:52 )             24.7         Mean Cell Volume : 97.6 fL  Mean Cell Hemoglobin : 29.2 pg  Mean Cell Hemoglobin Concentration : 30.0 g/dL  Auto Neutrophil # : 6.62 K/uL  Auto Lymphocyte # : 2.31 K/uL  Auto Monocyte # : 0.89 K/uL  Auto Eosinophil # : 0.24 K/uL  Auto Basophil # : 0.05 K/uL  Auto Neutrophil % : 64.8 %  Auto Lymphocyte % : 22.6 %  Auto Monocyte % : 8.7 %  Auto Eosinophil % : 2.4 %  Auto Basophil % : 0.5 %      Serial CBC's  03-13 @ 08:52  Hct-24.7 / Hgb-7.4 / Plat-307 / RBC-2.53 / WBC-10.21      03-13    139  |  107  |  60<H>  ----------------------------<  134<H>  4.9   |  23  |  2.2<H>    Ca    8.6      13 Mar 2018 08:52          RADIOLOGY & ADDITIONAL STUDIES:    Chest Liza 3/13: negative  EGD done at [facility] on 1/28/18: showed 4cm semi-circumferential mass in sigmoid colon which was biopsied. Moderately differentiated adenocarcinoma. Patient is a 62y old  Female who presents with a chief complaint of altered mental status (24 Feb 2018 18:44)    HPI:  62 year old female with past medical history of multiple sclerosis, CKD, diabetes, chronic antony, sacral osteomyelitis, bipolar affective disorder, and seizure disorder, who is bedridden at [facility] because of multiple sclerosis, was sent in for evaluation of altered mental status. The patient was recently admitted at [facility] for altered mental status and was found to have a sacral ulcer that required debridement and IV antibiotics. The patient was discharged on IV meropenem and IV vancomycin on Feb 9. At the time of discharge, the patient was responding appropriately to questions. Today, the patient is mostly non-verbal and only is moaning. She doesn't answer any questions. As per history from medical team, she had an colonoscopy which showed 4cm semi-circumferential mass in sigmoid colon which showed moderately differentiated adenocarcinoma. No information available about involvement of nodes. She had CT abd/pelvis here but it was non contrast study. It did not reveal any pelvic lymphadenopathy      ROS: Difficult to assess  General: no fever, weight loss or fatigue  Skin/Breast: No itching, burning, rashes or lesions, no pain, masses or nipple discharge  Opthamologic: no eye pain, visual disturbance or discharge  ENMT: no difficulty hearing, tinnitus or vertigo; No sinus or throat pain, no pain or stiffness in neck  Respiratory and Thorax: no current SOB or wheezing  Cardiovascular: No chest pain, palpitations, dizziness or leg swelling  Gastrointestinal: No abdominal or epigastric pain. No nausea, vomiting, hematemesis, diarrhea or constipation. No melena or hematochezia.   Musculoskeletal: No joint pain or swelling; No muscle, back or extremity pain.  Neurological: No headaches, memory loss, loss of strength, numbness or tremors  Psychiatric: No depression, anxiety, mood swings or difficulty sleeping  Hematology/Lymphatics: No enlarged glands, no easy bruising, or bleeding gums  Endocrine: No heat/cold intolerance, no hair loss  Allergic/Immunologic: No hives or eczema      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  Chronic kidney disease (CKD), stage IV (severe)  Psychosis  Bedridden  Antony catheter in place on admission  Osteomyelitis of sacrum  Sacral ulcer  Osteoporosis  Diabetes mellitus  Seizure disorder  Bipolar affective disorder  Status post debridement: of sacral ulcer      SOCIAL HISTORY:    FAMILY HISTORY:  Family history unknown      MEDICATIONS  (STANDING):  bisacodyl Suppository 10 milliGRAM(s) Rectal daily  carBAMazepine Suspension 600 milliGRAM(s) Oral daily  cefepime  IVPB 1000 milliGRAM(s) IV Intermittent every 24 hours  clog zipper 1 Packet(s) 1 Each Enteral Tube once  collagenase Ointment 1 Application(s) Topical two times a day  Dakins Solution - Full Strength 1 Application(s) Topical two times a day  docusate sodium 100 milliGRAM(s) Oral three times a day  fentaNYL   Patch  25 MICROgram(s)/Hr. 1 Patch Transdermal every 48 hours  folic acid 1 milliGRAM(s) Oral daily  heparin  Injectable 5000 Unit(s) SubCutaneous every 8 hours  lactobacillus acidophilus 1 Tablet(s) Oral two times a day with meals  lactulose Syrup 10 Gram(s) Oral three times a day  levothyroxine 50 MICROGram(s) Oral daily  multivitamin 1 Tablet(s) Oral daily  pantoprazole   Suspension 40 milliGRAM(s) Oral two times a day before meals  silver sulfADIAZINE 1% Cream 1 Application(s) Topical every 12 hours  simethicone 80 milliGRAM(s) Chew daily  sodium bicarbonate 650 milliGRAM(s) Oral three times a day  thiamine 100 milliGRAM(s) Oral daily  valproic  acid Syrup 250 milliGRAM(s) Oral daily  valproic  acid Syrup 500 milliGRAM(s) Oral daily  valproic  acid Syrup 500 milliGRAM(s) Oral at bedtime  zinc sulfate 220 milliGRAM(s) Oral daily    MEDICATIONS  (PRN):  acetaminophen  Suppository 650 milliGRAM(s) Rectal four times a day PRN For Temp greater than 38 C (100.4 F)  morphine  - Injectable 2 milliGRAM(s) IV Push every 4 hours PRN Severe Pain (7 - 10)  senna 2 Tablet(s) Oral at bedtime PRN Constipation      Allergies    No Known Allergies      Vital Signs Last 24 Hrs  T(C): 37.5 (14 Mar 2018 04:36), Max: 38.1 (13 Mar 2018 20:28)  T(F): 99.5 (14 Mar 2018 04:36), Max: 100.6 (13 Mar 2018 20:28)  HR: 114 (14 Mar 2018 04:36) (88 - 123)  BP: 105/61 (14 Mar 2018 04:36) (100/51 - 116/56)  BP(mean): --  RR: 18 (14 Mar 2018 04:36) (18 - 18)  SpO2: --    PHYSICAL EXAM  General: NAD, well groomed, well developed  HEENT: Nontraumatic, normocephalic, No tonsillar erythema, exudates or enlargement;  Moist Mucous membranes, good dentition, no lesions  Lymph: no lymphadenopathy noted  Neck: supple, no JVD, normal thyroid  CV: normal S1/S2 with no murmur rubs or gallops  Lungs: Bilateral air entry, clear to auscultation, no wheezes, no rales, no stridor  Abdomen: soft non-tender non-distended, no hepatosplenomegaly, bowel sounds present  Ext: 2+ Peripheral pulses, no clubbing cyanosis or edema. Wearing compression stockings  Skin: no rashes and no petechiae  Neuro: alert and oriented X 3, no focal deficits, good concentration      LABS:                          7.4    10.21 )-----------( 307      ( 13 Mar 2018 08:52 )             24.7         Mean Cell Volume : 97.6 fL  Mean Cell Hemoglobin : 29.2 pg  Mean Cell Hemoglobin Concentration : 30.0 g/dL  Auto Neutrophil # : 6.62 K/uL  Auto Lymphocyte # : 2.31 K/uL  Auto Monocyte # : 0.89 K/uL  Auto Eosinophil # : 0.24 K/uL  Auto Basophil # : 0.05 K/uL  Auto Neutrophil % : 64.8 %  Auto Lymphocyte % : 22.6 %  Auto Monocyte % : 8.7 %  Auto Eosinophil % : 2.4 %  Auto Basophil % : 0.5 %      Serial CBC's  03-13 @ 08:52  Hct-24.7 / Hgb-7.4 / Plat-307 / RBC-2.53 / WBC-10.21      03-13    139  |  107  |  60<H>  ----------------------------<  134<H>  4.9   |  23  |  2.2<H>    Ca    8.6      13 Mar 2018 08:52          RADIOLOGY & ADDITIONAL STUDIES:  < from: CT Abdomen and Pelvis No Cont (02.24.18 @ 16:26) >  IMPRESSION:        Since February 1, 2018,    Overall unchanged examination:    **Sacral decubitus ulcer with extension to the coccyx demonstrating bony   destruction of the coccyx consistent with osteomyelitis. Surrounding   phlegmon measures 6 cm transverse, stable, with no definite involvement   of the rectum or vagina.    Trace persistent right hydroureteronephrosis with suggestion of bilateral   urothelial thickening likely related to chronic infection.    Stable 2.2 cm right adrenal adenoma.    Stable diffuse bladder wall thickening and trabeculation.      < end of copied text >    Chest XRay 3/13: negative  EGD done at [facility] on 1/28/18: showed 4cm semi-circumferential mass in sigmoid colon which was biopsied. Moderately differentiated adenocarcinoma. Patient is a 62y old  female who presents with a chief complaint of altered mental status (24 Feb 2018 18:44)    HPI:  62 year old female with past medical history of multiple sclerosis, CKD, diabetes, chronic antony, sacral osteomyelitis, bipolar affective and seizure disorder, who is bedridden at [facility] because of multiple sclerosis, sent in for evaluation of altered mental status.     The patient was recently admitted at [facility] for altered mental status and was found to have a sacral ulcer that required debridement and IV antibiotics. The patient was discharged on IV meropenem and IV vancomycin on Feb 9. At the time of discharge, the patient was responding appropriately to questions. Today, the patient is mostly non-verbal and only is moaning. She doesn't answer any questions. As per history from medical team, she had an colonoscopy which showed 4cm semi-circumferential mass in sigmoid colon which showed moderately differentiated adenocarcinoma. No information available about involvement of nodes. She had CT abd/pelvis here but it was non contrast study. It did not reveal any pelvic lymphadenopathy      ROS: Difficult to assess  General: no fever, weight loss or fatigue  Skin/Breast: No itching, burning, rashes or lesions, no pain, masses or nipple discharge  Opthamologic: no eye pain, visual disturbance or discharge  ENMT: no difficulty hearing, tinnitus or vertigo; No sinus or throat pain, no pain or stiffness in neck  Respiratory and Thorax: no current SOB or wheezing  Cardiovascular: No chest pain, palpitations, dizziness or leg swelling  Gastrointestinal: No abdominal or epigastric pain. No nausea, vomiting, hematemesis, diarrhea or constipation. No melena or hematochezia.   Musculoskeletal: No joint pain or swelling; No muscle, back or extremity pain.  Neurological: No headaches, memory loss, loss of strength, numbness or tremors  Psychiatric: Cannot be assessed  Hematology/Lymphatics: No enlarged glands, no easy bruising, or bleeding gums  Endocrine: No heat/cold intolerance, no hair loss  Allergic/Immunologic: No hives or eczema      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  Chronic kidney disease (CKD), stage IV (severe)  Psychosis  Bedridden  Antony catheter in place on admission  Osteomyelitis of sacrum  Sacral ulcer  Osteoporosis  Diabetes mellitus  Seizure disorder  Bipolar affective disorder  Status post debridement: of sacral ulcer      SOCIAL HISTORY:    FAMILY HISTORY:  Family history unknown      MEDICATIONS  (STANDING):  bisacodyl Suppository 10 milliGRAM(s) Rectal daily  carBAMazepine Suspension 600 milliGRAM(s) Oral daily  cefepime  IVPB 1000 milliGRAM(s) IV Intermittent every 24 hours  clog zipper 1 Packet(s) 1 Each Enteral Tube once  collagenase Ointment 1 Application(s) Topical two times a day  Dakins Solution - Full Strength 1 Application(s) Topical two times a day  docusate sodium 100 milliGRAM(s) Oral three times a day  fentaNYL   Patch  25 MICROgram(s)/Hr. 1 Patch Transdermal every 48 hours  folic acid 1 milliGRAM(s) Oral daily  heparin  Injectable 5000 Unit(s) SubCutaneous every 8 hours  lactobacillus acidophilus 1 Tablet(s) Oral two times a day with meals  lactulose Syrup 10 Gram(s) Oral three times a day  levothyroxine 50 MICROGram(s) Oral daily  multivitamin 1 Tablet(s) Oral daily  pantoprazole   Suspension 40 milliGRAM(s) Oral two times a day before meals  silver sulfADIAZINE 1% Cream 1 Application(s) Topical every 12 hours  simethicone 80 milliGRAM(s) Chew daily  sodium bicarbonate 650 milliGRAM(s) Oral three times a day  thiamine 100 milliGRAM(s) Oral daily  valproic  acid Syrup 250 milliGRAM(s) Oral daily  valproic  acid Syrup 500 milliGRAM(s) Oral daily  valproic  acid Syrup 500 milliGRAM(s) Oral at bedtime  zinc sulfate 220 milliGRAM(s) Oral daily    MEDICATIONS  (PRN):  acetaminophen  Suppository 650 milliGRAM(s) Rectal four times a day PRN For Temp greater than 38 C (100.4 F)  morphine  - Injectable 2 milliGRAM(s) IV Push every 4 hours PRN Severe Pain (7 - 10)  senna 2 Tablet(s) Oral at bedtime PRN Constipation      Allergies    No Known Allergies      Vital Signs Last 24 Hrs  T(C): 37.5 (14 Mar 2018 04:36), Max: 38.1 (13 Mar 2018 20:28)  T(F): 99.5 (14 Mar 2018 04:36), Max: 100.6 (13 Mar 2018 20:28)  HR: 114 (14 Mar 2018 04:36) (88 - 123)  BP: 105/61 (14 Mar 2018 04:36) (100/51 - 116/56)  BP(mean): --  RR: 18 (14 Mar 2018 04:36) (18 - 18)  SpO2: --    PHYSICAL EXAM  General: NAD, well groomed, well developed  HEENT: Nontraumatic, normocephalic, No tonsillar erythema, exudates or enlargement;  Moist Mucous membranes, good dentition, no lesions  Lymph: no lymphadenopathy noted  Neck: supple, no JVD, normal thyroid  CV: normal S1/S2 with no murmur rubs or gallops  Lungs: Bilateral air entry, clear to auscultation, no wheezes, no rales, no stridor  Abdomen: soft non-tender non-distended, no hepatosplenomegaly, bowel sounds present  Ext: 2+ Peripheral pulses, no clubbing cyanosis or edema. Wearing compression stockings  Skin: no rashes and no petechiae  Neuro: alert and oriented X 3, no focal deficits, good concentration      LABS:                          7.4    10.21 )-----------( 307      ( 13 Mar 2018 08:52 )             24.7         Mean Cell Volume : 97.6 fL  Mean Cell Hemoglobin : 29.2 pg  Mean Cell Hemoglobin Concentration : 30.0 g/dL  Auto Neutrophil # : 6.62 K/uL  Auto Lymphocyte # : 2.31 K/uL  Auto Monocyte # : 0.89 K/uL  Auto Eosinophil # : 0.24 K/uL  Auto Basophil # : 0.05 K/uL  Auto Neutrophil % : 64.8 %  Auto Lymphocyte % : 22.6 %  Auto Monocyte % : 8.7 %  Auto Eosinophil % : 2.4 %  Auto Basophil % : 0.5 %      Serial CBC's  03-13 @ 08:52  Hct-24.7 / Hgb-7.4 / Plat-307 / RBC-2.53 / WBC-10.21      03-13    139  |  107  |  60<H>  ----------------------------<  134<H>  4.9   |  23  |  2.2<H>    Ca    8.6      13 Mar 2018 08:52          RADIOLOGY & ADDITIONAL STUDIES:  < from: CT Abdomen and Pelvis No Cont (02.24.18 @ 16:26) >  IMPRESSION:        Since February 1, 2018,    Overall unchanged examination:    **Sacral decubitus ulcer with extension to the coccyx demonstrating bony   destruction of the coccyx consistent with osteomyelitis. Surrounding   phlegmon measures 6 cm transverse, stable, with no definite involvement   of the rectum or vagina.    Trace persistent right hydroureteronephrosis with suggestion of bilateral   urothelial thickening likely related to chronic infection.    Stable 2.2 cm right adrenal adenoma.    Stable diffuse bladder wall thickening and trabeculation.      < end of copied text >    Chest XRay 3/13: negative  EGD done at [facility] on 1/28/18: showed 4cm semi-circumferential mass in sigmoid colon which was biopsied. Moderately differentiated adenocarcinoma.

## 2018-03-14 NOTE — PROGRESS NOTE ADULT - SUBJECTIVE AND OBJECTIVE BOX
SUBJECTIVE:    Patient is a 62y old Female who presents with a chief complaint of altered mental status (24 Feb 2018 18:44)    Currently admitted to medicine with the primary diagnosis of Sepsis     Today is hospital day 18. Patient appears slightly more alert than previous days though still nonsensical.    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     ALLERGIES:  No Known Allergies    MEDICATIONS:  STANDING MEDICATIONS  bisacodyl Suppository 10 milliGRAM(s) Rectal daily  carBAMazepine Suspension 600 milliGRAM(s) Oral daily  cefepime  IVPB 1000 milliGRAM(s) IV Intermittent every 24 hours  clog zipper 1 Packet(s) 1 Each Enteral Tube once  collagenase Ointment 1 Application(s) Topical two times a day  Dakins Solution - Full Strength 1 Application(s) Topical two times a day  docusate sodium 100 milliGRAM(s) Oral three times a day  fentaNYL   Patch  25 MICROgram(s)/Hr 1 Patch Transdermal every 72 hours  folic acid 1 milliGRAM(s) Oral daily  heparin  Injectable 5000 Unit(s) SubCutaneous every 8 hours  lactobacillus acidophilus 1 Tablet(s) Oral two times a day with meals  lactulose Syrup 10 Gram(s) Oral three times a day  levothyroxine 50 MICROGram(s) Oral daily  multivitamin 1 Tablet(s) Oral daily  pantoprazole   Suspension 40 milliGRAM(s) Oral two times a day before meals  silver sulfADIAZINE 1% Cream 1 Application(s) Topical every 12 hours  simethicone 80 milliGRAM(s) Chew daily  sodium bicarbonate 650 milliGRAM(s) Oral three times a day  thiamine 100 milliGRAM(s) Oral daily  valproic  acid Syrup 250 milliGRAM(s) Oral daily  valproic  acid Syrup 500 milliGRAM(s) Oral daily  valproic  acid Syrup 500 milliGRAM(s) Oral at bedtime  zinc sulfate 220 milliGRAM(s) Oral daily    PRN MEDICATIONS  acetaminophen  Suppository 650 milliGRAM(s) Rectal four times a day PRN  morphine  - Injectable 2 milliGRAM(s) IV Push every 4 hours PRN  senna 2 Tablet(s) Oral at bedtime PRN    VITALS:   T(F): 97.9  HR: 117  BP: 133/60  RR: 19  SpO2: --    LABS:                        7.4    10.21 )-----------( 307      ( 13 Mar 2018 08:52 )             24.7     03-13    139  |  107  |  60<H>  ----------------------------<  134<H>  4.9   |  23  |  2.2<H>    Ca    8.6      13 Mar 2018 08:52      PHYSICAL EXAM:  GEN: NAD, comfortable  LUNGS: CTAB, no w/r/r  HEART: RRR, no m/r/g  ABD: soft, NT/ND, +BS  EXT: no edema, PP b/l  NEURO: AAOX3 SUBJECTIVE:    Patient is a 62y old Female who presents with a chief complaint of altered mental status (24 Feb 2018 18:44)    Currently admitted to medicine with the primary diagnosis of Sepsis     Today is hospital day 18. Patient's mental status appears to wax and wane. She opens her eyes and responds to verbal stimuli but with nonsensical, murmured responses.    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     ALLERGIES:  No Known Allergies    MEDICATIONS:  STANDING MEDICATIONS  bisacodyl Suppository 10 milliGRAM(s) Rectal daily  carBAMazepine Suspension 600 milliGRAM(s) Oral daily  cefepime  IVPB 1000 milliGRAM(s) IV Intermittent every 24 hours  clog zipper 1 Packet(s) 1 Each Enteral Tube once  collagenase Ointment 1 Application(s) Topical two times a day  Dakins Solution - Full Strength 1 Application(s) Topical two times a day  docusate sodium 100 milliGRAM(s) Oral three times a day  fentaNYL   Patch  25 MICROgram(s)/Hr 1 Patch Transdermal every 72 hours  folic acid 1 milliGRAM(s) Oral daily  heparin  Injectable 5000 Unit(s) SubCutaneous every 8 hours  lactobacillus acidophilus 1 Tablet(s) Oral two times a day with meals  lactulose Syrup 10 Gram(s) Oral three times a day  levothyroxine 50 MICROGram(s) Oral daily  multivitamin 1 Tablet(s) Oral daily  pantoprazole   Suspension 40 milliGRAM(s) Oral two times a day before meals  silver sulfADIAZINE 1% Cream 1 Application(s) Topical every 12 hours  simethicone 80 milliGRAM(s) Chew daily  sodium bicarbonate 650 milliGRAM(s) Oral three times a day  thiamine 100 milliGRAM(s) Oral daily  valproic  acid Syrup 250 milliGRAM(s) Oral daily  valproic  acid Syrup 500 milliGRAM(s) Oral daily  valproic  acid Syrup 500 milliGRAM(s) Oral at bedtime  zinc sulfate 220 milliGRAM(s) Oral daily    PRN MEDICATIONS  acetaminophen  Suppository 650 milliGRAM(s) Rectal four times a day PRN  morphine  - Injectable 2 milliGRAM(s) IV Push every 4 hours PRN  senna 2 Tablet(s) Oral at bedtime PRN    VITALS:   T(F): 97.9  HR: 117  BP: 133/60  RR: 19  SpO2: --    LABS:                        7.4    10.21 )-----------( 307      ( 13 Mar 2018 08:52 )             24.7     03-13    139  |  107  |  60<H>  ----------------------------<  134<H>  4.9   |  23  |  2.2<H>    Ca    8.6      13 Mar 2018 08:52      PHYSICAL EXAM:  GEN: NAD, eyes open, responsive but not oriented  LUNGS: CTAB, no w/r/r  HEART: RRR, no m/r/g  ABD: soft, NT/ND, +BS  EXT: NC/NC/NE/2+PP/MERCEDES  NEURO: AAOx0

## 2018-03-14 NOTE — CONSULT NOTE ADULT - ATTENDING COMMENTS
The patient was seen and examined by myself too. I agree with the medical student, MARSHALL Maier. Situation discussed with her and the Hem-Onc fellow Dr. Portillo.

## 2018-03-14 NOTE — CONSULT NOTE ADULT - ASSESSMENT
62 year old female with pmhx of MS, CKD, DM, chronic antony, and sacral osteomyelitis presented from [facility] for altered mental status. She was also recently diagnosed with colon adenocarcinoma through EGD at [facility].     Colon cancer:  Continue with Protonix  GI on board. No intervention at this time. 62 year old female with pmhx of MS, CKD, DM, chronic antony, and sacral osteomyelitis presented from [facility] for altered mental status. She was also recently diagnosed with colon adenocarcinoma through EGD at [facility].   It appears to be localized now, but she would need imaging with contrast to complete staging. However, her performance status is very poor and she may not be a candidate for surgery nor systemic chemotherapy.  Consult Hospice. 62 year old female with pmhx of MS, CKD, DM, chronic antony, and sacral osteomyelitis presented from [facility] for altered mental status. She was also recently diagnosed with colon adenocarcinoma through EGD at [facility].   It appears to be localized now, but she would need imaging with contrast to complete staging. If its localized disease the treatment of choice is surgery. However, she may not be a good surgical candidate given her poor performance status and ongoing infection. Chemotherapy has minimal role in localized colon cancer.   To make further recommendations, will need complete staging. 62 year old female with pmhx of MS, CKD, DM, chronic Tyson, and sacral osteomyelitis presented from [facility] for altered mental status. She was also recently diagnosed with colon adenocarcinoma through colonoscopy, not clear why it was done, at [facility].   It appears to be localized now, but she would need imaging with contrast to complete staging. If its localized disease the treatment of choice is surgery. However, she may not be a good surgical candidate given her poor performance status and ongoing infection. Chemotherapy has minimal role in localized colon cancer. Apparently CT of the abdomen and pelvis did not show any distant metastases.  To make further recommendations, will need complete staging if she becomes candidate for further treatment.  At this time, given his overall condition, with a performance status of 4, she is not a candidate for systemic chemotherapy. Surgery would have been the treatment needed at least to prevent complications such as obstruction, bleeding and perforation in the future.   Since she is not a candidate for chemotherapy, adjuvant or other, she should be monitored closely for the above complications. May need diverting colostomy if obstruction occurs.    Given the overall situation, hospice may be appropriate.

## 2018-03-15 DIAGNOSIS — R53.81 OTHER MALAISE: ICD-10-CM

## 2018-03-15 LAB
ALBUMIN SERPL ELPH-MCNC: 1.7 G/DL — LOW (ref 3–5.5)
ALP SERPL-CCNC: 67 U/L — SIGNIFICANT CHANGE UP (ref 30–115)
ALT FLD-CCNC: 16 U/L — SIGNIFICANT CHANGE UP (ref 0–41)
AMMONIA BLD-MCNC: 19 MMOL/L — SIGNIFICANT CHANGE UP (ref 11–55)
ANION GAP SERPL CALC-SCNC: 11 MMOL/L — SIGNIFICANT CHANGE UP (ref 7–14)
AST SERPL-CCNC: 22 U/L — SIGNIFICANT CHANGE UP (ref 0–41)
BASOPHILS # BLD AUTO: 0.05 K/UL — SIGNIFICANT CHANGE UP (ref 0–0.2)
BASOPHILS NFR BLD AUTO: 0.5 % — SIGNIFICANT CHANGE UP (ref 0–1)
BILIRUB SERPL-MCNC: 0.6 MG/DL — SIGNIFICANT CHANGE UP (ref 0.2–1.2)
BLD GP AB SCN SERPL QL: SIGNIFICANT CHANGE UP
BUN SERPL-MCNC: 66 MG/DL — CRITICAL HIGH (ref 10–20)
CALCIUM SERPL-MCNC: 9.7 MG/DL — SIGNIFICANT CHANGE UP (ref 8.5–10.1)
CHLORIDE SERPL-SCNC: 119 MMOL/L — HIGH (ref 98–110)
CO2 SERPL-SCNC: 26 MMOL/L — SIGNIFICANT CHANGE UP (ref 17–32)
CREAT SERPL-MCNC: 2.5 MG/DL — HIGH (ref 0.7–1.5)
EOSINOPHIL # BLD AUTO: 0.38 K/UL — SIGNIFICANT CHANGE UP (ref 0–0.7)
EOSINOPHIL NFR BLD AUTO: 3.6 % — SIGNIFICANT CHANGE UP (ref 0–8)
GLUCOSE SERPL-MCNC: 115 MG/DL — HIGH (ref 70–110)
HCT VFR BLD CALC: 24.9 % — LOW (ref 37–47)
HCT VFR BLD CALC: 26.9 % — LOW (ref 37–47)
HGB BLD-MCNC: 7.3 G/DL — CRITICAL LOW (ref 12–16)
HGB BLD-MCNC: 7.9 G/DL — LOW (ref 12–16)
IMM GRANULOCYTES NFR BLD AUTO: 1.2 % — HIGH (ref 0.1–0.3)
LYMPHOCYTES # BLD AUTO: 2.18 K/UL — SIGNIFICANT CHANGE UP (ref 1.2–3.4)
LYMPHOCYTES # BLD AUTO: 20.9 % — SIGNIFICANT CHANGE UP (ref 20.5–51.1)
MAGNESIUM SERPL-MCNC: 3.1 MG/DL — CRITICAL HIGH (ref 1.8–2.4)
MCHC RBC-ENTMCNC: 29.2 PG — SIGNIFICANT CHANGE UP (ref 27–31)
MCHC RBC-ENTMCNC: 29.3 G/DL — LOW (ref 32–37)
MCHC RBC-ENTMCNC: 29.3 PG — SIGNIFICANT CHANGE UP (ref 27–31)
MCHC RBC-ENTMCNC: 29.4 G/DL — LOW (ref 32–37)
MCV RBC AUTO: 100 FL — HIGH (ref 81–99)
MCV RBC AUTO: 99.3 FL — HIGH (ref 81–99)
MONOCYTES # BLD AUTO: 1.1 K/UL — HIGH (ref 0.1–0.6)
MONOCYTES NFR BLD AUTO: 10.6 % — HIGH (ref 1.7–9.3)
NEUTROPHILS # BLD AUTO: 6.59 K/UL — HIGH (ref 1.4–6.5)
NEUTROPHILS NFR BLD AUTO: 63.2 % — SIGNIFICANT CHANGE UP (ref 42.2–75.2)
NRBC # BLD: 0 /100 WBCS — SIGNIFICANT CHANGE UP (ref 0–0)
NRBC # BLD: 0 /100 WBCS — SIGNIFICANT CHANGE UP (ref 0–0)
PHOSPHATE SERPL-MCNC: 5.1 MG/DL — HIGH (ref 2.1–4.9)
PLATELET # BLD AUTO: 335 K/UL — SIGNIFICANT CHANGE UP (ref 130–400)
PLATELET # BLD AUTO: 336 K/UL — SIGNIFICANT CHANGE UP (ref 130–400)
POTASSIUM SERPL-MCNC: 5 MMOL/L — SIGNIFICANT CHANGE UP (ref 3.5–5)
POTASSIUM SERPL-SCNC: 5 MMOL/L — SIGNIFICANT CHANGE UP (ref 3.5–5)
PROT SERPL-MCNC: 5.7 G/DL — LOW (ref 6–8)
RBC # BLD: 2.49 M/UL — LOW (ref 4.2–5.4)
RBC # BLD: 2.71 M/UL — LOW (ref 4.2–5.4)
RBC # FLD: 17.9 % — HIGH (ref 11.5–14.5)
RBC # FLD: 18 % — HIGH (ref 11.5–14.5)
SODIUM SERPL-SCNC: 156 MMOL/L — HIGH (ref 135–146)
TYPE + AB SCN PNL BLD: SIGNIFICANT CHANGE UP
WBC # BLD: 10.35 K/UL — SIGNIFICANT CHANGE UP (ref 4.8–10.8)
WBC # BLD: 10.42 K/UL — SIGNIFICANT CHANGE UP (ref 4.8–10.8)
WBC # FLD AUTO: 10.35 K/UL — SIGNIFICANT CHANGE UP (ref 4.8–10.8)
WBC # FLD AUTO: 10.42 K/UL — SIGNIFICANT CHANGE UP (ref 4.8–10.8)

## 2018-03-15 RX ADMIN — Medication 1 APPLICATION(S): at 16:00

## 2018-03-15 RX ADMIN — CEFEPIME 100 MILLIGRAM(S): 1 INJECTION, POWDER, FOR SOLUTION INTRAMUSCULAR; INTRAVENOUS at 11:23

## 2018-03-15 RX ADMIN — Medication 650 MILLIGRAM(S): at 07:05

## 2018-03-15 RX ADMIN — Medication 650 MILLIGRAM(S): at 12:17

## 2018-03-15 RX ADMIN — Medication 650 MILLIGRAM(S): at 21:38

## 2018-03-15 RX ADMIN — HEPARIN SODIUM 5000 UNIT(S): 5000 INJECTION INTRAVENOUS; SUBCUTANEOUS at 13:44

## 2018-03-15 RX ADMIN — Medication 1 APPLICATION(S): at 05:06

## 2018-03-15 RX ADMIN — HEPARIN SODIUM 5000 UNIT(S): 5000 INJECTION INTRAVENOUS; SUBCUTANEOUS at 21:53

## 2018-03-15 RX ADMIN — Medication 50 MICROGRAM(S): at 05:04

## 2018-03-15 RX ADMIN — LACTULOSE 10 GRAM(S): 10 SOLUTION ORAL at 05:01

## 2018-03-15 RX ADMIN — Medication 100 MILLIGRAM(S): at 05:04

## 2018-03-15 RX ADMIN — LACTULOSE 10 GRAM(S): 10 SOLUTION ORAL at 21:53

## 2018-03-15 RX ADMIN — LACTULOSE 10 GRAM(S): 10 SOLUTION ORAL at 13:42

## 2018-03-15 RX ADMIN — Medication 1 TABLET(S): at 08:35

## 2018-03-15 RX ADMIN — Medication 1 MILLIGRAM(S): at 11:23

## 2018-03-15 RX ADMIN — Medication 1 APPLICATION(S): at 05:05

## 2018-03-15 RX ADMIN — Medication 1 APPLICATION(S): at 05:04

## 2018-03-15 RX ADMIN — Medication 650 MILLIGRAM(S): at 05:04

## 2018-03-15 RX ADMIN — Medication 600 MILLIGRAM(S): at 11:22

## 2018-03-15 RX ADMIN — HEPARIN SODIUM 5000 UNIT(S): 5000 INJECTION INTRAVENOUS; SUBCUTANEOUS at 05:02

## 2018-03-15 RX ADMIN — Medication 650 MILLIGRAM(S): at 13:44

## 2018-03-15 RX ADMIN — PANTOPRAZOLE SODIUM 40 MILLIGRAM(S): 20 TABLET, DELAYED RELEASE ORAL at 17:58

## 2018-03-15 RX ADMIN — PANTOPRAZOLE SODIUM 40 MILLIGRAM(S): 20 TABLET, DELAYED RELEASE ORAL at 06:10

## 2018-03-15 RX ADMIN — Medication 500 MILLIGRAM(S): at 05:01

## 2018-03-15 RX ADMIN — Medication 1 APPLICATION(S): at 16:01

## 2018-03-15 RX ADMIN — Medication 250 MILLIGRAM(S): at 17:58

## 2018-03-15 RX ADMIN — Medication 500 MILLIGRAM(S): at 21:52

## 2018-03-15 RX ADMIN — Medication 1 TABLET(S): at 17:59

## 2018-03-15 RX ADMIN — Medication 650 MILLIGRAM(S): at 21:52

## 2018-03-15 RX ADMIN — Medication 100 MILLIGRAM(S): at 11:23

## 2018-03-15 RX ADMIN — Medication 10 MILLIGRAM(S): at 11:20

## 2018-03-15 RX ADMIN — Medication 1 TABLET(S): at 11:23

## 2018-03-15 NOTE — CHART NOTE - NSCHARTNOTEFT_GEN_A_CORE
Registered Dietitian Limited Follow-Up       Pt presents with AMS, sepsis, and CKD stage 4. Palliative care meeting: As per family, pt is now DNR and DNI. Family now open to hospice care - to have family meeting again on Friday 3/16. Pt NPO with tube feed (nasogastric). RN reports pt receiving and tolerating full feeds but feeds are being given through tube that is typically used for suction. Receiving Glucerna 1.2, 320mL Q6hrs (1536 kcal, 77g pro). Pt confused, disoriented, nonverbal. Per RN, pt is receiving full feeds and tolerating. Wt (3/15): 74.4 kg (164#). - wt stable with fluctuations (73-79 kg) since admit. Will continue to monitor wt trends. LBM 3/14 per EMR.  Pt receiving 220mg zinc since 2/24. Skin: suspected DTI B/L heels, unstageable to sacrum. Edema 2+ mild B/L wrist and hands.     Please consider discontinuing zinc sulfate.

## 2018-03-15 NOTE — PROGRESS NOTE ADULT - ASSESSMENT
62 year old with 30 history of WC due to MS and since 2009 in LTC facility. Since October 2017 has had multiple hospitalizations and declining functional status. Options have been presented to family including hospice; meeting is set to be tomorrow.

## 2018-03-15 NOTE — PROGRESS NOTE ADULT - ASSESSMENT
Patient is a 62y old Female with PMH of MS and being bed-bound, CKD IV, chronic sacral osteo, recently diagnosed colon CA at [facility], and seizure disorder who presents with a chief complaint of altered mental status (24 Feb 2018 18:44). Patient's daughter/healthcare proxy present at bedside. She reports patient's mental status has gradually declined since October after repeated hospitalizations for lung infections, UTIs, and the chronic sacral ulcer. Patient's hospital course complicated by metabolic acidosis and sepsis. Patient is still non-verbal and altered. As per daughter, patient more verbal a week prior to admission.    1. Metabolic encephalopathy probably 2/2 sepsis/ sacral osteomyelitis/UTI 2/2 chronic indwelling antony  - CT Head No Cont (02.24.18 @ 16:25): negative  - CT Abdomen and Pelvis No Cont (02.24.18 @ 16:26) Sacral decubitus ulcer with extension to the coccyx demonstrating bony destruction of the coccyx consistent with osteomyelitis. Surrounding phlegmon measures 6 cm transverse. Trace persistent right hydroureteronephrosis with suggestion of bilateral urothelial thickening likely related to chronic infection.Stable 2.2 cm right adrenal adenoma.Stable diffuse bladder wall thickening and trabeculation.  - EEG showed generalized slowing w/ triphasic waves (reportedly) - consistent w/ metabolic encephalopathy  - Ammonia elevated - increased lactulose, titrate to 3-4 BMs per day. C/w Rifaximin. Ammonia trend 135->141->116->192 -> 113 -> 82 -> 75 -> 19  - repeat Blood Cx - NGTD  - ID on board - Rx's appreciated  - Burn on board - Rx Dakins + Santyl wet-to-dry dressings for now. Will follow up on patient regarding current wound status and possible debridement.  - Palliative on board - Rx's appreciated  - Hospice on board - to have meeting with family on Friday 3/16  - will check UA + wound Cx    2. Hypernatremia  - Nephro on board - Rx keeping Bicarb >22  - daily BMP  - starting free water 1L daily    3. AMS, can't take PO  - NGT feeds: Glucerna  - Aspiration precautions  - S&S eval - pending    4. Seizure Disorder/ Bipolar disorder  - Valproic acid level 26; Carbamazepine level 7.3. These levels recorded after increasing both meds  - Neuro on board - Rx Valproic acid increased to 500mg in the morning and at bedtime and 250mg in between. Tegretol increased to 600mg QD.    5. Sigmoid Colon AdenoCA  - GI on board - Rx no intervention for now  - H/H stable  - c/w protonix  - EGD done at [facility] on 1/28/18 showed 4cm semi-circumferential mass in sigmoid colon, which was biopsied - reports show moderately differentiated adenoCA  - Oncology on board - Rx's appreciated. Not a candidate for chemo at this time. Surgical resection best for localized colon ca but needs complete staging (i.e. CT chest/abd/pel w/ IV contrast)  - family agreeable for CT w/ contrast. However in light of worsening SALOME will hold off on CT for now.    5. DM type 2  - Monitor FS.    6. Hypothyroidism  - c/w synthroid.    7. Multiple Sclerosis with neurogenic bladder  - Chronic antony     8. B/L upper extremity edema  - Venous duplex showed evidence of superficial thrombophlebitis in left arm distal to PICC line, results above.    DVT ppx: Heparin subQ  Code status: DNR/DNI  Dispo: from [facility] (long-term)    Prognosis guarded.   - Palliative care meeting: As per family, pt is now DNR and DNI. Family now open to hospice care - to have family meeting again on Friday 3/16.

## 2018-03-15 NOTE — PROGRESS NOTE ADULT - SUBJECTIVE AND OBJECTIVE BOX
SUBJECTIVE:    Patient is a 62y old Female who presents with a chief complaint of altered mental status (24 Feb 2018 18:44)    Currently admitted to medicine with the primary diagnosis of Sepsis     Today is hospital day 19.    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     ALLERGIES:  No Known Allergies    MEDICATIONS:  STANDING MEDICATIONS  bisacodyl Suppository 10 milliGRAM(s) Rectal daily  carBAMazepine Suspension 600 milliGRAM(s) Oral daily  cefepime  IVPB 1000 milliGRAM(s) IV Intermittent every 24 hours  clog zipper 1 Packet(s) 1 Each Enteral Tube once  collagenase Ointment 1 Application(s) Topical two times a day  Dakins Solution - Full Strength 1 Application(s) Topical two times a day  docusate sodium 100 milliGRAM(s) Oral three times a day  fentaNYL   Patch  25 MICROgram(s)/Hr 1 Patch Transdermal every 72 hours  folic acid 1 milliGRAM(s) Oral daily  heparin  Injectable 5000 Unit(s) SubCutaneous every 8 hours  lactobacillus acidophilus 1 Tablet(s) Oral two times a day with meals  lactulose Syrup 10 Gram(s) Oral three times a day  levothyroxine 50 MICROGram(s) Oral daily  multivitamin 1 Tablet(s) Oral daily  pantoprazole   Suspension 40 milliGRAM(s) Oral two times a day before meals  silver sulfADIAZINE 1% Cream 1 Application(s) Topical every 12 hours  simethicone 80 milliGRAM(s) Chew daily  sodium bicarbonate 650 milliGRAM(s) Oral three times a day  thiamine 100 milliGRAM(s) Oral daily  valproic  acid Syrup 250 milliGRAM(s) Oral daily  valproic  acid Syrup 500 milliGRAM(s) Oral daily  valproic  acid Syrup 500 milliGRAM(s) Oral at bedtime    PRN MEDICATIONS  acetaminophen  Suppository 650 milliGRAM(s) Rectal four times a day PRN  morphine  - Injectable 2 milliGRAM(s) IV Push every 4 hours PRN  senna 2 Tablet(s) Oral at bedtime PRN    VITALS:   T(F): 101.8  HR: 118  BP: 83/47  RR: 18  SpO2: --    LABS:                        7.9    10.35 )-----------( 335      ( 15 Mar 2018 11:12 )             26.9     03-15    156<H>  |  119<H>  |  66<HH>  ----------------------------<  115<H>  5.0   |  26  |  2.5<H>    Ca    9.7      15 Mar 2018 10:11  Phos  5.1     03-15  Mg     3.1     03-15    TPro  5.7<L>  /  Alb  1.7<L>  /  TBili  0.6  /  DBili  x   /  AST  22  /  ALT  16  /  AlkPhos  67  03-15      PHYSICAL EXAM:  GEN: NAD, eyes open, responsive but not oriented  LUNGS: CTAB, no w/r/r  HEART: RRR, no m/r/g  ABD: soft, NT/ND, +BS  EXT: NC/NC/NE/2+PP/MERCEDES  NEURO: AAOx0

## 2018-03-15 NOTE — PROGRESS NOTE ADULT - SUBJECTIVE AND OBJECTIVE BOX
62yFemale with diagnosis: SEPSIS, UTI. Now day 19 not responding to medical therapies.    Chart reviewed. Onc consult appreciated.     Asked by nurse to see patient at daughter's request. Discussed case with hospitalist team (Dr. Poole and Dr. Valdez)    Patient seen, lying bed. Daughter (Steven) and Chase (another family member) present at bedside. Steven texting updates to siser    PHYSICAL EXAM    T(C): , Max: 38.8 (12:15)  T(F): 100.9   HR: 118 (110 - 129)  BP: 83/47 (83/47 - 100/55)  RR: 18 (18 - 18)  SpO2: --    LABS:                        7.9    10.35 )-----------( 335      ( 15 Mar 2018 11:12 )             26.9                                                                                     03-15    156<H>  |  119<H>  |  66<HH>  ----------------------------<  115<H>  5.0   |  26  |  2.5<H>    Ca    9.7      15 Mar 2018 10:11  Phos  5.1     03-15  Mg     3.1     03-15    TPro  5.7<L>  /  Alb  1.7<L>  /  TBili  0.6  /  DBili  x   /  AST  22  /  ALT  16  /  AlkPhos  67  03-15                                                  MEDICATIONS  (STANDING):  bisacodyl Suppository 10 milliGRAM(s) Rectal daily  carBAMazepine Suspension 600 milliGRAM(s) Oral daily  cefepime  IVPB 1000 milliGRAM(s) IV Intermittent every 24 hours  collagenase Ointment 1 Application(s) Topical two times a day  Dakins Solution - Full Strength 1 Application(s) Topical two times a day  docusate sodium 100 milliGRAM(s) Oral three times a day  fentaNYL   Patch  25 MICROgram(s)/Hr 1 Patch Transdermal every 72 hours - in place on right CW  folic acid 1 milliGRAM(s) Oral daily  heparin  Injectable 5000 Unit(s) SubCutaneous every 8 hours  lactobacillus acidophilus 1 Tablet(s) Oral two times a day with meals  lactulose Syrup 10 Gram(s) Oral three times a day  levothyroxine 50 MICROGram(s) Oral daily  multivitamin 1 Tablet(s) Oral daily  pantoprazole   Suspension 40 milliGRAM(s) Oral two times a day before meals  silver sulfADIAZINE 1% Cream 1 Application(s) Topical every 12 hours  simethicone 80 milliGRAM(s) Chew daily  sodium bicarbonate 650 milliGRAM(s) Oral three times a day  thiamine 100 milliGRAM(s) Oral daily  valproic  acid Syrup 250 milliGRAM(s) Oral daily  valproic  acid Syrup 500 milliGRAM(s) Oral daily  valproic  acid Syrup 500 milliGRAM(s) Oral at bedtime    MEDICATIONS  (PRN):  acetaminophen  Suppository 650 milliGRAM(s) Rectal four times a day PRN For Temp greater than 38 C (100.4 F)  morphine  - Injectable 2 milliGRAM(s) IV Push every 4 hours PRN Severe Pain (7 - 10) 0 doses/24H  senna 2 Tablet(s) Oral at bedtime PRN Constipation

## 2018-03-16 DIAGNOSIS — Z71.89 OTHER SPECIFIED COUNSELING: ICD-10-CM

## 2018-03-16 LAB
ANION GAP SERPL CALC-SCNC: 13 MMOL/L — SIGNIFICANT CHANGE UP (ref 7–14)
APPEARANCE UR: (no result)
APPEARANCE UR: (no result)
BACTERIA # UR AUTO: (no result) /HPF
BASOPHILS # BLD AUTO: 0.07 K/UL — SIGNIFICANT CHANGE UP (ref 0–0.2)
BASOPHILS NFR BLD AUTO: 0.6 % — SIGNIFICANT CHANGE UP (ref 0–1)
BILIRUB UR-MCNC: NEGATIVE — SIGNIFICANT CHANGE UP
BILIRUB UR-MCNC: NEGATIVE — SIGNIFICANT CHANGE UP
BUN SERPL-MCNC: 71 MG/DL — CRITICAL HIGH (ref 10–20)
CALCIUM SERPL-MCNC: 9.2 MG/DL — SIGNIFICANT CHANGE UP (ref 8.5–10.1)
CHLORIDE SERPL-SCNC: 118 MMOL/L — HIGH (ref 98–110)
CO2 SERPL-SCNC: 26 MMOL/L — SIGNIFICANT CHANGE UP (ref 17–32)
COLOR SPEC: YELLOW — SIGNIFICANT CHANGE UP
COLOR SPEC: YELLOW — SIGNIFICANT CHANGE UP
CREAT SERPL-MCNC: 2.9 MG/DL — HIGH (ref 0.7–1.5)
DIFF PNL FLD: (no result)
DIFF PNL FLD: (no result)
EOSINOPHIL # BLD AUTO: 0.26 K/UL — SIGNIFICANT CHANGE UP (ref 0–0.7)
EOSINOPHIL NFR BLD AUTO: 2.4 % — SIGNIFICANT CHANGE UP (ref 0–8)
EPI CELLS # UR: (no result) /HPF
EPI CELLS # UR: (no result) /HPF
GLUCOSE SERPL-MCNC: 93 MG/DL — SIGNIFICANT CHANGE UP (ref 70–110)
GLUCOSE UR QL: NEGATIVE MG/DL — SIGNIFICANT CHANGE UP
GLUCOSE UR QL: NEGATIVE MG/DL — SIGNIFICANT CHANGE UP
HCT VFR BLD CALC: 25 % — LOW (ref 37–47)
HGB BLD-MCNC: 7.3 G/DL — CRITICAL LOW (ref 12–16)
IMM GRANULOCYTES NFR BLD AUTO: 0.8 % — HIGH (ref 0.1–0.3)
KETONES UR-MCNC: NEGATIVE — SIGNIFICANT CHANGE UP
KETONES UR-MCNC: NEGATIVE — SIGNIFICANT CHANGE UP
LEUKOCYTE ESTERASE UR-ACNC: (no result)
LEUKOCYTE ESTERASE UR-ACNC: (no result)
LYMPHOCYTES # BLD AUTO: 2.61 K/UL — SIGNIFICANT CHANGE UP (ref 1.2–3.4)
LYMPHOCYTES # BLD AUTO: 23.7 % — SIGNIFICANT CHANGE UP (ref 20.5–51.1)
MAGNESIUM SERPL-MCNC: 3.2 MG/DL — CRITICAL HIGH (ref 1.8–2.4)
MCHC RBC-ENTMCNC: 29.2 G/DL — LOW (ref 32–37)
MCHC RBC-ENTMCNC: 29.6 PG — SIGNIFICANT CHANGE UP (ref 27–31)
MCV RBC AUTO: 101.2 FL — HIGH (ref 81–99)
MONOCYTES # BLD AUTO: 1.33 K/UL — HIGH (ref 0.1–0.6)
MONOCYTES NFR BLD AUTO: 12.1 % — HIGH (ref 1.7–9.3)
NEUTROPHILS # BLD AUTO: 6.66 K/UL — HIGH (ref 1.4–6.5)
NEUTROPHILS NFR BLD AUTO: 60.4 % — SIGNIFICANT CHANGE UP (ref 42.2–75.2)
NITRITE UR-MCNC: NEGATIVE — SIGNIFICANT CHANGE UP
NITRITE UR-MCNC: NEGATIVE — SIGNIFICANT CHANGE UP
NRBC # BLD: 0 /100 WBCS — SIGNIFICANT CHANGE UP (ref 0–0)
PH UR: 7 — SIGNIFICANT CHANGE UP (ref 5–8)
PH UR: 7 — SIGNIFICANT CHANGE UP (ref 5–8)
PHOSPHATE SERPL-MCNC: 5.8 MG/DL — HIGH (ref 2.1–4.9)
PLATELET # BLD AUTO: 401 K/UL — HIGH (ref 130–400)
POTASSIUM SERPL-MCNC: 4.9 MMOL/L — SIGNIFICANT CHANGE UP (ref 3.5–5)
POTASSIUM SERPL-SCNC: 4.9 MMOL/L — SIGNIFICANT CHANGE UP (ref 3.5–5)
PROT UR-MCNC: 100 MG/DL
PROT UR-MCNC: 100 MG/DL
RBC # BLD: 2.47 M/UL — LOW (ref 4.2–5.4)
RBC # FLD: 18 % — HIGH (ref 11.5–14.5)
RBC CASTS # UR COMP ASSIST: (no result) /HPF
RBC CASTS # UR COMP ASSIST: (no result) /HPF
SODIUM SERPL-SCNC: 157 MMOL/L — HIGH (ref 135–146)
SP GR SPEC: 1.01 — SIGNIFICANT CHANGE UP (ref 1.01–1.03)
SP GR SPEC: 1.01 — SIGNIFICANT CHANGE UP (ref 1.01–1.03)
UROBILINOGEN FLD QL: 0.2 MG/DL — SIGNIFICANT CHANGE UP (ref 0.2–0.2)
UROBILINOGEN FLD QL: 0.2 MG/DL — SIGNIFICANT CHANGE UP (ref 0.2–0.2)
WBC # BLD: 11.02 K/UL — HIGH (ref 4.8–10.8)
WBC # FLD AUTO: 11.02 K/UL — HIGH (ref 4.8–10.8)
WBC UR QL: >50 /HPF
WBC UR QL: >50 /HPF

## 2018-03-16 RX ORDER — SODIUM CHLORIDE 9 MG/ML
1000 INJECTION, SOLUTION INTRAVENOUS
Qty: 0 | Refills: 0 | Status: DISCONTINUED | OUTPATIENT
Start: 2018-03-16 | End: 2018-03-19

## 2018-03-16 RX ORDER — MEROPENEM 1 G/30ML
500 INJECTION INTRAVENOUS EVERY 12 HOURS
Qty: 0 | Refills: 0 | Status: DISCONTINUED | OUTPATIENT
Start: 2018-03-16 | End: 2018-03-19

## 2018-03-16 RX ORDER — MEROPENEM 1 G/30ML
500 INJECTION INTRAVENOUS EVERY 8 HOURS
Qty: 0 | Refills: 0 | Status: DISCONTINUED | OUTPATIENT
Start: 2018-03-16 | End: 2018-03-16

## 2018-03-16 RX ORDER — CALCIUM ACETATE 667 MG
667 TABLET ORAL
Qty: 0 | Refills: 0 | Status: DISCONTINUED | OUTPATIENT
Start: 2018-03-16 | End: 2018-03-17

## 2018-03-16 RX ADMIN — PANTOPRAZOLE SODIUM 40 MILLIGRAM(S): 20 TABLET, DELAYED RELEASE ORAL at 17:34

## 2018-03-16 RX ADMIN — LACTULOSE 10 GRAM(S): 10 SOLUTION ORAL at 05:34

## 2018-03-16 RX ADMIN — Medication 1 APPLICATION(S): at 05:25

## 2018-03-16 RX ADMIN — Medication 250 MILLIGRAM(S): at 15:28

## 2018-03-16 RX ADMIN — LACTULOSE 10 GRAM(S): 10 SOLUTION ORAL at 14:45

## 2018-03-16 RX ADMIN — Medication 667 MILLIGRAM(S): at 17:37

## 2018-03-16 RX ADMIN — HEPARIN SODIUM 5000 UNIT(S): 5000 INJECTION INTRAVENOUS; SUBCUTANEOUS at 22:19

## 2018-03-16 RX ADMIN — Medication 500 MILLIGRAM(S): at 22:19

## 2018-03-16 RX ADMIN — Medication 650 MILLIGRAM(S): at 14:45

## 2018-03-16 RX ADMIN — Medication 1 APPLICATION(S): at 15:14

## 2018-03-16 RX ADMIN — MEROPENEM 100 MILLIGRAM(S): 1 INJECTION INTRAVENOUS at 17:34

## 2018-03-16 RX ADMIN — Medication 650 MILLIGRAM(S): at 05:26

## 2018-03-16 RX ADMIN — Medication 500 MILLIGRAM(S): at 05:34

## 2018-03-16 RX ADMIN — Medication 1 TABLET(S): at 17:34

## 2018-03-16 RX ADMIN — Medication 10 MILLIGRAM(S): at 12:54

## 2018-03-16 RX ADMIN — Medication 650 MILLIGRAM(S): at 14:44

## 2018-03-16 RX ADMIN — SODIUM CHLORIDE 75 MILLILITER(S): 9 INJECTION, SOLUTION INTRAVENOUS at 22:26

## 2018-03-16 RX ADMIN — Medication 1 APPLICATION(S): at 05:35

## 2018-03-16 RX ADMIN — HEPARIN SODIUM 5000 UNIT(S): 5000 INJECTION INTRAVENOUS; SUBCUTANEOUS at 05:26

## 2018-03-16 RX ADMIN — Medication 1 TABLET(S): at 12:51

## 2018-03-16 RX ADMIN — PANTOPRAZOLE SODIUM 40 MILLIGRAM(S): 20 TABLET, DELAYED RELEASE ORAL at 06:18

## 2018-03-16 RX ADMIN — Medication 650 MILLIGRAM(S): at 21:53

## 2018-03-16 RX ADMIN — Medication 50 MICROGRAM(S): at 05:26

## 2018-03-16 RX ADMIN — HEPARIN SODIUM 5000 UNIT(S): 5000 INJECTION INTRAVENOUS; SUBCUTANEOUS at 14:45

## 2018-03-16 RX ADMIN — Medication 650 MILLIGRAM(S): at 22:19

## 2018-03-16 NOTE — CONSULT NOTE ADULT - SUBJECTIVE AND OBJECTIVE BOX
62y  Female  HPI:  62F who is bedridden at [facility] because of MS, who is currently being treated for sacral OM with vancomycin and meropenem  Patient is sent in by [facility] because of altered mental status. The patient was recently admitted at [facility] for AMS also and was found to have a sacral ulcer that required debridement and IV antibiotics. The patient was discharged on IV meropenem and IV vancomycin on Feb 9. At the time of discharge, the patient was responding appropriately to questions.   Today, the patient is mostly non-verbal and only is moaning. She doesn't answer any questions. (24 Feb 2018 18:44)    Hospital course***  Allergies    No Known Allergies    Intolerances      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  Chronic kidney disease (CKD), stage IV (severe)  Psychosis  Bedridden  Tyson catheter in place on admission  Osteomyelitis of sacrum  Sacral ulcer  Osteoporosis  Diabetes mellitus  Seizure disorder  Bipolar affective disorder  Status post debridement: of sacral ulcer      Labs:                        7.3    11.02 )-----------( 401      ( 16 Mar 2018 12:32 )             25.0     03-16    157<H>  |  118<H>  |  71<HH>  ----------------------------<  93  4.9   |  26  |  2.9<H>    Ca    9.2      16 Mar 2018 12:32  Phos  5.8     03-16  Mg     3.2     03-16    TPro  5.7<L>  /  Alb  1.7<L>  /  TBili  0.6  /  DBili  x   /  AST  22  /  ALT  16  /  AlkPhos  67  03-15      Culture - Blood (collected 15 Mar 2018 11:12)  Source: .Blood None  Preliminary Report (16 Mar 2018 16:01):    No growth to date.    Culture - Blood (collected 15 Mar 2018 01:15)  Source: .Blood None  Preliminary Report (16 Mar 2018 12:01):    No growth to date.        PE:    Full thickness wound to sacrum with 100% necrotic tissue, serosang discharge

## 2018-03-16 NOTE — PROGRESS NOTE ADULT - SUBJECTIVE AND OBJECTIVE BOX
62yFemale with diagnosis: SEPSIS, UTI    Chart reviewed; discussed with Dr. Poole, Carol Garcia,   Patient seen, had family meeting with Lianna from Hospice: Michell (daughter) and ; Caitlyn (phone); Enma Sousa and  (HCPs)    PHYSICAL EXAM    T(C): , Max: 39 (14:15)  T(F): 102.2  HR: 117 (99 - 120)  BP: 94/52 (78/42 - 96/52)  RR: 18 (16 - 18)  SpO2: --    LABS:                 7.3    11.02 )-----------( 401      ( 16 Mar 2018 12:32 )             25.0                                                                                    03-16    x   |  x   |  71<HH>  ----------------------------<  x   x    |  x   |  x     Ca    9.7      15 Mar 2018 10:11  Phos  5.8     03-16  Mg     3.2     03-16    TPro  5.7<L>  /  Alb  1.7<L>  /  TBili  0.6  /  DBili  x   /  AST  22  /  ALT  16  /  AlkPhos  67  03-15                                                MEDICATIONS  (STANDING):  bisacodyl Suppository 10 milliGRAM(s) Rectal daily  carBAMazepine Suspension 600 milliGRAM(s) Oral daily  clog zipper 1 Packet(s) 1 Each Enteral Tube once  collagenase Ointment 1 Application(s) Topical two times a day  Dakins Solution - Full Strength 1 Application(s) Topical two times a day  fentaNYL   Patch  25 MICROgram(s)/Hr 1 Patch Transdermal every 72 hours  folic acid 1 milliGRAM(s) Oral daily  heparin  Injectable 5000 Unit(s) SubCutaneous every 8 hours  lactobacillus acidophilus 1 Tablet(s) Oral two times a day with meals  lactulose Syrup 10 Gram(s) Oral three times a day  levothyroxine 50 MICROGram(s) Oral daily  meropenem  IVPB 500 milliGRAM(s) IV Intermittent every 12 hours  multivitamin 1 Tablet(s) Oral daily  pantoprazole   Suspension 40 milliGRAM(s) Oral two times a day before meals  silver sulfADIAZINE 1% Cream 1 Application(s) Topical every 12 hours  simethicone 80 milliGRAM(s) Chew daily  sodium bicarbonate 650 milliGRAM(s) Oral three times a day  thiamine 100 milliGRAM(s) Oral daily  valproic  acid Syrup 250 milliGRAM(s) Oral daily  valproic  acid Syrup 500 milliGRAM(s) Oral daily  valproic  acid Syrup 500 milliGRAM(s) Oral at bedtime    MEDICATIONS  (PRN):  acetaminophen  Suppository 650 milliGRAM(s) Rectal four times a day PRN For Temp greater than 38 C (100.4 F)  senna 2 Tablet(s) Oral at bedtime PRN Constipation 62yFemale with diagnosis: SEPSIS, UTI    Chart reviewed; discussed with Dr. Poole, Carol Garcia,   Patient seen, had family meeting with Lianna from Hospice: Michell (daughter) and ; Caitlyn (phone); Enma Sousa and  (HCPs)    PHYSICAL EXAM  Opens eyes to verbal/tactile stimuli - smiles without purpose; no tracking; no purposeful movement/communication; (family reports occasionally one word answers but for the most part not coherent)  Not moving extremities  TODD  Resp easy and unlabored at times tachy but no use of accessory muscles.  NGT out   Upper body edematous  Extremities warm    T(C): , Max: 39 (14:15)  T(F): 102.2  HR: 117 (99 - 120)  BP: 94/52 (78/42 - 96/52)  RR: 18 (16 - 18)  SpO2: --    LABS:                 7.3    11.02 )-----------( 401      ( 16 Mar 2018 12:32 )             25.0                                                                                    03-16    x   |  x   |  71<HH>  ----------------------------<  x   x    |  x   |  x     Ca    9.7      15 Mar 2018 10:11  Phos  5.8     03-16  Mg     3.2     03-16    TPro  5.7<L>  /  Alb  1.7<L>  /  TBili  0.6  /  DBili  x   /  AST  22  /  ALT  16  /  AlkPhos  67  03-15                                                MEDICATIONS  (STANDING):  bisacodyl Suppository 10 milliGRAM(s) Rectal daily  carBAMazepine Suspension 600 milliGRAM(s) Oral daily  clog zipper 1 Packet(s) 1 Each Enteral Tube once  collagenase Ointment 1 Application(s) Topical two times a day  Dakins Solution - Full Strength 1 Application(s) Topical two times a day  fentaNYL   Patch  25 MICROgram(s)/Hr 1 Patch Transdermal every 72 hours  folic acid 1 milliGRAM(s) Oral daily  heparin  Injectable 5000 Unit(s) SubCutaneous every 8 hours  lactobacillus acidophilus 1 Tablet(s) Oral two times a day with meals  lactulose Syrup 10 Gram(s) Oral three times a day  levothyroxine 50 MICROGram(s) Oral daily  meropenem  IVPB 500 milliGRAM(s) IV Intermittent every 12 hours  multivitamin 1 Tablet(s) Oral daily  pantoprazole   Suspension 40 milliGRAM(s) Oral two times a day before meals  silver sulfADIAZINE 1% Cream 1 Application(s) Topical every 12 hours  simethicone 80 milliGRAM(s) Chew daily  sodium bicarbonate 650 milliGRAM(s) Oral three times a day  thiamine 100 milliGRAM(s) Oral daily  valproic  acid Syrup 250 milliGRAM(s) Oral daily  valproic  acid Syrup 500 milliGRAM(s) Oral daily  valproic  acid Syrup 500 milliGRAM(s) Oral at bedtime    MEDICATIONS  (PRN):  acetaminophen  Suppository 650 milliGRAM(s) Rectal four times a day PRN For Temp greater than 38 C (100.4 F)  senna 2 Tablet(s) Oral at bedtime PRN Constipation

## 2018-03-16 NOTE — PROGRESS NOTE ADULT - ASSESSMENT
# hypernatremia    - monitor BMP and UO closely   - Continue D5W at 50  ml/hr iv, check Na level tomorrow,    # CKD 4 and High AG metabolic acidosis: Cr stable.    - start sodium bicarbonate  350mg tid oral, keep HCO3 >22  - check iron study, ferritin, phos level, iPTH  - PRO/CR ratio noted, probably due to DM. will f/u.    # UTI: on vanco, Zosyn will repeat vanco trough today     will follow 62 y o woman with extensive PMH including multiple psych history decubiti and CKD 4 presenting to the hospital with sepsis found to be hypernatremic and SALOME on CKD 3    ·	 hypernatremia  ·	resume IVF D5W 1/2 NS at 75 cc/hour   ·	follow up BMP  ·	resume free water via NGT if unable to tolerate po       ·	SALOME on CKD 4 rule out prerenal etiology   ·	fluids as above follow BMP in AM  ·	Strict I and O    ·	PTH noted c/w CKD 4  ·	Hyperphosphatemia noted if IP >5.5 will strat Phoslo 1/1/1  ·	ID /UTI on merrem    will follow

## 2018-03-16 NOTE — SWALLOW BEDSIDE ASSESSMENT ADULT - PHARYNGEAL PHASE
Cough post oral intake/Throat clear post oral intake/Delayed throat clear post oral intake Within functional limits

## 2018-03-16 NOTE — PROGRESS NOTE ADULT - ASSESSMENT
62 year old with multiple chronic advanced illness now in end of life. Family is having decisional conflict. They are able to verbalize the current status and prognosis but are having a difficulty time deciding on hospice for best level of care.

## 2018-03-16 NOTE — PROGRESS NOTE ADULT - SUBJECTIVE AND OBJECTIVE BOX
SUBJECTIVE:    Patient is a 62y old Female who presents with a chief complaint of altered mental status (2018 18:44)    Currently admitted to medicine with the primary diagnosis of Sepsis     Today is hospital day 20. Patient more oriented today. Able to state her name. Keeps repeating same few words - cannot understand which words (nor can family).    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     ALLERGIES:  No Known Allergies    MEDICATIONS:  STANDING MEDICATIONS  bisacodyl Suppository 10 milliGRAM(s) Rectal daily  carBAMazepine Suspension 600 milliGRAM(s) Oral daily  clog zipper 1 Packet(s) 1 Each Enteral Tube once  collagenase Ointment 1 Application(s) Topical two times a day  Dakins Solution - Full Strength 1 Application(s) Topical two times a day  fentaNYL   Patch  25 MICROgram(s)/Hr 1 Patch Transdermal every 72 hours  folic acid 1 milliGRAM(s) Oral daily  heparin  Injectable 5000 Unit(s) SubCutaneous every 8 hours  lactobacillus acidophilus 1 Tablet(s) Oral two times a day with meals  lactulose Syrup 10 Gram(s) Oral three times a day  levothyroxine 50 MICROGram(s) Oral daily  meropenem  IVPB 500 milliGRAM(s) IV Intermittent every 12 hours  multivitamin 1 Tablet(s) Oral daily  pantoprazole   Suspension 40 milliGRAM(s) Oral two times a day before meals  simethicone 80 milliGRAM(s) Chew daily  sodium bicarbonate 650 milliGRAM(s) Oral three times a day  thiamine 100 milliGRAM(s) Oral daily  valproic  acid Syrup 250 milliGRAM(s) Oral daily  valproic  acid Syrup 500 milliGRAM(s) Oral daily  valproic  acid Syrup 500 milliGRAM(s) Oral at bedtime    PRN MEDICATIONS  acetaminophen  Suppository 650 milliGRAM(s) Rectal four times a day PRN  senna 2 Tablet(s) Oral at bedtime PRN    VITALS:   T(F): 102.2  HR: 117  BP: 94/52  RR: 18  SpO2: --    LABS:                        7.3    11.02 )-----------( 401      ( 16 Mar 2018 12:32 )             25.0     03-16    157<H>  |  118<H>  |  71<HH>  ----------------------------<  93  4.9   |  26  |  2.9<H>    Ca    9.2      16 Mar 2018 12:32  Phos  5.8     03-16  Mg     3.2     03-16    TPro  5.7<L>  /  Alb  1.7<L>  /  TBili  0.6  /  DBili  x   /  AST  22  /  ALT  16  /  AlkPhos  67  03-15      Urinalysis Basic - ( 16 Mar 2018 12:23 )    Color: Yellow / Appearance: Cloudy / S.015 / pH: x  Gluc: x / Ketone: Negative  / Bili: Negative / Urobili: 0.2 mg/dL   Blood: x / Protein: 100 mg/dL / Nitrite: Negative   Leuk Esterase: Large / RBC: 2-5 /HPF / WBC >50 /HPF   Sq Epi: x / Non Sq Epi: Occasional /HPF / Bacteria: x      Culture - Blood (collected 15 Mar 2018 11:12)  Source: .Blood None  Preliminary Report (16 Mar 2018 16:01):    No growth to date.    Culture - Blood (collected 15 Mar 2018 01:15)  Source: .Blood None  Preliminary Report (16 Mar 2018 12:01):    No growth to date.    PHYSICAL EXAM:  GEN: NAD, eyes open  LUNGS: CTAB, no w/r/r  HEART: RRR, no m/r/g  ABD: soft, NT/ND, +BS  EXT: NC/NC/NE/2+PP/MERCEDES  NEURO: AAOx1

## 2018-03-16 NOTE — PROGRESS NOTE ADULT - ASSESSMENT
Patient is a 62y old Female with PMH of MS and being bed-bound, CKD IV, chronic sacral osteo, recently diagnosed colon CA at [facility], and seizure disorder who presents with a chief complaint of altered mental status (24 Feb 2018 18:44). Patient's daughter/healthcare proxy present at bedside. She reports patient's mental status has gradually declined since October after repeated hospitalizations for lung infections, UTIs, and the chronic sacral ulcer. Patient's hospital course complicated by metabolic acidosis and sepsis. Patient is still non-verbal and altered. As per daughter, patient more verbal a week prior to admission.    1. Metabolic encephalopathy probably 2/2 sepsis/ sacral osteomyelitis/UTI 2/2 chronic indwelling antony  - CT Head No Cont (02.24.18 @ 16:25): negative  - CT Abdomen and Pelvis No Cont (02.24.18 @ 16:26) Sacral decubitus ulcer with extension to the coccyx demonstrating bony destruction of the coccyx consistent with osteomyelitis. Surrounding phlegmon measures 6 cm transverse. Trace persistent right hydroureteronephrosis with suggestion of bilateral urothelial thickening likely related to chronic infection.Stable 2.2 cm right adrenal adenoma.Stable diffuse bladder wall thickening and trabeculation.  - EEG showed generalized slowing w/ triphasic waves (reportedly) - consistent w/ metabolic encephalopathy  - Ammonia elevated - increased lactulose, titrate to 3-4 BMs per day. C/w Rifaximin. Ammonia trend 135->141->116->192 -> 113 -> 82 -> 75 -> 19  - repeat Blood Cx - NGTD  - ID on board - Rx's Huey (renally dosed to 500 q12)  - Burn on board - saw patient today. Would take patient for debridement if medically cleared and if family agreeable. Will f/u w/ family.  - Palliative on board - Rx's appreciated  - Hospice on board - met on 3/16. Family still undecided at this time.  - repeat U Cx pending - taken after antony changed    2. Hypernatremia  - Nephro on board - Rx keeping Bicarb >22, starting Phoslo 1/1/1 to keep Ph<5.5, d51/2ns @ 75 ml/hr  - daily BMP    3. AMS, can't take PO  - NGT removed 3/16  - S&S evaluated - Rx pureed diet w/ nectar thick liquids, 1:1 feeds small sips/bites    4. Seizure Disorder/ Bipolar disorder  - Valproic acid level 26; Carbamazepine level 7.3. These levels recorded after increasing both meds  - Neuro on board - Rx Valproic acid increased to 500mg in the morning and at bedtime and 250mg in between. Tegretol increased to 600mg QD.    5. Sigmoid Colon AdenoCA  - GI on board - Rx no intervention for now  - H/H stable  - c/w protonix  - EGD done at [facility] on 1/28/18 showed 4cm semi-circumferential mass in sigmoid colon, which was biopsied - reports show moderately differentiated adenoCA  - Oncology on board - Rx's appreciated. Not a candidate for chemo at this time. Surgical resection best for localized colon ca but needs complete staging (i.e. CT chest/abd/pel w/ IV contrast)  - family agreeable for CT w/ contrast. However in light of worsening SALOME will hold off on CT for now.    5. DM type 2  - Monitor FS.    6. Hypothyroidism  - c/w synthroid.    7. Multiple Sclerosis with neurogenic bladder  - Chronic antony. Changed 3/16    8. B/L upper extremity edema  - Venous duplex showed evidence of superficial thrombophlebitis in left arm distal to PICC line, results above.    DVT ppx: Heparin subQ  Code status: DNR/DNI  Dispo: from [facility] (long-term)

## 2018-03-16 NOTE — CONSULT NOTE ADULT - ASSESSMENT
ASSESSMENT:  Unstageable pressure ulcer to the sacrum  Infected        RECOMMENDATION:  Wound care - Santyl / Wet Gauze with 1/4 Dakins / DPD BID  Surgical debridement   IV abx as indicated/ per ID  Offloading/ positional changes  Will follow.

## 2018-03-16 NOTE — PROGRESS NOTE ADULT - SUBJECTIVE AND OBJECTIVE BOX
Nephrology progress note    Patient is seen and examined, events over the last 24 h noted .    Allergies:  No Known Allergies    Hospital Medications:   MEDICATIONS  (STANDING):  bisacodyl Suppository 10 milliGRAM(s) Rectal daily  carBAMazepine Suspension 600 milliGRAM(s) Oral daily  clog zipper 1 Packet(s) 1 Each Enteral Tube once  collagenase Ointment 1 Application(s) Topical two times a day  Dakins Solution - Full Strength 1 Application(s) Topical two times a day  fentaNYL   Patch  25 MICROgram(s)/Hr 1 Patch Transdermal every 72 hours  folic acid 1 milliGRAM(s) Oral daily  heparin  Injectable 5000 Unit(s) SubCutaneous every 8 hours  lactobacillus acidophilus 1 Tablet(s) Oral two times a day with meals  lactulose Syrup 10 Gram(s) Oral three times a day  levothyroxine 50 MICROGram(s) Oral daily  meropenem  IVPB 500 milliGRAM(s) IV Intermittent every 8 hours  multivitamin 1 Tablet(s) Oral daily  pantoprazole   Suspension 40 milliGRAM(s) Oral two times a day before meals  silver sulfADIAZINE 1% Cream 1 Application(s) Topical every 12 hours  simethicone 80 milliGRAM(s) Chew daily  sodium bicarbonate 650 milliGRAM(s) Oral three times a day  thiamine 100 milliGRAM(s) Oral daily  valproic  acid Syrup 250 milliGRAM(s) Oral daily  valproic  acid Syrup 500 milliGRAM(s) Oral daily  valproic  acid Syrup 500 milliGRAM(s) Oral at bedtime        VITALS:  T(F): 100.2 (18 @ 06:05), Max: 101.8 (03-15-18 @ 20:30)  HR: 102 (18 @ 06:05)  BP: 96/52 (18 @ 06:05)  RR: 16 (18 @ 06:05)       @ 07:01  -  03-15 @ 07:00  --------------------------------------------------------  IN: 2180 mL / OUT: 902 mL / NET: 1278 mL    03-15 @ 07:01  -  03-16 @ 07:00  --------------------------------------------------------  IN: 1340 mL / OUT: 800 mL / NET: 540 mL          PHYSICAL EXAM:  Constitutional: NAD  HEENT: anicteric sclera, oropharynx clear, MMM  Neck: No JVD  Respiratory: CTAB, no wheezes, rales or rhonchi  Cardiovascular: S1, S2, RRR  Gastrointestinal: BS+, soft, NT/ND  Extremities: No cyanosis or clubbing. No peripheral edema  :  No antony.   Skin: No rashes    LABS:  03-15    156<H>  |  119<H>  |  66<HH>  ----------------------------<  115<H>  5.0   |  26  |  2.5<H>    Creatinine Trend: 2.5<--, 2.2<--, 2.2<--, 2.1<--, 2.2<--, 2.2<--  SODIUM TREND:  Sodium 156 [03-15 @ 10:11]  Sodium 139 [ @ 08:52]  Sodium 149 [ @ 08:55]  Sodium 148 [ @ 02:23]  Sodium 151 [ @ 13:02]  Sodium 149 [03-10 @ 10:17]  Sodium 146 [ @ 06:51]  Sodium 143 [ @ 15:21]  Sodium 146 [ @ 06:10]  Sodium 144 [ @ 10:03]    Ca    9.7      15 Mar 2018 10:11  Phos  5.1     03-15  Mg     3.1     03-15    TPro  5.7<L>  /  Alb  1.7<L>  /  TBili  0.6  /  DBili      /  AST  22  /  ALT  16  /  AlkPhos  67  03-15                          7.3    11.02 )-----------( 401      ( 16 Mar 2018 12:32 )             25.0       Urine Studies:  Urinalysis Basic - ( 16 Mar 2018 08:44 )    Color: Yellow / Appearance: Cloudy / S.015 / pH:   Gluc:  / Ketone: Negative  / Bili: Negative / Urobili: 0.2 mg/dL   Blood:  / Protein: 100 mg/dL / Nitrite: Negative   Leuk Esterase: Large / RBC: 5-10 /HPF / WBC >50 /HPF   Sq Epi:  / Non Sq Epi: Moderate /HPF / Bacteria: Moderate /HPF Nephrology progress note    Patient is seen and examined, events over the last 24 h noted .    Allergies:  No Known Allergies    Hospital Medications:   MEDICATIONS  (STANDING):  bisacodyl Suppository 10 milliGRAM(s) Rectal daily  carBAMazepine Suspension 600 milliGRAM(s) Oral daily  clog zipper 1 Packet(s) 1 Each Enteral Tube once  collagenase Ointment 1 Application(s) Topical two times a day  Dakins Solution - Full Strength 1 Application(s) Topical two times a day  fentaNYL   Patch  25 MICROgram(s)/Hr 1 Patch Transdermal every 72 hours  folic acid 1 milliGRAM(s) Oral daily  heparin  Injectable 5000 Unit(s) SubCutaneous every 8 hours  lactobacillus acidophilus 1 Tablet(s) Oral two times a day with meals  lactulose Syrup 10 Gram(s) Oral three times a day  levothyroxine 50 MICROGram(s) Oral daily  meropenem  IVPB 500 milliGRAM(s) IV Intermittent every 8 hours  multivitamin 1 Tablet(s) Oral daily  pantoprazole   Suspension 40 milliGRAM(s) Oral two times a day before meals  silver sulfADIAZINE 1% Cream 1 Application(s) Topical every 12 hours  simethicone 80 milliGRAM(s) Chew daily  sodium bicarbonate 650 milliGRAM(s) Oral three times a day  thiamine 100 milliGRAM(s) Oral daily  valproic  acid Syrup 250 milliGRAM(s) Oral daily  valproic  acid Syrup 500 milliGRAM(s) Oral daily  valproic  acid Syrup 500 milliGRAM(s) Oral at bedtime        VITALS:  T(F): 100.2 (18 @ 06:05), Max: 101.8 (03-15-18 @ 20:30)  HR: 102 (18 @ 06:05)  BP: 96/52 (18 @ 06:05)  RR: 16 (18 @ 06:05)       @ 07:01  -  03-15 @ 07:00  --------------------------------------------------------  IN: 2180 mL / OUT: 902 mL / NET: 1278 mL    03-15 @ 07:01  -  03-16 @ 07:00  --------------------------------------------------------  IN: 1340 mL / OUT: 800 mL / NET: 540 mL          PHYSICAL EXAM:  Constitutional: NAD  HEENT: anicteric sclera, oropharynx clear, MMM  Neck: No JVD  Respiratory: CTAB, no wheezes, rales or rhonchi  Cardiovascular: S1, S2, RRR  Gastrointestinal: BS+, soft, NT/ND  Extremities: No cyanosis or clubbing. No peripheral edema  :  No antony.   Skin: No rashes    LABS:  03-15    156<H>  |  119<H>  |  66<HH>  ----------------------------<  115<H>  5.0   |  26  |  2.5<H>    Creatinine Trend: 2.5<--, 2.2<--, 2.2<--, 2.1<--, 2.2<--, 2.2<--  SODIUM TREND:  Sodium 156 [03-15 @ 10:11]  Sodium 139 [ @ 08:52]  Sodium 149 [ @ 08:55]  Sodium 148 [ @ 02:23]  Sodium 151 [ @ 13:02]  Sodium 149 [03-10 @ 10:17]  Sodium 146 [ @ 06:51]  Sodium 143 [ @ 15:21]  Sodium 146 [ @ 06:10]  Sodium 144 [ @ 10:03]    Ca    9.7      15 Mar 2018 10:11  Phos  5.1     -15  Mg     3.1     03-15    TPro  5.7<L>  /  Alb  1.7<L>  /  TBili  0.6  /  DBili      /  AST  22  /  ALT  16  /  AlkPhos  67  -15  Intact PTH: 85:                             7.3    11.02 )-----------( 401      ( 16 Mar 2018 12:32 )             25.0       Urine Studies:  Urinalysis Basic - ( 16 Mar 2018 08:44 )    Color: Yellow / Appearance: Cloudy / S.015 / pH:   Gluc:  / Ketone: Negative  / Bili: Negative / Urobili: 0.2 mg/dL   Blood:  / Protein: 100 mg/dL / Nitrite: Negative   Leuk Esterase: Large / RBC: 5-10 /HPF / WBC >50 /HPF   Sq Epi:  / Non Sq Epi: Moderate /HPF / Bacteria: Moderate /HPF Nephrology progress note    Patient is seen and examined, events over the last 24 h noted .  Still confused poorly responsive to stimuli  Last seen by our team 2 weeks ago    Allergies:  No Known Allergies    Hospital Medications:   MEDICATIONS  (STANDING):  bisacodyl Suppository 10 milliGRAM(s) Rectal daily  carBAMazepine Suspension 600 milliGRAM(s) Oral daily  clog zipper 1 Packet(s) 1 Each Enteral Tube once  collagenase Ointment 1 Application(s) Topical two times a day  Dakins Solution - Full Strength 1 Application(s) Topical two times a day  fentaNYL   Patch  25 MICROgram(s)/Hr 1 Patch Transdermal every 72 hours  folic acid 1 milliGRAM(s) Oral daily  heparin  Injectable 5000 Unit(s) SubCutaneous every 8 hours  lactobacillus acidophilus 1 Tablet(s) Oral two times a day with meals  lactulose Syrup 10 Gram(s) Oral three times a day  levothyroxine 50 MICROGram(s) Oral daily  meropenem  IVPB 500 milliGRAM(s) IV Intermittent every 8 hours  multivitamin 1 Tablet(s) Oral daily  pantoprazole   Suspension 40 milliGRAM(s) Oral two times a day before meals  silver sulfADIAZINE 1% Cream 1 Application(s) Topical every 12 hours  simethicone 80 milliGRAM(s) Chew daily  sodium bicarbonate 650 milliGRAM(s) Oral three times a day  thiamine 100 milliGRAM(s) Oral daily  valproic  acid Syrup 250 milliGRAM(s) Oral daily  valproic  acid Syrup 500 milliGRAM(s) Oral daily  valproic  acid Syrup 500 milliGRAM(s) Oral at bedtime        VITALS:  T(F): 100.2 (18 @ 06:05), Max: 101.8 (03-15-18 @ 20:30)  HR: 102 (18 @ 06:05)  BP: 96/52 (18 @ 06:05)  RR: 16 (18 @ 06:05)       @ 07:01  -  03-15 @ 07:00  --------------------------------------------------------  IN: 2180 mL / OUT: 902 mL / NET: 1278 mL    03-15 @ 07:01  -   @ 07:00  --------------------------------------------------------  IN: 1340 mL / OUT: 800 mL / NET: 540 mL          PHYSICAL EXAM:  Constitutional: NAD  HEENT: anicteric sclera, oropharynx clear, MMM  Neck: No JVD  Respiratory: CTAB, no wheezes, rales or rhonchi  Cardiovascular: S1, S2, RRR  Gastrointestinal: BS+, soft, NT/ND  Extremities: No cyanosis or clubbing. plus one  peripheral edema  :  No antony.   Skin: No rashes    LABS:  03-15    156<H>  |  119<H>  |  66<HH>  ----------------------------<  115<H>  5.0   |  26  |  2.5<H>    Creatinine Trend: 2.5<--, 2.2<--, 2.2<--, 2.1<--, 2.2<--, 2.2<--  SODIUM TREND:  Sodium 156 [03-15 @ 10:11]  Sodium 139 [ @ 08:52]  Sodium 149 [ @ 08:55]  Sodium 148 [ @ 02:23]  Sodium 151 [ @ 13:02]  Sodium 149 [03-10 @ 10:17]  Sodium 146 [ @ 06:51]  Sodium 143 [ @ 15:21]  Sodium 146 [ @ 06:10]  Sodium 144 [ @ 10:03]    Ca    9.7      15 Mar 2018 10:11  Phos  5.1     03-15  Mg     3.1     03-15    TPro  5.7<L>  /  Alb  1.7<L>  /  TBili  0.6  /  DBili      /  AST  22  /  ALT  16  /  AlkPhos  67  03-15  Intact PTH: 85:                             7.3    11.02 )-----------( 401      ( 16 Mar 2018 12:32 )             25.0       Urine Studies:  Urinalysis Basic - ( 16 Mar 2018 08:44 )    Color: Yellow / Appearance: Cloudy / S.015 / pH:   Gluc:  / Ketone: Negative  / Bili: Negative / Urobili: 0.2 mg/dL   Blood:  / Protein: 100 mg/dL / Nitrite: Negative   Leuk Esterase: Large / RBC: 5-10 /HPF / WBC >50 /HPF   Sq Epi:  / Non Sq Epi: Moderate /HPF / Bacteria: Moderate /HPF

## 2018-03-16 NOTE — PROGRESS NOTE ADULT - PROBLEM SELECTOR PLAN 2
DNR/I; support provided.   Message left for Enma (HCP) in order to offer availability.
Support given to daughter.  Will meet tomorrow - at a time TBD  DNR/I
Continue fentanyl  In light of possible transition to another level of care - use Morphine sulfate concentrated liquid for breakthough pain 5 mg every 6H PRN/before dressing changes/body care

## 2018-03-16 NOTE — PROGRESS NOTE ADULT - PROBLEM SELECTOR PLAN 1
Continue Fentanyl 25/72 hours; recommend oxycodone 5mg via NGT every 3H PRN; DC morphine.
Since only two does of oxycodone in last 24H and patient is demonstrating signs of non-verbal pain (moaning) like yesterday, continue Fentanyl 25mcg/72H with oxycodone 5mg every 2H PRN.  Discussed this with both Dr. Carl and Dr. Brunson.
Supportive care

## 2018-03-17 LAB
ANION GAP SERPL CALC-SCNC: 12 MMOL/L — SIGNIFICANT CHANGE UP (ref 7–14)
BASOPHILS # BLD AUTO: 0.03 K/UL — SIGNIFICANT CHANGE UP (ref 0–0.2)
BASOPHILS NFR BLD AUTO: 0.4 % — SIGNIFICANT CHANGE UP (ref 0–1)
BUN SERPL-MCNC: 66 MG/DL — CRITICAL HIGH (ref 10–20)
CALCIUM SERPL-MCNC: 8.5 MG/DL — SIGNIFICANT CHANGE UP (ref 8.5–10.1)
CHLORIDE SERPL-SCNC: 116 MMOL/L — HIGH (ref 98–110)
CO2 SERPL-SCNC: 26 MMOL/L — SIGNIFICANT CHANGE UP (ref 17–32)
CREAT SERPL-MCNC: 2.9 MG/DL — HIGH (ref 0.7–1.5)
CULTURE RESULTS: NO GROWTH — SIGNIFICANT CHANGE UP
EOSINOPHIL # BLD AUTO: 0.22 K/UL — SIGNIFICANT CHANGE UP (ref 0–0.7)
EOSINOPHIL NFR BLD AUTO: 2.9 % — SIGNIFICANT CHANGE UP (ref 0–8)
GLUCOSE SERPL-MCNC: 128 MG/DL — HIGH (ref 70–110)
HCT VFR BLD CALC: 23.1 % — LOW (ref 37–47)
HGB BLD-MCNC: 6.7 G/DL — CRITICAL LOW (ref 12–16)
IMM GRANULOCYTES NFR BLD AUTO: 0.7 % — HIGH (ref 0.1–0.3)
LYMPHOCYTES # BLD AUTO: 1.6 K/UL — SIGNIFICANT CHANGE UP (ref 1.2–3.4)
LYMPHOCYTES # BLD AUTO: 21.2 % — SIGNIFICANT CHANGE UP (ref 20.5–51.1)
MAGNESIUM SERPL-MCNC: 3 MG/DL — HIGH (ref 1.8–2.4)
MCHC RBC-ENTMCNC: 29 G/DL — LOW (ref 32–37)
MCHC RBC-ENTMCNC: 29.9 PG — SIGNIFICANT CHANGE UP (ref 27–31)
MCV RBC AUTO: 103.1 FL — HIGH (ref 81–99)
MONOCYTES # BLD AUTO: 0.77 K/UL — HIGH (ref 0.1–0.6)
MONOCYTES NFR BLD AUTO: 10.2 % — HIGH (ref 1.7–9.3)
NEUTROPHILS # BLD AUTO: 4.89 K/UL — SIGNIFICANT CHANGE UP (ref 1.4–6.5)
NEUTROPHILS NFR BLD AUTO: 64.6 % — SIGNIFICANT CHANGE UP (ref 42.2–75.2)
NRBC # BLD: 0 /100 WBCS — SIGNIFICANT CHANGE UP (ref 0–0)
PHOSPHATE SERPL-MCNC: 5.1 MG/DL — HIGH (ref 2.1–4.9)
PLATELET # BLD AUTO: 358 K/UL — SIGNIFICANT CHANGE UP (ref 130–400)
POTASSIUM SERPL-MCNC: 5 MMOL/L — SIGNIFICANT CHANGE UP (ref 3.5–5)
POTASSIUM SERPL-SCNC: 5 MMOL/L — SIGNIFICANT CHANGE UP (ref 3.5–5)
RBC # BLD: 2.24 M/UL — LOW (ref 4.2–5.4)
RBC # FLD: 18.1 % — HIGH (ref 11.5–14.5)
SODIUM SERPL-SCNC: 154 MMOL/L — HIGH (ref 135–146)
SPECIMEN SOURCE: SIGNIFICANT CHANGE UP
WBC # BLD: 7.56 K/UL — SIGNIFICANT CHANGE UP (ref 4.8–10.8)
WBC # FLD AUTO: 7.56 K/UL — SIGNIFICANT CHANGE UP (ref 4.8–10.8)

## 2018-03-17 RX ORDER — SEVELAMER CARBONATE 2400 MG/1
400 POWDER, FOR SUSPENSION ORAL THREE TIMES A DAY
Qty: 0 | Refills: 0 | Status: DISCONTINUED | OUTPATIENT
Start: 2018-03-17 | End: 2018-03-17

## 2018-03-17 RX ADMIN — Medication 1 APPLICATION(S): at 18:13

## 2018-03-17 RX ADMIN — Medication 500 MILLIGRAM(S): at 06:33

## 2018-03-17 RX ADMIN — SIMETHICONE 80 MILLIGRAM(S): 80 TABLET, CHEWABLE ORAL at 14:07

## 2018-03-17 RX ADMIN — MEROPENEM 100 MILLIGRAM(S): 1 INJECTION INTRAVENOUS at 19:28

## 2018-03-17 RX ADMIN — HEPARIN SODIUM 5000 UNIT(S): 5000 INJECTION INTRAVENOUS; SUBCUTANEOUS at 06:32

## 2018-03-17 RX ADMIN — Medication 667 MILLIGRAM(S): at 09:25

## 2018-03-17 RX ADMIN — Medication 1 TABLET(S): at 09:26

## 2018-03-17 RX ADMIN — Medication 600 MILLIGRAM(S): at 14:08

## 2018-03-17 RX ADMIN — Medication 50 MICROGRAM(S): at 06:32

## 2018-03-17 RX ADMIN — Medication 500 MILLIGRAM(S): at 22:30

## 2018-03-17 RX ADMIN — LACTULOSE 10 GRAM(S): 10 SOLUTION ORAL at 14:10

## 2018-03-17 RX ADMIN — Medication 1 APPLICATION(S): at 06:34

## 2018-03-17 RX ADMIN — LACTULOSE 10 GRAM(S): 10 SOLUTION ORAL at 22:25

## 2018-03-17 RX ADMIN — Medication 650 MILLIGRAM(S): at 06:36

## 2018-03-17 RX ADMIN — Medication 100 MILLIGRAM(S): at 14:10

## 2018-03-17 RX ADMIN — FENTANYL CITRATE 1 PATCH: 50 INJECTION INTRAVENOUS at 19:24

## 2018-03-17 RX ADMIN — Medication 1 TABLET(S): at 14:06

## 2018-03-17 RX ADMIN — Medication 10 MILLIGRAM(S): at 14:11

## 2018-03-17 RX ADMIN — LACTULOSE 10 GRAM(S): 10 SOLUTION ORAL at 06:34

## 2018-03-17 RX ADMIN — Medication 1 TABLET(S): at 18:13

## 2018-03-17 RX ADMIN — HEPARIN SODIUM 5000 UNIT(S): 5000 INJECTION INTRAVENOUS; SUBCUTANEOUS at 22:29

## 2018-03-17 RX ADMIN — HEPARIN SODIUM 5000 UNIT(S): 5000 INJECTION INTRAVENOUS; SUBCUTANEOUS at 14:12

## 2018-03-17 RX ADMIN — Medication 250 MILLIGRAM(S): at 18:12

## 2018-03-17 RX ADMIN — PANTOPRAZOLE SODIUM 40 MILLIGRAM(S): 20 TABLET, DELAYED RELEASE ORAL at 18:11

## 2018-03-17 RX ADMIN — FENTANYL CITRATE 1 PATCH: 50 INJECTION INTRAVENOUS at 18:11

## 2018-03-17 RX ADMIN — MEROPENEM 100 MILLIGRAM(S): 1 INJECTION INTRAVENOUS at 06:36

## 2018-03-17 RX ADMIN — Medication 1 MILLIGRAM(S): at 14:07

## 2018-03-17 RX ADMIN — PANTOPRAZOLE SODIUM 40 MILLIGRAM(S): 20 TABLET, DELAYED RELEASE ORAL at 09:25

## 2018-03-17 NOTE — PROGRESS NOTE ADULT - ASSESSMENT
Patient is a 62y old Female with PMH of MS and being bed-bound, CKD IV, chronic sacral osteo, recently diagnosed colon CA at [facility], and seizure disorder who presents with a chief complaint of altered mental status (24 Feb 2018 18:44). Patient's daughter/healthcare proxy present at bedside. She reports patient's mental status has gradually declined since October after repeated hospitalizations for lung infections, UTIs, and the chronic sacral ulcer. As per daughter, patient more verbal a week prior to admission.    1. Metabolic encephalopathy probably 2/2 sepsis/ sacral osteomyelitis/UTI 2/2 chronic indwelling antony  - CT Head No Cont (02.24.18 @ 16:25): negative  - CT Abdomen and Pelvis No Cont (02.24.18 @ 16:26) Sacral decubitus ulcer with extension to the coccyx demonstrating bony destruction of the coccyx consistent with osteomyelitis. Surrounding phlegmon measures 6 cm transverse. Trace persistent right hydroureteronephrosis with suggestion of bilateral urothelial thickening likely related to chronic infection.Stable 2.2 cm right adrenal adenoma.Stable diffuse bladder wall thickening and trabeculation.  - EEG showed generalized slowing w/ triphasic waves (reportedly) - consistent w/ metabolic encephalopathy  - Ammonia elevated - increased lactulose, titrate to 3-4 BMs per day. C/w Rifaximin. Ammonia trend 135->141->116->192 -> 113 -> 82 -> 75 -> 19  - repeat Blood Cx - NGTD  - ID on board - Rx's Huey (renally dosed to 500 q12)  - Burn on board - possible debridement in OR pending medical clearance. Will discuss w/ family.  - Palliative on board - Rx's appreciated  - Hospice on board - met on 3/16. Family still undecided at this time.  - repeat U Cx pending  - Pulm consult - placed for clearance for OR    2. Hypernatremia  - Nephro on board - Rx starting Renagel 1/1/1, d/c Na bicarb, renal sono, c/w d5 1/2 ns @ 75  - daily BMP    3. AMS, can't take PO  - NGT removed 3/16  - S&S evaluated - Rx pureed diet w/ nectar thick liquids, 1:1 feeds small sips/bites    4. Seizure Disorder/ Bipolar disorder  - Valproic acid level 26; Carbamazepine level 7.3. These levels recorded after increasing both meds  - Neuro on board - Rx Valproic acid increased to 500mg in the morning and at bedtime and 250mg in between. Tegretol increased to 600mg QD.    5. Sigmoid Colon AdenoCA  - GI on board - Rx no intervention for now  - H/H stable  - c/w protonix  - EGD done at [facility] on 1/28/18 showed 4cm semi-circumferential mass in sigmoid colon, which was biopsied - reports show moderately differentiated adenoCA  - Oncology on board - Rx's appreciated. Not a candidate for chemo at this time. Surgical resection best for localized colon ca but needs complete staging (i.e. CT chest/abd/pel w/ IV contrast)  - family agreeable for CT w/ contrast. However in light of worsening SALOME will hold off on CT for now.    5. DM type 2  - Monitor FS.    6. Hypothyroidism  - c/w synthroid.    7. Multiple Sclerosis with neurogenic bladder  - Chronic antony. Changed 3/16    8. B/L upper extremity edema  - Venous duplex showed evidence of superficial thrombophlebitis in left arm distal to PICC line, results above.    DVT ppx: Heparin subQ  Code status: DNR/DNI  Dispo: from [facility] (long-term)

## 2018-03-17 NOTE — CONSULT NOTE ADULT - SUBJECTIVE AND OBJECTIVE BOX
Patient is a 62y old  Female who presents with a chief complaint of altered mental status (2018 18:44)      HPI:  62F who is bedridden at [facility] because of MS, who is currently being treated for sacral OM with vancomycin and meropenem  Patient is sent in by [facility] because of altered mental status. The patient was recently admitted at [facility] for AMS also and was found to have a sacral ulcer that required debridement and IV antibiotics. The patient was discharged on IV meropenem and IV vancomycin on . At the time of discharge, the patient was responding appropriately to questions.   Today, the patient is mostly non-verbal and only is moaning. She doesn't answer any questions. (2018 18:44)      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  Chronic kidney disease (CKD), stage IV (severe)  Psychosis  Bedridden  Tyson catheter in place on admission  Osteomyelitis of sacrum  Sacral ulcer  Osteoporosis  Diabetes mellitus  Seizure disorder  Bipolar affective disorder  Status post debridement: of sacral ulcer      SOCIAL HX:   Smoking                             FAMILY HISTORY:  Family history unknown          Allergies    No Known Allergies    Intolerances          PHYSICAL EXAM  Vital Signs Last 24 Hrs  T(C): 36 (17 Mar 2018 12:30), Max: 39 (16 Mar 2018 14:15)  T(F): 96.8 (17 Mar 2018 12:30), Max: 102.2 (16 Mar 2018 14:15)  HR: 91 (17 Mar 2018 12:30) (91 - 117)  BP: 107/65 (17 Mar 2018 12:30) (94/52 - 109/58)  BP(mean): --  RR: 16 (17 Mar 2018 12:30) (16 - 18)  SpO2: --    General:    HEENT: AAO x3            Lymph Nodes: No lymphadenapathy  Neck:  Supple  Lungs: Clear bilaterally  Cardiovascular: S1S2  Abdomen: Soft, non tender  Extremities: NO edema or cyanosis  Skin: Intact      18 @ 07:01  -  18 @ 07:00  --------------------------------------------------------  IN: 0 mL / OUT: 1150 mL / NET: -1150 mL    18 @ 07:01  -  -18 @ 13:03  --------------------------------------------------------  IN: 240 mL / OUT: 0 mL / NET: 240 mL          LAB:                        7.3    11.02 )-----------( 401      ( 16 Mar 2018 12:32 )             25.0                                               03-16    157<H>  |  118<H>  |  71<HH>  ----------------------------<  93  4.9   |  26  |  2.9<H>    Ca    9.2      16 Mar 2018 12:32  Phos  5.8     03-16  Mg     3.2     03-16                                               Urinalysis Basic - ( 16 Mar 2018 12:23 )    Color: Yellow / Appearance: Cloudy / S.015 / pH: x  Gluc: x / Ketone: Negative  / Bili: Negative / Urobili: 0.2 mg/dL   Blood: x / Protein: 100 mg/dL / Nitrite: Negative   Leuk Esterase: Large / RBC: 2-5 /HPF / WBC >50 /HPF   Sq Epi: x / Non Sq Epi: Occasional /HPF / Bacteria: x                                                                                               Culture - Blood (collected 15 Mar 2018 11:12)  Source: .Blood None  Preliminary Report (16 Mar 2018 16:01):    No growth to date.    Culture - Blood (collected 15 Mar 2018 01:15)  Source: .Blood None  Preliminary Report (16 Mar 2018 12:01):    No growth to date.                                                    MEDICATIONS  (STANDING):  bisacodyl Suppository 10 milliGRAM(s) Rectal daily  carBAMazepine Suspension 600 milliGRAM(s) Oral daily  clog zipper 1 Packet(s) 1 Each Enteral Tube once  collagenase Ointment 1 Application(s) Topical two times a day  Dakins Solution - Full Strength 1 Application(s) Topical two times a day  dextrose 5% + sodium chloride 0.45%. 1000 milliLiter(s) (75 mL/Hr) IV Continuous <Continuous>  fentaNYL   Patch  25 MICROgram(s)/Hr 1 Patch Transdermal every 72 hours  folic acid 1 milliGRAM(s) Oral daily  heparin  Injectable 5000 Unit(s) SubCutaneous every 8 hours  lactobacillus acidophilus 1 Tablet(s) Oral two times a day with meals  lactulose Syrup 10 Gram(s) Oral three times a day  levothyroxine 50 MICROGram(s) Oral daily  meropenem  IVPB 500 milliGRAM(s) IV Intermittent every 12 hours  multivitamin 1 Tablet(s) Oral daily  pantoprazole   Suspension 40 milliGRAM(s) Oral two times a day before meals  sevelamer hydrochloride 400 milliGRAM(s) Oral three times a day  simethicone 80 milliGRAM(s) Chew daily  thiamine 100 milliGRAM(s) Oral daily  valproic  acid Syrup 250 milliGRAM(s) Oral daily  valproic  acid Syrup 500 milliGRAM(s) Oral daily  valproic  acid Syrup 500 milliGRAM(s) Oral at bedtime    MEDICATIONS  (PRN):  acetaminophen  Suppository 650 milliGRAM(s) Rectal four times a day PRN For Temp greater than 38 C (100.4 F)  senna 2 Tablet(s) Oral at bedtime PRN Constipation Patient is a 62y old  Female who presents with a chief complaint of altered mental status (2018 18:44)      HPI:  62F who is bedridden at [facility] because of MS, who is currently being treated for sacral OM with vancomycin and meropenem  Patient is sent in by [facility] because of altered mental status. The patient was recently admitted at [facility] for AMS also and was found to have a sacral ulcer that required debridement and IV antibiotics. The patient was discharged on IV meropenem and IV vancomycin on . At the time of discharge, the patient was responding appropriately to questions.   Today, the patient is mostly non-verbal and only is moaning. She doesn't answer any questions. (2018 18:44)      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  Chronic kidney disease (CKD), stage IV (severe)  Psychosis  Bedridden  Tyson catheter in place on admission  Osteomyelitis of sacrum  Sacral ulcer  Osteoporosis  Diabetes mellitus  Seizure disorder  Bipolar affective disorder  Status post debridement: of sacral ulcer      SOCIAL HX:  never smoked                        FAMILY HISTORY:  Family history unknown          Allergies    No Known Allergies    Intolerances          PHYSICAL EXAM  Vital Signs Last 24 Hrs  T(C): 36 (17 Mar 2018 12:30), Max: 39 (16 Mar 2018 14:15)  T(F): 96.8 (17 Mar 2018 12:30), Max: 102.2 (16 Mar 2018 14:15)  HR: 91 (17 Mar 2018 12:30) (91 - 117)  BP: 107/65 (17 Mar 2018 12:30) (94/52 - 109/58)  BP(mean): --  RR: 16 (17 Mar 2018 12:30) (16 - 18)  SpO2: --    General:    HEENT: AAO x3            Lymph Nodes: No lymphadenapathy  Neck:  Supple  Lungs: Clear bilaterally  Cardiovascular: S1S2  Abdomen: Soft, non tender  Extremities: NO edema or cyanosis  Skin: Intact      18 @ 07:01  -  18 @ 07:00  --------------------------------------------------------  IN: 0 mL / OUT: 1150 mL / NET: -1150 mL    18 @ 07:01  -  03- @ 13:03  --------------------------------------------------------  IN: 240 mL / OUT: 0 mL / NET: 240 mL          LAB:                        7.3    11.02 )-----------( 401      ( 16 Mar 2018 12:32 )             25.0                                               03-16    157<H>  |  118<H>  |  71<HH>  ----------------------------<  93  4.9   |  26  |  2.9<H>    Ca    9.2      16 Mar 2018 12:32  Phos  5.8     03-16  Mg     3.2     03-16                                               Urinalysis Basic - ( 16 Mar 2018 12:23 )    Color: Yellow / Appearance: Cloudy / S.015 / pH: x  Gluc: x / Ketone: Negative  / Bili: Negative / Urobili: 0.2 mg/dL   Blood: x / Protein: 100 mg/dL / Nitrite: Negative   Leuk Esterase: Large / RBC: 2-5 /HPF / WBC >50 /HPF   Sq Epi: x / Non Sq Epi: Occasional /HPF / Bacteria: x                                                                                               Culture - Blood (collected 15 Mar 2018 11:12)  Source: .Blood None  Preliminary Report (16 Mar 2018 16:01):    No growth to date.    Culture - Blood (collected 15 Mar 2018 01:15)  Source: .Blood None  Preliminary Report (16 Mar 2018 12:01):    No growth to date.                                                    MEDICATIONS  (STANDING):  bisacodyl Suppository 10 milliGRAM(s) Rectal daily  carBAMazepine Suspension 600 milliGRAM(s) Oral daily  clog zipper 1 Packet(s) 1 Each Enteral Tube once  collagenase Ointment 1 Application(s) Topical two times a day  Dakins Solution - Full Strength 1 Application(s) Topical two times a day  dextrose 5% + sodium chloride 0.45%. 1000 milliLiter(s) (75 mL/Hr) IV Continuous <Continuous>  fentaNYL   Patch  25 MICROgram(s)/Hr 1 Patch Transdermal every 72 hours  folic acid 1 milliGRAM(s) Oral daily  heparin  Injectable 5000 Unit(s) SubCutaneous every 8 hours  lactobacillus acidophilus 1 Tablet(s) Oral two times a day with meals  lactulose Syrup 10 Gram(s) Oral three times a day  levothyroxine 50 MICROGram(s) Oral daily  meropenem  IVPB 500 milliGRAM(s) IV Intermittent every 12 hours  multivitamin 1 Tablet(s) Oral daily  pantoprazole   Suspension 40 milliGRAM(s) Oral two times a day before meals  sevelamer hydrochloride 400 milliGRAM(s) Oral three times a day  simethicone 80 milliGRAM(s) Chew daily  thiamine 100 milliGRAM(s) Oral daily  valproic  acid Syrup 250 milliGRAM(s) Oral daily  valproic  acid Syrup 500 milliGRAM(s) Oral daily  valproic  acid Syrup 500 milliGRAM(s) Oral at bedtime    MEDICATIONS  (PRN):  acetaminophen  Suppository 650 milliGRAM(s) Rectal four times a day PRN For Temp greater than 38 C (100.4 F)  senna 2 Tablet(s) Oral at bedtime PRN Constipation

## 2018-03-17 NOTE — PROGRESS NOTE ADULT - ASSESSMENT
SALOME/stage 4 ckd/sepsis/anemia/hypernatremia:  #continue d5 1/2 ns at 75 cc/h  # creatinine trending up,  # check renal sono in view of previous findings on CT and creatinine increasing  # sodium noted  # d/c na bicarb po  # d/c phoslo in view of hypercalemia , corrected calcium : 10.8, and start renagel 1/1/1  # no evi in view of infection  # will follow

## 2018-03-17 NOTE — CONSULT NOTE ADULT - ASSESSMENT
MS bedridden with coccyx OM, hypernatremia, SALOME on CKD  No h/o lung disease, not hypercapnic in ABG (03/05/2018)  normal CXR IMPRESSION:  sepsis secondary to OM of coccyx  MS bedridden   hypernatremia, SALOME on CKD  No h/o lung disease, not hypercapnic in ABG (03/05/2018)  normal CXR, on room air      RECOMMENDATIONS:  correct hypernatremia. f/u Sodium  F/u with renal. adjust antibiotic as per GFR  patient cleared from pulmonary stand point for Debridement  F/u burn for debridement  needs aggressive DVT prophylaxis and incentive spirometer.

## 2018-03-17 NOTE — CONSULT NOTE ADULT - CONSULT REQUESTED BY NAME
Dr Abbott
Dr Watson
Hospitalist team
Medical service
Medicine
Medicine
[place]
dr. Private LAYTON
Hospitalist

## 2018-03-17 NOTE — PROGRESS NOTE ADULT - SUBJECTIVE AND OBJECTIVE BOX
seen and examined  lethargic  no distress        Standing Inpatient Medications  bisacodyl Suppository 10 milliGRAM(s) Rectal daily  calcium acetate 667 milliGRAM(s) Oral three times a day with meals  carBAMazepine Suspension 600 milliGRAM(s) Oral daily  clog zipper 1 Packet(s) 1 Each Enteral Tube once  collagenase Ointment 1 Application(s) Topical two times a day  Dakins Solution - Full Strength 1 Application(s) Topical two times a day  dextrose 5% + sodium chloride 0.45%. 1000 milliLiter(s) IV Continuous <Continuous>  fentaNYL   Patch  25 MICROgram(s)/Hr 1 Patch Transdermal every 72 hours  folic acid 1 milliGRAM(s) Oral daily  heparin  Injectable 5000 Unit(s) SubCutaneous every 8 hours  lactobacillus acidophilus 1 Tablet(s) Oral two times a day with meals  lactulose Syrup 10 Gram(s) Oral three times a day  levothyroxine 50 MICROGram(s) Oral daily  meropenem  IVPB 500 milliGRAM(s) IV Intermittent every 12 hours  multivitamin 1 Tablet(s) Oral daily  pantoprazole   Suspension 40 milliGRAM(s) Oral two times a day before meals  simethicone 80 milliGRAM(s) Chew daily  sodium bicarbonate 650 milliGRAM(s) Oral three times a day  thiamine 100 milliGRAM(s) Oral daily  valproic  acid Syrup 250 milliGRAM(s) Oral daily  valproic  acid Syrup 500 milliGRAM(s) Oral daily  valproic  acid Syrup 500 milliGRAM(s) Oral at bedtime    PRN Inpatient Medications  acetaminophen  Suppository 650 milliGRAM(s) Rectal four times a day PRN  senna 2 Tablet(s) Oral at bedtime PRN        VITALS/PHYSICAL EXAM  --------------------------------------------------------------------------------  T(C): 37.9 (03-17-18 @ 06:21), Max: 39 (03-16-18 @ 14:15)  HR: 95 (03-17-18 @ 06:21) (95 - 117)  BP: 95/53 (03-17-18 @ 06:21) (94/52 - 109/58)  RR: 18 (03-17-18 @ 06:21) (18 - 18)  SpO2: --  Wt(kg): --        03-16-18 @ 07:01  -  03-17-18 @ 07:00  --------------------------------------------------------  IN: 0 mL / OUT: 1150 mL / NET: -1150 mL      Physical Exam:  	Gen: NAD, dry mucosa  	Pulm: decrease BS B/L  	CV:  S1S2; no rub  	Abd: distended  	:  arpan  	LE:   no edema  	    LABS/STUDIES  --------------------------------------------------------------------------------              7.3    11.02 >-----------<  401      [03-16-18 @ 12:32]              25.0     157  |  118  |  71  ----------------------------<  93      [03-16-18 @ 12:32]  4.9   |  26  |  2.9       Ca     9.2     [03-16-18 @ 12:32]      Mg     3.2     [03-16-18 @ 12:32]      Phos  5.8     [03-16-18 @ 12:32]    TPro  5.7  /  Alb  1.7  /  TBili  0.6  /  DBili  x   /  AST  22  /  ALT  16  /  AlkPhos  67  [03-15-18 @ 10:11]          Creatinine Trend:  SCr 2.9 [03-16 @ 12:32]  SCr 2.5 [03-15 @ 10:11]  SCr 2.2 [03-13 @ 08:52]  SCr 2.2 [03-12 @ 08:55]  SCr 2.1 [03-12 @ 02:23]    Urinalysis - [03-16-18 @ 12:23]      Color Yellow / Appearance Cloudy / SG 1.015 / pH 7.0      Gluc Negative / Ketone Negative  / Bili Negative / Urobili 0.2       Blood Small / Protein 100 / Leuk Est Large / Nitrite Negative      RBC 2-5 / WBC >50 / Hyaline  / Gran  / Sq Epi  / Non Sq Epi Occasional / Bacteria       Iron 25, TIBC 88, %sat 26      [03-01-18 @ 19:45]  Ferritin 537.0      [03-01-18 @ 19:45]  PTH -- (Ca 8.7)      [03-01-18 @ 19:45]   85  HbA1c 5.4      [02-05-18 @ 06:52]

## 2018-03-17 NOTE — CONSULT NOTE ADULT - CONSULT REQUESTED DATE/TIME
16-Mar-2018 16:00
25-Feb-2018 13:22
26-Feb-2018 14:43
26-Feb-2018 15:20
27-Feb-2018 11:27
27-Feb-2018 20:21
17-Mar-2018 13:03
27-Feb-2018 09:33
14-Mar-2018 14:56
05-Mar-2018

## 2018-03-17 NOTE — PROGRESS NOTE ADULT - SUBJECTIVE AND OBJECTIVE BOX
SUBJECTIVE:    Patient is a 62y old Female who presents with a chief complaint of altered mental status (2018 18:44)    Currently admitted to medicine with the primary diagnosis of Sepsis     Today is hospital day 21. Patient appears more alert than previous days. She is responding to commands, AAOx2-3. Burn evaluated patient - can take patient for debridement in OR. Requesting clearance.    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     ALLERGIES:  No Known Allergies    MEDICATIONS:  STANDING MEDICATIONS  bisacodyl Suppository 10 milliGRAM(s) Rectal daily  carBAMazepine Suspension 600 milliGRAM(s) Oral daily  clog zipper 1 Packet(s) 1 Each Enteral Tube once  collagenase Ointment 1 Application(s) Topical two times a day  Dakins Solution - Full Strength 1 Application(s) Topical two times a day  dextrose 5% + sodium chloride 0.45%. 1000 milliLiter(s) IV Continuous <Continuous>  fentaNYL   Patch  25 MICROgram(s)/Hr 1 Patch Transdermal every 72 hours  folic acid 1 milliGRAM(s) Oral daily  heparin  Injectable 5000 Unit(s) SubCutaneous every 8 hours  lactobacillus acidophilus 1 Tablet(s) Oral two times a day with meals  lactulose Syrup 10 Gram(s) Oral three times a day  levothyroxine 50 MICROGram(s) Oral daily  meropenem  IVPB 500 milliGRAM(s) IV Intermittent every 12 hours  multivitamin 1 Tablet(s) Oral daily  pantoprazole   Suspension 40 milliGRAM(s) Oral two times a day before meals  sevelamer hydrochloride 400 milliGRAM(s) Oral three times a day  simethicone 80 milliGRAM(s) Chew daily  thiamine 100 milliGRAM(s) Oral daily  valproic  acid Syrup 250 milliGRAM(s) Oral daily  valproic  acid Syrup 500 milliGRAM(s) Oral daily  valproic  acid Syrup 500 milliGRAM(s) Oral at bedtime    PRN MEDICATIONS  acetaminophen  Suppository 650 milliGRAM(s) Rectal four times a day PRN  senna 2 Tablet(s) Oral at bedtime PRN    VITALS:   T(F): 100.2  HR: 95  BP: 95/53  RR: 18  SpO2: --    LABS:                        7.3    11.02 )-----------( 401      ( 16 Mar 2018 12:32 )             25.0     03-16    157<H>  |  118<H>  |  71<HH>  ----------------------------<  93  4.9   |  26  |  2.9<H>    Ca    9.2      16 Mar 2018 12:32  Phos  5.8     03-16  Mg     3.2     03-16    Urinalysis Basic - ( 16 Mar 2018 12:23 )    Color: Yellow / Appearance: Cloudy / S.015 / pH: x  Gluc: x / Ketone: Negative  / Bili: Negative / Urobili: 0.2 mg/dL   Blood: x / Protein: 100 mg/dL / Nitrite: Negative   Leuk Esterase: Large / RBC: 2-5 /HPF / WBC >50 /HPF   Sq Epi: x / Non Sq Epi: Occasional /HPF / Bacteria: x    Culture - Blood (collected 15 Mar 2018 11:12)  Source: .Blood None  Preliminary Report (16 Mar 2018 16:01):    No growth to date.    Culture - Blood (collected 15 Mar 2018 01:15)  Source: .Blood None  Preliminary Report (16 Mar 2018 12:01):    No growth to date.    PHYSICAL EXAM:  GEN: NAD, eyes open  LUNGS: CTAB, no w/r/r  HEART: RRR, no m/r/g  ABD: soft, NT/ND, +BS  EXT: NC/NC/2+PP/MERCEDES  NEURO: AAOx2-3

## 2018-03-18 LAB
ANION GAP SERPL CALC-SCNC: 16 MMOL/L — HIGH (ref 7–14)
BASOPHILS # BLD AUTO: 0.03 K/UL — SIGNIFICANT CHANGE UP (ref 0–0.2)
BASOPHILS NFR BLD AUTO: 0.4 % — SIGNIFICANT CHANGE UP (ref 0–1)
BLD GP AB SCN SERPL QL: SIGNIFICANT CHANGE UP
BUN SERPL-MCNC: 58 MG/DL — HIGH (ref 10–20)
CALCIUM SERPL-MCNC: 8.7 MG/DL — SIGNIFICANT CHANGE UP (ref 8.5–10.1)
CHLORIDE SERPL-SCNC: 113 MMOL/L — HIGH (ref 98–110)
CO2 SERPL-SCNC: 21 MMOL/L — SIGNIFICANT CHANGE UP (ref 17–32)
CREAT SERPL-MCNC: 2.6 MG/DL — HIGH (ref 0.7–1.5)
EOSINOPHIL # BLD AUTO: 0.24 K/UL — SIGNIFICANT CHANGE UP (ref 0–0.7)
EOSINOPHIL NFR BLD AUTO: 3.1 % — SIGNIFICANT CHANGE UP (ref 0–8)
GLUCOSE SERPL-MCNC: 98 MG/DL — SIGNIFICANT CHANGE UP (ref 70–110)
HCT VFR BLD CALC: 27.9 % — LOW (ref 37–47)
HGB BLD-MCNC: 8.3 G/DL — LOW (ref 12–16)
IMM GRANULOCYTES NFR BLD AUTO: 0.7 % — HIGH (ref 0.1–0.3)
LYMPHOCYTES # BLD AUTO: 2.05 K/UL — SIGNIFICANT CHANGE UP (ref 1.2–3.4)
LYMPHOCYTES # BLD AUTO: 26.7 % — SIGNIFICANT CHANGE UP (ref 20.5–51.1)
MAGNESIUM SERPL-MCNC: 2.7 MG/DL — HIGH (ref 1.8–2.4)
MCHC RBC-ENTMCNC: 29.2 PG — SIGNIFICANT CHANGE UP (ref 27–31)
MCHC RBC-ENTMCNC: 29.7 G/DL — LOW (ref 32–37)
MCV RBC AUTO: 98.2 FL — SIGNIFICANT CHANGE UP (ref 81–99)
MONOCYTES # BLD AUTO: 0.77 K/UL — HIGH (ref 0.1–0.6)
MONOCYTES NFR BLD AUTO: 10 % — HIGH (ref 1.7–9.3)
NEUTROPHILS # BLD AUTO: 4.55 K/UL — SIGNIFICANT CHANGE UP (ref 1.4–6.5)
NEUTROPHILS NFR BLD AUTO: 59.1 % — SIGNIFICANT CHANGE UP (ref 42.2–75.2)
NRBC # BLD: 0 /100 WBCS — SIGNIFICANT CHANGE UP (ref 0–0)
PHOSPHATE SERPL-MCNC: 4.7 MG/DL — SIGNIFICANT CHANGE UP (ref 2.1–4.9)
PLATELET # BLD AUTO: 381 K/UL — SIGNIFICANT CHANGE UP (ref 130–400)
POTASSIUM SERPL-MCNC: 4.6 MMOL/L — SIGNIFICANT CHANGE UP (ref 3.5–5)
POTASSIUM SERPL-SCNC: 4.6 MMOL/L — SIGNIFICANT CHANGE UP (ref 3.5–5)
RBC # BLD: 2.84 M/UL — LOW (ref 4.2–5.4)
RBC # FLD: 19.2 % — HIGH (ref 11.5–14.5)
SODIUM SERPL-SCNC: 150 MMOL/L — HIGH (ref 135–146)
TYPE + AB SCN PNL BLD: SIGNIFICANT CHANGE UP
WBC # BLD: 7.69 K/UL — SIGNIFICANT CHANGE UP (ref 4.8–10.8)
WBC # FLD AUTO: 7.69 K/UL — SIGNIFICANT CHANGE UP (ref 4.8–10.8)

## 2018-03-18 RX ADMIN — Medication 100 MILLIGRAM(S): at 13:58

## 2018-03-18 RX ADMIN — Medication 500 MILLIGRAM(S): at 05:18

## 2018-03-18 RX ADMIN — Medication 600 MILLIGRAM(S): at 13:57

## 2018-03-18 RX ADMIN — SODIUM CHLORIDE 75 MILLILITER(S): 9 INJECTION, SOLUTION INTRAVENOUS at 00:43

## 2018-03-18 RX ADMIN — HEPARIN SODIUM 5000 UNIT(S): 5000 INJECTION INTRAVENOUS; SUBCUTANEOUS at 05:15

## 2018-03-18 RX ADMIN — LACTULOSE 10 GRAM(S): 10 SOLUTION ORAL at 22:19

## 2018-03-18 RX ADMIN — Medication 1 MILLIGRAM(S): at 13:58

## 2018-03-18 RX ADMIN — SODIUM CHLORIDE 75 MILLILITER(S): 9 INJECTION, SOLUTION INTRAVENOUS at 19:03

## 2018-03-18 RX ADMIN — Medication 10 MILLIGRAM(S): at 13:58

## 2018-03-18 RX ADMIN — Medication 500 MILLIGRAM(S): at 22:20

## 2018-03-18 RX ADMIN — MEROPENEM 100 MILLIGRAM(S): 1 INJECTION INTRAVENOUS at 19:04

## 2018-03-18 RX ADMIN — PANTOPRAZOLE SODIUM 40 MILLIGRAM(S): 20 TABLET, DELAYED RELEASE ORAL at 06:19

## 2018-03-18 RX ADMIN — PANTOPRAZOLE SODIUM 40 MILLIGRAM(S): 20 TABLET, DELAYED RELEASE ORAL at 16:55

## 2018-03-18 RX ADMIN — Medication 1 APPLICATION(S): at 19:04

## 2018-03-18 RX ADMIN — LACTULOSE 10 GRAM(S): 10 SOLUTION ORAL at 13:59

## 2018-03-18 RX ADMIN — HEPARIN SODIUM 5000 UNIT(S): 5000 INJECTION INTRAVENOUS; SUBCUTANEOUS at 22:19

## 2018-03-18 RX ADMIN — HEPARIN SODIUM 5000 UNIT(S): 5000 INJECTION INTRAVENOUS; SUBCUTANEOUS at 14:00

## 2018-03-18 RX ADMIN — Medication 1 TABLET(S): at 16:56

## 2018-03-18 RX ADMIN — SIMETHICONE 80 MILLIGRAM(S): 80 TABLET, CHEWABLE ORAL at 13:58

## 2018-03-18 RX ADMIN — Medication 1 TABLET(S): at 13:58

## 2018-03-18 RX ADMIN — LACTULOSE 10 GRAM(S): 10 SOLUTION ORAL at 05:14

## 2018-03-18 RX ADMIN — Medication 250 MILLIGRAM(S): at 16:55

## 2018-03-18 RX ADMIN — Medication 50 MICROGRAM(S): at 05:16

## 2018-03-18 RX ADMIN — MEROPENEM 100 MILLIGRAM(S): 1 INJECTION INTRAVENOUS at 05:14

## 2018-03-18 RX ADMIN — Medication 1 TABLET(S): at 09:34

## 2018-03-18 RX ADMIN — Medication 1 APPLICATION(S): at 05:27

## 2018-03-18 NOTE — PROGRESS NOTE ADULT - SUBJECTIVE AND OBJECTIVE BOX
Nephrology progress note    Patient is seen and examined, events over the last 24 h noted .    Allergies:  No Known Allergies    Hospital Medications:   MEDICATIONS  (STANDING):  bisacodyl Suppository 10 milliGRAM(s) Rectal daily  carBAMazepine Suspension 600 milliGRAM(s) Oral daily  clog zipper 1 Packet(s) 1 Each Enteral Tube once  collagenase Ointment 1 Application(s) Topical two times a day  Dakins Solution - Full Strength 1 Application(s) Topical two times a day  dextrose 5% + sodium chloride 0.45%. 1000 milliLiter(s) (75 mL/Hr) IV Continuous <Continuous>  fentaNYL   Patch  25 MICROgram(s)/Hr 1 Patch Transdermal every 72 hours  folic acid 1 milliGRAM(s) Oral daily  heparin  Injectable 5000 Unit(s) SubCutaneous every 8 hours  lactobacillus acidophilus 1 Tablet(s) Oral two times a day with meals  lactulose Syrup 10 Gram(s) Oral three times a day  levothyroxine 50 MICROGram(s) Oral daily  meropenem  IVPB 500 milliGRAM(s) IV Intermittent every 12 hours  multivitamin 1 Tablet(s) Oral daily  pantoprazole   Suspension 40 milliGRAM(s) Oral two times a day before meals  renvela 800 mg 800 milliGRAM(s) 800 milliGRAM(s) Oral three times a day  simethicone 80 milliGRAM(s) Chew daily  thiamine 100 milliGRAM(s) Oral daily  valproic  acid Syrup 250 milliGRAM(s) Oral daily  valproic  acid Syrup 500 milliGRAM(s) Oral daily  valproic  acid Syrup 500 milliGRAM(s) Oral at bedtime        VITALS:  T(F): 99.4 (18 @ 22:00), Max: 100.2 (18 @ 06:21)  HR: 97 (18 @ 22:00)  BP: 94/53 (18 @ 22:00)  RR: 16 (18 @ 22:00)  SpO2: 98% (18 @ 20:46)  Wt(kg): --    03-15 @ 07:01  -   @ 07:00  --------------------------------------------------------  IN: 1340 mL / OUT: 800 mL / NET: 540 mL     @ 07:01  -   @ 07:00  --------------------------------------------------------  IN: 0 mL / OUT: 1150 mL / NET: -1150 mL     @ 07:01  -   @ 05:45  --------------------------------------------------------  IN: 1490 mL / OUT: 500 mL / NET: 990 mL          PHYSICAL EXAM:  Constitutional: NAD  HEENT: anicteric sclera, oropharynx clear, MMM  Neck: No JVD  Respiratory: CTAB, no wheezes, rales or rhonchi  Cardiovascular: S1, S2, RRR  Gastrointestinal: BS+, soft, NT/ND  Extremities: No cyanosis or clubbing. No peripheral edema  Neurological: A/O x 3, no focal deficits  : No CVA tenderness. No antony.   Skin: No rashes  Vascular Access:    LABS:      154<H>  |  116<H>  |  66<HH>  ----------------------------<  128<H>  5.0   |  26  |  2.9<H>  Creatinine Trend: 2.9<--, 2.9<--, 2.5<--, 2.2<--, 2.2<--, 2.1<--  SODIUM TREND:  Sodium 154 [ @ 13:30]  Sodium 157 [ @ 12:32]  Sodium 156 [03-15 @ 10:11]  Sodium 139 [ @ 08:52]  Sodium 149 [ @ 08:55]  Sodium 148 [ @ 02:23]  Sodium 151 [ 13:02]  Sodium 149 [03-10 @ 10:17]  Sodium 146 [ @ 06:51]  Sodium 143 [ @ 15:21]    Ca    8.5      17 Mar 2018 13:30  Phos  5.1       Mg     3.0                                 6.7    7.56  )-----------( 358      ( 17 Mar 2018 13:30 )             23.1     Creatinine, Serum: 2.9 mg/dL (18 @ 13:30)  Creatinine, Serum: 2.9 mg/dL (18 @ 12:32)  Creatinine, Serum: 2.5 mg/dL (03-15-18 @ 10:11)  Creatinine, Serum: 2.2 mg/dL (18 @ 08:52)  Creatinine, Serum: 2.2 mg/dL (18 @ 08:55)          Urine Studies:  Urinalysis Basic - ( 16 Mar 2018 12:23 )    Color: Yellow / Appearance: Cloudy / S.015 / pH:   Gluc:  / Ketone: Negative  / Bili: Negative / Urobili: 0.2 mg/dL   Blood:  / Protein: 100 mg/dL / Nitrite: Negative   Leuk Esterase: Large / RBC: 2-5 /HPF / WBC >50 /HPF   Sq Epi:  / Non Sq Epi: Occasional /HPF / Bacteria:         RADIOLOGY & ADDITIONAL STUDIES: Nephrology progress note    Patient is seen and examined, events over the last 24 h noted .  confused, no good response.    Allergies:  No Known Allergies    Hospital Medications:   MEDICATIONS  (STANDING):  bisacodyl Suppository 10 milliGRAM(s) Rectal daily  carBAMazepine Suspension 600 milliGRAM(s) Oral daily  clog zipper 1 Packet(s) 1 Each Enteral Tube once  collagenase Ointment 1 Application(s) Topical two times a day  Dakins Solution - Full Strength 1 Application(s) Topical two times a day  dextrose 5% + sodium chloride 0.45%. 1000 milliLiter(s) (75 mL/Hr) IV Continuous <Continuous>  fentaNYL   Patch  25 MICROgram(s)/Hr 1 Patch Transdermal every 72 hours  folic acid 1 milliGRAM(s) Oral daily  heparin  Injectable 5000 Unit(s) SubCutaneous every 8 hours  lactobacillus acidophilus 1 Tablet(s) Oral two times a day with meals  lactulose Syrup 10 Gram(s) Oral three times a day  levothyroxine 50 MICROGram(s) Oral daily  meropenem  IVPB 500 milliGRAM(s) IV Intermittent every 12 hours  multivitamin 1 Tablet(s) Oral daily  pantoprazole   Suspension 40 milliGRAM(s) Oral two times a day before meals  renvela 800 mg 800 milliGRAM(s) 800 milliGRAM(s) Oral three times a day  simethicone 80 milliGRAM(s) Chew daily  thiamine 100 milliGRAM(s) Oral daily  valproic  acid Syrup 250 milliGRAM(s) Oral daily  valproic  acid Syrup 500 milliGRAM(s) Oral daily  valproic  acid Syrup 500 milliGRAM(s) Oral at bedtime        VITALS:  T(F): 99.4 (18 @ 22:00), Max: 100.2 (18 @ 06:21)  HR: 97 (18 @ 22:00)  BP: 94/53 (18 @ 22:00)Vital Signs Last 24 Hrs  BP: 118/55 (18 Mar 2018 05:54) (92/51 - 118/55)  RR: 16 (18 @ 22:00)  SpO2: 98% (18 @ 20:46)  Wt(kg): --    03-15 @ 07:01  -   @ 07:00  --------------------------------------------------------  IN: 1340 mL / OUT: 800 mL / NET: 540 mL     @ 07:01  -   @ 07:00  --------------------------------------------------------  IN: 0 mL / OUT: 1150 mL / NET: -1150 mL     @ 07:01  -   @ 05:45  --------------------------------------------------------  IN: 1490 mL / OUT: 500 mL / NET: 990 mL    PHYSICAL EXAM:  GEN: NAD, eyes open  LUNGS: CTAB, no w/r/r  HEART: RRR, no m/r/g  ABD: soft, NT/ND, +BS  EXT: NC/NC/2+PP/MERCEDES  NEURO: AAOx2-3    LABS:      154<H>  |  116<H>  |  66<HH>  ----------------------------<  128<H>  5.0   |  26  |  2.9<H>  Creatinine Trend: 2.9<--, 2.9<--, 2.5<--, 2.2<--, 2.2<--, 2.1<--  SODIUM TREND:  Sodium 154 [ @ 13:30]  Sodium 157 [ @ 12:32]  Sodium 156 [03-15 @ 10:11]  Sodium 139 [ @ 08:52]  Sodium 149 [ @ 08:55]  Sodium 148 [ @ 02:23]  Sodium 151 [ 13:02]  Sodium 149 [03-10 @ 10:17]  Sodium 146 [ @ 06:51]  Sodium 143 [ @ 15:21]    Ca    8.5      17 Mar 2018 13:30  Phos  5.1       Mg     3.0                         6.7    7.56  )-----------( 358      ( 17 Mar 2018 13:30 )             23.1     Urine Studies:  Urinalysis Basic - ( 16 Mar 2018 12:23 )    Color: Yellow / Appearance: Cloudy / S.015 / pH:   Gluc:  / Ketone: Negative  / Bili: Negative / Urobili: 0.2 mg/dL   Blood:  / Protein: 100 mg/dL / Nitrite: Negative   Leuk Esterase: Large / RBC: 2-5 /HPF / WBC >50 /HPF   Sq Epi:  / Non Sq Epi: Occasional /HPF / Bacteria:     RADIOLOGY & ADDITIONAL STUDIES:  < from: Xray Chest 1 View- PORTABLE-Urgent (18 @ 13:07) >  Impression:      Nofocal consolidation.    < end of copied text >  < from: US Retroperitoneal Complete (18 @ 13:24) >  IMPRESSION:  1.  Negative examination of the kidneys. No hydronephrosis.    2.  Nondiagnostic examination urinary bladder.    < end of copied text >

## 2018-03-18 NOTE — PROGRESS NOTE ADULT - ASSESSMENT
Patient is a 62y old Female with PMH of MS and being bed-bound, CKD IV, chronic sacral osteo, recently diagnosed colon CA at [facility], and seizure disorder who presents with a chief complaint of altered mental status (24 Feb 2018 18:44). Patient's daughter/healthcare proxy present at bedside. She reports patient's mental status has gradually declined since October after repeated hospitalizations for lung infections, UTIs, and the chronic sacral ulcer. As per daughter, patient more verbal a week prior to admission.    1. Metabolic encephalopathy probably 2/2 sepsis/ sacral osteomyelitis/UTI 2/2 chronic indwelling antony  - CT Head No Cont (02.24.18 @ 16:25): negative  - EEG showed generalized slowing w/ triphasic waves (reportedly) - consistent w/ metabolic encephalopathy  - Ammonia elevated - increased lactulose, titrate to 3-4 BMs per day. C/w Rifaximin. Ammonia trend 135->141->116->192 -> 113 -> 82 -> 75 -> 19  - repeat Blood Cx - NGTD  - ID on board - Rx's Huey (renally dosed to 500 q12)  - Burn on board - possible debridement in OR pending medical clearance. Will discuss w/ family.  - Palliative on board   - Hospice on board - met on 3/16. Family still undecided at this time.  - repeat U Cx pending  - Pulm consult - placed for clearance for OR    2. Hypernatremia  - improving.  - Nephro on board -   - c/w d5 1/2 ns @ 75  - daily BMP    3. AMS, can't take PO  - NGT removed 3/16  - S&S evaluated - Rx pureed diet w/ nectar thick liquids, 1:1 feeds small sips/bites    4. Seizure Disorder/ Bipolar disorder  - Valproic acid level 26; Carbamazepine level 7.3. These levels recorded after increasing both meds  - Neuro on board - Rx Valproic acid increased to 500mg in the morning and at bedtime and 250mg in between. Tegretol increased to 600mg QD.    5. Sigmoid Colon AdenoCA  - GI on board - Rx no intervention for now  - H/H stable  - c/w protonix  - EGD done at [facility] on 1/28/18 showed 4cm semi-circumferential mass in sigmoid colon, which was biopsied - reports show moderately differentiated adenoCA  - Oncology on board - Rx's appreciated. Not a candidate for chemo at this time. Surgical resection best for localized colon ca but needs complete staging (i.e. CT chest/abd/pel w/ IV contrast)  - family agreeable for CT w/ contrast. However in light of worsening SALOME will hold off on CT for now.    5. DM type 2  - Monitor FS.    6. Hypothyroidism  - c/w synthroid.    7. Multiple Sclerosis with neurogenic bladder  - Chronic antony. Changed 3/16    8. B/L upper extremity edema  - Venous duplex showed evidence of superficial thrombophlebitis in left arm distal to PICC line, results above.    DVT ppx: Heparin subQ  Code status: DNR/DNI  Dispo: from [facility] (long-term)

## 2018-03-18 NOTE — PROGRESS NOTE ADULT - ASSESSMENT
SALOME/stage 4 ckd/sepsis/anemia/hypernatremia:    # start d5 1/2 ns at 75 cc/h  # creatinine trending up,  # renal sono noted, no hydro  # sodium noted  # corrected calcium : 10.8, and start renagel 1/1/1; check PTH, vit D  # no evi in view of infection     will follow

## 2018-03-18 NOTE — PROGRESS NOTE ADULT - SUBJECTIVE AND OBJECTIVE BOX
JUAN JOSE RAMACHANDRAN  62y  Female      Patient is a 62y old  Female who presents with a chief complaint of altered mental status (24 Feb 2018 18:44)      INTERVAL HPI/OVERNIGHT EVENTS:      ******************************* REVIEW OF SYSTEMS:**********************************************    UNable to assess for patient's poor mental status    *********************** VITALS ******************************************    T(F): 97.2 (03-18-18 @ 13:23)  HR: 85 (03-18-18 @ 13:23) (85 - 97)  BP: 110/69 (03-18-18 @ 13:23) (92/51 - 118/55)  RR: 18 (03-18-18 @ 13:23) (16 - 18)  SpO2: 98% (03-17-18 @ 20:46) (98% - 98%)    03-17-18 @ 07:01  -  03-18-18 @ 07:00  --------------------------------------------------------  IN: 1490 mL / OUT: 500 mL / NET: 990 mL            03-17-18 @ 07:01  -  03-18-18 @ 07:00  --------------------------------------------------------  IN: 1490 mL / OUT: 500 mL / NET: 990 mL        ******************************** PHYSICAL EXAM:**************************************************  GENERAL: NAD    PSYCH: no agitation,   HEENT:     NERVOUS SYSTEM:  Alert & Oriented X1-2    PULMONARY: BRUCE, CTA    CARDIOVASCULAR: S1S2 RRR    GI: Soft, NT, ND; BS present.    EXTREMITIES:  2+ Peripheral Pulses, No clubbing, cyanosis, or edema    LYMPH: No lymphadenopathy noted    SKIN: No rashes or lesions    ******************************************************************************************    Consultant(s) Notes Reviewed:  [x ] YES  [ ] NO    Discussed with Consultants/Other Providers [ x] YES     **************************** LABS *******************************************************                          8.3    7.69  )-----------( 381      ( 18 Mar 2018 07:10 )             27.9     03-18    150<H>  |  113<H>  |  58<H>  ----------------------------<  98  4.6   |  21  |  2.6<H>    Ca    8.7      18 Mar 2018 07:10  Phos  4.7     03-18  Mg     2.7     03-18            Lactate Trend        CAPILLARY BLOOD GLUCOSE  95 (18 Mar 2018 11:57)              **************************Active Medications *******************************************  No Known Allergies      acetaminophen  Suppository 650 milliGRAM(s) Rectal four times a day PRN  bisacodyl Suppository 10 milliGRAM(s) Rectal daily  carBAMazepine Suspension 600 milliGRAM(s) Oral daily  clog zipper 1 Packet(s) 1 Each Enteral Tube once  collagenase Ointment 1 Application(s) Topical two times a day  Dakins Solution - Full Strength 1 Application(s) Topical two times a day  dextrose 5% + sodium chloride 0.45%. 1000 milliLiter(s) IV Continuous <Continuous>  fentaNYL   Patch  25 MICROgram(s)/Hr 1 Patch Transdermal every 72 hours  folic acid 1 milliGRAM(s) Oral daily  heparin  Injectable 5000 Unit(s) SubCutaneous every 8 hours  lactobacillus acidophilus 1 Tablet(s) Oral two times a day with meals  lactulose Syrup 10 Gram(s) Oral three times a day  levothyroxine 50 MICROGram(s) Oral daily  meropenem  IVPB 500 milliGRAM(s) IV Intermittent every 12 hours  multivitamin 1 Tablet(s) Oral daily  pantoprazole   Suspension 40 milliGRAM(s) Oral two times a day before meals  renvela 800 mg 800 milliGRAM(s) 800 milliGRAM(s) Oral three times a day  senna 2 Tablet(s) Oral at bedtime PRN  simethicone 80 milliGRAM(s) Chew daily  thiamine 100 milliGRAM(s) Oral daily  valproic  acid Syrup 250 milliGRAM(s) Oral daily  valproic  acid Syrup 500 milliGRAM(s) Oral daily  valproic  acid Syrup 500 milliGRAM(s) Oral at bedtime      ***************************************************  RADIOLOGY & ADDITIONAL TESTS:    Imaging Personally Reviewed:  [ ] YES  [ ] NO    HEALTH ISSUES - PROBLEM Dx:  Goals of care, counseling/discussion: Goals of care, counseling/discussion  Declining functional status: Declining functional status  Palliative care by specialist: Palliative care by specialist  Pain: Pain

## 2018-03-19 ENCOUNTER — RESULT REVIEW (OUTPATIENT)
Age: 63
End: 2018-03-19

## 2018-03-19 LAB
ANION GAP SERPL CALC-SCNC: 15 MMOL/L — HIGH (ref 7–14)
BUN SERPL-MCNC: 45 MG/DL — HIGH (ref 10–20)
CALCIUM SERPL-MCNC: 8.7 MG/DL — SIGNIFICANT CHANGE UP (ref 8.5–10.1)
CHLORIDE SERPL-SCNC: 106 MMOL/L — SIGNIFICANT CHANGE UP (ref 98–110)
CO2 SERPL-SCNC: 22 MMOL/L — SIGNIFICANT CHANGE UP (ref 17–32)
CREAT SERPL-MCNC: 2.2 MG/DL — HIGH (ref 0.7–1.5)
GLUCOSE SERPL-MCNC: 84 MG/DL — SIGNIFICANT CHANGE UP (ref 70–110)
MAGNESIUM SERPL-MCNC: 2.3 MG/DL — SIGNIFICANT CHANGE UP (ref 1.8–2.4)
PHOSPHATE SERPL-MCNC: 4.5 MG/DL — SIGNIFICANT CHANGE UP (ref 2.1–4.9)
POTASSIUM SERPL-MCNC: 4.8 MMOL/L — SIGNIFICANT CHANGE UP (ref 3.5–5)
POTASSIUM SERPL-SCNC: 4.8 MMOL/L — SIGNIFICANT CHANGE UP (ref 3.5–5)
SODIUM SERPL-SCNC: 143 MMOL/L — SIGNIFICANT CHANGE UP (ref 135–146)

## 2018-03-19 RX ORDER — SODIUM HYPOCHLORITE 0.125 %
1 SOLUTION, NON-ORAL MISCELLANEOUS
Qty: 0 | Refills: 0 | Status: DISCONTINUED | OUTPATIENT
Start: 2018-03-19 | End: 2018-03-22

## 2018-03-19 RX ORDER — FOLIC ACID 0.8 MG
1 TABLET ORAL DAILY
Qty: 0 | Refills: 0 | Status: DISCONTINUED | OUTPATIENT
Start: 2018-03-19 | End: 2018-03-22

## 2018-03-19 RX ORDER — SIMETHICONE 80 MG/1
80 TABLET, CHEWABLE ORAL DAILY
Qty: 0 | Refills: 0 | Status: DISCONTINUED | OUTPATIENT
Start: 2018-03-19 | End: 2018-03-22

## 2018-03-19 RX ORDER — FENTANYL CITRATE 50 UG/ML
1 INJECTION INTRAVENOUS
Qty: 0 | Refills: 0 | Status: DISCONTINUED | OUTPATIENT
Start: 2018-03-19 | End: 2018-03-22

## 2018-03-19 RX ORDER — VALPROIC ACID (AS SODIUM SALT) 250 MG/5ML
500 SOLUTION, ORAL ORAL DAILY
Qty: 0 | Refills: 0 | Status: DISCONTINUED | OUTPATIENT
Start: 2018-03-19 | End: 2018-03-22

## 2018-03-19 RX ORDER — SODIUM CHLORIDE 9 MG/ML
1000 INJECTION, SOLUTION INTRAVENOUS
Qty: 0 | Refills: 0 | Status: DISCONTINUED | OUTPATIENT
Start: 2018-03-19 | End: 2018-03-20

## 2018-03-19 RX ORDER — LEVOTHYROXINE SODIUM 125 MCG
50 TABLET ORAL DAILY
Qty: 0 | Refills: 0 | Status: DISCONTINUED | OUTPATIENT
Start: 2018-03-19 | End: 2018-03-22

## 2018-03-19 RX ORDER — ACETAMINOPHEN 500 MG
650 TABLET ORAL
Qty: 0 | Refills: 0 | Status: DISCONTINUED | OUTPATIENT
Start: 2018-03-19 | End: 2018-03-22

## 2018-03-19 RX ORDER — SENNA PLUS 8.6 MG/1
2 TABLET ORAL AT BEDTIME
Qty: 0 | Refills: 0 | Status: DISCONTINUED | OUTPATIENT
Start: 2018-03-19 | End: 2018-03-22

## 2018-03-19 RX ORDER — THIAMINE MONONITRATE (VIT B1) 100 MG
100 TABLET ORAL DAILY
Qty: 0 | Refills: 0 | Status: DISCONTINUED | OUTPATIENT
Start: 2018-03-19 | End: 2018-03-22

## 2018-03-19 RX ORDER — VALPROIC ACID (AS SODIUM SALT) 250 MG/5ML
250 SOLUTION, ORAL ORAL DAILY
Qty: 0 | Refills: 0 | Status: DISCONTINUED | OUTPATIENT
Start: 2018-03-19 | End: 2018-03-22

## 2018-03-19 RX ORDER — MORPHINE SULFATE 50 MG/1
1 CAPSULE, EXTENDED RELEASE ORAL
Qty: 0 | Refills: 0 | Status: DISCONTINUED | OUTPATIENT
Start: 2018-03-19 | End: 2018-03-19

## 2018-03-19 RX ORDER — HEPARIN SODIUM 5000 [USP'U]/ML
5000 INJECTION INTRAVENOUS; SUBCUTANEOUS EVERY 8 HOURS
Qty: 0 | Refills: 0 | Status: DISCONTINUED | OUTPATIENT
Start: 2018-03-19 | End: 2018-03-22

## 2018-03-19 RX ORDER — LACTOBACILLUS ACIDOPHILUS 100MM CELL
1 CAPSULE ORAL
Qty: 0 | Refills: 0 | Status: DISCONTINUED | OUTPATIENT
Start: 2018-03-19 | End: 2018-03-22

## 2018-03-19 RX ORDER — OXYCODONE AND ACETAMINOPHEN 5; 325 MG/1; MG/1
1 TABLET ORAL ONCE
Qty: 0 | Refills: 0 | Status: DISCONTINUED | OUTPATIENT
Start: 2018-03-19 | End: 2018-03-19

## 2018-03-19 RX ORDER — ONDANSETRON 8 MG/1
1 TABLET, FILM COATED ORAL
Qty: 0 | Refills: 0 | Status: DISCONTINUED | OUTPATIENT
Start: 2018-03-19 | End: 2018-03-19

## 2018-03-19 RX ORDER — SODIUM CHLORIDE 9 MG/ML
1000 INJECTION INTRAMUSCULAR; INTRAVENOUS; SUBCUTANEOUS
Qty: 0 | Refills: 0 | Status: DISCONTINUED | OUTPATIENT
Start: 2018-03-19 | End: 2018-03-19

## 2018-03-19 RX ORDER — CARBAMAZEPINE 200 MG
600 TABLET ORAL DAILY
Qty: 0 | Refills: 0 | Status: DISCONTINUED | OUTPATIENT
Start: 2018-03-19 | End: 2018-03-22

## 2018-03-19 RX ORDER — COLLAGENASE CLOSTRIDIUM HIST. 250 UNIT/G
1 OINTMENT (GRAM) TOPICAL
Qty: 0 | Refills: 0 | Status: DISCONTINUED | OUTPATIENT
Start: 2018-03-19 | End: 2018-03-22

## 2018-03-19 RX ORDER — PANTOPRAZOLE SODIUM 20 MG/1
40 TABLET, DELAYED RELEASE ORAL
Qty: 0 | Refills: 0 | Status: DISCONTINUED | OUTPATIENT
Start: 2018-03-19 | End: 2018-03-22

## 2018-03-19 RX ORDER — MEROPENEM 1 G/30ML
500 INJECTION INTRAVENOUS EVERY 12 HOURS
Qty: 0 | Refills: 0 | Status: DISCONTINUED | OUTPATIENT
Start: 2018-03-19 | End: 2018-03-22

## 2018-03-19 RX ADMIN — SODIUM CHLORIDE 75 MILLILITER(S): 9 INJECTION, SOLUTION INTRAVENOUS at 17:00

## 2018-03-19 RX ADMIN — PANTOPRAZOLE SODIUM 40 MILLIGRAM(S): 20 TABLET, DELAYED RELEASE ORAL at 06:20

## 2018-03-19 RX ADMIN — Medication 500 MILLIGRAM(S): at 06:19

## 2018-03-19 RX ADMIN — Medication 1 APPLICATION(S): at 06:19

## 2018-03-19 RX ADMIN — PANTOPRAZOLE SODIUM 40 MILLIGRAM(S): 20 TABLET, DELAYED RELEASE ORAL at 18:17

## 2018-03-19 RX ADMIN — Medication 1 TABLET(S): at 18:17

## 2018-03-19 RX ADMIN — Medication 50 MICROGRAM(S): at 06:19

## 2018-03-19 RX ADMIN — Medication 1 APPLICATION(S): at 06:21

## 2018-03-19 RX ADMIN — HEPARIN SODIUM 5000 UNIT(S): 5000 INJECTION INTRAVENOUS; SUBCUTANEOUS at 21:59

## 2018-03-19 RX ADMIN — HEPARIN SODIUM 5000 UNIT(S): 5000 INJECTION INTRAVENOUS; SUBCUTANEOUS at 06:19

## 2018-03-19 RX ADMIN — LACTULOSE 10 GRAM(S): 10 SOLUTION ORAL at 06:21

## 2018-03-19 RX ADMIN — MEROPENEM 100 MILLIGRAM(S): 1 INJECTION INTRAVENOUS at 18:13

## 2018-03-19 RX ADMIN — MEROPENEM 100 MILLIGRAM(S): 1 INJECTION INTRAVENOUS at 06:19

## 2018-03-19 NOTE — BRIEF OPERATIVE NOTE - PROCEDURE
<<-----Click on this checkbox to enter Procedure Debridement  03/19/2018  sharp excisional debridement sacrum [id number]  Active  [id number]

## 2018-03-19 NOTE — PROGRESS NOTE ADULT - SUBJECTIVE AND OBJECTIVE BOX
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     ALLERGIES:  No Known Allergies    MEDICATIONS:  STANDING MEDICATIONS  bisacodyl Suppository 10 milliGRAM(s) Rectal daily  carBAMazepine Suspension 600 milliGRAM(s) Oral daily  clog zipper 1 Packet(s) 1 Each Enteral Tube once  collagenase Ointment 1 Application(s) Topical two times a day  Dakins Solution - Full Strength 1 Application(s) Topical two times a day  dextrose 5% + sodium chloride 0.45%. 1000 milliLiter(s) IV Continuous <Continuous>  fentaNYL   Patch  25 MICROgram(s)/Hr 1 Patch Transdermal every 72 hours  folic acid 1 milliGRAM(s) Oral daily  heparin  Injectable 5000 Unit(s) SubCutaneous every 8 hours  lactobacillus acidophilus 1 Tablet(s) Oral two times a day with meals  lactulose Syrup 10 Gram(s) Oral three times a day  levothyroxine 50 MICROGram(s) Oral daily  meropenem  IVPB 500 milliGRAM(s) IV Intermittent every 12 hours  multivitamin 1 Tablet(s) Oral daily  pantoprazole   Suspension 40 milliGRAM(s) Oral two times a day before meals  renvela 800 mg 800 milliGRAM(s) 800 milliGRAM(s) Oral three times a day  simethicone 80 milliGRAM(s) Chew daily  thiamine 100 milliGRAM(s) Oral daily  valproic  acid Syrup 250 milliGRAM(s) Oral daily  valproic  acid Syrup 500 milliGRAM(s) Oral daily  valproic  acid Syrup 500 milliGRAM(s) Oral at bedtime    PRN MEDICATIONS  acetaminophen  Suppository 650 milliGRAM(s) Rectal four times a day PRN  senna 2 Tablet(s) Oral at bedtime PRN    VITALS:   Vital Signs Last 24 Hrs  T(C): 36.6 (19 Mar 2018 06:01), Max: 36.6 (19 Mar 2018 06:01)  T(F): 97.8 (19 Mar 2018 06:01), Max: 97.8 (19 Mar 2018 06:01)  HR: 87 (19 Mar 2018 06:01) (85 - 94)  BP: 130/68 (19 Mar 2018 06:01) (110/69 - 130/68)  BP(mean): --  RR: 18 (19 Mar 2018 06:01) (18 - 18)  SpO2: --    LABS:                        8.3    7.69  )-----------( 381      ( 18 Mar 2018 07:10 )             27.9     03-18    150<H>  |  113<H>  |  58<H>  ----------------------------<  98  4.6   |  21  |  2.6<H>    Ca    8.7      18 Mar 2018 07:10  Phos  4.7     03-18  Mg     2.7     03-18    Culture - Urine (collected 16 Mar 2018 12:23)  Source: .Urine Catheterized  Final Report (17 Mar 2018 21:50):    No growth      RADIOLOGY:    < from: US Retroperitoneal Complete (03.17.18 @ 13:24) >  1.  Negative examination of the kidneys. No hydronephrosis.    2.  Nondiagnostic examination urinary bladder.    < end of copied text >    < from: Xray Chest 1 View- PORTABLE-Urgent (03.16.18 @ 13:07) >  Nofocal consolidation.    < end of copied text >    PHYSICAL EXAM:  GEN: No acute distress  LUNGS: Clear to auscultation bilaterally   HEART: S1/S2 present. RRR.   ABD: Soft, non-tender, non-distended. Bowel sounds present  EXT: NC/NC/NE/2+PP/MERCEDES  NEURO: [id number]

## 2018-03-19 NOTE — PROGRESS NOTE ADULT - ASSESSMENT
Patient is a 62y old Female with PMH of MS and being bed-bound, CKD IV, chronic sacral osteo, recently diagnosed colon CA at [facility], and seizure disorder who presents with a chief complaint of altered mental status (24 Feb 2018 18:44). Patient's daughter/healthcare proxy present at bedside. She reports patient's mental status has gradually declined since October after repeated hospitalizations for lung infections, UTIs, and the chronic sacral ulcer. As per daughter, patient more verbal a week prior to admission.    #) Metabolic encephalopathy probably 2/2 sepsis/ sacral osteomyelitis/UTI 2/2 chronic indwelling antony  -on merrem (started 3/16)  - CT Head No Cont (02.24.18 @ 16:25): negative  - EEG showed generalized slowing w/ triphasic waves (reportedly) - consistent w/ metabolic encephalopathy  - Ammonia elevated - increased lactulose, titrate to 3-4 BMs per day. C/w Rifaximin. Ammonia trend 135->141->116->192 -> 113 -> 82 -> 75 -> 19  - repeat Blood Cx - NGTD  - ID following - palliative care. d/c iv antibiotics  - Burn following (Dr. Ordonez) - possible debridement in OR pending medical clearance. Will discuss w/ family.  - Palliative following  - Hospice following - met on 3/16. Family still undecided at this time.  - repeat UCx pending  - Pulm c/s (Dr. Henry) - correct hypernatremia. f/u Sodium, F/u with renal. adjust antibiotic as per GFR, patient cleared from pulmonary stand point for Debridement, F/u burn for debridement, needs aggressive DVT prophylaxis and incentive spirometer. clearance for OR  -Heme/Onc c/s (Dr. Bertrand)- Given the overall situation, hospice may be appropriate, not a candidate for systemic chemotherapy    #) Hypernatremia  -improving.  -Nephro following (Dr. Kleiner)- c/w d5 1/2 ns @ 75, creatinine trending up, renal sono noted, no hydro, sodium noted  -daily BMP    #) AMS, can't take PO  - NGT removed 3/16  - S&S evaluated - Rx pureed diet w/ nectar thick liquids, 1:1 feeds small sips/bites    #) Seizure Disorder/ Bipolar disorder  - Valproic acid level 26; Carbamazepine level 7.3. These levels recorded after increasing both meds  - Neuro on board - Rx Valproic acid increased to 500mg in the morning and at bedtime and 250mg in between. Tegretol increased to 600mg QD.    #) Sigmoid Colon AdenoCA  - GI following - Rx no intervention for now  - H/H stable  - c/w protonix  - EGD done at [facility] on 1/28/18 showed 4cm semi-circumferential mass in sigmoid colon, which was biopsied - reports show moderately differentiated adenoCA  - Oncology on board - Rx's appreciated. Not a candidate for chemo at this time. Surgical resection best for localized colon ca but needs complete staging (i.e. CT chest/abd/pel w/ IV contrast)  - family agreeable for CT w/ contrast. However in light of worsening SALOME will hold off on CT for now.    #) DM type 2  - Monitor FS.    #) Hypothyroidism  - c/w synthroid.    #) Multiple Sclerosis with neurogenic bladder  - Chronic antony. Changed 3/16    #) B/L upper extremity edema  - Venous duplex showed evidence of superficial thrombophlebitis in left arm distal to PICC line, results above.    #) DVT/ GI ppx  -HepSubQ, no     #) Code status  -DNR/DNI    #) Dispo  -from [facility] (long-term) Patient is a 62y old Female with PMH of MS and being bed-bound, CKD IV, chronic sacral osteo, recently diagnosed colon CA at [facility], and seizure disorder who presents with a chief complaint of altered mental status (24 Feb 2018 18:44). Patient's daughter/healthcare proxy present at bedside. She reports patient's mental status has gradually declined since October after repeated hospitalizations for lung infections, UTIs, and the chronic sacral ulcer. As per daughter, patient more verbal a week prior to admission.    #) Metabolic encephalopathy probably 2/2 sepsis/ sacral osteomyelitis/UTI 2/2 chronic indwelling antony  -on merrem (started 3/16)  - CT Head No Cont (02.24.18 @ 16:25): negative  - EEG showed generalized slowing w/ triphasic waves (reportedly) - consistent w/ metabolic encephalopathy  - Ammonia elevated - increased lactulose, titrate to 3-4 BMs per day. C/w Rifaximin. Ammonia trend 135->141->116->192 -> 113 -> 82 -> 75 -> 19  - repeat Blood Cx - NGTD  - ID following - palliative care. d/c iv antibiotics  - Burn following (Dr. Ordonez) - debridement today (3/19/18)  - Palliative following  - Hospice following - met on 3/16. Family still undecided at this time.  - repeat UCx pending  - Pulm c/s (Dr. Henry) - correct hypernatremia. f/u Sodium, F/u with renal. adjust antibiotic as per GFR, patient cleared from pulmonary stand point for Debridement, F/u burn for debridement, needs aggressive DVT prophylaxis and incentive spirometer. clearance for OR  -Heme/Onc c/s (Dr. Bertrand)- Given the overall situation, hospice may be appropriate, not a candidate for systemic chemotherapy    #) Hypernatremia  -improving.  -Nephro following (Dr. Kleiner)- c/w d5 1/2 ns @ 75, creatinine trending up, renal sono noted, no hydro, sodium noted  -daily BMP    #) AMS, can't take PO  - NGT removed 3/16  - S&S evaluated - Rx pureed diet w/ nectar thick liquids, 1:1 feeds small sips/bites    #) Seizure Disorder/ Bipolar disorder  - Valproic acid level 26; Carbamazepine level 7.3. These levels recorded after increasing both meds  - Neuro on board - Rx Valproic acid increased to 500mg in the morning and at bedtime and 250mg in between. Tegretol increased to 600mg QD.    #) Sigmoid Colon AdenoCA  - GI following - Rx no intervention for now  - H/H stable  - c/w protonix  - EGD done at [facility] on 1/28/18 showed 4cm semi-circumferential mass in sigmoid colon, which was biopsied - reports show moderately differentiated adenoCA  - Oncology on board - Rx's appreciated. Not a candidate for chemo at this time. Surgical resection best for localized colon ca but needs complete staging (i.e. CT chest/abd/pel w/ IV contrast)  - family agreeable for CT w/ contrast. However in light of worsening SALOME will hold off on CT for now.    #) DM type 2  - Monitor FS.    #) Hypothyroidism  - c/w synthroid.    #) Multiple Sclerosis with neurogenic bladder  - Chronic antony. Changed 3/16    #) B/L upper extremity edema  - Venous duplex showed evidence of superficial thrombophlebitis in left arm distal to PICC line, results above.    #) DVT/ GI ppx  -HepSubQ, no     #) Code status  -DNR/DNI    #) Dispo  -from [facility] (long-term) Patient is a 62y old Female with PMH of MS and being bed-bound, CKD IV, chronic sacral osteo, recently diagnosed colon CA at [facility], and seizure disorder who presents with a chief complaint of altered mental status (24 Feb 2018 18:44). Patient's daughter/healthcare proxy present at bedside. She reports patient's mental status has gradually declined since October after repeated hospitalizations for lung infections, UTIs, and the chronic sacral ulcer. As per daughter, patient more verbal a week prior to admission.    #) Metabolic encephalopathy probably 2/2 sepsis/ sacral osteomyelitis/UTI 2/2 chronic indwelling antony  -on merrem (started 3/16)  - CT Head No Cont (02.24.18 @ 16:25): negative  - EEG showed generalized slowing w/ triphasic waves (reportedly) - consistent w/ metabolic encephalopathy  - Ammonia elevated - increased lactulose, titrate to 3-4 BMs per day. C/w Rifaximin. Ammonia trend 135->141->116->192 -> 113 -> 82 -> 75 -> 19  - repeat Blood Cx - NGTD  - ID following - palliative care. d/c iv antibiotics  - Burn following (Dr. Ordonez) - debridement today (3/19/18), no more debridement plans, santyl and 1/2 dakins  - Palliative following  - Hospice following - met on 3/16. Family still undecided at this time.  - repeat UCx pending  - Pulm c/s (Dr. Henry) - correct hypernatremia. f/u Sodium, F/u with renal. adjust antibiotic as per GFR, patient cleared from pulmonary stand point for Debridement, F/u burn for debridement, needs aggressive DVT prophylaxis and incentive spirometer. clearance for OR  -Heme/Onc c/s (Dr. Bertrand)- Given the overall situation, hospice may be appropriate, not a candidate for systemic chemotherapy    #) Hypernatremia  -improving.  -Nephro following (Dr. Kleiner)- c/w d5 1/2 ns @ 75, creatinine trending up, renal sono noted, no hydro, sodium noted  -daily BMP    #) AMS, can't take PO  - NGT removed 3/16  - S&S evaluated - Rx pureed diet w/ nectar thick liquids, 1:1 feeds small sips/bites    #) Seizure Disorder/ Bipolar disorder  - Valproic acid level 26; Carbamazepine level 7.3. These levels recorded after increasing both meds  - Neuro on board - Rx Valproic acid increased to 500mg in the morning and at bedtime and 250mg in between. Tegretol increased to 600mg QD.    #) Sigmoid Colon AdenoCA  - GI following - Rx no intervention for now  - H/H stable  - c/w protonix  - EGD done at [facility] on 1/28/18 showed 4cm semi-circumferential mass in sigmoid colon, which was biopsied - reports show moderately differentiated adenoCA  - Oncology on board - Rx's appreciated. Not a candidate for chemo at this time. Surgical resection best for localized colon ca but needs complete staging (i.e. CT chest/abd/pel w/ IV contrast)  - family agreeable for CT w/ contrast. However in light of worsening SALOME will hold off on CT for now.    #) DM type 2  - Monitor FS.    #) Hypothyroidism  - c/w synthroid.    #) Multiple Sclerosis with neurogenic bladder  - Chronic antony. Changed 3/16    #) B/L upper extremity edema  - Venous duplex showed evidence of superficial thrombophlebitis in left arm distal to PICC line, results above.    #) DVT/ GI ppx  -HepSubQ, no     #) Code status  -DNR/DNI    #) Dispo  -from [facility] (long-term)

## 2018-03-19 NOTE — PRE-ANESTHESIA EVALUATION ADULT - NSANTHADDINFOFT_GEN_ALL_CORE
D/W daughter DNR status to be discontinued when in OR then started after  goes to floor by Dr Mortensen  also expalined Dementia may get worse after anesthesia

## 2018-03-20 LAB
ANION GAP SERPL CALC-SCNC: 10 MMOL/L — SIGNIFICANT CHANGE UP (ref 7–14)
BASOPHILS # BLD AUTO: 0.05 K/UL — SIGNIFICANT CHANGE UP (ref 0–0.2)
BASOPHILS NFR BLD AUTO: 0.6 % — SIGNIFICANT CHANGE UP (ref 0–1)
BUN SERPL-MCNC: 42 MG/DL — HIGH (ref 10–20)
CALCIUM SERPL-MCNC: 8.7 MG/DL — SIGNIFICANT CHANGE UP (ref 8.5–10.1)
CHLORIDE SERPL-SCNC: 111 MMOL/L — HIGH (ref 98–110)
CO2 SERPL-SCNC: 23 MMOL/L — SIGNIFICANT CHANGE UP (ref 17–32)
CREAT SERPL-MCNC: 2.2 MG/DL — HIGH (ref 0.7–1.5)
CULTURE RESULTS: SIGNIFICANT CHANGE UP
CULTURE RESULTS: SIGNIFICANT CHANGE UP
EOSINOPHIL # BLD AUTO: 0.15 K/UL — SIGNIFICANT CHANGE UP (ref 0–0.7)
EOSINOPHIL NFR BLD AUTO: 1.9 % — SIGNIFICANT CHANGE UP (ref 0–8)
GLUCOSE SERPL-MCNC: 91 MG/DL — SIGNIFICANT CHANGE UP (ref 70–110)
HCT VFR BLD CALC: 30.4 % — LOW (ref 37–47)
HCT VFR BLD CALC: 30.8 % — LOW (ref 37–47)
HGB BLD-MCNC: 9.5 G/DL — LOW (ref 12–16)
HGB BLD-MCNC: 9.5 G/DL — LOW (ref 12–16)
IMM GRANULOCYTES NFR BLD AUTO: 0.7 % — HIGH (ref 0.1–0.3)
LYMPHOCYTES # BLD AUTO: 1.67 K/UL — SIGNIFICANT CHANGE UP (ref 1.2–3.4)
LYMPHOCYTES # BLD AUTO: 20.8 % — SIGNIFICANT CHANGE UP (ref 20.5–51.1)
MAGNESIUM SERPL-MCNC: 2.2 MG/DL — SIGNIFICANT CHANGE UP (ref 1.8–2.4)
MCHC RBC-ENTMCNC: 29.1 PG — SIGNIFICANT CHANGE UP (ref 27–31)
MCHC RBC-ENTMCNC: 29.7 PG — SIGNIFICANT CHANGE UP (ref 27–31)
MCHC RBC-ENTMCNC: 30.8 G/DL — LOW (ref 32–37)
MCHC RBC-ENTMCNC: 31.3 G/DL — LOW (ref 32–37)
MCV RBC AUTO: 94.2 FL — SIGNIFICANT CHANGE UP (ref 81–99)
MCV RBC AUTO: 95 FL — SIGNIFICANT CHANGE UP (ref 81–99)
MONOCYTES # BLD AUTO: 0.86 K/UL — HIGH (ref 0.1–0.6)
MONOCYTES NFR BLD AUTO: 10.7 % — HIGH (ref 1.7–9.3)
NEUTROPHILS # BLD AUTO: 5.25 K/UL — SIGNIFICANT CHANGE UP (ref 1.4–6.5)
NEUTROPHILS NFR BLD AUTO: 65.3 % — SIGNIFICANT CHANGE UP (ref 42.2–75.2)
NRBC # BLD: 0 /100 WBCS — SIGNIFICANT CHANGE UP (ref 0–0)
PLATELET # BLD AUTO: 353 K/UL — SIGNIFICANT CHANGE UP (ref 130–400)
PLATELET # BLD AUTO: 389 K/UL — SIGNIFICANT CHANGE UP (ref 130–400)
POTASSIUM SERPL-MCNC: 4.6 MMOL/L — SIGNIFICANT CHANGE UP (ref 3.5–5)
POTASSIUM SERPL-SCNC: 4.6 MMOL/L — SIGNIFICANT CHANGE UP (ref 3.5–5)
RBC # BLD: 3.2 M/UL — LOW (ref 4.2–5.4)
RBC # BLD: 3.27 M/UL — LOW (ref 4.2–5.4)
RBC # FLD: 17.3 % — HIGH (ref 11.5–14.5)
RBC # FLD: 17.4 % — HIGH (ref 11.5–14.5)
SODIUM SERPL-SCNC: 144 MMOL/L — SIGNIFICANT CHANGE UP (ref 135–146)
SPECIMEN SOURCE: SIGNIFICANT CHANGE UP
SPECIMEN SOURCE: SIGNIFICANT CHANGE UP
VALPROATE SERPL-MCNC: 3 UG/ML — LOW (ref 50–100)
WBC # BLD: 6.92 K/UL — SIGNIFICANT CHANGE UP (ref 4.8–10.8)
WBC # BLD: 8.04 K/UL — SIGNIFICANT CHANGE UP (ref 4.8–10.8)
WBC # FLD AUTO: 6.92 K/UL — SIGNIFICANT CHANGE UP (ref 4.8–10.8)
WBC # FLD AUTO: 8.04 K/UL — SIGNIFICANT CHANGE UP (ref 4.8–10.8)

## 2018-03-20 RX ORDER — VALPROIC ACID (AS SODIUM SALT) 250 MG/5ML
250 SOLUTION, ORAL ORAL
Qty: 0 | Refills: 0 | Status: DISCONTINUED | OUTPATIENT
Start: 2018-03-20 | End: 2018-03-22

## 2018-03-20 RX ORDER — COLLAGENASE CLOSTRIDIUM HIST. 250 UNIT/G
1 OINTMENT (GRAM) TOPICAL
Qty: 0 | Refills: 0 | Status: DISCONTINUED | OUTPATIENT
Start: 2018-03-20 | End: 2018-03-22

## 2018-03-20 RX ORDER — VALPROIC ACID (AS SODIUM SALT) 250 MG/5ML
500 SOLUTION, ORAL ORAL
Qty: 0 | Refills: 0 | Status: DISCONTINUED | OUTPATIENT
Start: 2018-03-20 | End: 2018-03-22

## 2018-03-20 RX ORDER — SODIUM CHLORIDE 9 MG/ML
1000 INJECTION, SOLUTION INTRAVENOUS
Qty: 0 | Refills: 0 | Status: DISCONTINUED | OUTPATIENT
Start: 2018-03-20 | End: 2018-03-20

## 2018-03-20 RX ADMIN — Medication 1 APPLICATION(S): at 17:45

## 2018-03-20 RX ADMIN — Medication 250 MILLIGRAM(S): at 16:22

## 2018-03-20 RX ADMIN — FENTANYL CITRATE 1 PATCH: 50 INJECTION INTRAVENOUS at 17:50

## 2018-03-20 RX ADMIN — SODIUM CHLORIDE 75 MILLILITER(S): 9 INJECTION, SOLUTION INTRAVENOUS at 06:11

## 2018-03-20 RX ADMIN — Medication 1 TABLET(S): at 14:14

## 2018-03-20 RX ADMIN — Medication 1 TABLET(S): at 17:51

## 2018-03-20 RX ADMIN — Medication 1 MILLIGRAM(S): at 14:14

## 2018-03-20 RX ADMIN — MEROPENEM 100 MILLIGRAM(S): 1 INJECTION INTRAVENOUS at 17:51

## 2018-03-20 RX ADMIN — PANTOPRAZOLE SODIUM 40 MILLIGRAM(S): 20 TABLET, DELAYED RELEASE ORAL at 16:21

## 2018-03-20 RX ADMIN — PANTOPRAZOLE SODIUM 40 MILLIGRAM(S): 20 TABLET, DELAYED RELEASE ORAL at 06:12

## 2018-03-20 RX ADMIN — SIMETHICONE 80 MILLIGRAM(S): 80 TABLET, CHEWABLE ORAL at 14:16

## 2018-03-20 RX ADMIN — Medication 100 MILLIGRAM(S): at 14:15

## 2018-03-20 RX ADMIN — HEPARIN SODIUM 5000 UNIT(S): 5000 INJECTION INTRAVENOUS; SUBCUTANEOUS at 06:12

## 2018-03-20 RX ADMIN — Medication 600 MILLIGRAM(S): at 14:15

## 2018-03-20 RX ADMIN — MEROPENEM 100 MILLIGRAM(S): 1 INJECTION INTRAVENOUS at 06:12

## 2018-03-20 RX ADMIN — FENTANYL CITRATE 1 PATCH: 50 INJECTION INTRAVENOUS at 17:45

## 2018-03-20 RX ADMIN — HEPARIN SODIUM 5000 UNIT(S): 5000 INJECTION INTRAVENOUS; SUBCUTANEOUS at 14:16

## 2018-03-20 RX ADMIN — Medication 50 MICROGRAM(S): at 06:12

## 2018-03-20 RX ADMIN — HEPARIN SODIUM 5000 UNIT(S): 5000 INJECTION INTRAVENOUS; SUBCUTANEOUS at 21:50

## 2018-03-20 RX ADMIN — Medication 1 APPLICATION(S): at 06:08

## 2018-03-20 NOTE — PROGRESS NOTE ADULT - ASSESSMENT
Patient is a 62y old Female with PMH of MS and being bed-bound, CKD IV, chronic sacral osteo, recently diagnosed colon CA at [facility], and seizure disorder who presents with a chief complaint of altered mental status (24 Feb 2018 18:44). Patient's daughter/healthcare proxy present at bedside. She reports patient's mental status has gradually declined since October after repeated hospitalizations for lung infections, UTIs, and the chronic sacral ulcer. As per daughter, patient more verbal a week prior to admission.    #) Metabolic encephalopathy probably 2/2 sepsis/ sacral osteomyelitis/UTI 2/2 chronic indwelling antony  -on merrem (started 3/16)  -CT Head No Cont (02.24.18 @ 16:25)- negative  -EEG showed generalized slowing w/ triphasic waves (reportedly) - consistent w/ metabolic encephalopathy  -repeat Blood Cx - NGTD  -ID following - palliative care, d/c iv antibiotics  -Burn following (Dr. Ordonez) - s/p debridement 03/19/2018  sharp excisional debridement sacrum [id number]  -Palliative following  -Hospice following - met on 3/16. Family still undecided at this time.  -repeat UCx pending  -Pulm c/s (Dr. Henry) - correct hypernatremia. f/u Sodium, F/u with renal. adjust antibiotic as per GFR, patient cleared from pulmonary stand point for Debridement, F/u burn for debridement, needs aggressive DVT prophylaxis and incentive spirometer. clearance for OR  -Heme/Onc (Dr. Bertrand)- Given the overall situation, hospice may be appropriate, not a candidate for systemic chemotherapy    #) Hypernatremia  -improving.  -Nephro following (Dr. Kleiner)- c/w d5 1/2 ns @ 75, creatinine trending up, renal sono noted, no hydro, sodium noted  -daily BMP    #) AMS, can't take PO  - NGT removed 3/16  - S&S evaluated - Rx pureed diet w/ nectar thick liquids, 1:1 feeds small sips/bites    #) Seizure Disorder/ Bipolar disorder  - Valproic acid level 26; Carbamazepine level 7.3. These levels recorded after increasing both meds  - Neuro on board - Rx Valproic acid increased to 500mg in the morning and at bedtime and 250mg in between. Tegretol increased to 600mg QD.    #) Sigmoid Colon AdenoCA  - GI following - Rx no intervention for now  - H/H stable  - c/w protonix  - EGD done at [facility] on 1/28/18 showed 4cm semi-circumferential mass in sigmoid colon, which was biopsied - reports show moderately differentiated adenoCA  - Oncology on board - Rx's appreciated. Not a candidate for chemo at this time. Surgical resection best for localized colon ca but needs complete staging (i.e. CT chest/abd/pel w/ IV contrast)  - family agreeable for CT w/ contrast. However in light of worsening SALOME will hold off on CT for now.    #) DM type 2  - Monitor FS.    #) Hypothyroidism  - c/w synthroid.    #) Multiple Sclerosis with neurogenic bladder  - Chronic antony. Changed 3/16    #) B/L upper extremity edema  - Venous duplex showed evidence of superficial thrombophlebitis in left arm distal to PICC line, results above.    #) DVT/ GI ppx  -HepSubQ, no GI ppx indicated    #) Code status  -DNR/DNI    #) Dispo  -from [facility] (long-term) Patient is a 62y old Female with PMH of MS and being bed-bound, CKD IV, chronic sacral osteo, recently diagnosed colon CA at [facility], and seizure disorder who presents with a chief complaint of altered mental status (24 Feb 2018 18:44). Patient's daughter/healthcare proxy present at bedside. She reports patient's mental status has gradually declined since October after repeated hospitalizations for lung infections, UTIs, and the chronic sacral ulcer. As per daughter, patient more verbal a week prior to admission.    #) Metabolic encephalopathy probably 2/2 sepsis/ sacral osteomyelitis/UTI 2/2 chronic indwelling antony  -on merrem (started 3/16)  -CT Head No Cont (02.24.18 @ 16:25)- negative  -EEG showed generalized slowing w/ triphasic waves (reportedly) - consistent w/ metabolic encephalopathy  -repeat Blood Cx - NGTD  -ID following - palliative care, d/c iv antibiotics  -Burn f/u (Dr. Ordonez) - s/p debridement 03/19/2018  sharp excisional debridement sacrum [id number]  -Palliative following  -Hospice following - met on 3/16. Family still undecided at this time.  -repeat UCx pending  -Pulm following (Dr. Henry) - correct hypernatremia. f/u Sodium, F/u with renal. adjust antibiotic as per GFR, patient cleared from pulmonary stand point for Debridement, F/u burn for debridement, needs aggressive DVT prophylaxis and incentive spirometer. clearance for OR  -Heme/Onc (Dr. Bertrand)- Given the overall situation, hospice may be appropriate, not a candidate for systemic chemotherapy    #) Hypernatremia  -improving.  -Nephro following (Dr. Kleiner)- c/w d5 1/2 ns @ 75, creatinine trending up, renal sono noted, no hydro, sodium noted  -daily BMP    #) AMS, can't take PO  - NGT removed 3/16  - S&S evaluated - Rx pureed diet w/ nectar thick liquids, 1:1 feeds small sips/bites    #) Seizure Disorder/ Bipolar disorder  - Valproic acid level 26; Carbamazepine level 7.3. These levels recorded after increasing both meds  - Neuro following - Rx Valproic acid increased to 500mg in the morning and at bedtime and 250mg in between. Tegretol increased to 600mg QD.    #) Sigmoid Colon AdenoCA  - GI following - Rx no intervention for now  - H/H stable  - c/w protonix  - EGD done at [facility] on 1/28/18 showed 4cm semi-circumferential mass in sigmoid colon, which was biopsied - reports show moderately differentiated adenoCA  - Oncology on board - Rx's appreciated. Not a candidate for chemo at this time. Surgical resection best for localized colon ca but needs complete staging (i.e. CT chest/abd/pel w/ IV contrast)  - family agreeable for CT w/ contrast. However in light of worsening SALOME will hold off on CT for now.    #) DM type 2  - Monitor FS.    #) Hypothyroidism  - c/w synthroid.    #) Multiple Sclerosis with neurogenic bladder  - Chronic antony. Changed 3/16    #) B/L upper extremity edema  - Venous duplex showed evidence of superficial thrombophlebitis in left arm distal to PICC line, results above.    #) DVT/ GI ppx  -HepSubQ, no GI ppx indicated    #) Code status  -DNR/DNI    #) Dispo  -from [facility] (long-term) Patient is a 62y old Female with PMH of MS and being bed-bound, CKD IV, chronic sacral osteo, recently diagnosed colon CA at [facility], and seizure disorder who presents with a chief complaint of altered mental status (24 Feb 2018 18:44). Patient's daughter/healthcare proxy present at bedside. She reports patient's mental status has gradually declined since October after repeated hospitalizations for lung infections, UTIs, and the chronic sacral ulcer. As per daughter, patient more verbal a week prior to admission.    #) Metabolic encephalopathy probably 2/2 sepsis/ sacral osteomyelitis/UTI 2/2 chronic indwelling antony  -on merrem (started 3/16)  -CT Head No Cont (02.24.18 @ 16:25)- negative  -EEG showed generalized slowing w/ triphasic waves (reportedly) - consistent w/ metabolic encephalopathy  -BCx (3/15) - NGTD, UCx (3/16)- NGTD  -ID following - palliative care, d/c iv antibiotics  -Burn f/u (Dr. Ordonez) - s/p debridement 03/19/2018  sharp excisional debridement sacrum [id number]  -Palliative following  -Hospice following - met on 3/16. Family still undecided at this time.  -repeat UCx pending  -Pulm following (Dr. Henry) - correct hypernatremia. f/u Sodium, F/u with renal. adjust antibiotic as per GFR, patient cleared from pulmonary stand point for Debridement, F/u burn for debridement, needs aggressive DVT prophylaxis and incentive spirometer. clearance for OR  -Heme/Onc (Dr. Bertrand)- Given the overall situation, hospice may be appropriate, not a candidate for systemic chemotherapy    #) Hypernatremia  -improving.  -Nephro following (Dr. Kleiner)- c/w d5 1/2 ns @ 75, creatinine trending up, renal sono noted, no hydro, sodium noted  -daily BMP    #) AMS, can't take PO  - NGT removed 3/16  - S&S evaluated - Rx pureed diet w/ nectar thick liquids, 1:1 feeds small sips/bites    #) Seizure Disorder/ Bipolar disorder  - Valproic acid level 26; Carbamazepine level 7.3. These levels recorded after increasing both meds  - Neuro following - Rx Valproic acid increased to 500mg in the morning and at bedtime and 250mg in between. Tegretol increased to 600mg QD.    #) Sigmoid Colon AdenoCA  - GI following - Rx no intervention for now  - H/H stable  - c/w protonix  - EGD done at [facility] on 1/28/18 showed 4cm semi-circumferential mass in sigmoid colon, which was biopsied - reports show moderately differentiated adenoCA  - Oncology on board - Rx's appreciated. Not a candidate for chemo at this time. Surgical resection best for localized colon ca but needs complete staging (i.e. CT chest/abd/pel w/ IV contrast)  - family agreeable for CT w/ contrast. However in light of worsening SALOME will hold off on CT for now.    #) DM type 2  - Monitor FS.    #) Hypothyroidism  - c/w synthroid.    #) Multiple Sclerosis with neurogenic bladder  - Chronic antony. Changed 3/16    #) B/L upper extremity edema  - Venous duplex showed evidence of superficial thrombophlebitis in left arm distal to PICC line, results above.    #) DVT/ GI ppx  -HepSubQ, no GI ppx indicated    #) Code status  -DNR/DNI    #) Dispo  -from [facility] (long-term) Patient is a 62y old Female with PMH of MS and being bed-bound, CKD IV, chronic sacral osteo, recently diagnosed colon CA at [facility], and seizure disorder who presents with a chief complaint of altered mental status (24 Feb 2018 18:44). Patient's daughter/healthcare proxy present at bedside. She reports patient's mental status has gradually declined since October after repeated hospitalizations for lung infections, UTIs, and the chronic sacral ulcer. As per daughter, patient more verbal a week prior to admission.    #) Metabolic encephalopathy probably 2/2 sepsis/ sacral osteomyelitis/UTI 2/2 chronic indwelling antony  -on merrem (started 3/16)  -CT Head No Cont (02.24.18 @ 16:25)- negative  -EEG showed generalized slowing w/ triphasic waves (reportedly) - consistent w/ metabolic encephalopathy  -BCx (3/15) - NGTD, UCx (3/16)- NGTD  -ID following - palliative care, d/c iv antibiotics  -Burn f/u (Dr. Ordonez) - s/p debridement 03/19/2018  sharp excisional debridement sacrum [id number]  -Palliative following  -Hospice following - met on 3/16. Family still undecided at this time.  -Pulm following (Dr. Henry) - correct hypernatremia. f/u Sodium, F/u with renal. adjust antibiotic as per GFR, patient cleared from pulmonary stand point for Debridement, F/u burn for debridement, needs aggressive DVT prophylaxis and incentive spirometer. clearance for OR  -Heme/Onc (Dr. Bertrand)- Given the overall situation, hospice may be appropriate, not a candidate for systemic chemotherapy    #) Hypernatremia  -improving.  -Nephro following (Dr. Kleiner)- sodium improving, d/c iv fluids, creatinine improving   -daily BMP    #) AMS, can't take PO  - NGT removed 3/16  - S&S evaluated - Rx pureed diet w/ nectar thick liquids, 1:1 feeds small sips/bites    #) Seizure Disorder/ Bipolar disorder  - Valproic acid level 26; Carbamazepine level 7.3. These levels recorded after increasing both meds  - Neuro following - Rx Valproic acid increased to 500mg in the morning and at bedtime and 250mg in between. Tegretol increased to 600mg QD.    #) Sigmoid Colon AdenoCA  - GI following - Rx no intervention for now  - H/H stable  - c/w protonix  - EGD done at [facility] on 1/28/18 showed 4cm semi-circumferential mass in sigmoid colon, which was biopsied - reports show moderately differentiated adenoCA  - Oncology on board - Rx's appreciated. Not a candidate for chemo at this time. Surgical resection best for localized colon ca but needs complete staging (i.e. CT chest/abd/pel w/ IV contrast)  - family agreeable for CT w/ contrast. However in light of worsening SALOME will hold off on CT for now.    #) DM type 2  - Monitor FS.    #) Hypothyroidism  - c/w synthroid.    #) Multiple Sclerosis with neurogenic bladder  - Chronic antony. Changed 3/16    #) B/L upper extremity edema  - Venous duplex showed evidence of superficial thrombophlebitis in left arm distal to PICC line, results above.    #) DVT/ GI ppx  -HepSubQ, no GI ppx indicated    #) Code status  -DNR/DNI    #) Dispo  -from [facility] (long-term) Patient is a 62y old Female with PMH of MS and being bed-bound, CKD IV, chronic sacral osteo, recently diagnosed colon CA at [facility], and seizure disorder who presents with a chief complaint of altered mental status (24 Feb 2018 18:44). Patient's daughter/healthcare proxy present at bedside. She reports patient's mental status has gradually declined since October after repeated hospitalizations for lung infections, UTIs, and the chronic sacral ulcer. As per daughter, patient more verbal a week prior to admission.    #) Metabolic encephalopathy probably 2/2 sepsis/ sacral osteomyelitis/UTI 2/2 chronic indwelling antony  -improving  -on merrem (started 3/16)  -CT Head No Cont (02.24.18 @ 16:25)- negative  -EEG showed generalized slowing w/ triphasic waves (reportedly) - consistent w/ metabolic encephalopathy  -BCx (3/15) - NGTD, UCx (3/16)- NGTD  -Palliative following  -Hospice following - met on 3/16. Family still undecided at this time.  -Pulm following (Dr. Henry) - correct hypernatremia. f/u Sodium, F/u with renal. adjust antibiotic as per GFR, patient cleared from pulmonary stand point for Debridement, F/u burn for debridement, needs aggressive DVT prophylaxis and incentive spirometer. clearance for OR  -Heme/Onc (Dr. Bertrand)- Given the overall situation, hospice may be appropriate, not a candidate for systemic chemotherapy    #) Sacral Decubiti ulcer stage 4  -Burn f/u (Dr. Ordonez) - s/p debridement 03/19/2018  sharp excisional debridement sacrum [id number], no more debridement plans  -ID following - palliative care, d/c iv antibiotics    #) Hypernatremia  -resolved    #) Seizure Disorder/ Bipolar disorder  - stable, on valproate and carbamazepine    #) Sigmoid Colon AdenoCA  - GI following - Rx no intervention for now  - H/H stable  - c/w protonix  - EGD done at [facility] on 1/28/18 showed 4cm semi-circumferential mass in sigmoid colon, which was biopsied - reports show moderately differentiated adenoCA  - Oncology on board - Rx's appreciated. Not a candidate for chemo at this time. Surgical resection best for localized colon ca but needs complete staging (i.e. CT chest/abd/pel w/ IV contrast)  - family agreeable for CT w/ contrast. However in light of worsening SALOME will hold off on CT for now.    #) DM type 2  - Monitor FS.    #) Hypothyroidism  - c/w synthroid.    #) Multiple Sclerosis with neurogenic bladder  - Chronic antony. Changed 3/16    #) B/L upper extremity edema  - Venous duplex showed evidence of superficial thrombophlebitis in left arm distal to PICC line, results above.    #) DVT/ GI ppx  -HepSubQ, no GI ppx indicated    #) Code status  -DNR/DNI    #) Dispo  -from [facility] (long-term)

## 2018-03-20 NOTE — PROGRESS NOTE ADULT - ASSESSMENT
SALOME/stage 4 ckd/sepsis/anemia/hypernatremia:  #sodium improving, d/c iv fluids   # creatinine improving   # non oliguric   # ph at goal   # will sign off recall as needed

## 2018-03-20 NOTE — SWALLOW BEDSIDE ASSESSMENT ADULT - SLP GENERAL OBSERVATIONS
pt awake however confused not able to follow commands or answer questions. unintelligible utterances.
pt awake requesting water. pt more alert, following commands, and able to answer questions. no c/o pain

## 2018-03-20 NOTE — SWALLOW BEDSIDE ASSESSMENT ADULT - SLP PERTINENT HISTORY OF CURRENT PROBLEM
PMH of MS and being bed-bound, CKD IV, chronic sacral osteo, recently diagnosed colon CA at [facility], and seizure disorder who presents with a chief complaint of altered mental status. family, medical team, and palliative care team meeting to establish goals of care
pt s/p sacral debridement. pt treated for AMS 2/2 sepsis. pt with advanced MS bedbound at home and recent Dx of colon ca with poor prognosis.

## 2018-03-20 NOTE — PROGRESS NOTE ADULT - SUBJECTIVE AND OBJECTIVE BOX
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     ALLERGIES:  No Known Allergies    MEDICATIONS:  MEDICATIONS  (STANDING):  bisacodyl Suppository 10 milliGRAM(s) Rectal daily  carBAMazepine Suspension 600 milliGRAM(s) Oral daily  collagenase Ointment 1 Application(s) Topical two times a day  collagenase Ointment 1 Application(s) Topical two times a day  Dakins Solution - Full Strength 1 Application(s) Topical two times a day  dextrose 5% + sodium chloride 0.45%. 1000 milliLiter(s) (75 mL/Hr) IV Continuous <Continuous>  fentaNYL   Patch  25 MICROgram(s)/Hr 1 Patch Transdermal every 72 hours  folic acid 1 milliGRAM(s) Oral daily  heparin  Injectable 5000 Unit(s) SubCutaneous every 8 hours  lactobacillus acidophilus 1 Tablet(s) Oral two times a day with meals  levothyroxine 50 MICROGram(s) Oral daily  meropenem  IVPB 500 milliGRAM(s) IV Intermittent every 12 hours  multivitamin 1 Tablet(s) Oral daily  pantoprazole   Suspension 40 milliGRAM(s) Oral two times a day before meals  simethicone 80 milliGRAM(s) Chew daily  thiamine 100 milliGRAM(s) Oral daily  valproic  acid Syrup 500 milliGRAM(s) Oral daily  valproic  acid Syrup 250 milliGRAM(s) Oral daily    MEDICATIONS  (PRN):  acetaminophen  Suppository 650 milliGRAM(s) Rectal four times a day PRN For Temp greater than 38 C (100.4 F)  senna 2 Tablet(s) Oral at bedtime PRN Constipation      VITALS:   Vital Signs Last 24 Hrs  T(C): 37 (20 Mar 2018 06:14), Max: 37.6 (19 Mar 2018 21:34)  T(F): 98.6 (20 Mar 2018 06:14), Max: 99.6 (19 Mar 2018 21:34)  HR: 101 (20 Mar 2018 06:14) (87 - 112)  BP: 111/59 (20 Mar 2018 06:14) (87/51 - 130/68)  BP(mean): 73 (19 Mar 2018 15:17) (64 - 78)  RR: 18 (20 Mar 2018 06:14) (13 - 27)  SpO2: 100% (19 Mar 2018 15:17) (100% - 100%)    LABS:                                   9.5    8.04  )-----------( 389      ( 20 Mar 2018 00:17 )             30.8       03-19    143  |  106  |  45<H>  ----------------------------<  84  4.8   |  22  |  2.2<H>    Ca    8.7      19 Mar 2018 19:18  Phos  4.5     03-19  Mg     2.3     03-19      RADIOLOGY:    < from: US Retroperitoneal Complete (03.17.18 @ 13:24) >  1.  Negative examination of the kidneys. No hydronephrosis.    2.  Nondiagnostic examination urinary bladder.    < end of copied text >    < from: Xray Chest 1 View- PORTABLE-Urgent (03.16.18 @ 13:07) >  Nofocal consolidation.    < end of copied text >    PHYSICAL EXAM:  GEN: No acute distress  LUNGS: Clear to auscultation bilaterally   HEART: S1/S2 present. RRR.   ABD: Soft, non-tender, non-distended. 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     ALLERGIES:  No Known Allergies    MEDICATIONS:  MEDICATIONS  (STANDING):  bisacodyl Suppository 10 milliGRAM(s) Rectal daily  carBAMazepine Suspension 600 milliGRAM(s) Oral daily  collagenase Ointment 1 Application(s) Topical two times a day  collagenase Ointment 1 Application(s) Topical two times a day  Dakins Solution - Full Strength 1 Application(s) Topical two times a day  dextrose 5% + sodium chloride 0.45%. 1000 milliLiter(s) (75 mL/Hr) IV Continuous <Continuous>  fentaNYL   Patch  25 MICROgram(s)/Hr 1 Patch Transdermal every 72 hours  folic acid 1 milliGRAM(s) Oral daily  heparin  Injectable 5000 Unit(s) SubCutaneous every 8 hours  lactobacillus acidophilus 1 Tablet(s) Oral two times a day with meals  levothyroxine 50 MICROGram(s) Oral daily  meropenem  IVPB 500 milliGRAM(s) IV Intermittent every 12 hours  multivitamin 1 Tablet(s) Oral daily  pantoprazole   Suspension 40 milliGRAM(s) Oral two times a day before meals  simethicone 80 milliGRAM(s) Chew daily  thiamine 100 milliGRAM(s) Oral daily  valproic  acid Syrup 500 milliGRAM(s) Oral daily  valproic  acid Syrup 250 milliGRAM(s) Oral daily    MEDICATIONS  (PRN):  acetaminophen  Suppository 650 milliGRAM(s) Rectal four times a day PRN For Temp greater than 38 C (100.4 F)  senna 2 Tablet(s) Oral at bedtime PRN Constipation      VITALS:   Vital Signs Last 24 Hrs  T(C): 37 (20 Mar 2018 06:14), Max: 37.6 (19 Mar 2018 21:34)  T(F): 98.6 (20 Mar 2018 06:14), Max: 99.6 (19 Mar 2018 21:34)  HR: 101 (20 Mar 2018 06:14) (87 - 112)  BP: 111/59 (20 Mar 2018 06:14) (87/51 - 130/68)  BP(mean): 73 (19 Mar 2018 15:17) (64 - 78)  RR: 18 (20 Mar 2018 06:14) (13 - 27)  SpO2: 100% (19 Mar 2018 15:17) (100% - 100%)    LABS:                          9.5    8.04  )-----------( 389      ( 20 Mar 2018 00:17 )             30.8       03-19    143  |  106  |  45<H>  ----------------------------<  84  4.8   |  22  |  2.2<H>    Ca    8.7      19 Mar 2018 19:18  Phos  4.5     03-19  Mg     2.3     03-19      RADIOLOGY:    < from: US Retroperitoneal Complete (03.17.18 @ 13:24) >  1.  Negative examination of the kidneys. No hydronephrosis.    2.  Nondiagnostic examination urinary bladder.    < end of copied text >    < from: Xray Chest 1 View- PORTABLE-Urgent (03.16.18 @ 13:07) >  Nofocal consolidation.    < end of copied text >    PHYSICAL EXAM:  GEN: No acute distress  LUNGS: Clear to auscultation bilaterally   HEART: S1/S2 present. RRR.   ABD: Soft, non-tender, non-distended. Bowel sounds present  EXT: NC/NC/NE/2+PP/MERCEDES  NEURO: AAOX3

## 2018-03-20 NOTE — SWALLOW BEDSIDE ASSESSMENT ADULT - ASR SWALLOW ASPIRATION MONITOR
change of breathing pattern/cough/fever/pneumonia/throat clearing/upper respiratory infection/oral hygiene/position upright (90Y)/gurgly voice
upper respiratory infection/pneumonia/change of breathing pattern/cough/gurgly voice/oral hygiene/position upright (90Y)/fever/throat clearing

## 2018-03-20 NOTE — SWALLOW BEDSIDE ASSESSMENT ADULT - SWALLOW EVAL: DIAGNOSIS
suspected pharyngeal dysphagia for thins. mild-mod oral dysphagia for puree and nectar thick liquids
mild oral dysphaia for soft. toleration for thins

## 2018-03-20 NOTE — PROGRESS NOTE ADULT - SUBJECTIVE AND OBJECTIVE BOX
seen and examined  no distress      Standing Inpatient Medications  bisacodyl Suppository 10 milliGRAM(s) Rectal daily  carBAMazepine Suspension 600 milliGRAM(s) Oral daily  collagenase Ointment 1 Application(s) Topical two times a day  collagenase Ointment 1 Application(s) Topical two times a day  Dakins Solution - Full Strength 1 Application(s) Topical two times a day  dextrose 5% + sodium chloride 0.45%. 1000 milliLiter(s) IV Continuous <Continuous>  fentaNYL   Patch  25 MICROgram(s)/Hr 1 Patch Transdermal every 72 hours  folic acid 1 milliGRAM(s) Oral daily  heparin  Injectable 5000 Unit(s) SubCutaneous every 8 hours  lactobacillus acidophilus 1 Tablet(s) Oral two times a day with meals  levothyroxine 50 MICROGram(s) Oral daily  meropenem  IVPB 500 milliGRAM(s) IV Intermittent every 12 hours  multivitamin 1 Tablet(s) Oral daily  pantoprazole   Suspension 40 milliGRAM(s) Oral two times a day before meals  simethicone 80 milliGRAM(s) Chew daily  thiamine 100 milliGRAM(s) Oral daily  valproic  acid Syrup 500 milliGRAM(s) Oral daily  valproic  acid Syrup 250 milliGRAM(s) Oral daily            VITALS/PHYSICAL EXAM  --------------------------------------------------------------------------------  T(C): 37 (03-20-18 @ 06:14), Max: 37.6 (03-19-18 @ 21:34)  HR: 101 (03-20-18 @ 06:14) (87 - 112)  BP: 111/59 (03-20-18 @ 06:14) (87/51 - 130/68)  RR: 18 (03-20-18 @ 06:14) (13 - 27)  SpO2: 100% (03-19-18 @ 15:17) (100% - 100%)      03-19-18 @ 07:01  -  03-20-18 @ 07:00  --------------------------------------------------------  IN: 292 mL / OUT: 1760 mL / NET: -1468 mL      Physical Exam:  	Gen: NAD  	Pulm: decrease BS  B/L  	CV: S1S2; no rub  	Abd: distended  	: arpan   	LE:  no edema  	    LABS/STUDIES  --------------------------------------------------------------------------------              9.5    6.92  >-----------<  353      [03-20-18 @ 07:11]              30.4     144  |  111  |  42  ----------------------------<  91      [03-20-18 @ 07:11]  4.6   |  23  |  2.2        Ca     8.7     [03-20-18 @ 07:11]      Mg     2.2     [03-20-18 @ 07:11]      Phos  4.5     [03-19-18 @ 19:18]            Creatinine Trend:  SCr 2.2 [03-20 @ 07:11]  SCr 2.2 [03-19 @ 19:18]  SCr 2.6 [03-18 @ 07:10]  SCr 2.9 [03-17 @ 13:30]  SCr 2.9 [03-16 @ 12:32]    Iron 25, TIBC 88, %sat 26      [03-01-18 @ 19:45]  Ferritin 537.0      [03-01-18 @ 19:45]  PTH -- (Ca 8.7)      [03-01-18 @ 19:45]   85  HbA1c 5.4      [02-05-18 @ 06:52] seen and examined  no distress  confused       Standing Inpatient Medications  bisacodyl Suppository 10 milliGRAM(s) Rectal daily  carBAMazepine Suspension 600 milliGRAM(s) Oral daily  collagenase Ointment 1 Application(s) Topical two times a day  collagenase Ointment 1 Application(s) Topical two times a day  Dakins Solution - Full Strength 1 Application(s) Topical two times a day  dextrose 5% + sodium chloride 0.45%. 1000 milliLiter(s) IV Continuous <Continuous>  fentaNYL   Patch  25 MICROgram(s)/Hr 1 Patch Transdermal every 72 hours  folic acid 1 milliGRAM(s) Oral daily  heparin  Injectable 5000 Unit(s) SubCutaneous every 8 hours  lactobacillus acidophilus 1 Tablet(s) Oral two times a day with meals  levothyroxine 50 MICROGram(s) Oral daily  meropenem  IVPB 500 milliGRAM(s) IV Intermittent every 12 hours  multivitamin 1 Tablet(s) Oral daily  pantoprazole   Suspension 40 milliGRAM(s) Oral two times a day before meals  simethicone 80 milliGRAM(s) Chew daily  thiamine 100 milliGRAM(s) Oral daily  valproic  acid Syrup 500 milliGRAM(s) Oral daily  valproic  acid Syrup 250 milliGRAM(s) Oral daily            VITALS/PHYSICAL EXAM  --------------------------------------------------------------------------------  T(C): 37 (03-20-18 @ 06:14), Max: 37.6 (03-19-18 @ 21:34)  HR: 101 (03-20-18 @ 06:14) (87 - 112)  BP: 111/59 (03-20-18 @ 06:14) (87/51 - 130/68)  RR: 18 (03-20-18 @ 06:14) (13 - 27)  SpO2: 100% (03-19-18 @ 15:17) (100% - 100%)      03-19-18 @ 07:01  -  03-20-18 @ 07:00  --------------------------------------------------------  IN: 292 mL / OUT: 1760 mL / NET: -1468 mL      Physical Exam:  	Gen: NAD  	Pulm: decrease BS  B/L  	CV: S1S2; no rub  	Abd: distended  	: arpan   	LE:  no edema  	    LABS/STUDIES  --------------------------------------------------------------------------------              9.5    6.92  >-----------<  353      [03-20-18 @ 07:11]              30.4     144  |  111  |  42  ----------------------------<  91      [03-20-18 @ 07:11]  4.6   |  23  |  2.2        Ca     8.7     [03-20-18 @ 07:11]      Mg     2.2     [03-20-18 @ 07:11]      Phos  4.5     [03-19-18 @ 19:18]            Creatinine Trend:  SCr 2.2 [03-20 @ 07:11]  SCr 2.2 [03-19 @ 19:18]  SCr 2.6 [03-18 @ 07:10]  SCr 2.9 [03-17 @ 13:30]  SCr 2.9 [03-16 @ 12:32]    Iron 25, TIBC 88, %sat 26      [03-01-18 @ 19:45]  Ferritin 537.0      [03-01-18 @ 19:45]  PTH -- (Ca 8.7)      [03-01-18 @ 19:45]   85  HbA1c 5.4      [02-05-18 @ 06:52]
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
## 2018-03-21 LAB
-  AMIKACIN: SIGNIFICANT CHANGE UP
-  AMPICILLIN/SULBACTAM: SIGNIFICANT CHANGE UP
-  AMPICILLIN: SIGNIFICANT CHANGE UP
-  AMPICILLIN: SIGNIFICANT CHANGE UP
-  AZTREONAM: SIGNIFICANT CHANGE UP
-  CEFAZOLIN: SIGNIFICANT CHANGE UP
-  CEFEPIME: SIGNIFICANT CHANGE UP
-  CEFOXITIN: SIGNIFICANT CHANGE UP
-  CEFTRIAXONE: SIGNIFICANT CHANGE UP
-  CIPROFLOXACIN: SIGNIFICANT CHANGE UP
-  CIPROFLOXACIN: SIGNIFICANT CHANGE UP
-  ERTAPENEM: SIGNIFICANT CHANGE UP
-  GENTAMICIN: SIGNIFICANT CHANGE UP
-  IMIPENEM: SIGNIFICANT CHANGE UP
-  LEVOFLOXACIN: SIGNIFICANT CHANGE UP
-  LINEZOLID: SIGNIFICANT CHANGE UP
-  MEROPENEM: SIGNIFICANT CHANGE UP
-  PIPERACILLIN/TAZOBACTAM: SIGNIFICANT CHANGE UP
-  TETRACYCLINE: SIGNIFICANT CHANGE UP
-  TOBRAMYCIN: SIGNIFICANT CHANGE UP
-  TRIMETHOPRIM/SULFAMETHOXAZOLE: SIGNIFICANT CHANGE UP
-  VANCOMYCIN: SIGNIFICANT CHANGE UP
ANION GAP SERPL CALC-SCNC: 14 MMOL/L — SIGNIFICANT CHANGE UP (ref 7–14)
BUN SERPL-MCNC: 36 MG/DL — HIGH (ref 10–20)
CALCIUM SERPL-MCNC: 8.9 MG/DL — SIGNIFICANT CHANGE UP (ref 8.5–10.1)
CHLORIDE SERPL-SCNC: 105 MMOL/L — SIGNIFICANT CHANGE UP (ref 98–110)
CO2 SERPL-SCNC: 20 MMOL/L — SIGNIFICANT CHANGE UP (ref 17–32)
CREAT SERPL-MCNC: 2 MG/DL — HIGH (ref 0.7–1.5)
CULTURE RESULTS: SIGNIFICANT CHANGE UP
GLUCOSE SERPL-MCNC: 80 MG/DL — SIGNIFICANT CHANGE UP (ref 70–110)
HCT VFR BLD CALC: 32.8 % — LOW (ref 37–47)
HGB BLD-MCNC: 10.3 G/DL — LOW (ref 12–16)
MAGNESIUM SERPL-MCNC: 2.1 MG/DL — SIGNIFICANT CHANGE UP (ref 1.8–2.4)
MCHC RBC-ENTMCNC: 29.8 PG — SIGNIFICANT CHANGE UP (ref 27–31)
MCHC RBC-ENTMCNC: 31.4 G/DL — LOW (ref 32–37)
MCV RBC AUTO: 94.8 FL — SIGNIFICANT CHANGE UP (ref 81–99)
METHOD TYPE: SIGNIFICANT CHANGE UP
METHOD TYPE: SIGNIFICANT CHANGE UP
NRBC # BLD: 0 /100 WBCS — SIGNIFICANT CHANGE UP (ref 0–0)
ORGANISM # SPEC MICROSCOPIC CNT: SIGNIFICANT CHANGE UP
PLATELET # BLD AUTO: 364 K/UL — SIGNIFICANT CHANGE UP (ref 130–400)
POTASSIUM SERPL-MCNC: 4.5 MMOL/L — SIGNIFICANT CHANGE UP (ref 3.5–5)
POTASSIUM SERPL-SCNC: 4.5 MMOL/L — SIGNIFICANT CHANGE UP (ref 3.5–5)
RBC # BLD: 3.46 M/UL — LOW (ref 4.2–5.4)
RBC # FLD: 17 % — HIGH (ref 11.5–14.5)
SODIUM SERPL-SCNC: 139 MMOL/L — SIGNIFICANT CHANGE UP (ref 135–146)
SPECIMEN SOURCE: SIGNIFICANT CHANGE UP
WBC # BLD: 7.1 K/UL — SIGNIFICANT CHANGE UP (ref 4.8–10.8)
WBC # FLD AUTO: 7.1 K/UL — SIGNIFICANT CHANGE UP (ref 4.8–10.8)

## 2018-03-21 RX ADMIN — Medication 1 APPLICATION(S): at 05:55

## 2018-03-21 RX ADMIN — Medication 50 MICROGRAM(S): at 05:57

## 2018-03-21 RX ADMIN — Medication 1 APPLICATION(S): at 18:23

## 2018-03-21 RX ADMIN — Medication 1 APPLICATION(S): at 05:56

## 2018-03-21 RX ADMIN — Medication 1 TABLET(S): at 17:27

## 2018-03-21 RX ADMIN — Medication 250 MILLIGRAM(S): at 17:28

## 2018-03-21 RX ADMIN — Medication 1 APPLICATION(S): at 18:22

## 2018-03-21 RX ADMIN — Medication 1 TABLET(S): at 08:44

## 2018-03-21 RX ADMIN — HEPARIN SODIUM 5000 UNIT(S): 5000 INJECTION INTRAVENOUS; SUBCUTANEOUS at 21:26

## 2018-03-21 RX ADMIN — HEPARIN SODIUM 5000 UNIT(S): 5000 INJECTION INTRAVENOUS; SUBCUTANEOUS at 05:56

## 2018-03-21 RX ADMIN — PANTOPRAZOLE SODIUM 40 MILLIGRAM(S): 20 TABLET, DELAYED RELEASE ORAL at 17:28

## 2018-03-21 RX ADMIN — PANTOPRAZOLE SODIUM 40 MILLIGRAM(S): 20 TABLET, DELAYED RELEASE ORAL at 05:57

## 2018-03-21 RX ADMIN — Medication 500 MILLIGRAM(S): at 05:59

## 2018-03-21 RX ADMIN — HEPARIN SODIUM 5000 UNIT(S): 5000 INJECTION INTRAVENOUS; SUBCUTANEOUS at 14:53

## 2018-03-21 RX ADMIN — MEROPENEM 100 MILLIGRAM(S): 1 INJECTION INTRAVENOUS at 18:23

## 2018-03-21 RX ADMIN — MEROPENEM 100 MILLIGRAM(S): 1 INJECTION INTRAVENOUS at 06:25

## 2018-03-21 NOTE — PROGRESS NOTE ADULT - SUBJECTIVE AND OBJECTIVE BOX
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     ALLERGIES:  No Known Allergies    MEDICATIONS:  MEDICATIONS  (STANDING):  carBAMazepine Suspension 600 milliGRAM(s) Oral daily  collagenase Ointment 1 Application(s) Topical two times a day  collagenase Ointment 1 Application(s) Topical two times a day  Dakins Solution - Full Strength 1 Application(s) Topical two times a day  fentaNYL   Patch  25 MICROgram(s)/Hr 1 Patch Transdermal every 72 hours  folic acid 1 milliGRAM(s) Oral daily  heparin  Injectable 5000 Unit(s) SubCutaneous every 8 hours  lactobacillus acidophilus 1 Tablet(s) Oral two times a day with meals  levothyroxine 50 MICROGram(s) Oral daily  meropenem  IVPB 500 milliGRAM(s) IV Intermittent every 12 hours  multivitamin 1 Tablet(s) Oral daily  pantoprazole   Suspension 40 milliGRAM(s) Oral two times a day before meals  simethicone 80 milliGRAM(s) Chew daily  thiamine 100 milliGRAM(s) Oral daily  valproic  acid Syrup 500 milliGRAM(s) Oral daily  valproic  acid Syrup 250 milliGRAM(s) Oral daily  valproic acid 250 milliGRAM(s) Oral <User Schedule>  valproic acid 500 milliGRAM(s) Oral <User Schedule>    MEDICATIONS  (PRN):  acetaminophen  Suppository 650 milliGRAM(s) Rectal four times a day PRN For Temp greater than 38 C (100.4 F)  senna 2 Tablet(s) Oral at bedtime PRN Constipation      VITALS:   Vital Signs Last 24 Hrs  T(C): 36.2 (21 Mar 2018 04:30), Max: 36.2 (21 Mar 2018 04:30)  T(F): 97.1 (21 Mar 2018 04:30), Max: 97.1 (21 Mar 2018 04:30)  HR: 94 (21 Mar 2018 04:30) (85 - 94)  BP: 97/60 (21 Mar 2018 04:30) (97/60 - 99/54)  BP(mean): --  RR: 18 (21 Mar 2018 04:30) (18 - 18)  SpO2: --    LABS:                          9.5    6.92  )-----------( 353      ( 20 Mar 2018 07:11 )             30.4       03-20    144  |  111<H>  |  42<H>  ----------------------------<  91  4.6   |  23  |  2.2<H>    Ca    8.7      20 Mar 2018 07:11  Phos  4.5     03-19  Mg     2.2     03-20    Culture - Other (collected 19 Mar 2018 14:00)  Source: .Other sacral  Preliminary Report (20 Mar 2018 23:33):    Rare Gram Negative Rods    Moderate Enterococcus faecium    RADIOLOGY:    < from: US Retroperitoneal Complete (03.17.18 @ 13:24) >  1.  Negative examination of the kidneys. No hydronephrosis.    2.  Nondiagnostic examination urinary bladder.    < end of copied text >    < from: Xray Chest 1 View- PORTABLE-Urgent (03.16.18 @ 13:07) >  Nofocal consolidation.    < end of copied text >    PHYSICAL EXAM:  GEN: No acute distress  LUNGS: Clear to auscultation bilaterally   HEART: S1/S2 present. RRR.   ABD: Soft, non-tender, non-distended. 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     ALLERGIES:  No Known Allergies    MEDICATIONS:  MEDICATIONS  (STANDING):  carBAMazepine Suspension 600 milliGRAM(s) Oral daily  collagenase Ointment 1 Application(s) Topical two times a day  collagenase Ointment 1 Application(s) Topical two times a day  Dakins Solution - Full Strength 1 Application(s) Topical two times a day  fentaNYL   Patch  25 MICROgram(s)/Hr 1 Patch Transdermal every 72 hours  folic acid 1 milliGRAM(s) Oral daily  heparin  Injectable 5000 Unit(s) SubCutaneous every 8 hours  lactobacillus acidophilus 1 Tablet(s) Oral two times a day with meals  levothyroxine 50 MICROGram(s) Oral daily  meropenem  IVPB 500 milliGRAM(s) IV Intermittent every 12 hours  multivitamin 1 Tablet(s) Oral daily  pantoprazole   Suspension 40 milliGRAM(s) Oral two times a day before meals  simethicone 80 milliGRAM(s) Chew daily  thiamine 100 milliGRAM(s) Oral daily  valproic  acid Syrup 500 milliGRAM(s) Oral daily  valproic  acid Syrup 250 milliGRAM(s) Oral daily  valproic acid 250 milliGRAM(s) Oral <User Schedule>  valproic acid 500 milliGRAM(s) Oral <User Schedule>    MEDICATIONS  (PRN):  acetaminophen  Suppository 650 milliGRAM(s) Rectal four times a day PRN For Temp greater than 38 C (100.4 F)  senna 2 Tablet(s) Oral at bedtime PRN Constipation      VITALS:   Vital Signs Last 24 Hrs  T(C): 36.2 (21 Mar 2018 04:30), Max: 36.2 (21 Mar 2018 04:30)  T(F): 97.1 (21 Mar 2018 04:30), Max: 97.1 (21 Mar 2018 04:30)  HR: 94 (21 Mar 2018 04:30) (85 - 94)  BP: 97/60 (21 Mar 2018 04:30) (97/60 - 99/54)  BP(mean): --  RR: 18 (21 Mar 2018 04:30) (18 - 18)  SpO2: --    LABS:                          9.5    6.92  )-----------( 353      ( 20 Mar 2018 07:11 )             30.4       03-20    144  |  111<H>  |  42<H>  ----------------------------<  91  4.6   |  23  |  2.2<H>    Ca    8.7      20 Mar 2018 07:11  Phos  4.5     03-19  Mg     2.2     03-20    Culture - Other (collected 19 Mar 2018 14:00)  Source: .Other sacral  Preliminary Report (20 Mar 2018 23:33):    Rare Gram Negative Rods    Moderate Enterococcus faecium    RADIOLOGY:    < from: US Retroperitoneal Complete (03.17.18 @ 13:24) >  1.  Negative examination of the kidneys. No hydronephrosis.    2.  Nondiagnostic examination urinary bladder.    < end of copied text >    < from: Xray Chest 1 View- PORTABLE-Urgent (03.16.18 @ 13:07) >  Nofocal consolidation.    < end of copied text >    PHYSICAL EXAM:  GEN: No acute distress  LUNGS: Clear to auscultation bilaterally   HEART: S1/S2 present. RRR.   ABD: Soft, non-tender, non-distended. Bowel sounds present  EXT: NC/NC/NE/2+PP/MERCEDES  NEURO: AAOX1, follows commands 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     ALLERGIES:  No Known Allergies    MEDICATIONS:  MEDICATIONS  (STANDING):  carBAMazepine Suspension 600 milliGRAM(s) Oral daily  collagenase Ointment 1 Application(s) Topical two times a day  collagenase Ointment 1 Application(s) Topical two times a day  Dakins Solution - Full Strength 1 Application(s) Topical two times a day  fentaNYL   Patch  25 MICROgram(s)/Hr 1 Patch Transdermal every 72 hours  folic acid 1 milliGRAM(s) Oral daily  heparin  Injectable 5000 Unit(s) SubCutaneous every 8 hours  lactobacillus acidophilus 1 Tablet(s) Oral two times a day with meals  levothyroxine 50 MICROGram(s) Oral daily  meropenem  IVPB 500 milliGRAM(s) IV Intermittent every 12 hours  multivitamin 1 Tablet(s) Oral daily  pantoprazole   Suspension 40 milliGRAM(s) Oral two times a day before meals  simethicone 80 milliGRAM(s) Chew daily  thiamine 100 milliGRAM(s) Oral daily  valproic  acid Syrup 500 milliGRAM(s) Oral daily  valproic  acid Syrup 250 milliGRAM(s) Oral daily  valproic acid 250 milliGRAM(s) Oral <User Schedule>  valproic acid 500 milliGRAM(s) Oral <User Schedule>    MEDICATIONS  (PRN):  acetaminophen  Suppository 650 milliGRAM(s) Rectal four times a day PRN For Temp greater than 38 C (100.4 F)  senna 2 Tablet(s) Oral at bedtime PRN Constipation      VITALS:   Vital Signs Last 24 Hrs  T(C): 36.2 (21 Mar 2018 04:30), Max: 36.2 (21 Mar 2018 04:30)  T(F): 97.1 (21 Mar 2018 04:30), Max: 97.1 (21 Mar 2018 04:30)  HR: 94 (21 Mar 2018 04:30) (85 - 94)  BP: 97/60 (21 Mar 2018 04:30) (97/60 - 99/54)  BP(mean): --  RR: 18 (21 Mar 2018 04:30) (18 - 18)  SpO2: --    LABS:                          9.5    6.92  )-----------( 353      ( 20 Mar 2018 07:11 )             30.4       03-20    144  |  111<H>  |  42<H>  ----------------------------<  91  4.6   |  23  |  2.2<H>    Ca    8.7      20 Mar 2018 07:11  Phos  4.5     03-19  Mg     2.2     03-20    Culture - Other (collected 19 Mar 2018 14:00)  Source: .Other sacral  Preliminary Report (20 Mar 2018 23:33):    Rare Gram Negative Rods    Moderate Enterococcus faecium    RADIOLOGY:    < from: US Retroperitoneal Complete (03.17.18 @ 13:24) >  1.  Negative examination of the kidneys. No hydronephrosis.    2.  Nondiagnostic examination urinary bladder.    < end of copied text >    < from: Xray Chest 1 View- PORTABLE-Urgent (03.16.18 @ 13:07) >  Nofocal consolidation.    < end of copied text >    PHYSICAL EXAM:  GEN: No acute distress  LUNGS: Clear to auscultation bilaterally   HEART: S1/S2 present. RRR.   ABD: Soft, non-tender, non-distended. Bowel sounds present  EXT: NC/NC/NE/2+PP/MERCEDES.  Skin: sacral ulcer stage 4  NEURO: AAOX1, follows commands

## 2018-03-21 NOTE — PROGRESS NOTE ADULT - ASSESSMENT
Pt improving clinically  Stage 4 pressure ulcer. Continue Santyl moist and dry dressing  Will likely require additional sharp debridement.   Continue offloading measures

## 2018-03-21 NOTE — PROGRESS NOTE ADULT - SUBJECTIVE AND OBJECTIVE BOX
Pt awake alert   VSS   Occ stool contamination  of wound dressing reported  EXAM:  Sacral wound with central dark discoloration, pink outer wound

## 2018-03-22 ENCOUNTER — TRANSCRIPTION ENCOUNTER (OUTPATIENT)
Age: 63
End: 2018-03-22

## 2018-03-22 VITALS
RESPIRATION RATE: 19 BRPM | SYSTOLIC BLOOD PRESSURE: 117 MMHG | HEART RATE: 103 BPM | TEMPERATURE: 100 F | DIASTOLIC BLOOD PRESSURE: 60 MMHG

## 2018-03-22 LAB
ANION GAP SERPL CALC-SCNC: 12 MMOL/L — SIGNIFICANT CHANGE UP (ref 7–14)
BUN SERPL-MCNC: 32 MG/DL — HIGH (ref 10–20)
CALCIUM SERPL-MCNC: 8.8 MG/DL — SIGNIFICANT CHANGE UP (ref 8.5–10.1)
CHLORIDE SERPL-SCNC: 104 MMOL/L — SIGNIFICANT CHANGE UP (ref 98–110)
CO2 SERPL-SCNC: 21 MMOL/L — SIGNIFICANT CHANGE UP (ref 17–32)
CREAT SERPL-MCNC: 1.9 MG/DL — HIGH (ref 0.7–1.5)
GLUCOSE SERPL-MCNC: 92 MG/DL — SIGNIFICANT CHANGE UP (ref 70–110)
HCT VFR BLD CALC: 33 % — LOW (ref 37–47)
HGB BLD-MCNC: 10.5 G/DL — LOW (ref 12–16)
MAGNESIUM SERPL-MCNC: 2 MG/DL — SIGNIFICANT CHANGE UP (ref 1.8–2.4)
MCHC RBC-ENTMCNC: 29.6 PG — SIGNIFICANT CHANGE UP (ref 27–31)
MCHC RBC-ENTMCNC: 31.8 G/DL — LOW (ref 32–37)
MCV RBC AUTO: 93 FL — SIGNIFICANT CHANGE UP (ref 81–99)
NRBC # BLD: 0 /100 WBCS — SIGNIFICANT CHANGE UP (ref 0–0)
PLATELET # BLD AUTO: 343 K/UL — SIGNIFICANT CHANGE UP (ref 130–400)
POTASSIUM SERPL-MCNC: 5 MMOL/L — SIGNIFICANT CHANGE UP (ref 3.5–5)
POTASSIUM SERPL-SCNC: 5 MMOL/L — SIGNIFICANT CHANGE UP (ref 3.5–5)
RBC # BLD: 3.55 M/UL — LOW (ref 4.2–5.4)
RBC # FLD: 16.9 % — HIGH (ref 11.5–14.5)
SODIUM SERPL-SCNC: 137 MMOL/L — SIGNIFICANT CHANGE UP (ref 135–146)
SURGICAL PATHOLOGY STUDY: SIGNIFICANT CHANGE UP
WBC # BLD: 6.61 K/UL — SIGNIFICANT CHANGE UP (ref 4.8–10.8)
WBC # FLD AUTO: 6.61 K/UL — SIGNIFICANT CHANGE UP (ref 4.8–10.8)

## 2018-03-22 RX ADMIN — Medication 1 APPLICATION(S): at 06:27

## 2018-03-22 RX ADMIN — Medication 500 MILLIGRAM(S): at 06:28

## 2018-03-22 RX ADMIN — Medication 1 TABLET(S): at 08:30

## 2018-03-22 RX ADMIN — Medication 250 MILLIGRAM(S): at 12:53

## 2018-03-22 RX ADMIN — Medication 1 APPLICATION(S): at 06:26

## 2018-03-22 RX ADMIN — Medication 600 MILLIGRAM(S): at 12:53

## 2018-03-22 RX ADMIN — HEPARIN SODIUM 5000 UNIT(S): 5000 INJECTION INTRAVENOUS; SUBCUTANEOUS at 06:27

## 2018-03-22 RX ADMIN — HEPARIN SODIUM 5000 UNIT(S): 5000 INJECTION INTRAVENOUS; SUBCUTANEOUS at 14:06

## 2018-03-22 RX ADMIN — Medication 500 MILLIGRAM(S): at 12:56

## 2018-03-22 RX ADMIN — Medication 100 MILLIGRAM(S): at 12:54

## 2018-03-22 RX ADMIN — PANTOPRAZOLE SODIUM 40 MILLIGRAM(S): 20 TABLET, DELAYED RELEASE ORAL at 06:29

## 2018-03-22 RX ADMIN — Medication 1 TABLET(S): at 12:54

## 2018-03-22 RX ADMIN — SIMETHICONE 80 MILLIGRAM(S): 80 TABLET, CHEWABLE ORAL at 12:55

## 2018-03-22 RX ADMIN — MEROPENEM 100 MILLIGRAM(S): 1 INJECTION INTRAVENOUS at 06:26

## 2018-03-22 RX ADMIN — Medication 50 MICROGRAM(S): at 06:26

## 2018-03-22 RX ADMIN — Medication 1 MILLIGRAM(S): at 12:54

## 2018-03-22 NOTE — PROGRESS NOTE ADULT - ATTENDING COMMENTS
Agree with above.   Alex agreed for another debridement  Request Pulm clearance for General Anesthesia though.   Aiming for surgery for monday ?   C/w Huey. Can we add Vanco ?     Prognosis guarded.   Family has not made up their mind on hospice yet    DNR/DNI    Will f/u
Agree with above. SEe earlier detailed notes.     1) delirium on dementia   2) Advanced MS     Showing minimal improvements now with Huey. consult ID if Vanco also indicated.   Get Pulm clearance for Possible Debridement of sacral ulcer in coming week   Probably has adjacent osteomyelitis too since probe to bone positive.   will be a hard ulcer to heal.   Re-evaluate goals of care next week     3) Hypernatremia :   c/w D5 1/2 NS   Monitor.   Avoid overcorrection.
Assessment and plan above were modified and discussed with residents, physician assistants, and nurses.
Ongoing care discussed with patient
Patient is a 62y old Female with PMH of MS and being bed-bound, CKD IV, chronic sacral osteo, recently diagnosed colon CA at [facility], and seizure disorder who presents with a chief complaint of altered mental status (24 Feb 2018 18:44). Patient's daughter/healthcare proxy  reports patient's mental status has gradually declined since October after repeated hospitalizations for lung infections, UTIs, and the chronic sacral ulcer infections (the ulcer was debrided over her last admission?).   Patient's hospital course complicated by metabolic acidosis and sepsis. Patient is still non-verbal and altered. As per daughter, patient more verbal a week prior to admission.    # Metabolic encephalopathy probably 2/2 sepsis/ Chronic sacral osteomyelitis. UTI from chronic indwelling antony has a much smaller role to play in this.   Been on broad spectrum Abx since admission but to no avail.   Needs source control. Check with Burn to get her sacral ulcer debrided.  The family will give ABx another week maybe and if no promise of improvement, they may choose hospice. If her mentation is not going to get better, family thinks pursuing the Colon Ca would also be futile.     the multiple insults since October could also have worsened any Dementia ?    - CT Head No Cont (02.24.18 @ 16:25): negative  - CT Abdomen and Pelvis No Cont (02.24.18 @ 16:26) Sacral decubitus ulcer with extension to the coccyx demonstrating bony destruction of the coccyx consistent with osteomyelitis. Surrounding phlegmon measures 6 cm transverse. Trace persistent right hydroureteronephrosis with suggestion of bilateral urothelial thickening likely related to chronic infection.Stable 2.2 cm right adrenal adenoma.Stable diffuse bladder wall thickening and trabeculation.  - EEG showed generalized slowing w/ triphasic waves (reportedly) - consistent w/ metabolic encephalopathy  - Ammonia elevated - increased lactulose, titrate to 3-4 BMs per day. C/w Rifaximin. Ammonia trend 135->141->116->192 -> 113 -> 82 -> 75 -> 19    *But Mental status did not improve. Patient has no known h/o Hepatic Encephalopathy either. Hence May d/c lactulose if causing diarrhea/dehydration.    - repeat Blood Cx - NGTD    - will check UA + wound Cx  Re-consult ID since patient still actively septic from Chronic Sacral OM (Febrile, borderline BP)    # Hypernatremia  Nephro hasn't seen the patient recently. Please reconsult them. Increase free water flushes with feeds to around 1L/day. Check BMP frequently. Avoid correction more than 8 in 24 hours.     #SALOME on CKD : worsening. f/u BMP with the increased free water flushes. If need be, could use some 1/2NS on top of that.    #NAGMA : d/t SALOME. Controlled on PO Bicarb.    #Hyperparathyroidism : ?Primary. Corrected Ca & Phos are high. Nephro never followed up after these results came back. Ask Nephro to f/u    #Nutrition:   - NGT feeds: Glucerna  - Aspiration precautions  - S&S eval - pending    # Seizure Disorder/ Bipolar disorder  - Valproic acid level 26; Carbamazepine level 7.3. These levels recorded after increasing both meds  - Neuro on board - Rx Valproic acid increased to 500mg in the morning and at bedtime and 250mg in between. Tegretol increased to 600mg QD.    # Sigmoid Colon AdenoCA  Colonoscopy done at [facility] on 1/28/18 showed 4cm semi-circumferential mass in sigmoid colon, which was biopsied - reports show moderately differentiated adenoCA  - Oncology on board - Rx's appreciated. Not a candidate for chemo at this time. Surgical resection best for localized colon ca but needs complete staging (i.e. CT chest/abd/pel w/ IV contrast)  - family agreeable for CT w/ contrast (even though not sure if they would pursue the diagnosis, but they are still curious to know the extent). However in light of worsening SALOME will hold off on CT for now as it is not urgent.   Also explained to family that it is possible that the infected sacral ulcer might kill her before the CAncer does.     5. DM type 2  - Monitor FS.    6. Hypothyroidism  - c/w synthroid.    7. Multiple Sclerosis with neurogenic bladder  - Chronic antony   Bed bound     8. B/L upper extremity edema  - Venous duplex showed evidence of superficial thrombophlebitis in left arm distal to PICC line, results above.    DVT ppx: Heparin subQ  Code status: DNR/DNI  Dispo: from [facility] (long-term)    Prognosis guarded.   - Palliative care meeting: As per family, pt is now DNR and DNI. Family now open to hospice care - to have family meeting again on Friday 3/16.
Patient was evaluated and examined by bedside, bedridden, with profound lethargy and poor responce.    All labs, radiology studies, VS was reviewed  I agree with medical plan outlined by Medical resident as stated above.  Patient with very poor overall prognosis post meeting with Palliative team yesterday, now in agreement with Hospice care evaluation.  - Continue supportive  and comfort tx., Pain control is currently optimized.
Patient was evaluated and examined by bedside, bedridden, with profound lethargy and poor response.    All labs, radiology studies, VS was reviewed  I agree with medical plan outlined by Medical resident as stated above.  Patient with very poor overall prognosis post meeting with Palliative team , now in agreement with Hospice care evaluation.  - Continue supportive  and comfort tx., Pain control is currently optimized.    -f/up family for final decision for possible hospice care
Patient was evaluated and examined by bedside, very deconditioned, with lethargic state   All labs, radiology studies, VS was reviewed  I agree with medical plan outlined by Medical resident as stated above.  Overall patient's prognosis is very poor, continue with Palliative care, and encourage patient's family , to consider Hospice care for comfort and supportive tx.
Patient was evaluated and examined by bedside, very deconditioned, with lethargic state, noted to moan on/off  All labs, radiology studies, VS was reviewed  I agree with medical plan outlined by Medical resident as stated above.  Overall patient's prognosis is very poor, continue with Palliative care, and encourage patient's family , to consider Hospice care for comfort and supportive tx.    to optimize pain management will increase fentanyl patch to 50 mcg topically every 72 hours, start Morphine 2 mg intravenously every 4 hours as needed for severe pain.
Patient is a 62y old Female with PMH of MS and being bed-bound, CKD IV, chronic sacral osteo, recently diagnosed colon CA at [facility], and seizure disorder who presents with a chief complaint of altered mental status (24 Feb 2018 18:44). Patient's daughter/healthcare proxy present at bedside. She reports patient's mental status has gradually declined since October after repeated hospitalizations for lung infections, UTIs, and the chronic sacral ulcer. Patient's hospital course complicated by metabolic acidosis and sepsis. Patient is still non-verbal and altered. As per daughter, patient more verbal a week prior to admission.    1. Metabolic encephalopathy probably 2/2 sepsis/ sacral osteomyelitis/UTI 2/2 chronic indwelling antony :  Can continue IV Abx  for Osteo to see if it improves Mental status but overall patient is severely deconditioned , to the extent that Alex did not even consider her for debridement. Discussed with DAughter gissel today that despite all measures, unlikely that the QOL will return to normal or improve. Hospice meeting on Friday.     2. Recent diagnosis of Sigmoid Colon AdenoCA : Discuss with daughter if they want pan scan for staging. Are they gonna pursue this cancer diagnosis ? Or just Hospice ?
free water
Agree with above. SEe earlier detailed notes.     1) delirium on dementia   2) Advanced MS     Showing minimal improvements now with Huey. consult ID if Vanco also indicated.   Probably has adjacent osteomyelitis too since probe to bone positive.   will be a hard ulcer to heal.   Got debridement done today.   Re-evaluate goals of care next week if doesn't improve.    3) Hypernatremia :   c/w D5 1/2 NS   Monitor.   Avoid overcorrection    D/c lactulose.   Check valproic acid level to see why ammonia level may have been high.
Patient was evaluated and examined by bedside, awake, communicates well , tolerating diet well.  remains afebrile  All labs, radiology studies, VS was reviewed  I agree with medical plan outlined by Medical resident as stated above..  Patient anticipated for d/c to SN[facility], complete 3 more weeks of IV antibiotics for sacral osteomyelitis  -she is at high risk for readmission due to multiple comorbidities, her prognosis remains guarded, although she is clinically improved.  -Electrolytes imbalance -corrected  -h/o Sigmoid colon cancer- not a candidate for chemotx. due to poor functional status. f/up outpatient GI/Oncology  -Chronic lower extremities paralysis/non-ambulatory status- frequent turning, decub. prevention tx.  -CKD stage 4- creatinine at baseline  -for chronic medical conditions-resumed on home regimen tx.  D/C PLAN: PATIENT IS MEDICALLY STABLE FOR D/C TO SNIF WHEN BED AVAILABLE
Agree with above. SEe earlier detailed notes.     1) delirium on dementia   2) Advanced MS     After a prolonged hospitalization finally started to show improvements in mental status d/t the Abx.   Ulcer debrided 3/19.  This is the best her mental status has been. But counselled family at length that d/t her co-morbidities, it is only a matter of time before she catches some other problem. Family advised to re-evaluate Goals of care.   Continues to be DNR/DNI.   will be a hard ulcer to heal. c/w LWC.    May check with ID in 1-2 days what Abx she may go with to SNF for further ulcer wound care.        3) Hypernatremia :   c/w D5 1/2 NS   Monitor.   Avoid overcorrection    D/c lactulose.   Check valproic acid level to see why ammonia level may have been high.
When seen at 1700, fentanyl patch was still at 25mcg/hr and respiratory rate was decreased from ;yesterday, patient was not grunting, and no grimace with breathing  I previously spoke with the attending to explain my rationale.

## 2018-03-22 NOTE — PROGRESS NOTE ADULT - NSHPATTENDINGPLANDISCUSS_GEN_ALL_CORE
House staff, pts daughter
House staff
Medical Resident, 
Medical Resident, Nurse, 
intern on call.
Medical Resident
Medical Resident
Nursing
Resident
medical resident, 
Medical Resident, Family, 
House staff

## 2018-03-22 NOTE — CHART NOTE - NSCHARTNOTEFT_GEN_A_CORE
Registered Dietitian Limited Follow-Up    Metabolic encephalopathy probably 2/2 sepsis/ sacral osteomyelitis/UTI 2/2 chronic indwelling antony: improving. Sacral Decubiti ulcer stage 4: BURN following. SALOME on CKD IV: resolved, creatinine stable, Sigmoid Colon AdenoCA: oncology following but no intervention at this time. Hospice following - met on 3/16. Family still undecided at this time. Family agreeable to send pt back to [facility].      Pt now on po diet of dysphagia 2 mechanical soft, thin, carbohydrate consistent, low sodium. Pt noted to be alert/confused, disoriented. Spoke to RN about recent intake and tolerance. RN reports pt consuming almost everything with no issues tolerating since advancement, recent po 75% per EMR. Current diet order consistent with SLP recs (3/20). Last BM 3/22. Skin: B/L heel suspected DTI and stage IV pressure injury to sacrum ongoing. Pt previously on zinc for wound healing, and RD rec to d/c, ascorbic acid not given as pt with hx of CKD, pt on MVI. Meds and labs reviewed. Wt (3/21) 79.9 kg (3/19) 77.1 -  wt stable with fluctuations (73-79 kg) since admit. Will continue to monitor wt trends. No nutrition intervention at this time.

## 2018-03-22 NOTE — DISCHARGE NOTE ADULT - MEDICATION SUMMARY - MEDICATIONS TO TAKE
I will START or STAY ON the medications listed below when I get home from the hospital:    carBAMazepine 200 mg oral tablet  -- 2 tab(s) by mouth once a day  -- Indication: For Bipolar affective disorder    Depakote 500 mg oral delayed release tablet  -- 1 tab(s) by mouth once a day (at bedtime)  -- Indication: For Bipolar affective disorder    divalproex sodium 125 mg oral delayed release capsule  -- 2 cap(s) by mouth 2 times a day  -- Indication: For Bipolar affective disorder    LORazepam 1 mg oral tablet  -- 1 tab(s) by mouth 2 times a day  -- Indication: For Bipolar affective disorder    meropenem 500 mg intravenous injection  -- 500 milligram(s) intravenous every 12 hours for 6 weeks  -- Indication: For Sacral ulcer    silver sulfADIAZINE 1% topical cream  -- 1 application on skin every 12 hours  -- Indication: For Sacral ulcer    vancomycin  -- 500 milligram(s) intravenously once a day  for 6 weeks  -- Indication: For Sacral ulcer    Cranberry oral capsule  -- Indication: For HCM    FeroSul 325 mg (65 mg elemental iron) oral tablet  -- 325 milligram(s) by mouth 2 times a day  -- Indication: For HCM    Bisco-Lax 10 mg rectal suppository  -- 1 suppository(ies) rectally once a day  -- Indication: For Constipation    lactulose  -- 30 gram(s) by mouth 3 times a day  -- Indication: For Constipation    zinc sulfate 220 mg oral capsule  -- 1 cap(s) by mouth once a day  -- Indication: For HCM    simethicone 80 mg oral tablet  -- 1 tab(s) by mouth once a day  -- Indication: For HCM    Acidophilus oral tablet  -- tab(s) by mouth 2 times a day  -- Indication: For HCM    Synthroid 50 mcg (0.05 mg) oral tablet  -- 1 tab(s) by mouth once a day (in the morning)  -- Indication: For Hypothyroidism    Multiple Vitamins oral tablet  -- 1 tab(s) by mouth once a day  -- Indication: For HCM    thiamine 100 mg oral tablet  -- 1 tab(s) by mouth once a day  -- Indication: For HCM    Vitamin D3 50,000 intl units oral capsule  -- 1 cap(s) by mouth once a month  -- Indication: For HCM    folic acid 1 mg oral tablet  -- 1 tab(s) by mouth once a day  -- Indication: For HCM

## 2018-03-22 NOTE — DISCHARGE NOTE ADULT - CARE PLAN
Principal Discharge DX:	Declining functional status  Goal:	Improve symptoms, prevent recurrence  Assessment and plan of treatment:	Metabolic encephalopathy 2/2 Sacral ulcer and UTI, take medications as instructed, f/u with primary care physician  Secondary Diagnosis:	Bedridden  Secondary Diagnosis:	Sacral ulcer  Secondary Diagnosis:	UTI (urinary tract infection)

## 2018-03-22 NOTE — DISCHARGE NOTE ADULT - CARE PROVIDER_API CALL
Agnes Watson), Internal Medicine  [address] [place] [address]  Phone: [phone]  Fax: [phone] Agnes Watson), Internal Medicine  [address] [address]  Phone: [phone]  Fax: [phone]    Ramon Lewis), Infectious Disease; Internal Medicine  [address] [place] [id number]  Phone: [phone]  Fax: [phone]

## 2018-03-22 NOTE — PROGRESS NOTE ADULT - PROVIDER SPECIALTY LIST ADULT
Burn
Burn
Gastroenterology
Hospitalist
Infectious Disease
Internal Medicine
Nephrology
Palliative Care
Surgery
Surgery
Internal Medicine
Infectious Disease
Internal Medicine
Hospitalist

## 2018-03-22 NOTE — PROGRESS NOTE ADULT - SUBJECTIVE AND OBJECTIVE BOX
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     ALLERGIES:  No Known Allergies    MEDICATIONS:  MEDICATIONS  (STANDING):  carBAMazepine Suspension 600 milliGRAM(s) Oral daily  collagenase Ointment 1 Application(s) Topical two times a day  collagenase Ointment 1 Application(s) Topical two times a day  Dakins Solution - Full Strength 1 Application(s) Topical two times a day  fentaNYL   Patch  25 MICROgram(s)/Hr 1 Patch Transdermal every 72 hours  folic acid 1 milliGRAM(s) Oral daily  heparin  Injectable 5000 Unit(s) SubCutaneous every 8 hours  lactobacillus acidophilus 1 Tablet(s) Oral two times a day with meals  levothyroxine 50 MICROGram(s) Oral daily  meropenem  IVPB 500 milliGRAM(s) IV Intermittent every 12 hours  multivitamin 1 Tablet(s) Oral daily  pantoprazole   Suspension 40 milliGRAM(s) Oral two times a day before meals  simethicone 80 milliGRAM(s) Chew daily  thiamine 100 milliGRAM(s) Oral daily  valproic  acid Syrup 500 milliGRAM(s) Oral daily  valproic  acid Syrup 250 milliGRAM(s) Oral daily  valproic acid 250 milliGRAM(s) Oral <User Schedule>  valproic acid 500 milliGRAM(s) Oral <User Schedule>    MEDICATIONS  (PRN):  acetaminophen  Suppository 650 milliGRAM(s) Rectal four times a day PRN For Temp greater than 38 C (100.4 F)  senna 2 Tablet(s) Oral at bedtime PRN Constipation    VITALS:   Vital Signs Last 24 Hrs  T(C): 36.9 (21 Mar 2018 21:17), Max: 36.9 (21 Mar 2018 21:17)  T(F): 98.4 (21 Mar 2018 21:17), Max: 98.4 (21 Mar 2018 21:17)  HR: 100 (21 Mar 2018 21:17) (91 - 100)  BP: 96/45 (21 Mar 2018 21:17) (93/60 - 96/45)  BP(mean): --  RR: 18 (21 Mar 2018 21:17) (18 - 20)  SpO2: 100% (22 Mar 2018 00:48) (96% - 100%)    LABS:                    10.3   7.10  )-----------( 364      ( 21 Mar 2018 08:49 )             32.8     03-21    139  |  105  |  36<H>  ----------------------------<  80  4.5   |  20  |  2.0<H>    Ca    8.9      21 Mar 2018 08:49  Mg     2.1     03-21    Culture - Other (collected 19 Mar 2018 14:00)  Source: .Other sacral  Final Report (21 Mar 2018 22:30):    Rare Enterobacter cloacae    Moderate Enterococcus faecium (vancomycin resistant)  Organism: Enterobacter cloacae  Enterococcus faecium (vancomycin resistant) (21 Mar 2018 22:30)  Organism: Enterococcus faecium (vancomycin resistant) (21 Mar 2018 22:30)  Organism: Enterobacter cloacae (21 Mar 2018 22:30)      RADIOLOGY:    < from: US Retroperitoneal Complete (03.17.18 @ 13:24) >  1.  Negative examination of the kidneys. No hydronephrosis.    2.  Nondiagnostic examination urinary bladder.    < end of copied text >    < from: Xray Chest 1 View- PORTABLE-Urgent (03.16.18 @ 13:07) >  Nofocal consolidation.    < end of copied text >    PHYSICAL EXAM:  GEN: No acute distress  LUNGS: Clear to auscultation bilaterally   HEART: S1/S2 present. RRR.   ABD: Soft, non-tender, non-distended. Bowel sounds present  EXT: NC/NC/NE/2+PP/MERCEDES.  Skin: sacral ulcer stage 4  NEURO: AAOX1, follows commands

## 2018-03-22 NOTE — DISCHARGE NOTE ADULT - ADDITIONAL INSTRUCTIONS
f/u w/ primary care physician f/u w/ primary care physician  anna for 3 more weeks, end date on 04/12/18  f/u w/ Dr. Ramon Lewis as OP

## 2018-03-22 NOTE — PROGRESS NOTE ADULT - ASSESSMENT
Patient is a 62y old Female with PMH of MS and being bed-bound, CKD IV, chronic sacral osteo, recently diagnosed colon CA at [facility], and seizure disorder who presents with a chief complaint of altered mental status (24 Feb 2018 18:44). Patient's daughter/healthcare proxy present at bedside. She reports patient's mental status has gradually declined since October after repeated hospitalizations for lung infections, UTIs, and the chronic sacral ulcer. As per daughter, patient more verbal a week prior to admission.    #) Metabolic encephalopathy probably 2/2 sepsis/ sacral osteomyelitis/UTI 2/2 chronic indwelling antony  -improving  -on meropenem (started 3/16)  -CT Head No Cont (02.24.18 @ 16:25)- negative  -EEG showed generalized slowing w/ triphasic waves (reportedly) - consistent w/ metabolic encephalopathy  -BCx (3/15) - NGTD, UCx (3/16)- NGTD    #) Sacral Decubiti ulcer stage 4  -Burn f/u (Dr. Ordonez) - s/p debridement 03/19/2018  sharp excisional debridement sacrum [id number], no more debridement plans  -ID f/u (Dr. Suarez)- c/w meropenem, f/u sacral ulcer cx sensitivities  -Sacral cx (3/19)- rare gram neg rods, moderate enterococcus faecium, resistant to vancomycin but sensitive to meropenem    #)SALOME on CKD stage 4  -resolved, Cr-2.0  -Cr baseline around 2.0  -monitor BUN/creat    #) Hypernatremia  -resolved    #) Seizure Disorder/ Bipolar disorder  - stable, on valproate and carbamazepine    #) Sigmoid Colon AdenoCA  - GI - no intervention for now  - H/H stable  - c/w protonix  - EGD done at [facility] on 1/28/18 showed 4cm semi-circumferential mass in sigmoid colon, which was biopsied - reports show moderately differentiated adenoCA  - Oncology on board - Rx's appreciated. Not a candidate for chemo at this time. Surgical resection best for localized colon ca but needs complete staging (i.e. CT chest/abd/pel w/ IV contrast)  - family agreeable for CT w/ contrast. However in light of worsening SALOME will hold off on CT for now.    #) DM type 2  - Monitor FS.    #) Hypothyroidism  - c/w synthroid.    #) Multiple Sclerosis with neurogenic bladder  - Chronic antony. Changed 3/16    #) B/L upper extremity edema  - Venous duplex showed evidence of superficial thrombophlebitis in left arm distal to PICC line, results above.    #) DVT/ GI ppx  -HepSubQ, no GI ppx indicated    #) Code status  -DNR/DNI  -Palliative following  -Hospice following - met on 3/16. Family still undecided at this time.  -Heme/Onc (Dr. Bertrand)- Given the overall situation, hospice may be appropriate, not a candidate for systemic chemotherapy    #) Dispo  -from [facility] (long-term) Patient is a 62y old Female with PMH of MS and being bed-bound, CKD IV, chronic sacral osteo, recently diagnosed colon CA at [facility], and seizure disorder who presents with a chief complaint of altered mental status (24 Feb 2018 18:44). Patient's daughter/healthcare proxy present at bedside. She reports patient's mental status has gradually declined since October after repeated hospitalizations for lung infections, UTIs, and the chronic sacral ulcer. As per daughter, patient more verbal a week prior to admission.    #) Metabolic encephalopathy probably 2/2 sepsis/ sacral osteomyelitis/UTI 2/2 chronic indwelling antony  -improving  -on meropenem (started 3/16)  -CT Head No Cont (02.24.18 @ 16:25)- negative  -EEG showed generalized slowing w/ triphasic waves (reportedly) - consistent w/ metabolic encephalopathy  -BCx (3/15) - NGTD, UCx (3/16)- NGTD    #) Sacral Decubiti ulcer stage 4  -Burn f/u (Dr. Ordonez) - s/p debridement 03/19/2018  sharp excisional debridement sacrum [id number], no more debridement plans  -ID f/u (Dr. Suarez)- c/w meropenem for 3 more weeks (end date 04/12/18)  -Sacral cx (3/19)- rare gram neg rods, moderate enterococcus faecium, resistant to vancomycin but sensitive to meropenem    #)SALOME on CKD stage 4  -resolved, Cr-2.0  -Cr baseline around 2.0  -monitor BUN/creat    #) Hypernatremia  -resolved    #) Seizure Disorder/ Bipolar disorder  - stable, on valproate and carbamazepine    #) Sigmoid Colon AdenoCA  -GI - no intervention for now  -H/H stable  -c/w protonix  -EGD done at [facility] on 1/28/18 showed 4cm semi-circumferential mass in sigmoid colon, which was biopsied - reports show moderately differentiated adenoCA  -Oncology on board - Rx's appreciated. Not a candidate for chemo at this time. Surgical resection best for localized colon ca but needs complete staging (i.e. CT chest/abd/pel w/ IV contrast)  - family agreeable for CT w/ contrast. However in light of worsening SALOME will hold off on CT for now.    #) DM type 2  - Monitor FS.    #) Hypothyroidism  - c/w synthroid.    #) Multiple Sclerosis with neurogenic bladder  - Chronic antony. Changed 3/16    #) B/L upper extremity edema  - Venous duplex showed evidence of superficial thrombophlebitis in left arm distal to PICC line, results above.    #) DVT/ GI ppx  -HepSubQ, no GI ppx indicated    #) Code status  -DNR/DNI  -Palliative following  -Hospice following - met on 3/16. Family still undecided at this time.  -Heme/Onc (Dr. Bertrand)- Given the overall situation, hospice may be appropriate, not a candidate for systemic chemotherapy    #) Dispo  -from [facility] (long-term) Patient is a 62y old Female with PMH of MS and being bed-bound, CKD IV, chronic sacral osteo, recently diagnosed colon CA at [facility], and seizure disorder who presents with a chief complaint of altered mental status (24 Feb 2018 18:44). Patient's daughter/healthcare proxy present at bedside. She reports patient's mental status has gradually declined since October after repeated hospitalizations for lung infections, UTIs, and the chronic sacral ulcer. As per daughter, patient more verbal a week prior to admission.    #) Metabolic encephalopathy probably 2/2 sepsis/ sacral osteomyelitis/UTI 2/2 chronic indwelling antony  -improving  -on meropenem (started 3/16)  -CT Head No Cont (02.24.18 @ 16:25)- negative  -EEG showed generalized slowing w/ triphasic waves (reportedly) - consistent w/ metabolic encephalopathy  -BCx (3/15) - NGTD, UCx (3/16)- NGTD    #) Sacral Decubiti ulcer stage 4  -Burn f/u (Dr. Ordonez) - s/p debridement 03/19/2018  sharp excisional debridement sacrum [id number], no more debridement plans  -ID f/u (Dr. Suarez)- c/w meropenem for 3 more weeks (end date 04/12/18)  -Sacral cx (3/19)- rare gram neg rods, moderate enterococcus faecium, resistant to vancomycin but sensitive to meropenem    #)SALOME on CKD stage 4  -resolved, Cr-2.0  -Cr baseline around 2.0  -monitor BUN/creat    #) Hypernatremia  -resolved    #) Seizure Disorder/ Bipolar disorder  - stable, on valproate and carbamazepine    #) Sigmoid Colon AdenoCA  -GI - no intervention for now  -H/H stable  -c/w protonix  -EGD done at [facility] on 1/28/18 showed 4cm semi-circumferential mass in sigmoid colon, which was biopsied - reports show moderately differentiated adenoCA  -Oncology on board - Rx's appreciated. Not a candidate for chemo at this time. Surgical resection best for localized colon ca but needs complete staging (i.e. CT chest/abd/pel w/ IV contrast)  - family agreeable for CT w/ contrast. However in light of worsening SALOME will hold off on CT for now.    #) DM type 2  - Monitor FS.    #) Hypothyroidism  - c/w synthroid.    #) Multiple Sclerosis with neurogenic bladder  - Chronic antony. Changed 3/16    #) B/L upper extremity edema  - Venous duplex showed evidence of superficial thrombophlebitis in left arm distal to PICC line, results above.    #) DVT/ GI ppx  -HepSubQ, no GI ppx indicated    #) Code status  -DNR/DNI  -Palliative following  -Hospice following - met on 3/16. Family still undecided at this time.  -Heme/Onc (Dr. Bertrand)- Given the overall situation, hospice may be appropriate, not a candidate for systemic chemotherapy    #) Dispo  -from [facility] (long-term)  -spoke w/ family, family agreeable to send back to [facility]

## 2018-03-22 NOTE — DISCHARGE NOTE ADULT - PLAN OF CARE
Improve symptoms, prevent recurrence Metabolic encephalopathy 2/2 Sacral ulcer and UTI, take medications as instructed, f/u with primary care physician

## 2018-03-22 NOTE — DISCHARGE NOTE ADULT - HOSPITAL COURSE
Patient is a 62y old Female with PMH of MS and being bed-bound, CKD IV, chronic sacral osteo, recently diagnosed colon CA at [facility], and seizure disorder who presents with a chief complaint of altered mental status (24 Feb 2018 18:44). Patient's daughter/healthcare proxy present at bedside. She reports patient's mental status has gradually declined since October after repeated hospitalizations for lung infections, UTIs, and the chronic sacral ulcer. As per daughter, patient more verbal a week prior to admission.  #) Metabolic encephalopathy probably 2/2 sepsis/ sacral osteomyelitis/UTI 2/2 chronic indwelling antony  -improving  -on meropenem (started 3/16)  -CT Head No Cont (02.24.18 @ 16:25)- negative  -EEG showed generalized slowing w/ triphasic waves (reportedly) - consistent w/ metabolic encephalopathy  -BCx (3/15) - NGTD, UCx (3/16)- NGTD  #) Sacral Decubiti ulcer stage 4  -Burn f/u (Dr. Ordonez) - s/p debridement 03/19/2018  sharp excisional debridement sacrum [id number], no more debridement plans  -ID f/u (Dr. Suarez)- c/w meropenem, f/u sacral ulcer cx sensitivities  -Sacral cx (3/19)- rare gram neg rods, moderate enterococcus faecium and enterobacter cloacae, resistant to vancomycin but sensitive to meropenem

## 2018-03-23 ENCOUNTER — OUTPATIENT (OUTPATIENT)
Dept: OUTPATIENT SERVICES | Facility: HOSPITAL | Age: 63
LOS: 1 days | Discharge: HOME | End: 2018-03-23
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
## 2018-03-23 DIAGNOSIS — E72.20 DISORDER OF UREA CYCLE METABOLISM, UNSPECIFIED: ICD-10-CM

## 2018-03-23 DIAGNOSIS — Z79.2 LONG TERM (CURRENT) USE OF ANTIBIOTICS: ICD-10-CM

## 2018-03-23 DIAGNOSIS — E11.22 TYPE 2 DIABETES MELLITUS WITH DIABETIC CHRONIC KIDNEY DISEASE: ICD-10-CM

## 2018-03-23 DIAGNOSIS — R41.82 ALTERED MENTAL STATUS, UNSPECIFIED: ICD-10-CM

## 2018-03-23 DIAGNOSIS — I80.8 PHLEBITIS AND THROMBOPHLEBITIS OF OTHER SITES: ICD-10-CM

## 2018-03-23 DIAGNOSIS — C18.7 MALIGNANT NEOPLASM OF SIGMOID COLON: ICD-10-CM

## 2018-03-23 DIAGNOSIS — F31.9 BIPOLAR DISORDER, UNSPECIFIED: ICD-10-CM

## 2018-03-23 DIAGNOSIS — B37.49 OTHER UROGENITAL CANDIDIASIS: ICD-10-CM

## 2018-03-23 DIAGNOSIS — N17.9 ACUTE KIDNEY FAILURE, UNSPECIFIED: ICD-10-CM

## 2018-03-23 DIAGNOSIS — N18.4 CHRONIC KIDNEY DISEASE, STAGE 4 (SEVERE): ICD-10-CM

## 2018-03-23 DIAGNOSIS — D64.9 ANEMIA, UNSPECIFIED: ICD-10-CM

## 2018-03-23 DIAGNOSIS — N31.9 NEUROMUSCULAR DYSFUNCTION OF BLADDER, UNSPECIFIED: ICD-10-CM

## 2018-03-23 DIAGNOSIS — A41.9 SEPSIS, UNSPECIFIED ORGANISM: ICD-10-CM

## 2018-03-23 DIAGNOSIS — I96 GANGRENE, NOT ELSEWHERE CLASSIFIED: ICD-10-CM

## 2018-03-23 DIAGNOSIS — R13.10 DYSPHAGIA, UNSPECIFIED: ICD-10-CM

## 2018-03-23 DIAGNOSIS — G35 MULTIPLE SCLEROSIS: ICD-10-CM

## 2018-03-23 DIAGNOSIS — E83.39 OTHER DISORDERS OF PHOSPHORUS METABOLISM: ICD-10-CM

## 2018-03-23 DIAGNOSIS — Z66 DO NOT RESUSCITATE: ICD-10-CM

## 2018-03-23 DIAGNOSIS — Z98.890 OTHER SPECIFIED POSTPROCEDURAL STATES: Chronic | ICD-10-CM

## 2018-03-23 DIAGNOSIS — M46.28 OSTEOMYELITIS OF VERTEBRA, SACRAL AND SACROCOCCYGEAL REGION: ICD-10-CM

## 2018-03-23 DIAGNOSIS — E03.9 HYPOTHYROIDISM, UNSPECIFIED: ICD-10-CM

## 2018-03-23 DIAGNOSIS — T83.511A INFECTION AND INFLAMMATORY REACTION DUE TO INDWELLING URETHRAL CATHETER, INITIAL ENCOUNTER: ICD-10-CM

## 2018-03-23 DIAGNOSIS — E87.2 ACIDOSIS: ICD-10-CM

## 2018-03-23 DIAGNOSIS — G83.10 MONOPLEGIA OF LOWER LIMB AFFECTING UNSPECIFIED SIDE: ICD-10-CM

## 2018-03-23 DIAGNOSIS — G93.41 METABOLIC ENCEPHALOPATHY: ICD-10-CM

## 2018-03-23 DIAGNOSIS — E11.69 TYPE 2 DIABETES MELLITUS WITH OTHER SPECIFIED COMPLICATION: ICD-10-CM

## 2018-03-23 DIAGNOSIS — R62.7 ADULT FAILURE TO THRIVE: ICD-10-CM

## 2018-03-23 DIAGNOSIS — L89.154 PRESSURE ULCER OF SACRAL REGION, STAGE 4: ICD-10-CM

## 2018-03-23 DIAGNOSIS — F03.90 UNSPECIFIED DEMENTIA WITHOUT BEHAVIORAL DISTURBANCE: ICD-10-CM

## 2018-03-23 DIAGNOSIS — E87.0 HYPEROSMOLALITY AND HYPERNATREMIA: ICD-10-CM

## 2018-03-23 DIAGNOSIS — G40.909 EPILEPSY, UNSPECIFIED, NOT INTRACTABLE, WITHOUT STATUS EPILEPTICUS: ICD-10-CM

## 2018-03-24 ENCOUNTER — OUTPATIENT (OUTPATIENT)
Dept: OUTPATIENT SERVICES | Facility: HOSPITAL | Age: 63
LOS: 1 days | Discharge: HOME | End: 2018-03-24

## 2018-03-24 DIAGNOSIS — Z98.890 OTHER SPECIFIED POSTPROCEDURAL STATES: Chronic | ICD-10-CM

## 2018-03-24 DIAGNOSIS — R79.9 ABNORMAL FINDING OF BLOOD CHEMISTRY, UNSPECIFIED: ICD-10-CM

## 2018-03-24 DIAGNOSIS — Z51.81 ENCOUNTER FOR THERAPEUTIC DRUG LEVEL MONITORING: ICD-10-CM

## 2018-03-24 DIAGNOSIS — R94.6 ABNORMAL RESULTS OF THYROID FUNCTION STUDIES: ICD-10-CM

## 2018-03-24 DIAGNOSIS — D64.9 ANEMIA, UNSPECIFIED: ICD-10-CM

## 2018-03-26 ENCOUNTER — OUTPATIENT (OUTPATIENT)
Dept: OUTPATIENT SERVICES | Facility: HOSPITAL | Age: 63
LOS: 1 days | Discharge: HOME | End: 2018-03-26

## 2018-03-26 DIAGNOSIS — R79.9 ABNORMAL FINDING OF BLOOD CHEMISTRY, UNSPECIFIED: ICD-10-CM

## 2018-03-26 DIAGNOSIS — Z98.890 OTHER SPECIFIED POSTPROCEDURAL STATES: Chronic | ICD-10-CM

## 2018-03-26 DIAGNOSIS — A41.9 SEPSIS, UNSPECIFIED ORGANISM: ICD-10-CM

## 2018-03-26 DIAGNOSIS — D64.9 ANEMIA, UNSPECIFIED: ICD-10-CM

## 2018-04-02 ENCOUNTER — OUTPATIENT (OUTPATIENT)
Dept: OUTPATIENT SERVICES | Facility: HOSPITAL | Age: 63
LOS: 1 days | Discharge: HOME | End: 2018-04-02

## 2018-04-02 DIAGNOSIS — R79.9 ABNORMAL FINDING OF BLOOD CHEMISTRY, UNSPECIFIED: ICD-10-CM

## 2018-04-02 DIAGNOSIS — Z98.890 OTHER SPECIFIED POSTPROCEDURAL STATES: Chronic | ICD-10-CM

## 2018-04-02 DIAGNOSIS — D64.9 ANEMIA, UNSPECIFIED: ICD-10-CM

## 2018-04-09 ENCOUNTER — OUTPATIENT (OUTPATIENT)
Dept: OUTPATIENT SERVICES | Facility: HOSPITAL | Age: 63
LOS: 1 days | Discharge: HOME | End: 2018-04-09

## 2018-04-09 DIAGNOSIS — Z51.81 ENCOUNTER FOR THERAPEUTIC DRUG LEVEL MONITORING: ICD-10-CM

## 2018-04-09 DIAGNOSIS — Z98.890 OTHER SPECIFIED POSTPROCEDURAL STATES: Chronic | ICD-10-CM

## 2018-04-09 DIAGNOSIS — R79.9 ABNORMAL FINDING OF BLOOD CHEMISTRY, UNSPECIFIED: ICD-10-CM

## 2018-04-09 DIAGNOSIS — D64.9 ANEMIA, UNSPECIFIED: ICD-10-CM

## 2018-04-11 ENCOUNTER — OUTPATIENT (OUTPATIENT)
Dept: OUTPATIENT SERVICES | Facility: HOSPITAL | Age: 63
LOS: 1 days | Discharge: HOME | End: 2018-04-11

## 2018-04-11 DIAGNOSIS — Z98.890 OTHER SPECIFIED POSTPROCEDURAL STATES: Chronic | ICD-10-CM

## 2018-04-11 NOTE — PATIENT PROFILE ADULT. - ASSIST WITH
Patient understands there is an increased risk of corneal edema after cataract surgery. walking/standing/toileting

## 2018-04-12 DIAGNOSIS — Z51.81 ENCOUNTER FOR THERAPEUTIC DRUG LEVEL MONITORING: ICD-10-CM

## 2018-04-13 ENCOUNTER — OUTPATIENT (OUTPATIENT)
Dept: OUTPATIENT SERVICES | Facility: HOSPITAL | Age: 63
LOS: 1 days | Discharge: HOME | End: 2018-04-13

## 2018-04-13 DIAGNOSIS — Z98.890 OTHER SPECIFIED POSTPROCEDURAL STATES: Chronic | ICD-10-CM

## 2018-04-13 DIAGNOSIS — Z51.81 ENCOUNTER FOR THERAPEUTIC DRUG LEVEL MONITORING: ICD-10-CM

## 2018-04-14 ENCOUNTER — OUTPATIENT (OUTPATIENT)
Dept: OUTPATIENT SERVICES | Facility: HOSPITAL | Age: 63
LOS: 1 days | Discharge: HOME | End: 2018-04-14

## 2018-04-14 DIAGNOSIS — Z98.890 OTHER SPECIFIED POSTPROCEDURAL STATES: Chronic | ICD-10-CM

## 2018-04-14 DIAGNOSIS — R79.89 OTHER SPECIFIED ABNORMAL FINDINGS OF BLOOD CHEMISTRY: ICD-10-CM

## 2018-04-16 ENCOUNTER — OUTPATIENT (OUTPATIENT)
Dept: OUTPATIENT SERVICES | Facility: HOSPITAL | Age: 63
LOS: 1 days | Discharge: HOME | End: 2018-04-16

## 2018-04-16 DIAGNOSIS — R79.9 ABNORMAL FINDING OF BLOOD CHEMISTRY, UNSPECIFIED: ICD-10-CM

## 2018-04-16 DIAGNOSIS — Z98.890 OTHER SPECIFIED POSTPROCEDURAL STATES: Chronic | ICD-10-CM

## 2018-04-17 ENCOUNTER — OUTPATIENT (OUTPATIENT)
Dept: OUTPATIENT SERVICES | Facility: HOSPITAL | Age: 63
LOS: 1 days | Discharge: HOME | End: 2018-04-17

## 2018-04-17 DIAGNOSIS — Z51.81 ENCOUNTER FOR THERAPEUTIC DRUG LEVEL MONITORING: ICD-10-CM

## 2018-04-17 DIAGNOSIS — Z98.890 OTHER SPECIFIED POSTPROCEDURAL STATES: Chronic | ICD-10-CM

## 2018-04-18 ENCOUNTER — OUTPATIENT (OUTPATIENT)
Dept: OUTPATIENT SERVICES | Facility: HOSPITAL | Age: 63
LOS: 1 days | Discharge: HOME | End: 2018-04-18

## 2018-04-18 DIAGNOSIS — Z98.890 OTHER SPECIFIED POSTPROCEDURAL STATES: Chronic | ICD-10-CM

## 2018-04-18 DIAGNOSIS — Z51.81 ENCOUNTER FOR THERAPEUTIC DRUG LEVEL MONITORING: ICD-10-CM

## 2018-04-23 ENCOUNTER — OUTPATIENT (OUTPATIENT)
Dept: OUTPATIENT SERVICES | Facility: HOSPITAL | Age: 63
LOS: 1 days | Discharge: HOME | End: 2018-04-23

## 2018-04-23 DIAGNOSIS — R79.89 OTHER SPECIFIED ABNORMAL FINDINGS OF BLOOD CHEMISTRY: ICD-10-CM

## 2018-04-23 DIAGNOSIS — Z98.890 OTHER SPECIFIED POSTPROCEDURAL STATES: Chronic | ICD-10-CM

## 2018-04-24 ENCOUNTER — OUTPATIENT (OUTPATIENT)
Dept: OUTPATIENT SERVICES | Facility: HOSPITAL | Age: 63
LOS: 1 days | Discharge: HOME | End: 2018-04-24

## 2018-04-24 DIAGNOSIS — Z51.81 ENCOUNTER FOR THERAPEUTIC DRUG LEVEL MONITORING: ICD-10-CM

## 2018-04-24 DIAGNOSIS — Z98.890 OTHER SPECIFIED POSTPROCEDURAL STATES: Chronic | ICD-10-CM

## 2018-04-30 ENCOUNTER — OUTPATIENT (OUTPATIENT)
Dept: OUTPATIENT SERVICES | Facility: HOSPITAL | Age: 63
LOS: 1 days | Discharge: HOME | End: 2018-04-30

## 2018-04-30 DIAGNOSIS — Z98.890 OTHER SPECIFIED POSTPROCEDURAL STATES: Chronic | ICD-10-CM

## 2018-04-30 DIAGNOSIS — Z51.81 ENCOUNTER FOR THERAPEUTIC DRUG LEVEL MONITORING: ICD-10-CM

## 2018-05-01 ENCOUNTER — OUTPATIENT (OUTPATIENT)
Dept: OUTPATIENT SERVICES | Facility: HOSPITAL | Age: 63
LOS: 1 days | Discharge: HOME | End: 2018-05-01

## 2018-05-01 ENCOUNTER — OUTPATIENT (OUTPATIENT)
Dept: OUTPATIENT SERVICES | Facility: HOSPITAL | Age: 63
LOS: 1 days | End: 2018-05-01

## 2018-05-01 DIAGNOSIS — Z51.81 ENCOUNTER FOR THERAPEUTIC DRUG LEVEL MONITORING: ICD-10-CM

## 2018-05-01 DIAGNOSIS — Z98.890 OTHER SPECIFIED POSTPROCEDURAL STATES: Chronic | ICD-10-CM

## 2018-05-02 ENCOUNTER — OUTPATIENT (OUTPATIENT)
Dept: OUTPATIENT SERVICES | Facility: HOSPITAL | Age: 63
LOS: 1 days | Discharge: HOME | End: 2018-05-02

## 2018-05-02 DIAGNOSIS — Z98.890 OTHER SPECIFIED POSTPROCEDURAL STATES: Chronic | ICD-10-CM

## 2018-05-02 DIAGNOSIS — Z51.81 ENCOUNTER FOR THERAPEUTIC DRUG LEVEL MONITORING: ICD-10-CM

## 2018-05-02 DIAGNOSIS — C18.9 MALIGNANT NEOPLASM OF COLON, UNSPECIFIED: ICD-10-CM

## 2018-05-03 ENCOUNTER — OUTPATIENT (OUTPATIENT)
Dept: OUTPATIENT SERVICES | Facility: HOSPITAL | Age: 63
LOS: 1 days | Discharge: HOME | End: 2018-05-03

## 2018-05-03 DIAGNOSIS — Z51.81 ENCOUNTER FOR THERAPEUTIC DRUG LEVEL MONITORING: ICD-10-CM

## 2018-05-03 DIAGNOSIS — Z98.890 OTHER SPECIFIED POSTPROCEDURAL STATES: Chronic | ICD-10-CM

## 2018-05-17 ENCOUNTER — APPOINTMENT (OUTPATIENT)
Dept: HEMATOLOGY ONCOLOGY | Facility: CLINIC | Age: 63
End: 2018-05-17

## 2018-05-22 ENCOUNTER — OUTPATIENT (OUTPATIENT)
Dept: OUTPATIENT SERVICES | Facility: HOSPITAL | Age: 63
LOS: 1 days | Discharge: HOME | End: 2018-05-22

## 2018-05-22 DIAGNOSIS — R79.9 ABNORMAL FINDING OF BLOOD CHEMISTRY, UNSPECIFIED: ICD-10-CM

## 2018-05-22 DIAGNOSIS — E87.4 MIXED DISORDER OF ACID-BASE BALANCE: ICD-10-CM

## 2018-05-22 DIAGNOSIS — Z98.890 OTHER SPECIFIED POSTPROCEDURAL STATES: Chronic | ICD-10-CM

## 2018-05-22 DIAGNOSIS — Z51.81 ENCOUNTER FOR THERAPEUTIC DRUG LEVEL MONITORING: ICD-10-CM

## 2018-05-22 DIAGNOSIS — D64.9 ANEMIA, UNSPECIFIED: ICD-10-CM

## 2018-05-30 NOTE — CONSULT NOTE ADULT - PROVIDER SPECIALTY LIST ADULT
Burn
Burn
Called and informed the patient and pt has appointments all set up for the following recommendations.   
Gastroenterology
Heme/Onc
Infectious Disease
Nephrology
Palliative Care
Surgery
Neurology
Pulmonology

## 2018-06-04 ENCOUNTER — OUTPATIENT (OUTPATIENT)
Dept: OUTPATIENT SERVICES | Facility: HOSPITAL | Age: 63
LOS: 1 days | Discharge: HOME | End: 2018-06-04

## 2018-06-04 DIAGNOSIS — Z98.890 OTHER SPECIFIED POSTPROCEDURAL STATES: Chronic | ICD-10-CM

## 2018-06-04 DIAGNOSIS — N39.0 URINARY TRACT INFECTION, SITE NOT SPECIFIED: ICD-10-CM

## 2018-06-19 DIAGNOSIS — K74.60 UNSPECIFIED CIRRHOSIS OF LIVER: ICD-10-CM

## 2018-06-19 DIAGNOSIS — M86.9 OSTEOMYELITIS, UNSPECIFIED: ICD-10-CM

## 2018-06-19 DIAGNOSIS — G35 MULTIPLE SCLEROSIS: ICD-10-CM

## 2019-05-22 NOTE — PATIENT PROFILE ADULT. - NS PRO ABUSE SCREEN AFRAID ANYONE YN
(W89.300) Keratoconjunct sicca, not specified as Sjogren's, bilateral - Assesment : Examination revealed Dry Eye Syndrome OU. Reduced TF OU. Wears RGP OU. - Plan : Recommend taper to d/c redness drops/Visine that may be contributing to redness. Recommend PF artificial tears or rewetting drops 4-6 times daily OU. Monitor for changes. Advised patient to call our office with decreased vision or increased symptoms.  RV 1 year CL/Exam. unable to assess

## 2021-03-02 NOTE — BRIEF OPERATIVE NOTE - ANESTHESIOLOGIST NAME
kate I have personally reviewed this patient's labs below:                        11.6   3.65  )-----------( 183      ( 02 Mar 2021 02:50 )             36.6     03-02-21 @ 02:50    129<L>  |  96<L>  |  12             --------------------------< 116<H>     4.4  |  21<L>  | 1.03    eGFR AA: 91  eGFR N-AA: 79    Calcium: 8.1<L>  Phosphorus: --  Magnesium: --    AST: 108<H>    ALT: 42<H>  AlkPhos: 69  Protein: 6.8  Albumin: 3.5  TBili: 0.3  D-Bili: --    D-dimer 319, ferritin 632, CRP 80.2, procalcitonin 0.72, troponin 24    EKG ordered and pending    I have personally reviewed this patient's CXR and my independent interpretation is bilateral lower lobe opacities

## 2021-04-16 NOTE — PATIENT PROFILE ADULT. - PURPOSEFUL PROACTIVE ROUNDING
Chart reviewed. Last EGD on 4/9/21 by Dr. Scott. Gastric metaplasia noted, esophageal bx negative for EoE. Recommend repeat in 6 months.     Please call pt to schedule EGD with Dr. Scott.      Schedule Procedure:   Please Schedule in 6 months  Procedure: EGD ([id number])  Diagnosis: Intestinal metaplasia of gastric mucosa K31.89  Is patient:    Diabetic? No   ANTIPLATELET / ANTICOAGULATION: MEDICATION:  None  Latex allergy: No  Sleep apnea: No  Location: Patient Preference  Special Instructions:   MAC Anesthesia   COVID lab ordered    Patient

## 2021-08-03 NOTE — PROGRESS NOTE ADULT - SUBJECTIVE AND OBJECTIVE BOX
SUBJECTIVE:    Patient is a 62y old Female who presents with a chief complaint of altered mental status (24 Feb 2018 18:44)    Currently admitted to medicine with the primary diagnosis of Sepsis.     Today is hospital day 11d. This morning she is resting comfortably in bed and reports no new issues or overnight events. Spoke with daughter and Palliative care - they will plan for family meeting on Friday regarding goals of care.    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     ALLERGIES:  No Known Allergies    MEDICATIONS:  STANDING MEDICATIONS  bisacodyl Suppository 10 milliGRAM(s) Rectal daily  carBAMazepine Suspension 600 milliGRAM(s) Oral daily  collagenase Ointment 1 Application(s) Topical two times a day  Dakins Solution - Full Strength 1 Application(s) Topical two times a day  docusate sodium 100 milliGRAM(s) Oral three times a day  fentaNYL   Patch  25 MICROgram(s)/Hr 1 Patch Transdermal every 72 hours  folic acid 1 milliGRAM(s) Oral daily  heparin  Injectable 5000 Unit(s) SubCutaneous every 8 hours  lactobacillus acidophilus 1 Tablet(s) Oral two times a day with meals  lactulose Syrup 40 Gram(s) Enteral Tube three times a day  levothyroxine 50 MICROGram(s) Oral daily  metroNIDAZOLE    Tablet 500 milliGRAM(s) Oral every 12 hours  multivitamin 1 Tablet(s) Oral daily  pantoprazole   Suspension 40 milliGRAM(s) Oral two times a day before meals  rifaximin 550 milliGRAM(s) Oral two times a day  silver sulfADIAZINE 1% Cream 1 Application(s) Topical every 12 hours  simethicone 80 milliGRAM(s) Chew daily  sodium bicarbonate 650 milliGRAM(s) Oral three times a day  thiamine 100 milliGRAM(s) Oral daily  valproic  acid Syrup 250 milliGRAM(s) Oral daily  valproic  acid Syrup 500 milliGRAM(s) Oral daily  valproic  acid Syrup 500 milliGRAM(s) Oral at bedtime  zinc sulfate 220 milliGRAM(s) Oral daily    PRN MEDICATIONS  acetaminophen  Suppository 650 milliGRAM(s) Rectal four times a day PRN  oxyCODONE    IR 5 milliGRAM(s) Oral every 3 hours PRN  senna 2 Tablet(s) Oral at bedtime PRN    VITALS:   T(F): 100.2  HR: 119  BP: 110/56  RR: 20  SpO2: --    LABS:                        8.4    11.11 )-----------( 244      ( 07 Mar 2018 10:03 )             27.2     03-07    144  |  112<H>  |  48<H>  ----------------------------<  124<H>  5.2<H>   |  20  |  2.2<H>    Ca    9.3      07 Mar 2018 10:03  Phos  5.8     03-07  Mg     3.0     03-07    TPro  5.5<L>  /  Alb  1.5<L>  /  TBili  0.6  /  DBili  x   /  AST  16  /  ALT  11  /  AlkPhos  134<H>  03-07      PHYSICAL EXAM:  GEN: looks mildly uncomfortable, occasional moaning  LUNGS: CTAB, no w/r/r  HEART: RRR, no m/r/g  ABD: soft, NT/ND, +BS  EXT: NC/NC/NE/2+PP/MERCEDES  NEURO: AAOx0, non-verbal EOMI; PERRL; no drainage or redness

## 2021-09-10 NOTE — PROGRESS NOTE ADULT - ASSESSMENT
Patient is a 62y old Female with PMH of MS and being bed-bound, CKD IV, chronic sacral osteo, recently diagnosed colon CA at [facility], and seizure disorder who presents with a chief complaint of altered mental status (24 Feb 2018 18:44). Patient's daughter/healthcare proxy present at bedside. She reports patient's mental status has gradually declined since October after repeated hospitalizations for lung infections, UTIs, and the chronic sacral ulcer. As per daughter, patient more verbal a week prior to admission.    #) Metabolic encephalopathy probably 2/2 sepsis/ sacral osteomyelitis/UTI 2/2 chronic indwelling antony  -improving  -on merrem (started 3/16)  -CT Head No Cont (02.24.18 @ 16:25)- negative  -EEG showed generalized slowing w/ triphasic waves (reportedly) - consistent w/ metabolic encephalopathy  -BCx (3/15) - NGTD, UCx (3/16)- NGTD  -Palliative following  -Hospice following - met on 3/16. Family still undecided at this time.  -Pulm following (Dr. Henry)- gaave cearance for debridement  -Heme/Onc (Dr. Bertrand)- Given the overall situation, hospice may be appropriate, not a candidate for systemic chemotherapy    #) Sacral Decubiti ulcer stage 4  -Burn f/u (Dr. Ordonez) - s/p debridement 03/19/2018  sharp excisional debridement sacrum [id number], no more debridement plans  -ID following - palliative care, d/c iv antibiotics    #) Hypernatremia  -resolved    #) Seizure Disorder/ Bipolar disorder  - stable, on valproate and carbamazepine    #) Sigmoid Colon AdenoCA  - GI following - Rx no intervention for now  - H/H stable  - c/w protonix  - EGD done at [facility] on 1/28/18 showed 4cm semi-circumferential mass in sigmoid colon, which was biopsied - reports show moderately differentiated adenoCA  - Oncology on board - Rx's appreciated. Not a candidate for chemo at this time. Surgical resection best for localized colon ca but needs complete staging (i.e. CT chest/abd/pel w/ IV contrast)  - family agreeable for CT w/ contrast. However in light of worsening SALOME will hold off on CT for now.    #) DM type 2  - Monitor FS.    #) Hypothyroidism  - c/w synthroid.    #) Multiple Sclerosis with neurogenic bladder  - Chronic antony. Changed 3/16    #) B/L upper extremity edema  - Venous duplex showed evidence of superficial thrombophlebitis in left arm distal to PICC line, results above.    #) DVT/ GI ppx  -HepSubQ, no GI ppx indicated    #) Code status  -DNR/DNI    #) Dispo  -from [facility] (long-term) Patient is a 62y old Female with PMH of MS and being bed-bound, CKD IV, chronic sacral osteo, recently diagnosed colon CA at [facility], and seizure disorder who presents with a chief complaint of altered mental status (24 Feb 2018 18:44). Patient's daughter/healthcare proxy present at bedside. She reports patient's mental status has gradually declined since October after repeated hospitalizations for lung infections, UTIs, and the chronic sacral ulcer. As per daughter, patient more verbal a week prior to admission.    #) Metabolic encephalopathy probably 2/2 sepsis/ sacral osteomyelitis/UTI 2/2 chronic indwelling antony  -improving  -on merrem (started 3/16)  -CT Head No Cont (02.24.18 @ 16:25)- negative  -EEG showed generalized slowing w/ triphasic waves (reportedly) - consistent w/ metabolic encephalopathy  -BCx (3/15) - NGTD, UCx (3/16)- NGTD  -Palliative following  -Hospice following - met on 3/16. Family still undecided at this time.  -Pulm following (Dr. Henry)- gaave cearance for debridement  -Heme/Onc (Dr. Bertrand)- Given the overall situation, hospice may be appropriate, not a candidate for systemic chemotherapy    #) Sacral Decubiti ulcer stage 4  -Burn f/u (Dr. Ordonez) - s/p debridement 03/19/2018  sharp excisional debridement sacrum [id number], no more debridement plans  -ID f/u (Dr. Suarez)- c/w merrem, f/u sacral ulcer cx sensitivities  -Sacral cx (3/19)- rare gram neg rods, moderate enterococcus faecium    #) Hypernatremia  -resolved    #) Seizure Disorder/ Bipolar disorder  - stable, on valproate and carbamazepine    #) Sigmoid Colon AdenoCA  - GI following - Rx no intervention for now  - H/H stable  - c/w protonix  - EGD done at [facility] on 1/28/18 showed 4cm semi-circumferential mass in sigmoid colon, which was biopsied - reports show moderately differentiated adenoCA  - Oncology on board - Rx's appreciated. Not a candidate for chemo at this time. Surgical resection best for localized colon ca but needs complete staging (i.e. CT chest/abd/pel w/ IV contrast)  - family agreeable for CT w/ contrast. However in light of worsening SALOME will hold off on CT for now.    #) DM type 2  - Monitor FS.    #) Hypothyroidism  - c/w synthroid.    #) Multiple Sclerosis with neurogenic bladder  - Chronic antony. Changed 3/16    #) B/L upper extremity edema  - Venous duplex showed evidence of superficial thrombophlebitis in left arm distal to PICC line, results above.    #) DVT/ GI ppx  -HepSubQ, no GI ppx indicated    #) Code status  -DNR/DNI    #) Dispo  -from [facility] (long-term) Patient is a 62y old Female with PMH of MS and being bed-bound, CKD IV, chronic sacral osteo, recently diagnosed colon CA at [facility], and seizure disorder who presents with a chief complaint of altered mental status (24 Feb 2018 18:44). Patient's daughter/healthcare proxy present at bedside. She reports patient's mental status has gradually declined since October after repeated hospitalizations for lung infections, UTIs, and the chronic sacral ulcer. As per daughter, patient more verbal a week prior to admission.    #) Metabolic encephalopathy probably 2/2 sepsis/ sacral osteomyelitis/UTI 2/2 chronic indwelling antony  -improving  -on merrem (started 3/16)  -CT Head No Cont (02.24.18 @ 16:25)- negative  -EEG showed generalized slowing w/ triphasic waves (reportedly) - consistent w/ metabolic encephalopathy  -BCx (3/15) - NGTD, UCx (3/16)- NGTD  -Palliative following  -Hospice following - met on 3/16. Family still undecided at this time.  -Pulm following (Dr. Henry)- gave cearance for debridement  -Heme/Onc (Dr. Bertrand)- Given the overall situation, hospice may be appropriate, not a candidate for systemic chemotherapy    #) Sacral Decubiti ulcer stage 4  -Burn f/u (Dr. Ordonez) - s/p debridement 03/19/2018  sharp excisional debridement sacrum [id number], no more debridement plans  -ID f/u (Dr. Suarez)- c/w merrem, f/u sacral ulcer cx sensitivities  -Sacral cx (3/19)- rare gram neg rods, moderate enterococcus faecium    #) Hypernatremia  -resolved    #) Seizure Disorder/ Bipolar disorder  - stable, on valproate and carbamazepine    #) Sigmoid Colon AdenoCA  - GI following - Rx no intervention for now  - H/H stable  - c/w protonix  - EGD done at [facility] on 1/28/18 showed 4cm semi-circumferential mass in sigmoid colon, which was biopsied - reports show moderately differentiated adenoCA  - Oncology on board - Rx's appreciated. Not a candidate for chemo at this time. Surgical resection best for localized colon ca but needs complete staging (i.e. CT chest/abd/pel w/ IV contrast)  - family agreeable for CT w/ contrast. However in light of worsening SALOME will hold off on CT for now.    #) DM type 2  - Monitor FS.    #) Hypothyroidism  - c/w synthroid.    #) Multiple Sclerosis with neurogenic bladder  - Chronic antony. Changed 3/16    #) B/L upper extremity edema  - Venous duplex showed evidence of superficial thrombophlebitis in left arm distal to PICC line, results above.    #) DVT/ GI ppx  -HepSubQ, no GI ppx indicated    #) Code status  -DNR/DNI    #) Dispo  -from [facility] (long-term) Patient is a 62y old Female with PMH of MS and being bed-bound, CKD IV, chronic sacral osteo, recently diagnosed colon CA at [facility], and seizure disorder who presents with a chief complaint of altered mental status (24 Feb 2018 18:44). Patient's daughter/healthcare proxy present at bedside. She reports patient's mental status has gradually declined since October after repeated hospitalizations for lung infections, UTIs, and the chronic sacral ulcer. As per daughter, patient more verbal a week prior to admission.    #) Metabolic encephalopathy probably 2/2 sepsis/ sacral osteomyelitis/UTI 2/2 chronic indwelling antony  -improving  -on merropenem (started 3/16)  -CT Head No Cont (02.24.18 @ 16:25)- negative  -EEG showed generalized slowing w/ triphasic waves (reportedly) - consistent w/ metabolic encephalopathy  -BCx (3/15) - NGTD, UCx (3/16)- NGTD    #) Sacral Decubiti ulcer stage 4  -Burn f/u (Dr. Ordonez) - s/p debridement 03/19/2018  sharp excisional debridement sacrum [id number], no more debridement plans  -ID f/u (Dr. Suarez)- c/w merropenem, f/u sacral ulcer cx sens  itivities  -Sacral cx (3/19)- rare gram neg rods, moderate enterococcus faecium    #)SALOME, CKD stage 4  - resolving  - monitor BUN/creat    #) Hypernatremia  -resolved    #) Seizure Disorder/ Bipolar disorder  - stable, on valproate and carbamazepine    #) Sigmoid Colon AdenoCA  - GI following - Rx no intervention for now  - H/H stable  - c/w protonix  - EGD done at [facility] on 1/28/18 showed 4cm semi-circumferential mass in sigmoid colon, which was biopsied - reports show moderately differentiated adenoCA  - Oncology on board - Rx's appreciated. Not a candidate for chemo at this time. Surgical resection best for localized colon ca but needs complete staging (i.e. CT chest/abd/pel w/ IV contrast)  - family agreeable for CT w/ contrast. However in light of worsening SALOME will hold off on CT for now.    #) DM type 2  - Monitor FS.    #) Hypothyroidism  - c/w synthroid.    #) Multiple Sclerosis with neurogenic bladder  - Chronic antony. Changed 3/16    #) B/L upper extremity edema  - Venous duplex showed evidence of superficial thrombophlebitis in left arm distal to PICC line, results above.    #) DVT/ GI ppx  -HepSubQ, no GI ppx indicated    #) Code status  -DNR/DNI  -Palliative following  -Hospice following - met on 3/16. Family still undecided at this time.  -Heme/Onc (Dr. Bertrand)- Given the overall situation, hospice may be appropriate, not a candidate for systemic chemotherapy    #) Dispo  -from [facility] (long-term) no

## 2022-09-13 NOTE — PROGRESS NOTE ADULT - SUBJECTIVE AND OBJECTIVE BOX
Hpi Title: Evaluation of Skin Lesions Location: left cheek Year Removed: 2018 Patient is a 62y old  Female who presents with a chief complaint of change in MS, found to have UTI and SALOME. Started on IVF .   Overnight events: Renal failure is improving. Good urine output.    PAST MEDICAL & SURGICAL HISTORY:  Seizures, Liver cirrosis, bipolar d/o      Home Medications:  acetaminophen 650 mg oral tablet: 1 tab(s) orally 2 times a day (03 Feb 2018 17:32)  Acidophilus oral tablet: tab(s) orally 2 times a day (03 Feb 2018 17:32)  Bisco-Lax 10 mg rectal suppository: 1 suppository(ies) rectal once a day (03 Feb 2018 17:32)  carBAMazepine 100 mg oral tablet, chewable: 4.5 tab(s) orally once a day (at bedtime) (03 Feb 2018 17:32)  carBAMazepine 200 mg oral tablet: 2 tab(s) orally once a day (03 Feb 2018 17:32)  Cranberry oral capsule:  (03 Feb 2018 17:32)  Depakote 500 mg oral delayed release tablet: 1 tab(s) orally once a day (at bedtime) (03 Feb 2018 17:32)  divalproex sodium 125 mg oral delayed release capsule: 2 cap(s) orally 2 times a day (03 Feb 2018 17:32)  folic acid 1 mg oral tablet: 1 tab(s) orally once a day (03 Feb 2018 17:32)  lactulose: 30 gram(s) orally 3 times a day (03 Feb 2018 17:32)  LORazepam 1 mg oral tablet: 1 tab(s) orally 2 times a day (03 Feb 2018 17:32)  multivitamin with minerals:  (03 Feb 2018 17:32)  simethicone 80 mg oral tablet: 1 tab(s) orally once a day (03 Feb 2018 17:32)  Synthroid 50 mcg (0.05 mg) oral tablet: 1 tab(s) orally once a day (in the morning) (03 Feb 2018 17:32)  thiamine 100 mg oral tablet: 1 tab(s) orally once a day (03 Feb 2018 17:32)  Vitamin D3 50,000 intl units oral capsule: 1 cap(s) orally once a month (03 Feb 2018 17:32)  zinc sulfate 220 mg oral capsule: 1 cap(s) orally once a day (03 Feb 2018 17:32)          REVIEW OF SYSTEMS:  CONSTITUTIONAL: No weakness, fevers or chills  NECK: No pain or stiffness  RESPIRATORY: No cough, wheezing, hemoptysis; No shortness of breath  CARDIOVASCULAR: No chest pain or palpitations.  GASTROINTESTINAL: No abdominal or epigastric pain. No nausea, vomiting, or hematemesis; No diarrhea or constipation. No melena or hematochezia.  GENITOURINARY: antony in place  NEUROLOGICAL: No numbness or weakness  VASCULAR: No bilateral lower extremity edema.   All other review of systems is negative unless indicated above.    PHYSICAL EXAM:  GENERAL: NAD, no signs of respiratory distress  HEAD:  Atraumatic, Normocephalic  EYES: EOMI, PERRLA, conjunctiva and sclera clear  NECK: Supple, No JVD  CHEST/LUNG: Clear to auscultation bilaterally; No wheeze; No crackles; No accessory muscles used  HEART: Regular rate and rhythm; No murmurs;   ABDOMEN: Soft, Nontender, Nondistended; Bowel sounds present; No guarding  EXTREMITIES:  2+ Peripheral Pulses, No cyanosis or edema  NEUROLOGY: awake, alert  SKIN: No rashes or lesions    Vital Signs Last 24 Hrs  T(C): 31.7 (07 Feb 2018 08:02), Max: 36 (06 Feb 2018 16:45)  T(F): 89.1 (07 Feb 2018 08:02), Max: 96.8 (06 Feb 2018 16:45)  HR: 100 (07 Feb 2018 08:02) (97 - 100)  BP: 130/69 (07 Feb 2018 08:02) (114/52 - 130/69)  BP(mean): --  RR: 18 (07 Feb 2018 08:02) (16 - 18)  SpO2: --    I&O's Detail    06 Feb 2018 07:01  -  07 Feb 2018 07:00  --------------------------------------------------------  IN:    dextrose 5% + sodium chloride 0.45%.: 1000 mL    Jevity: 480 mL  Total IN: 1480 mL    OUT:    Indwelling Catheter - Urethral: 2525 mL    Stool: 5 mL  Total OUT: 2530 mL    Total NET: -1050 mL          Daily     Daily     Drug Dosing Weight  Height (cm): 157.48 (03 Feb 2018 12:20)  Weight (kg): 76.9 (03 Feb 2018 12:20)  BMI (kg/m2): 31 (03 Feb 2018 12:20)  BSA (m2): 1.78 (03 Feb 2018 12:20)    MEDICATIONS  (STANDING):  carBAMazepine 400 milliGRAM(s) Oral every 24 hours  carBAMazepine 450 milliGRAM(s) Oral at bedtime  dextrose 5% + sodium chloride 0.45%. 1000 milliLiter(s) (75 mL/Hr) IV Continuous <Continuous>  folic acid 1 milliGRAM(s) Oral daily  heparin  Injectable 5000 Unit(s) SubCutaneous every 8 hours  lactobacillus acidophilus 1 Tablet(s) Oral every 8 hours  lactulose Syrup 10 Gram(s) Oral every 8 hours  levothyroxine 50 MICROGram(s) Oral daily  meropenem  IVPB 500 milliGRAM(s) IV Intermittent every 12 hours  multivitamin 1 Tablet(s) Oral daily  silver sulfADIAZINE 1% Cream 1 Application(s) Topical every 12 hours  thiamine 100 milliGRAM(s) Oral daily  valproate sodium IVPB 500 milliGRAM(s) IV Intermittent every 12 hours  zinc sulfate 220 milliGRAM(s) Oral daily                              8.0    8.22  )-----------( 373      ( 07 Feb 2018 08:32 )             25.2     02-06    141  |  102  |  37<H>  ----------------------------<  98  3.5   |  23  |  2.0<H>    Ca    8.2<L>      06 Feb 2018 06:31  Mg     2.0     02-05    TPro  6.0  /  Alb  1.8<L>  /  TBili  0.5  /  DBili  x   /  AST  20  /  ALT  9   /  AlkPhos  100  02-06    LIVER FUNCTIONS - ( 06 Feb 2018 06:31 )  Alb: 1.8 g/dL / Pro: 6.0 g/dL / ALK PHOS: 100 U/L / ALT: 9 U/L / AST: 20 U/L / GGT: x           Potassium Trend: 3.5<--, 4.0<--, 3.5<--, 3.9<--, 3.3<--  Creatinine Trend: 2.0<--, 2.2<--, 2.3<--, 2.3<--, 2.3<--

## 2022-10-21 NOTE — DISCHARGE NOTE ADULT - THE PATIENT HAS
Daily Progress Note    Assessment & Plan     Patient Active Problem List:     S/P repair of ventral hernia[Z98.890, Z87.19]      Priority: Low [3]      Date Noted: 10/17/2022          Plan: Patient is a 68-year-old female who had a ventral hernia for some time.  She had bowel within the hernia sac and this would cause her occasional pain and discomfort.  She had no signs of any bowel obstruction but did have a nodule within the hernia sac.  She underwent a repair of a ventral hernia and excision of the peritoneal nodule on 10/17/2022.  Postoperatively the patient did have some hypoxia and was placed on oxygen.  Her oxygen could not be weaned down so she was admitted after the procedure.  CT scan of her chest was performed which showed changes from emphysema and possible lower lobe neoplasm.  Patient evaluated by pulmonary service.  She has been started on antibiotics.  She has tolerated her diet and her pain is well controlled.  Continue to use abdominal binder for comfort after surgery.  She does have fibromyalgia and is on Norco and gabapentin at home which can continue postoperatively.  Has had some issues with urination and a catheter placed.    Was on oxygen previously but this has been weaned off now.  Has been on Zosyn after surgery for possibility of pneumonia.  Okay for discharge from surgical standpoint.    Does have some pain to her abdomen at times but nothing unusual after surgery    Subjective     Patient hoping to go home today.  She is still not on any oxygen.  Eating well but has not had a bowel movement was hoping she can get some Colace to go home with    Objective   Vitals with min/max:  Vital Last Value 24 Hour Range   Temperature 97.5 °F (36.4 °C) (10/21/22 0433) Temp  Min: 97.3 °F (36.3 °C)  Max: 97.9 °F (36.6 °C)   Pulse 79 (10/21/22 0433) Pulse  Min: 79  Max: 104   Respiratory 16 (10/21/22 0433) Resp  Min: 14  Max: 17   Non-Invasive  Blood Pressure (!) 209/97 (10/21/22 0433) BP  Min: 107/71   Max: 209/97   Pulse Oximetry 97 % (10/21/22 0433) SpO2  Min: 96 %  Max: 98 %   Arterial   Blood Pressure   No data recorded       Weight change:     Intake/Output last 3 shifts:  I/O last 3 completed shifts:  In: 600 [P.O.:600]  Out: 2250 [Urine:2250]  Intake/Output this shift:  I/O this shift:  In: -   Out: 250 [Urine:250]     Physical Exam:   Gen: Poorly nourished, in no apparent distress  Head: normocephalic/atraumatic  Eyes: EOMI, pupils equal and round  ENT: nose and ears appear externally normal; moist mucous membranes  Neck: supple; no severe disturbance in ROM  CV: Regular rate and rhythm, S1/S2, no murmurs, rubs or gallops appreciated; no carotid bruits noted  Lungs: Clear to auscultation bilaterally, no wheezes or crackles appreciated.  Abd: Soft, nontender, nondistended, no guarding or rebound tenderness; no palpable masses appreciated.  Incision upper midline just below the xiphoid process clean and dry.  No bleeding noted  Extremities: No clubbing, cyanosis or edema  Skin: No rashes or lesions, no acute abnormality noted  Neuro: CN grossly intact; no focal deficits; muscle strength appears equal bilaterally  Psych: Alert and oriented, no acute distress or anxiety, appropriate affect    Medications:  Scheduled  Current Facility-Administered Medications   Medication Dose Route Frequency Provider Last Rate Last Admin   • nicotine (NICODERM) 21 MG/24HR patch 1 patch  1 patch Transdermal Daily Tank Tripathi MD   1 patch at 10/20/22 0855   • ipratropium-albuterol (DUONEB) 0.5-2.5 (3) MG/3ML nebulizer solution 3 mL  3 mL Nebulization BID Resp Etienne Reese MD   3 mL at 10/20/22 1957   • docusate sodium (COLACE) capsule 100 mg  100 mg Oral BID Tank Tripathi MD   100 mg at 10/20/22 2126   • fluticasone-umeclidin-vilanterol (TRELEGY ELLIPTA) 100-62.5-25 MCG/INH inhaler 1 puff  1 puff Inhalation Daily Resp Etienne Reese MD   1 puff at 10/20/22 0820   • pantoprazole (PROTONIX) EC tablet 40 mg  40 mg Oral QAM AC  Mana Noonan MD   40 mg at 10/21/22 0503   • sodium chloride (PF) 0.9 % injection 2 mL  2 mL Intracatheter 2 times per day Mana Noonan MD   2 mL at 10/20/22 2211   • heparin (porcine) injection 5,000 Units  5,000 Units Subcutaneous Q12H Mana Noonan MD   5,000 Units at 10/20/22 2145   • Magnesium Standard Replacement Protocol   Does not apply See Admin Instructions Mana Noonan MD       • Potassium Standard Replacement Protocol (Levels 3.5 and lower)   Does not apply See Admin Instructions Mana Noonan MD       • Phosphorus Standard Replacement Protocol   Does not apply See Admin Instructions Mana Noonan MD       • citalopram (CeleXA) tablet 40 mg  40 mg Oral Daily Mana Noonan MD   40 mg at 10/20/22 0855   • gabapentin (NEURONTIN) capsule 300 mg  300 mg Oral 2 times per day Mana Noonan MD   300 mg at 10/20/22 2126   • losartan (COZAAR) tablet 100 mg  100 mg Oral Daily Mana Noonan MD   100 mg at 10/20/22 0855   • rosuvastatin (CRESTOR) tablet 40 mg  40 mg Oral Daily Mana Noonan MD   40 mg at 10/20/22 0855   • piperacillin-tazobactam (ZOSYN) 3.375 g in sodium chloride 0.9 % 100 mL IVPB  3.375 g Intravenous 3 times per day Tank Tripathi MD 25 mL/hr at 10/21/22 0503 3.375 g at 10/21/22 0503     Infusions  Current Facility-Administered Medications   Medication Dose Route Frequency Provider Last Rate Last Admin     PRN  Current Facility-Administered Medications   Medication Dose Route Frequency Provider Last Rate Last Admin   • hydrALAZINE (APRESOLINE) injection 10 mg  10 mg Intravenous Q8H PRN Tank Tripathi MD   10 mg at 10/21/22 0438   • aluminum-magnesium hydroxide-simethicone (MAALOX) 200-200-20 MG/5ML suspension 30 mL  30 mL Oral Q4H PRN Mana Noonan MD   30 mL at 10/18/22 1723   • HYDROcodone-acetaminophen (NORCO)  MG per tablet 1 tablet  1 tablet Oral Q6H PRN Mana Noonan MD   1 tablet at 10/20/22 2128   •  HYDROcodone-acetaminophen (NORCO) 5-325 MG per tablet 1 tablet  1 tablet Oral Q4H PRN Michael S Romberg, MD   1 tablet at 10/21/22 0322   • sodium chloride 0.9 % flush bag 25 mL  25 mL Intravenous PRN Mana Noonan MD       • sodium chloride 0.9 % flush bag 25 mL  25 mL Intravenous PRN Mana Noonan MD       • acetaminophen (TYLENOL) tablet 650 mg  650 mg Oral Q4H PRN Mana Noonan MD       • tiZANidine (ZANAFLEX) tablet 2 mg  2 mg Oral Q8H PRN Mana Noonan MD   2 mg at 10/18/22 0347   • morphine injection 2 mg  2 mg Intravenous Q4H PRN Tank Tripathi MD   2 mg at 10/18/22 0144        Results:  Labs:  Recent Labs   Lab 10/21/22  0547 10/20/22  0447 10/19/22  0442   WBC 9.6 11.1* 14.5*   HGB 10.8* 11.4* 10.9*    331 332     Recent Labs   Lab 10/21/22  0547 10/20/22  0447 10/19/22  0442   SODIUM 135 133* 134*   POTASSIUM 3.9 4.0 4.3   CHLORIDE 102 101 101   CO2 27 26 25   BUN 11 9 10   CREATININE 0.99* 0.90 0.91   GLUCOSE 94 97 109*   CALCIUM 8.5 8.7 8.5       Imaging:  CT CHEST W CONTRAST   Final Result   Addendum 1 of 1   ADDENDUM:   10/18/22 02:10 Call Doctor Regarding Above results, called  SALO Choudhury on    10/18 02:09 (-05:00)            Final   1.  No pneumothorax is seen.  There is a small amount of    pneumomediastinum anteriorly associated with pneumoperitoneum and a small    amount of gas in the fissures portion of the anterior abdominal wall.     Correlate with recent surgical procedures.   2.  There is a 5 cm hiatal hernia and fluid distention of the mid to    lower esophagus measuring up to 2.7 cm in diameter.   3.  There is an oval 3.1 cm masslike consolidation in the periphery of    the left lower lobe abutting the pleura.  Differential considerations    include neoplasm versus round atelectasis.  Consider biopsy.   4.  Mild to moderate emphysema.            Communications:       Call Doctor Above results      Electronically signed by Gunner Almaraz MD on 10 18 22 at  02:03      XR CHEST AP OR PA 1 VIEW   Final Result      1. Suspected new small right pneumothorax.      2. Possible opacity in the retrocardiac left lower lobe.       3. Suspected right subdiaphragmatic free air likely from recent abdominal   surgery.         Findings were conveyed to NAEL MOSES via telephone at 10:31 PM on   10/17/2022 by Dr. Corrigan.      Electronically Signed by: LOUISE CORRIGAN MD    Signed on: 10/17/2022 10:32 PM          XR CHEST POST TUBE OR LINE PLACEMENT 1 VIEW   Final Result      No acute pulmonary process.      Electronically Signed by: CEZAR GROSSMAN MD    Signed on: 10/18/2022 7:52 AM               Available Intravenous Access     PICC Line / CVC Line / PIV Line / Intraosseous Line / Line / UAC Line  Duration           Peripheral IV 10/18/22 Left Forearm 24 3 days    Peripheral IV 10/18/22 Left Wrist 24 3 days                 Central Lines:       Urethral Catheter Lines:                      difficulty concentrating/difficulty decision making

## 2023-05-28 NOTE — DISCHARGE NOTE ADULT - NSFUCAREDSC_ALL_CORE_SIUH
Yes, the patient is being discharged from [facility]...
Fall with Harm Risk

## 2024-11-04 NOTE — PRE-OP CHECKLIST - ISOLATION PRECAUTIONS
8/5/24 last apt    none Staged Advancement Flap Text: The defect edges were debeveled with a #15 scalpel blade.  Given the location of the defect, shape of the defect and the proximity to free margins a staged advancement flap was deemed most appropriate.  Using a sterile surgical marker, an appropriate advancement flap was drawn incorporating the defect and placing the expected incisions within the relaxed skin tension lines where possible. The area thus outlined was incised deep to adipose tissue with a #15 scalpel blade.  The skin margins were undermined to an appropriate distance in all directions utilizing iris scissors.
